# Patient Record
Sex: FEMALE | Race: WHITE | NOT HISPANIC OR LATINO | Employment: UNEMPLOYED | URBAN - METROPOLITAN AREA
[De-identification: names, ages, dates, MRNs, and addresses within clinical notes are randomized per-mention and may not be internally consistent; named-entity substitution may affect disease eponyms.]

---

## 2017-01-02 ENCOUNTER — OFFICE VISIT (OUTPATIENT)
Dept: URGENT CARE | Facility: CLINIC | Age: 52
End: 2017-01-02
Payer: OTHER GOVERNMENT

## 2017-01-02 VITALS
WEIGHT: 236.56 LBS | HEIGHT: 64 IN | HEART RATE: 108 BPM | SYSTOLIC BLOOD PRESSURE: 142 MMHG | OXYGEN SATURATION: 98 % | TEMPERATURE: 98 F | BODY MASS INDEX: 40.39 KG/M2 | DIASTOLIC BLOOD PRESSURE: 82 MMHG

## 2017-01-02 DIAGNOSIS — H65.90 NON-SUPPURATIVE OTITIS MEDIA, UNSPECIFIED LATERALITY: Primary | ICD-10-CM

## 2017-01-02 DIAGNOSIS — J02.9 SORE THROAT: ICD-10-CM

## 2017-01-02 DIAGNOSIS — J06.9 VIRAL URI WITH COUGH: ICD-10-CM

## 2017-01-02 LAB
CTP QC/QA: YES
S PYO RRNA THROAT QL PROBE: NEGATIVE

## 2017-01-02 PROCEDURE — 87880 STREP A ASSAY W/OPTIC: CPT | Mod: PBBFAC,PO | Performed by: PHYSICIAN ASSISTANT

## 2017-01-02 PROCEDURE — 87081 CULTURE SCREEN ONLY: CPT

## 2017-01-02 PROCEDURE — 99214 OFFICE O/P EST MOD 30 MIN: CPT | Mod: PBBFAC,PO | Performed by: PHYSICIAN ASSISTANT

## 2017-01-02 PROCEDURE — 99999 PR PBB SHADOW E&M-EST. PATIENT-LVL IV: CPT | Mod: PBBFAC,,, | Performed by: PHYSICIAN ASSISTANT

## 2017-01-02 PROCEDURE — 99214 OFFICE O/P EST MOD 30 MIN: CPT | Mod: S$PBB,,, | Performed by: PHYSICIAN ASSISTANT

## 2017-01-02 RX ORDER — PROMETHAZINE HYDROCHLORIDE AND DEXTROMETHORPHAN HYDROBROMIDE 6.25; 15 MG/5ML; MG/5ML
5 SYRUP ORAL EVERY 6 HOURS PRN
Qty: 120 ML | Refills: 0 | Status: SHIPPED | OUTPATIENT
Start: 2017-01-02 | End: 2018-05-10 | Stop reason: ALTCHOICE

## 2017-01-02 RX ORDER — BENZONATATE 100 MG/1
200 CAPSULE ORAL 3 TIMES DAILY PRN
Qty: 60 CAPSULE | Refills: 0 | Status: SHIPPED | OUTPATIENT
Start: 2017-01-02 | End: 2017-01-12

## 2017-01-02 RX ORDER — AMOXICILLIN 875 MG/1
875 TABLET, FILM COATED ORAL EVERY 12 HOURS
Qty: 14 TABLET | Refills: 0 | Status: SHIPPED | OUTPATIENT
Start: 2017-01-02 | End: 2017-01-09

## 2017-01-02 NOTE — PATIENT INSTRUCTIONS
Middle Ear Infection (Adult)  You have an infection of the middle ear, the space behind the eardrum. This is also called acute otitis media (AOM). Sometimes it is caused by the common cold. This is because congestion can block the internal passage (eustachian tube) that drains fluid from the middle ear. When the middle ear fills with fluid, bacteria can grow there and cause an infection. Oral antibiotics are used to treat this illness, not ear drops. Symptoms usually start to improve within 1 to 2 days of treatment.    Home care  The following are general care guidelines:  · Finish all of the antibiotic medicine given, even though you may feel better after the first few days.  · You may use over-the-counter medicine, such as acetaminophen or ibuprofen, to control pain and fever, unless something else was prescribed. If you have chronic liver or kidney disease or have ever had a stomach ulcer or gastrointestinal bleeding, talk with your healthcare provider before using these medicines. Do not give aspirin to anyone under 18 years of age who has a fever. It may cause severe illness or death.  Follow-up care  Follow up with your healthcare provider, or as advised, in 2 weeks if all symptoms have not gotten better, or if hearing doesn't go back to normal within 1 month.  When to seek medical advice  Call your healthcare provider right away if any of these occur:  · Ear pain gets worse or does not improve after 3 days of treatment  · Unusual drowsiness or confusion  · Neck pain, stiff neck, or headache  · Fluid or blood draining from the ear canal  · Fever of 100.4°F (38°C) or as advised   · Seizure  © 2833-5121 [employer]. [address] [place] [address], [place] [address]. All rights reserved. This information is not intended as a substitute for professional medical care. Always follow your healthcare professional's instructions.      PRICE therapy:  Protect the joint with stabilizing brace or taping  Rest the  extremity  Ice as many times a day for 20 minute intervals for first 48 hours or until inflammation has stabilized    Compression to provide support and help prevent swelling  Elevation above heart level to help decrease swelling  For pain, may take Ibuprofen or Naproxen    Follow up with Primary Care Provider/Orthopedics if no improvement.

## 2017-01-02 NOTE — MR AVS SNAPSHOT
[facility]  [address] [place] [address]  Phone: [phone]  Fax: [phone]                  Linette Galo   2017 9:00 AM   Office Visit    Description:  Female : 1965   Provider:  Tiffanie Yepez PA-C   Department:  [place] [facility]           Reason for Visit     Sinus Problem           Diagnoses this Visit        Comments    Left non-suppurative otitis media    -  Primary     Viral URI with cough                To Do List           Goals (5 Years of Data)     None      Follow-Up and Disposition     Return in about 2 days (around 2017), or if symptoms worsen or fail to improve.       These Medications        Disp Refills Start End    amoxicillin (AMOXIL) 875 MG tablet 14 tablet 0 2017    Take 1 tablet (875 mg total) by mouth every 12 (twelve) hours. - Oral    Pharmacy: [facility] [id number] [address] Ph #: [phone]       benzonatate (TESSALON) 100 MG capsule 60 capsule 0 2017    Take 2 capsules (200 mg total) by mouth 3 (three) times daily as needed for Cough. - Oral    Pharmacy: [facility] [address] [address] Ph #: [phone]       promethazine-dextromethorphan (PROMETHAZINE-DM) 6.25-15 mg/5 mL Syrp 120 mL 0 2017     Take 5 mLs by mouth every 6 (six) hours as needed. May cause drowsiness - Oral    Pharmacy: [facility] [address] [address] Ph #: [phone]         [facility]     [facility] On Call Nurse Care Line -  Assistance  Registered nurses in the [facility] provide clinical advisement, health education, appointment booking, and other advisory services.  Call for this free service at [phone].             Medications           Message regarding Medications     Verify the changes and/or additions to your medication regime listed below are the same as discussed with your clinician today.  If any of these  changes or additions are incorrect, please notify your healthcare provider.        START taking these NEW medications        Refills    amoxicillin (AMOXIL) 875 MG tablet 0    Sig: Take 1 tablet (875 mg total) by mouth every 12 (twelve) hours.    Class: Normal    Route: Oral    benzonatate (TESSALON) 100 MG capsule 0    Sig: Take 2 capsules (200 mg total) by mouth 3 (three) times daily as needed for Cough.    Class: Normal    Route: Oral    promethazine-dextromethorphan (PROMETHAZINE-DM) 6.25-15 mg/5 mL Syrp 0    Sig: Take 5 mLs by mouth every 6 (six) hours as needed. May cause drowsiness    Class: Normal    Route: Oral           Verify that the below list of medications is an accurate representation of the medications you are currently taking.  If none reported, the list may be blank. If incorrect, please contact your healthcare provider. Carry this list with you in case of emergency.           Current Medications     buPROPion (WELLBUTRIN) 100 MG tablet Take 100 mg by mouth 2 (two) times daily.    cholecalciferol, vitamin D3, (VITAMIN D3) 1,000 unit capsule Take 1 capsule (1,000 Units total) by mouth once daily.    cyanocobalamin, vitamin B-12, (VITAMIN B-12) 50 mcg tablet Take 50 mcg by mouth once daily.    duloxetine (CYMBALTA) 60 MG capsule Take 60 mg by mouth once daily.    lamotrigine (LAMICTAL) 100 MG tablet Take by mouth once daily. 100mg Oral PRN As directed.    lisdexamfetamine (VYVANSE) 50 MG capsule Take 50 mg by mouth every morning.    multivitamin capsule Take 1 capsule by mouth once daily.    amoxicillin (AMOXIL) 875 MG tablet Take 1 tablet (875 mg total) by mouth every 12 (twelve) hours.    AMPHETAMINE SALT COMBO 10 MG tablet Take 1 tablet by mouth every evening.    benzonatate (TESSALON) 100 MG capsule Take 2 capsules (200 mg total) by mouth 3 (three) times daily as needed for Cough.    cetirizine (ZYRTEC) 10 MG tablet Take 1 tablet (10 mg total) by mouth every evening.    fluticasone (FLONASE) 50  "mcg/actuation nasal spray 2 sprays by Each Nare route once daily.    promethazine-dextromethorphan (PROMETHAZINE-DM) 6.25-15 mg/5 mL Syrp Take 5 mLs by mouth every 6 (six) hours as needed. May cause drowsiness           Clinical Reference Information           Vital Signs - Last Recorded  Most recent update: 1/2/2017  9:08 AM by Leena Easton LPN    BP Pulse Temp Ht Wt SpO2    (!) 142/82 108 98.3 °F (36.8 °C) (Tympanic) 5' 4" (1.626 m) 107.3 kg (236 lb 8.9 oz) 98%    BMI                40.6 kg/m2          Blood Pressure          Most Recent Value    BP  (!)  142/82      Allergies as of 1/2/2017     No Known Allergies      Immunizations Administered on Date of Encounter - 1/2/2017     None      Instructions      Middle Ear Infection (Adult)  You have an infection of the middle ear, the space behind the eardrum. This is also called acute otitis media (AOM). Sometimes it is caused by the common cold. This is because congestion can block the internal passage (eustachian tube) that drains fluid from the middle ear. When the middle ear fills with fluid, bacteria can grow there and cause an infection. Oral antibiotics are used to treat this illness, not ear drops. Symptoms usually start to improve within 1 to 2 days of treatment.    Home care  The following are general care guidelines:  · Finish all of the antibiotic medicine given, even though you may feel better after the first few days.  · You may use over-the-counter medicine, such as acetaminophen or ibuprofen, to control pain and fever, unless something else was prescribed. If you have chronic liver or kidney disease or have ever had a stomach ulcer or gastrointestinal bleeding, talk with your healthcare provider before using these medicines. Do not give aspirin to anyone under 18 years of age who has a fever. It may cause severe illness or death.  Follow-up care  Follow up with your healthcare provider, or as advised, in 2 weeks if all symptoms have not gotten " better, or if hearing doesn't go back to normal within 1 month.  When to seek medical advice  Call your healthcare provider right away if any of these occur:  · Ear pain gets worse or does not improve after 3 days of treatment  · Unusual drowsiness or confusion  · Neck pain, stiff neck, or headache  · Fluid or blood draining from the ear canal  · Fever of 100.4°F (38°C) or as advised   · Seizure  © 2770-0738 [employer]. [address] [place] [address], [place] [address]. All rights reserved. This information is not intended as a substitute for professional medical care. Always follow your healthcare professional's instructions.      PRICE therapy:  Protect the joint with stabilizing brace or taping  Rest the extremity  Ice as many times a day for 20 minute intervals for first 48 hours or until inflammation has stabilized    Compression to provide support and help prevent swelling  Elevation above heart level to help decrease swelling  For pain, may take Ibuprofen or Naproxen    Follow up with Primary Care Provider/Orthopedics if no improvement.

## 2017-01-02 NOTE — PROGRESS NOTES
"Subjective:       Patient ID: Linette Galo is a 51 y.o. female.    Chief Complaint: Sinus Problem    Sinus Problem   This is a new problem. Episode onset: 4 days ago. The problem is unchanged. There has been no fever. Her pain is at a severity of 8/10. The pain is severe. Associated symptoms include congestion (yellow discharge), coughing (non productive), ear pain (left ear pain), sinus pressure ("teeth hurt") and a sore throat. Pertinent negatives include no chills, shortness of breath or sneezing. Past treatments include nothing.     Review of Systems   Constitutional: Negative for chills, fatigue, fever and unexpected weight change.   HENT: Positive for congestion (yellow discharge), ear pain (left ear pain), sinus pressure ("teeth hurt") and sore throat. Negative for ear discharge, postnasal drip, rhinorrhea, sneezing, trouble swallowing and voice change.    Eyes: Negative for pain and discharge.   Respiratory: Positive for cough (non productive). Negative for shortness of breath and wheezing.    Cardiovascular: Negative for chest pain and leg swelling.   Gastrointestinal: Negative for abdominal pain, nausea and vomiting.       Objective:      Visit Vitals    BP (!) 142/82    Pulse 108    Temp 98.3 °F (36.8 °C) (Tympanic)    Ht 5' 4" (1.626 m)    Wt 107.3 kg (236 lb 8.9 oz)    SpO2 98%    BMI 40.6 kg/m2     Physical Exam   Constitutional: She is oriented to person, place, and time. She appears well-developed and well-nourished. No distress.   HENT:   Head: Normocephalic and atraumatic.   Right Ear: External ear normal.   Left Ear: External ear normal. Tympanic membrane is erythematous.   Nose: Nose normal.   Mouth/Throat: Oropharynx is clear and moist. No oropharyngeal exudate.   Eyes: Conjunctivae and EOM are normal. Pupils are equal, round, and reactive to light. Right eye exhibits no discharge. Left eye exhibits no discharge. No scleral icterus.   Neck: Normal range of motion. Neck supple. "   Cardiovascular: Normal rate, regular rhythm, normal heart sounds and intact distal pulses.  Exam reveals no gallop and no friction rub.    No murmur heard.  Pulmonary/Chest: Effort normal and breath sounds normal. No stridor. No respiratory distress. She has no wheezes. She has no rales. She exhibits no tenderness.   Lymphadenopathy:     She has no cervical adenopathy.   Neurological: She is alert and oriented to person, place, and time. Coordination normal.   Skin: Skin is warm and dry. No rash noted. She is not diaphoretic. No erythema. No pallor.   Nursing note and vitals reviewed.      Assessment:       1. Left non-suppurative otitis media    2. Viral URI with cough    3. Sore throat        Plan:       Left non-suppurative otitis media  -     amoxicillin (AMOXIL) 875 MG tablet; Take 1 tablet (875 mg total) by mouth every 12 (twelve) hours.  Dispense: 14 tablet; Refill: 0    Viral URI with cough  -     benzonatate (TESSALON) 100 MG capsule; Take 2 capsules (200 mg total) by mouth 3 (three) times daily as needed for Cough.  Dispense: 60 capsule; Refill: 0  -     promethazine-dextromethorphan (PROMETHAZINE-DM) 6.25-15 mg/5 mL Syrp; Take 5 mLs by mouth every 6 (six) hours as needed. May cause drowsiness  Dispense: 120 mL; Refill: 0    Sore throat  -     POCT Rapid Strep A  -     Strep A culture, throat      Patient also complained of R wrist pain gradual over past few months, has hx of carpal tunnel surgery. Worried about arthritis. Suggested to patient to resume wearing splint especially at night and may do ice and NSAID's along with resting the wrist. Suggested early follow up with PCP to discuss due to more complicated history of carpal tunnel and surgery.  PRICE therapy:  Protect the joint with stabilizing brace or taping  Rest the extremity  Ice as many times a day for 20 minute intervals for first 48 hours or until inflammation has stabilized    Compression to provide support and help prevent  swelling  Elevation above heart level to help decrease swelling  For pain, may take Ibuprofen or Naproxen    Follow up with Primary Care Provider/Orthopedics if no improvement.       Heather Trant PA-C [facility]

## 2017-01-04 LAB — BACTERIA THROAT CULT: NORMAL

## 2017-02-02 ENCOUNTER — HOSPITAL ENCOUNTER (OUTPATIENT)
Dept: RADIOLOGY | Facility: HOSPITAL | Age: 52
Discharge: HOME | End: 2017-02-02
Attending: FAMILY MEDICINE
Payer: OTHER GOVERNMENT

## 2017-02-02 ENCOUNTER — OFFICE VISIT (OUTPATIENT)
Dept: URGENT CARE | Facility: CLINIC | Age: 52
End: 2017-02-02
Payer: OTHER GOVERNMENT

## 2017-02-02 VITALS
WEIGHT: 239.19 LBS | HEART RATE: 95 BPM | TEMPERATURE: 99 F | OXYGEN SATURATION: 98 % | SYSTOLIC BLOOD PRESSURE: 130 MMHG | HEIGHT: 64 IN | BODY MASS INDEX: 40.83 KG/M2 | DIASTOLIC BLOOD PRESSURE: 80 MMHG

## 2017-02-02 DIAGNOSIS — M25.531 WRIST PAIN, ACUTE, RIGHT: Primary | ICD-10-CM

## 2017-02-02 DIAGNOSIS — M79.644 THUMB PAIN, RIGHT: ICD-10-CM

## 2017-02-02 DIAGNOSIS — M25.531 WRIST PAIN, ACUTE, RIGHT: ICD-10-CM

## 2017-02-02 DIAGNOSIS — S63.501A WRIST SPRAIN, RIGHT, INITIAL ENCOUNTER: ICD-10-CM

## 2017-02-02 PROCEDURE — 73140 X-RAY EXAM OF FINGER(S): CPT | Mod: TC,PO

## 2017-02-02 PROCEDURE — 99999 PR PBB SHADOW E&M-EST. PATIENT-LVL IV: CPT | Mod: PBBFAC,,, | Performed by: PHYSICIAN ASSISTANT

## 2017-02-02 PROCEDURE — 73110 X-RAY EXAM OF WRIST: CPT | Mod: 26,RT,, | Performed by: RADIOLOGY

## 2017-02-02 PROCEDURE — 99214 OFFICE O/P EST MOD 30 MIN: CPT | Mod: PBBFAC,PO | Performed by: PHYSICIAN ASSISTANT

## 2017-02-02 PROCEDURE — 99213 OFFICE O/P EST LOW 20 MIN: CPT | Mod: S$PBB,,, | Performed by: PHYSICIAN ASSISTANT

## 2017-02-02 PROCEDURE — 73140 X-RAY EXAM OF FINGER(S): CPT | Mod: 26,,, | Performed by: RADIOLOGY

## 2017-02-02 PROCEDURE — 73110 X-RAY EXAM OF WRIST: CPT | Mod: TC,PO,RT

## 2017-02-02 NOTE — PATIENT INSTRUCTIONS
Rest  Ice pack 3-4 times a day  Compression (brace/splint)  Elevation  Tylenol or Ibuprofen for pain as needed    Follow up with Ortho or Primary Care Physician if any worsening in symptoms or no improvement after 2 weeks.            Wrist Splint: Velcro  A splint is designed to prevent movement of the bones, muscles and tendons. Velcro wrist splints are used because of their comfort and convenience for wrist and hand injuries. In certain conditions, the splint can be removed when bathing or changing clothes. The condition you are being treated for will determine how long you should wear the splint and if it is safe to remove your splint before your next visit. If you are unsure, ask your nurse or doctor.  When to seek medical advice  Call your healthcare provider right away if any of these occur:  · Increased pain or swelling under the splint or in the hand or fingers  · Fingers or hand becomes cold, blue, numb or tingly  © 8885-3992 [employer]. [address] [place] [address]. All rights reserved. This information is not intended as a substitute for professional medical care. Always follow your healthcare professional's instructions.        Wrist Sprain  A sprain is an injury to the ligaments or capsule that holds a joint together. There are no broken bones. Most sprains take about 3 to 6 weeks to heal. If it a severe sprain where the ligament is completely torn, it can take months to recover.    Most wrist sprains are treated with a splint, wrist brace, or elastic wrap for support. Severe sprains may require surgery.  Home care  · Keep your arm elevated to reduce pain and swelling. This is very important during the first 48 hours.  · Apply an ice pack over the injured area for 15 to 20 minutes every 3 to 6 hours. You should do this for the first 24 to 48 hours. You can make an ice pack by filling a plastic bag that seals at the top with ice cubes and then wrapping it with a thin towel.  Continue to use ice packs for relief of pain and swelling as needed. As the ice melts, be careful to avoid getting your wrap, splint, or cast wet. After 48 hours, apply heat (warm shower or warm bath) for 15 to 20 minutes several times a day, or alternate ice and heat.   · You may use over-the-counter pain medicine to control pain, unless another pain medicine was prescribed. If you have chronic liver or kidney disease or ever had a stomach ulcer or GI bleeding, talk with your doctor before using these medicines.  · If you were given a splint or brace, wear it for the time advised by your doctor.  Follow-up care  Follow up with your healthcare provider as advised. Any X-rays you had today dont show any broken bones, breaks, or fractures. Sometimes fractures dont show up on the first X-ray. Bruises and sprains can sometimes hurt as much as a fracture. These injuries can take time to heal completely. If your symptoms dont improve or they get worse, talk with your doctor. You may need a repeat X-ray. If X-rays were taken, you will be told of any new findings that may affect your care.  When to seek medical advice  Call your healthcare provider right away if any of these occur:  · Pain or swelling increases  · Fingers or hand becomes cold, blue, numb, or tingly  © 7376-4135 [employer]. [address] [place] [address], [place] [address]. All rights reserved. This information is not intended as a substitute for professional medical care. Always follow your healthcare professional's instructions.

## 2017-02-02 NOTE — PROGRESS NOTES
"Subjective:    Patient ID: Linette Galo is a 51 y.o. female.    Chief Complaint: Wrist Pain ((R) including (R) thumb)    Wrist Pain    The pain is present in the right wrist and right fingers. This is a new problem. The current episode started in the past 7 days. There has been no history of extremity trauma. The quality of the pain is described as aching. The pain is at a severity of 6/10. Pertinent negatives include no fever, inability to bear weight, stiffness or tingling. She has tried nothing for the symptoms. The treatment provided no relief. Carpal tunnel surgery about 10 years ago     Review of Systems   Constitutional: Negative for chills and fever.   HENT: Negative for ear pain and sore throat.    Respiratory: Negative for cough and wheezing.    Gastrointestinal: Negative for diarrhea and vomiting.   Musculoskeletal: Positive for arthralgias, joint swelling and myalgias. Negative for stiffness.   Neurological: Negative for tingling.     Objective:     Visit Vitals    /80 (BP Location: Right arm, Patient Position: Sitting, BP Method: Manual)    Pulse 95    Temp 98.8 °F (37.1 °C) (Oral)    Ht 5' 3.5" (1.613 m)    Wt 108.5 kg (239 lb 3.2 oz)    SpO2 98%    BMI 41.71 kg/m2       Physical Exam   Constitutional: She is oriented to person, place, and time. She appears well-developed and well-nourished.   HENT:   Head: Normocephalic and atraumatic.   Right Ear: External ear normal.   Left Ear: External ear normal.   Nose: Nose normal.   Eyes: Conjunctivae and EOM are normal.   Neck: Normal range of motion. Neck supple.   Pulmonary/Chest: Effort normal. No respiratory distress.   Musculoskeletal:        Right wrist: She exhibits tenderness. She exhibits normal range of motion and no swelling.        Arms:       Right hand: She exhibits normal capillary refill and no swelling.   Pain with flexion and extension of wrist.  Right wrist tender to palpation.  Equal strength bilateral hands.  Right " thumb: negative tender to palpation.    Neurological: She is alert and oriented to person, place, and time.   Skin: Skin is warm.   Nursing note and vitals reviewed.    Assessment:     1. Wrist pain, acute, right    2. Thumb pain, right    3. Wrist sprain, right, initial encounter      Plan:   Wrist pain, acute, right  -     X-Ray Wrist Complete Right; Future; Expected date: 2/2/17    Thumb pain, right  -     X-Ray Finger 2 or More Views; Future; Expected date: 2/2/17    Wrist sprain, right, initial encounter    Xray Findings:    3 coned-down images of the thumb and multiple views RIGHT wrist reviewed.    There is minimal degenerative change noted at the radiocarpal joint.  Mild juxta-articular osteopenia present.  Early degenerative changes noted at the first CMC, MCP and IP joints without acute fracture or dislocation.  No radiopaque foreign body or soft tissue calcification noted.    Reviewed xray results with patient  Velcro splint applied to right wrist    Rest  Ice pack 3-4 times a day  Compression (brace/splint)  Elevation  Tylenol or Ibuprofen for pain as needed    Follow up with Ortho or Primary Care Physician if any worsening in symptoms or no improvement after 2 weeks.  After visit summary given and discussed.  Patient verbalized understanding and agrees with treatment plan.  Patient remained stable and was discharged in no acute distress.

## 2017-12-28 ENCOUNTER — OFFICE VISIT (OUTPATIENT)
Dept: URGENT CARE | Facility: CLINIC | Age: 52
End: 2017-12-28
Payer: OTHER GOVERNMENT

## 2017-12-28 VITALS
SYSTOLIC BLOOD PRESSURE: 140 MMHG | OXYGEN SATURATION: 99 % | DIASTOLIC BLOOD PRESSURE: 90 MMHG | HEIGHT: 64 IN | BODY MASS INDEX: 41.21 KG/M2 | TEMPERATURE: 99 F | HEART RATE: 105 BPM | WEIGHT: 241.38 LBS

## 2017-12-28 DIAGNOSIS — J01.00 ACUTE NON-RECURRENT MAXILLARY SINUSITIS: Primary | ICD-10-CM

## 2017-12-28 PROCEDURE — 99213 OFFICE O/P EST LOW 20 MIN: CPT | Mod: PBBFAC,PO | Performed by: PHYSICIAN ASSISTANT

## 2017-12-28 PROCEDURE — 3008F BODY MASS INDEX DOCD: CPT | Mod: ,,, | Performed by: PHYSICIAN ASSISTANT

## 2017-12-28 PROCEDURE — 99999 PR PBB SHADOW E&M-EST. PATIENT-LVL III: CPT | Mod: PBBFAC,,, | Performed by: PHYSICIAN ASSISTANT

## 2017-12-28 PROCEDURE — 99214 OFFICE O/P EST MOD 30 MIN: CPT | Mod: S$PBB,,, | Performed by: PHYSICIAN ASSISTANT

## 2017-12-28 RX ORDER — DOXYCYCLINE 100 MG/1
100 CAPSULE ORAL EVERY 12 HOURS
Qty: 14 CAPSULE | Refills: 0 | Status: SHIPPED | OUTPATIENT
Start: 2017-12-28 | End: 2018-01-04

## 2017-12-28 RX ORDER — METHYLPREDNISOLONE 4 MG/1
TABLET ORAL
Qty: 1 PACKAGE | Refills: 0 | Status: SHIPPED | OUTPATIENT
Start: 2017-12-28 | End: 2018-05-10 | Stop reason: ALTCHOICE

## 2017-12-28 NOTE — PROGRESS NOTES
"Subjective:       Patient ID: Linette Galo is a 52 y.o. female.    Chief Complaint: Sinus Problem    Sinus Problem   This is a new problem. The current episode started 1 to 4 weeks ago (seen 2.5 weeks ago for what she thought was flu at outside UC, treated with amoxil and flonase no improvement in symptoms. Continued sinus pressure R>L). The problem is unchanged. There has been no fever. The pain is moderate. Associated symptoms include congestion (thick yellow/green nasal discharge), headaches and sinus pressure (pain into teeth). Pertinent negatives include no chills, coughing, ear pain, shortness of breath, sneezing or sore throat. Treatments tried: amoxil, flonase, mucinex. The treatment provided mild relief.     Review of Systems   Constitutional: Negative for chills, fatigue and fever.   HENT: Positive for congestion (thick yellow/green nasal discharge), sinus pain and sinus pressure (pain into teeth). Negative for ear discharge, ear pain, postnasal drip, rhinorrhea, sneezing and sore throat.    Eyes: Negative for pain and discharge.   Respiratory: Negative for cough, shortness of breath and wheezing.    Cardiovascular: Negative for chest pain and leg swelling.   Gastrointestinal: Negative for abdominal pain, nausea and vomiting.   Musculoskeletal: Negative for myalgias.   Skin: Negative for rash.   Neurological: Positive for headaches.       Objective:      BP (!) 140/90 (BP Location: Left arm, Patient Position: Sitting, BP Method: Large (Manual))   Pulse 105   Temp 99.4 °F (37.4 °C) (Tympanic)   Ht 5' 4" (1.626 m)   Wt 109.5 kg (241 lb 6.5 oz)   SpO2 99%   BMI 41.44 kg/m²   Physical Exam   Constitutional: She is oriented to person, place, and time. She appears well-developed and well-nourished. No distress.   HENT:   Head: Normocephalic and atraumatic.   Right Ear: Tympanic membrane, external ear and ear canal normal.   Left Ear: Tympanic membrane, external ear and ear canal normal.   Nose: " Nose normal. Right sinus exhibits no maxillary sinus tenderness and no frontal sinus tenderness. Left sinus exhibits no maxillary sinus tenderness and no frontal sinus tenderness.   Mouth/Throat: Oropharynx is clear and moist. No oropharyngeal exudate or posterior oropharyngeal erythema. No tonsillar exudate.   Eyes: Conjunctivae and EOM are normal. Pupils are equal, round, and reactive to light. Right eye exhibits no discharge. Left eye exhibits no discharge.   Neck: Normal range of motion. Neck supple.   Cardiovascular: Normal rate, regular rhythm, normal heart sounds and intact distal pulses.  Exam reveals no gallop and no friction rub.    No murmur heard.  Pulmonary/Chest: Effort normal and breath sounds normal. No stridor. No respiratory distress. She has no wheezes. She has no rales. She exhibits no tenderness.   Lymphadenopathy:     She has no cervical adenopathy.   Neurological: She is alert and oriented to person, place, and time. Coordination normal.   Skin: Skin is warm and dry. No rash noted. She is not diaphoretic. No erythema. No pallor.   Nursing note and vitals reviewed.      Assessment:       1. Acute non-recurrent maxillary sinusitis        Plan:       Acute non-recurrent maxillary sinusitis  -     doxycycline (VIBRAMYCIN) 100 MG Cap; Take 1 capsule (100 mg total) by mouth every 12 (twelve) hours. Note to Pharmacy: Can substitute for Monodox if needed  Dispense: 14 capsule; Refill: 0  -     methylPREDNISolone (MEDROL DOSEPACK) 4 mg tablet; use as directed  Dispense: 1 Package; Refill: 0    Likely bacterial sinusitis no improvement after amoxil, will start doxy and steroid pack, RTC if no improvement.    Take all antibiotics even if you feel better before completing the entire regimen.   -Use normal saline nasal spray during the day every 3 hours for sinus irrigation and congestion.    -Avoid exposure to cigarette smoke.    -Practice good handwashing.  -Use warm compresses to sinuses for relief  several times a day  -Increase fluid intake to at least 64 ounces of water per day to keep secretions thin and loose  -Use a humidifier in home to help relieve congestion or steam inhalation three times a day for 20-30 minutes.  -Sleep with head of bed elevated   -Take tylenol or ibuprofen as needed for sinus headache.    -Use warm salt water gargles for throat discomfort.  -Avoid caffeine and alcohol    Follow up with PCP in 1 week if no improvement or sooner if worsening.    Go to ER if you develop fever of 103 or higher, chest pain, shortness of breath, upper back pain, stiff neck or severe headache.        Heather Trant PA-C [facility]

## 2017-12-28 NOTE — PATIENT INSTRUCTIONS
Sinusitis (Antibiotic Treatment)    The sinuses are air-filled spaces within the bones of the face. They connect to the inside of the nose. Sinusitis is an inflammation of the tissue lining the sinus cavity. Sinus inflammation can occur during a cold. It can also be due to allergies to pollens and other particles in the air. Sinusitis can cause symptoms of sinus congestion and fullness. A sinus infection causes fever, headache and facial pain. There is often green or yellow drainage from the nose or into the back of the throat (post-nasal drip). You have been given antibiotics to treat this condition.  Home care:  · Take the full course of antibiotics as instructed. Do not stop taking them, even if you feel better.  · Drink plenty of water, hot tea, and other liquids. This may help thin mucus. It also may promote sinus drainage.  · Heat may help soothe painful areas of the face. Use a towel soaked in hot water. Or,  the shower and direct the hot spray onto your face. Using a vaporizer along with a menthol rub at night may also help.   · An expectorant containing guaifenesin may help thin the mucus and promote drainage from the sinuses.  · Over-the-counter decongestants may be used unless a similar medicine was prescribed. Nasal sprays work the fastest. Use one that contains phenylephrine or oxymetazoline. First blow the nose gently. Then use the spray. Do not use these medicines more often than directed on the label or symptoms may get worse. You may also use tablets containing pseudoephedrine. Avoid products that combine ingredients, because side effects may be increased. Read labels. You can also ask the pharmacist for help. (NOTE: Persons with high blood pressure should not use decongestants. They can raise blood pressure.)  · Over-the-counter antihistamines may help if allergies contributed to your sinusitis.    · Do not use nasal rinses or irrigation during an acute sinus infection, unless told to by  your health care provider. Rinsing may spread the infection to other sinuses.  · Use acetaminophen or ibuprofen to control pain, unless another pain medicine was prescribed. (If you have chronic liver or kidney disease or ever had a stomach ulcer, talk with your doctor before using these medicines. Aspirin should never be used in anyone under 18 years of age who is ill with a fever. It may cause severe liver damage.)  · Don't smoke. This can worsen symptoms.  Follow-up care  Follow up with your healthcare provider or our staff if you are not improving within the next week.  When to seek medical advice  Call your healthcare provider if any of these occur:  · Facial pain or headache becoming more severe  · Stiff neck  · Unusual drowsiness or confusion  · Swelling of the forehead or eyelids  · Vision problems, including blurred or double vision  · Fever of 100.4ºF (38ºC) or higher, or as directed by your healthcare provider  · Seizure  · Breathing problems  · Symptoms not resolving within 10 days  Date Last Reviewed: 4/13/2015  © 5517-7229 [employer]. [address] [place] [address]. All rights reserved. This information is not intended as a substitute for professional medical care. Always follow your healthcare professional's instructions.      Take all antibiotics even if you feel better before completing the entire regimen.   -Use normal saline nasal spray during the day every 3 hours for sinus irrigation and congestion.    -Avoid exposure to cigarette smoke.    -Practice good handwashing.  -Use warm compresses to sinuses for relief several times a day  -Increase fluid intake to at least 64 ounces of water per day to keep secretions thin and loose  -Use a humidifier in home to help relieve congestion or steam inhalation three times a day for 20-30 minutes.  -Sleep with head of bed elevated   -Take tylenol or ibuprofen as needed for sinus headache.    -Use warm salt water gargles for throat  discomfort.  -Avoid caffeine and alcohol    Follow up with PCP in 1 week if no improvement or sooner if worsening.    Go to ER if you develop fever of 103 or higher, chest pain, shortness of breath, upper back pain, stiff neck or severe headache.

## 2018-05-10 ENCOUNTER — LAB VISIT (OUTPATIENT)
Dept: LAB | Facility: HOSPITAL | Age: 53
End: 2018-05-10
Attending: FAMILY MEDICINE
Payer: OTHER GOVERNMENT

## 2018-05-10 ENCOUNTER — OFFICE VISIT (OUTPATIENT)
Dept: INTERNAL MEDICINE | Facility: CLINIC | Age: 53
End: 2018-05-10
Payer: OTHER GOVERNMENT

## 2018-05-10 VITALS
SYSTOLIC BLOOD PRESSURE: 128 MMHG | BODY MASS INDEX: 40.73 KG/M2 | WEIGHT: 244.5 LBS | TEMPERATURE: 99 F | HEIGHT: 65 IN | DIASTOLIC BLOOD PRESSURE: 88 MMHG | HEART RATE: 80 BPM

## 2018-05-10 DIAGNOSIS — M65.341 TRIGGER RING FINGER OF RIGHT HAND: ICD-10-CM

## 2018-05-10 DIAGNOSIS — Z12.39 BREAST CANCER SCREENING: ICD-10-CM

## 2018-05-10 DIAGNOSIS — R53.83 FATIGUE, UNSPECIFIED TYPE: ICD-10-CM

## 2018-05-10 DIAGNOSIS — R53.83 FATIGUE, UNSPECIFIED TYPE: Primary | ICD-10-CM

## 2018-05-10 DIAGNOSIS — Z12.11 COLON CANCER SCREENING: ICD-10-CM

## 2018-05-10 DIAGNOSIS — M65.4 DE QUERVAIN'S DISEASE (TENOSYNOVITIS): ICD-10-CM

## 2018-05-10 LAB
ALBUMIN SERPL BCP-MCNC: 3.9 G/DL
ALP SERPL-CCNC: 71 U/L
ALT SERPL W/O P-5'-P-CCNC: 17 U/L
ANION GAP SERPL CALC-SCNC: 8 MMOL/L
AST SERPL-CCNC: 18 U/L
BASOPHILS # BLD AUTO: 0.05 K/UL
BASOPHILS NFR BLD: 0.7 %
BILIRUB SERPL-MCNC: 0.3 MG/DL
BUN SERPL-MCNC: 16 MG/DL
CALCIUM SERPL-MCNC: 9.3 MG/DL
CHLORIDE SERPL-SCNC: 107 MMOL/L
CO2 SERPL-SCNC: 23 MMOL/L
CREAT SERPL-MCNC: 0.9 MG/DL
DIFFERENTIAL METHOD: NORMAL
EOSINOPHIL # BLD AUTO: 0.2 K/UL
EOSINOPHIL NFR BLD: 2.3 %
ERYTHROCYTE [DISTWIDTH] IN BLOOD BY AUTOMATED COUNT: 13.5 %
EST. GFR  (AFRICAN AMERICAN): >60 ML/MIN/1.73 M^2
EST. GFR  (NON AFRICAN AMERICAN): >60 ML/MIN/1.73 M^2
FERRITIN SERPL-MCNC: 21 NG/ML
GLUCOSE SERPL-MCNC: 86 MG/DL
HCT VFR BLD AUTO: 41.5 %
HGB BLD-MCNC: 13.3 G/DL
IMM GRANULOCYTES # BLD AUTO: 0.03 K/UL
IMM GRANULOCYTES NFR BLD AUTO: 0.4 %
IRON SERPL-MCNC: 80 UG/DL
LYMPHOCYTES # BLD AUTO: 1.7 K/UL
LYMPHOCYTES NFR BLD: 24.7 %
MCH RBC QN AUTO: 30 PG
MCHC RBC AUTO-ENTMCNC: 32 G/DL
MCV RBC AUTO: 94 FL
MONOCYTES # BLD AUTO: 0.4 K/UL
MONOCYTES NFR BLD: 5.6 %
NEUTROPHILS # BLD AUTO: 4.5 K/UL
NEUTROPHILS NFR BLD: 66.3 %
NRBC BLD-RTO: 0 /100 WBC
PLATELET # BLD AUTO: 178 K/UL
PMV BLD AUTO: 12.6 FL
POTASSIUM SERPL-SCNC: 4.7 MMOL/L
PROT SERPL-MCNC: 7.2 G/DL
RBC # BLD AUTO: 4.44 M/UL
SATURATED IRON: 19 %
SODIUM SERPL-SCNC: 138 MMOL/L
TOTAL IRON BINDING CAPACITY: 431 UG/DL
TRANSFERRIN SERPL-MCNC: 291 MG/DL
TSH SERPL DL<=0.005 MIU/L-ACNC: 2.28 UIU/ML
VIT B12 SERPL-MCNC: 383 PG/ML
WBC # BLD AUTO: 6.83 K/UL

## 2018-05-10 PROCEDURE — 90471 IMMUNIZATION ADMIN: CPT | Mod: PBBFAC,PO

## 2018-05-10 PROCEDURE — 82607 VITAMIN B-12: CPT

## 2018-05-10 PROCEDURE — 99214 OFFICE O/P EST MOD 30 MIN: CPT | Mod: S$PBB,,, | Performed by: FAMILY MEDICINE

## 2018-05-10 PROCEDURE — 85025 COMPLETE CBC W/AUTO DIFF WBC: CPT

## 2018-05-10 PROCEDURE — 83540 ASSAY OF IRON: CPT

## 2018-05-10 PROCEDURE — 99213 OFFICE O/P EST LOW 20 MIN: CPT | Mod: PBBFAC,PO,25 | Performed by: FAMILY MEDICINE

## 2018-05-10 PROCEDURE — 84443 ASSAY THYROID STIM HORMONE: CPT

## 2018-05-10 PROCEDURE — 82728 ASSAY OF FERRITIN: CPT

## 2018-05-10 PROCEDURE — 80053 COMPREHEN METABOLIC PANEL: CPT

## 2018-05-10 PROCEDURE — 99999 PR PBB SHADOW E&M-EST. PATIENT-LVL III: CPT | Mod: PBBFAC,,, | Performed by: FAMILY MEDICINE

## 2018-05-10 PROCEDURE — 36415 COLL VENOUS BLD VENIPUNCTURE: CPT | Mod: PO

## 2018-05-10 RX ORDER — DEXTROAMPHETAMINE SULFATE, DEXTROAMPHETAMINE SACCHARATE, AMPHETAMINE ASPARTATE MONOHYDRATE, AND AMPHETAMINE SULFATE 12.5; 12.5; 12.5; 12.5 MG/1; MG/1; MG/1; MG/1
1 CAPSULE, EXTENDED RELEASE ORAL DAILY
COMMUNITY
Start: 2018-04-25 | End: 2019-12-06

## 2018-05-10 RX ORDER — BUPROPION HYDROCHLORIDE 200 MG/1
1 TABLET, EXTENDED RELEASE ORAL DAILY
COMMUNITY
Start: 2018-03-06 | End: 2021-01-27

## 2018-05-11 NOTE — PROGRESS NOTES
"Subjective:      Patient ID: Linette Galo is a 52 y.o. female.    Chief Complaint: Hand Pain (LT thumb, Rt ring finger)    HPI  51 yo female here with a few issues today.  Hx of bariatric surgery and she had been getting iron infusions a few yrs ago.    She has noticed increased fatigue overall and wants levels checked.  She also reports R hand/ring finger is getting stuck/painful.  Knows it is trigger finger, not quite ready for intervention.  Her L thumb is hurting at base.  No trauma/injury.  No swelling.    She is babysitting her infant grandchildren.    Past Medical History:   Diagnosis Date    ADD (attention deficit disorder)     Anemia     Anxiety     Followed by Psychology    Depression     bipolar, anxiety, ADD    Positive ARIK (antinuclear antibody)     Dr. Ross//following     Family History   Problem Relation Age of Onset    Breast cancer Maternal Grandmother     Colon cancer Maternal Grandmother     Hypertension Maternal Grandmother     Diabetes Maternal Grandmother     Thrombophilia Father     Parkinsonism Father     Alzheimer's disease Father     Lumbar disc disease Father     Thrombophilia Sister     Hypertension Mother     Diabetes Mother     Hypertension Paternal Grandmother     Diabetes Paternal Grandmother     Ovarian cancer Neg Hx      Past Surgical History:   Procedure Laterality Date    CARPAL TUNNEL RELEASE      both hands    GASTRIC BYPASS  2008    HYSTERECTOMY      RALH (retains ovaries)    TONSILLECTOMY       Social History   Substance Use Topics    Smoking status: Never Smoker    Smokeless tobacco: Never Used    Alcohol use Yes      Comment: socially       /88   Pulse 80   Temp 99.1 °F (37.3 °C) (Tympanic)   Ht 5' 4.5" (1.638 m)   Wt 110.9 kg (244 lb 7.8 oz)   BMI 41.32 kg/m²     Review of Systems   Constitutional: Positive for activity change and fatigue.   Gastrointestinal: Negative for blood in stool.   Musculoskeletal: Positive for " arthralgias.       Objective:     Physical Exam   Constitutional: She is oriented to person, place, and time. She appears well-developed and well-nourished.   Cardiovascular: Normal rate, regular rhythm and normal heart sounds.    Pulmonary/Chest: Effort normal and breath sounds normal. No respiratory distress.   Musculoskeletal: She exhibits tenderness.        Arms:  Right ring finger/trigger  Tender at base of L thumb   Neurological: She is alert and oriented to person, place, and time.   Nursing note and vitals reviewed.      Lab Results   Component Value Date    WBC 6.83 05/10/2018    HGB 13.3 05/10/2018    HCT 41.5 05/10/2018     05/10/2018    CHOL 199 08/19/2015    TRIG 59 08/19/2015    HDL 58 08/19/2015    ALT 17 05/10/2018    AST 18 05/10/2018     05/10/2018    K 4.7 05/10/2018     05/10/2018    CREATININE 0.9 05/10/2018    BUN 16 05/10/2018    CO2 23 05/10/2018    TSH 2.282 05/10/2018    INR 1.0 04/05/2012    HGBA1C 6.3 (H) 12/12/2007       Assessment:     1. Fatigue, unspecified type    2. De Quervain's disease (tenosynovitis)    3. Trigger ring finger of right hand    4. Colon cancer screening    5. Breast cancer screening         Plan:     Fatigue, unspecified type  -     CBC auto differential; Future; Expected date: 05/10/2018  -     Comprehensive metabolic panel; Future; Expected date: 05/10/2018  -     TSH; Future; Expected date: 05/10/2018  -     Ferritin; Future; Expected date: 05/10/2018  -     Iron and TIBC; Future; Expected date: 05/10/2018  -     Vitamin B12; Future; Expected date: 05/10/2018    De Quervain's disease (tenosynovitis)    Trigger ring finger of right hand    Colon cancer screening  -     Case request GI: COLONOSCOPY    Breast cancer screening  -     Mammo Digital Screening Bilat with CAD; Future; Expected date: 05/10/2018    Other orders  -     (In Office Administered) Tdap Vaccine    Update labs for iron levels/TSH  Update Mammo/Cscope  Recommend hand ortho.   Can use spica splint for L thumb/wrist.    Ice/NSAIDS if needed  F/u pRN

## 2018-05-16 ENCOUNTER — DOCUMENTATION ONLY (OUTPATIENT)
Dept: ENDOSCOPY | Facility: HOSPITAL | Age: 53
End: 2018-05-16

## 2018-05-28 ENCOUNTER — OFFICE VISIT (OUTPATIENT)
Dept: URGENT CARE | Facility: CLINIC | Age: 53
End: 2018-05-28
Payer: OTHER GOVERNMENT

## 2018-05-28 VITALS
RESPIRATION RATE: 16 BRPM | WEIGHT: 242.75 LBS | TEMPERATURE: 99 F | SYSTOLIC BLOOD PRESSURE: 138 MMHG | OXYGEN SATURATION: 99 % | HEART RATE: 110 BPM | BODY MASS INDEX: 41.44 KG/M2 | DIASTOLIC BLOOD PRESSURE: 76 MMHG | HEIGHT: 64 IN

## 2018-05-28 DIAGNOSIS — R29.898 TMJ CLICK: ICD-10-CM

## 2018-05-28 DIAGNOSIS — M26.69 TMJ INFLAMMATION: ICD-10-CM

## 2018-05-28 DIAGNOSIS — R59.0 SUBMANDIBULAR LYMPHADENOPATHY: Primary | ICD-10-CM

## 2018-05-28 PROCEDURE — 99214 OFFICE O/P EST MOD 30 MIN: CPT | Mod: S$PBB,,, | Performed by: PHYSICIAN ASSISTANT

## 2018-05-28 PROCEDURE — 99999 PR PBB SHADOW E&M-EST. PATIENT-LVL III: CPT | Mod: PBBFAC,,, | Performed by: PHYSICIAN ASSISTANT

## 2018-05-28 PROCEDURE — 96372 THER/PROPH/DIAG INJ SC/IM: CPT | Mod: PBBFAC,PO

## 2018-05-28 PROCEDURE — 99213 OFFICE O/P EST LOW 20 MIN: CPT | Mod: PBBFAC,PO | Performed by: PHYSICIAN ASSISTANT

## 2018-05-28 RX ORDER — METHYLPREDNISOLONE 4 MG/1
TABLET ORAL
Refills: 0 | COMMUNITY
Start: 2018-05-26 | End: 2018-11-02

## 2018-05-28 RX ORDER — HYDROCODONE BITARTRATE AND ACETAMINOPHEN 5; 325 MG/1; MG/1
TABLET ORAL
Refills: 0 | COMMUNITY
Start: 2018-05-26 | End: 2019-12-06

## 2018-05-28 RX ORDER — AMOXICILLIN AND CLAVULANATE POTASSIUM 875; 125 MG/1; MG/1
1 TABLET, FILM COATED ORAL 2 TIMES DAILY
Qty: 20 TABLET | Refills: 0 | Status: SHIPPED | OUTPATIENT
Start: 2018-05-28 | End: 2018-06-07

## 2018-05-28 RX ORDER — TIZANIDINE 4 MG/1
4 TABLET ORAL EVERY 6 HOURS PRN
Qty: 30 TABLET | Refills: 0 | Status: SHIPPED | OUTPATIENT
Start: 2018-05-28 | End: 2018-06-07

## 2018-05-28 RX ORDER — KETOROLAC TROMETHAMINE 30 MG/ML
60 INJECTION, SOLUTION INTRAMUSCULAR; INTRAVENOUS
Status: COMPLETED | OUTPATIENT
Start: 2018-05-28 | End: 2018-05-28

## 2018-05-28 RX ADMIN — KETOROLAC TROMETHAMINE 60 MG: 60 INJECTION, SOLUTION INTRAMUSCULAR at 09:05

## 2018-05-28 NOTE — PROGRESS NOTES
"Subjective:      Patient ID: Linette Galo is a 52 y.o. female.    Chief Complaint: Jaw Pain    Ms. Galo is a 51yo female that presents to Urgent Care for R sided facial and submandibular pain.  Patient states Saturday she went to [facility] for sharp, shooting pains on the R side of her face radiating to her forehead and down to her chin.  She was diagnosed with trigeminal neuralgia and given oral steroids, steroid injection and Norco.  Patient states pain in her face has somewhat improved but now the pain is more localized to her R submandibular area.  Patient denies any recent infection.  She does report a history of TMJ dysfunction, grinding, clenching and jaw clicking for which she wears a nightguard every night and occasionally during the day.  Patient reports pain when chewing and swallowing.  Patient reports she is leaving town today to bring her daughter to [place] and will return Wednesday.        Review of Systems   Constitutional: Negative for chills, diaphoresis and fever.   HENT: Positive for sore throat (mild). Negative for congestion, rhinorrhea, trouble swallowing (painful to swallow) and voice change.         (-) trismus  (-) tooth pain  Last dental work was October, had crown placed, have had to adjust crown several times but no problems in the past month or so   Eyes: Negative for visual disturbance.   Respiratory: Negative for cough, shortness of breath and wheezing.    Gastrointestinal: Positive for nausea (d/t pain). Negative for abdominal pain, constipation, diarrhea and vomiting.   Skin: Negative for rash.   Neurological: Positive for headaches. Negative for dizziness, weakness and light-headedness.        (-) unilateral paralysis       Objective:   /76 (BP Location: Left arm, Patient Position: Sitting, BP Method: Medium (Automatic))   Pulse 110   Temp 99.3 °F (37.4 °C) (Tympanic)   Resp 16   Ht 5' 4" (1.626 m)   Wt 110.1 kg (242 lb 11.6 oz)   SpO2 99%   BMI 41.66 kg/m² "   Physical Exam   Constitutional: She appears well-developed and well-nourished. She does not appear ill. No distress.   HENT:   Head: Normocephalic and atraumatic.   Right Ear: Tympanic membrane and ear canal normal. Tympanic membrane is not erythematous. No middle ear effusion.   Left Ear: Tympanic membrane and ear canal normal. Tympanic membrane is not erythematous.  No middle ear effusion.   Nose: Nose normal. No mucosal edema or rhinorrhea. Right sinus exhibits no maxillary sinus tenderness and no frontal sinus tenderness. Left sinus exhibits no maxillary sinus tenderness and no frontal sinus tenderness.   Mouth/Throat: Uvula is midline and oropharynx is clear and moist. No oral lesions. No dental abscesses or dental caries. No posterior oropharyngeal erythema.   Cardiovascular: Normal rate, regular rhythm and normal heart sounds.    No murmur heard.  Pulmonary/Chest: Effort normal and breath sounds normal. No respiratory distress. She has no decreased breath sounds. She has no wheezes. She has no rhonchi. She has no rales.   Lymphadenopathy:        Head (right side): Submandibular adenopathy present.     She has no cervical adenopathy.   Neurological: No cranial nerve deficit.   CN 3, 4, 6, 7, 11 and 12 checked and intact   Skin: Skin is warm and dry. No rash noted. She is not diaphoretic.   Psychiatric: She has a normal mood and affect. Her speech is normal and behavior is normal. Thought content normal.     Assessment:      1. Submandibular lymphadenopathy    2. TMJ click    3. TMJ inflammation       Plan:   Submandibular lymphadenopathy  -     amoxicillin-clavulanate 875-125mg (AUGMENTIN) 875-125 mg per tablet; Take 1 tablet by mouth 2 (two) times daily.  Dispense: 20 tablet; Refill: 0    TMJ click  -     tiZANidine (ZANAFLEX) 4 MG tablet; Take 1 tablet (4 mg total) by mouth every 6 (six) hours as needed (spasm).  Dispense: 30 tablet; Refill: 0    TMJ inflammation  -     ketorolac injection 60 mg; Inject 2  mLs (60 mg total) into the muscle one time.    Trigeminal neuralgia vs TMJ exacerbation vs submandibular infection  No fever or signs of infection, but will begin on antibiotics d/t unilateral lymphadenopathy and severely tender submandibular gland  Advise patient to d/c NSAIDs until this evening or tomorrow d/t injection today  Do not take Tylenol and Norco at the same time   Do not take Norco and muscle relaxer at the same time as they will have additive sedating effects  Discussed if patient becomes febrile, she has difficulty swallowing or opening jaw, pain becomes more intense, she should seek immediate care  Follow up with dentist to ensure symptoms are not originating from crown placed in October  Advise warm compresses for pain relief    Gave handout on lymphadenopathy and TMJ.  Printed AVS and reviewed treatment plan in detail.    Discussed worsening signs/symptoms and when to return to clinic or go to ED.   Patient expresses understanding and agrees with treatment plan.

## 2018-05-28 NOTE — PATIENT INSTRUCTIONS
Lymphadenopathy  Lymphadenopathy is swelling of the lymph nodes. Lymph nodes are small, bean-shaped glands around the body.  What are lymph nodes?  Lymph nodes are part of your immune system. The glands are found in your neck, armpits, groin, chest, and abdomen. They act as filters for lymph fluid as it flows through your body. Lymph fluid contains white blood cells and other things that fight infection.  Why lymph nodes swell  Lymphadenopathy is very common. The glands often enlarge during a viral or bacterial infection. It can happen during a cold, the flu, or strep throat. The nodes may swell in just one area of the body, such as the neck (localized). Or nodes may swell all over the body (generalized). The neck (cervical) lymph nodes are the most common site of lymphadenopathy.  What causes lymphadenopathy?  Dead cells and fluid build up in the lymph nodes as they help fight infection or disease. This causes them to swell in size. Enlarged lymph nodes are often near the source of infection. This can help to find the cause of an infection. For example, swollen lymph nodes around the jaw may be because of an infection in the teeth or mouth. But lymphadenopathy may also be generalized. This is common in some viral illnesses such as mononucleosis or chickenpox (varicella).  Lymphadenopathy can also be caused by:  · Infection of a lymph node or small group of nodes (lymphadenitis)  · Cancer  · Reactions to medicines such as antibiotics and some seizure medicines  · Other health conditions, such as lupus  Symptoms of lymphadenopathy  Lymphadenopathy can cause symptoms such as:  · Lumps under the jaw, on the sides or back of the neck, in the armpits, in the groin, or in the chest or belly (abdomen)  · Pain or tenderness in any of these areas  · Redness or warmth in any of these areas  You may also have symptoms from an infection causing the swollen glands. These symptoms may include fever, sore throat, body aches, or  cough.  Diagnosing lymphadenopathy  Your health care provider will ask about your health history and symptoms. He or she will give you a physical exam and check the areas where lymph nodes are enlarged. Your health care provider will check the size and location of the nodes, and ask how long they have been swollen and if they are painful. Diagnostic tests and referral to specialists may be recommended. They may include:  · Blood tests. These are done to check for signs of infection and other problems.  · Urine test. This is also done to check for infection and other problems.  · Chest X-ray. This test can show enlarged lymph nodes or other problems.  · Lymph node biopsy. If lymph nodes are swollen for 3 to 4 weeks, they may be checked with a biopsy. Small samples of lymph node tissue are taken and checked in a lab for signs of cancer. You may be referred to a specialist in blood disorders and cancer (hematologist and oncologist).  Treatment for lymphadenopathy  The treatment of enlarged lymph nodes depends on the cause. Enlarged lymph nodes are often harmless and go away without any treatment. Treatment is most often done on the cause of the enlarged nodes and may include:  · Antibiotic medicine to treat a bacterial infection  · Incision and drainage (I & D) of a lymph node for lymphadenitis  · Other medicines or procedures to treat the cause of the enlarged nodes  You may need follow-up exam in 3 to 4 weeks to recheck enlarged nodes.     When to call your health care provider  Call your health care provider if you have lymph nodes that are still swollen after 3 to 4 weeks.   Date Last Reviewed: 6/19/2015  © 4535-6651 [employer]. [address] [place] [address], [place] [address]. All rights reserved. This information is not intended as a substitute for professional medical care. Always follow your healthcare professional's instructions.        Pain Relief Methods for Temporomandibular Disorders (TMD)  You  have been diagnosed with temporomandibular disorder (TMD). This term describes a group of problems related to the temporomandibular joint (TMJ)and nearby muscles. The TMJ is located where the upper and lower jaws meet. TMD can cause painful and frustrating symptoms. But your health care provider can recommend various pain relief methods as part of your treatment. These may include medicines and certain types of therapy, like massage or gentle exercise.  Using medicines    Medicines may be prescribed to treat TMD. Others may be available over-the-counter. The medicine type and dosage will depend on the problem you have. For your safety, tell your health care provider if you are currently taking any medicines. Also mention any herbs or supplements you are using. Common medicines used to treat TMD include:  · Anti-inflammatories and analgesics. These treat pain, inflammation, osteoarthritis, and rheumatoid arthritis. Anti-inflammatories reduce swelling, heat, redness, and pain. They also help restore function. Analgesics reduce pain. Nonsteroidal anti-inflammatories (NSAIDs) relieve inflammation as well as pain.  · Muscle relaxants. These treat myofascial pain. This is pain that occurs in the soft tissues or muscles around the TMJ. Muscle relaxants help ease muscle tension. This reduces pressure on the TMJ from tight jaw muscles.  · Antidepressants. These can be used to reduce pain or bruxism (teeth grinding). At higher dosages, these medicines are used to treat depression. Given at low dosages, antidepressants help relieve TMD symptoms. They can reduce muscle pain. They also raise the level of serotonin, a body chemical that improves sleep. This in turn can decrease bruxism during the night.  Treating painful muscles  A trigger point is a painful spot in a tight muscle. It is often painful to the touch and may refer pain to other places. Your health care provider can focus on trigger points using:  · Massage, both  inside and outside the mouth. This relaxes muscles and improves circulation.  · Palpation, which is applying pressure to points of the jaw and face with the fingers.  · Cold spray and stretching of the muscles to relax them.  · An anesthetic for pain relief. This may be given as an injection by your dentist.  Treating the joint  Therapy may focus directly on the TMJ. There are different ways to treat the joint:  · A self-care program helps you treat and manage symptoms on your own. Your program may include exercises. It may also include using ice and heat to relieve pain.  · Gentle manipulation reduces pain and restores range of motion. The health care provider uses his or her hands to relax muscles and ligaments around the joint.  · Exercises strengthen muscles in the jaw and face.  · Ultrasound uses sound waves to reduce pain and swelling. It also improves pain and swelling.  Treating inflammation  When the joint is inflamed, movement becomes difficult -- even impossible at times. Your health care provider can help. Treatment may include:  · Rest and gentle exercise to increase range of motion. One common exercise is to apply pressure to the jaw and resist the movement (isometric exercise).  · A gel pack or ice wrapped in a towel applied for 10 to 20 minutes repeated 3 or 4 times a day. This eases swelling and reduces pain.  · Massage and gentle manipulation as described above.  Date Last Reviewed: 7/13/2015  © 6356-7332 [employer] [address] [place] [address], [place] [address]. All rights reserved. This information is not intended as a substitute for professional medical care. Always follow your healthcare professional's instructions.

## 2018-06-01 ENCOUNTER — PATIENT MESSAGE (OUTPATIENT)
Dept: INTERNAL MEDICINE | Facility: CLINIC | Age: 53
End: 2018-06-01

## 2018-06-18 ENCOUNTER — HOSPITAL ENCOUNTER (OUTPATIENT)
Dept: RADIOLOGY | Facility: HOSPITAL | Age: 53
Discharge: HOME | End: 2018-06-18
Attending: FAMILY MEDICINE
Payer: OTHER GOVERNMENT

## 2018-06-18 VITALS — HEIGHT: 64 IN | BODY MASS INDEX: 41.32 KG/M2 | WEIGHT: 242 LBS

## 2018-06-18 DIAGNOSIS — Z12.39 BREAST CANCER SCREENING: ICD-10-CM

## 2018-06-18 PROCEDURE — 77067 SCR MAMMO BI INCL CAD: CPT | Mod: TC,PO

## 2018-06-18 PROCEDURE — 77067 SCR MAMMO BI INCL CAD: CPT | Mod: 26,,, | Performed by: RADIOLOGY

## 2018-10-29 ENCOUNTER — PATIENT MESSAGE (OUTPATIENT)
Dept: INTERNAL MEDICINE | Facility: CLINIC | Age: 53
End: 2018-10-29

## 2018-10-29 DIAGNOSIS — Z82.49 FAM HX-ISCHEM HEART DISEASE: ICD-10-CM

## 2018-10-29 DIAGNOSIS — Z12.11 COLON CANCER SCREENING: ICD-10-CM

## 2018-10-29 DIAGNOSIS — M65.30 TRIGGER FINGER, UNSPECIFIED FINGER, UNSPECIFIED LATERALITY: Primary | ICD-10-CM

## 2018-10-31 ENCOUNTER — OFFICE VISIT (OUTPATIENT)
Dept: CARDIOLOGY | Facility: CLINIC | Age: 53
End: 2018-10-31
Payer: OTHER GOVERNMENT

## 2018-10-31 ENCOUNTER — CLINICAL SUPPORT (OUTPATIENT)
Dept: CARDIOLOGY | Facility: CLINIC | Age: 53
End: 2018-10-31
Payer: OTHER GOVERNMENT

## 2018-10-31 VITALS
HEIGHT: 64 IN | BODY MASS INDEX: 40.73 KG/M2 | HEART RATE: 84 BPM | DIASTOLIC BLOOD PRESSURE: 80 MMHG | SYSTOLIC BLOOD PRESSURE: 124 MMHG | WEIGHT: 238.56 LBS

## 2018-10-31 DIAGNOSIS — Z82.49 FAMILY HISTORY OF PREMATURE CAD: Primary | ICD-10-CM

## 2018-10-31 DIAGNOSIS — Z82.49 FAMILY HISTORY OF CORONARY ARTERY DISEASE: Primary | ICD-10-CM

## 2018-10-31 DIAGNOSIS — Z82.49 FAMILY HISTORY OF CORONARY ARTERY DISEASE: ICD-10-CM

## 2018-10-31 DIAGNOSIS — R07.89 OTHER CHEST PAIN: ICD-10-CM

## 2018-10-31 PROCEDURE — 99213 OFFICE O/P EST LOW 20 MIN: CPT | Mod: PBBFAC,PO | Performed by: INTERNAL MEDICINE

## 2018-10-31 PROCEDURE — 93005 ELECTROCARDIOGRAM TRACING: CPT | Mod: PBBFAC,PO | Performed by: INTERNAL MEDICINE

## 2018-10-31 PROCEDURE — 99244 OFF/OP CNSLTJ NEW/EST MOD 40: CPT | Mod: S$PBB,,, | Performed by: INTERNAL MEDICINE

## 2018-10-31 PROCEDURE — 99999 PR PBB SHADOW E&M-EST. PATIENT-LVL III: CPT | Mod: PBBFAC,,, | Performed by: INTERNAL MEDICINE

## 2018-10-31 PROCEDURE — 93010 ELECTROCARDIOGRAM REPORT: CPT | Mod: S$PBB,,, | Performed by: INTERNAL MEDICINE

## 2018-10-31 NOTE — LETTER
October 31, 2018      Earnest Reed MD  [address] [address]           [facility]  [address] [facility]  [place] [address]  Phone: [phone]  Fax: [phone]          Patient: Linette Galo   MR Number: [id number]   YOB: 1965   Date of Visit: 10/31/2018       Dear Dr. Earnest Reed:    Thank you for referring Linette Galo to me for evaluation. Attached you will find relevant portions of my assessment and plan of care.    If you have questions, please do not hesitate to call me. I look forward to following Linette Galo along with you.    Sincerely,    Redd Boyle MD    Enclosure  CC:  No Recipients    If you would like to receive this communication electronically, please contact [email or web address] or [phone] to request more information on [email or web address] Link access.    For providers and/or their staff who would like to refer a patient to [facility], please contact us through our one-stop-shop provider referral line, [facility], at [phone].    If you feel you have received this communication in error or would no longer like to receive these types of communications, please e-mail [email or web address]

## 2018-10-31 NOTE — PROGRESS NOTES
Subjective:   Patient ID:  Linette Gaol is a 53 y.o. female who presents for cardiac consult of Family History Ischemic Heart Disease      HPI  The patient came in today for cardiac consult of Family History Ischemic Heart Disease    Referring Physician: Earnest Reed MD   Reason for consult: eval for CAD    10/31/18  This is a 53 year old female pt with current medical conditions ADD,fe def anemia, anxiety, depression, positive ARIK, hx bariatric surgery, presents for initial CV evaluation.     Overall has been doing well. She has strong FH of CAD and wants eval. She has some atypical CP with drinking water/popcorn, unsure if GERD. No MIRANDA/SOB.     Patient feels no sob, no leg swelling, no PND, no palpitation, no dizziness, no syncope, no CNS symptoms.    Patient has fairly good exercise tolerance. Cuts grass without issues.     Patient is compliant with medications.    ECG - NSR    FH - GF  in 50s for MI, father - 53 with bypass.     Past Medical History:   Diagnosis Date    ADD (attention deficit disorder)     Anemia     Anxiety     Followed by Psychology    Depression     bipolar, anxiety, ADD    Positive ARIK (antinuclear antibody)     Dr. Ross//following       Past Surgical History:   Procedure Laterality Date    CARPAL TUNNEL RELEASE      both hands    GASTRIC BYPASS  2008    HYSTERECTOMY      RALH (retains ovaries)    TONSILLECTOMY         Social History     Tobacco Use    Smoking status: Never Smoker    Smokeless tobacco: Never Used   Substance Use Topics    Alcohol use: Yes     Comment: socially    Drug use: No       Family History   Problem Relation Age of Onset    Breast cancer Maternal Grandmother     Colon cancer Maternal Grandmother     Hypertension Maternal Grandmother     Diabetes Maternal Grandmother     Thrombophilia Father     Parkinsonism Father     Lumbar disc disease Father     Stroke Father     Heart disease Father     Thrombophilia Sister      Hypertension Mother     Diabetes Mother     Hypertension Paternal Grandmother     Diabetes Paternal Grandmother           Medication List           Accurate as of 10/31/18  9:28 AM. If you have any questions, ask your nurse or doctor.               CONTINUE taking these medications    buPROPion 200 MG Sr12  Commonly known as:  WELLBUTRIN SR     cetirizine 10 MG tablet  Commonly known as:  ZYRTEC  Take 1 tablet (10 mg total) by mouth every evening.     cholecalciferol (vitamin D3) 1,000 unit capsule  Commonly known as:  VITAMIN D3  Take 1 capsule (1,000 Units total) by mouth once daily.     DULoxetine 60 MG capsule  Commonly known as:  CYMBALTA     HYDROcodone-acetaminophen 5-325 mg per tablet  Commonly known as:  NORCO     lamoTRIgine 100 MG tablet  Commonly known as:  LAMICTAL     methylPREDNISolone 4 mg tablet  Commonly known as:  MEDROL DOSEPACK     multivitamin capsule     MYDAYIS 50 mg Ct24  Generic drug:  dextroamphetamine-amphetamine     VITAMIN B-12 50 mcg tablet  Generic drug:  cyanocobalamin (vitamin B-12)            Review of Systems   Constitutional: Negative.    HENT: Negative.    Eyes: Negative.    Respiratory: Negative.  Negative for shortness of breath.    Cardiovascular: Positive for chest pain. Negative for palpitations and leg swelling.   Gastrointestinal: Negative.    Genitourinary: Negative.    Musculoskeletal: Negative.    Skin: Negative.    Neurological: Negative.    Endo/Heme/Allergies: Negative.    Psychiatric/Behavioral: Negative.    All 12 systems otherwise negative.      Wt Readings from Last 3 Encounters:   10/31/18 108.2 kg (238 lb 8.6 oz)   06/18/18 109.8 kg (242 lb)   05/28/18 110.1 kg (242 lb 11.6 oz)     Temp Readings from Last 3 Encounters:   05/28/18 99.3 °F (37.4 °C) (Tympanic)   05/10/18 99.1 °F (37.3 °C) (Tympanic)   12/28/17 99.4 °F (37.4 °C) (Tympanic)     BP Readings from Last 3 Encounters:   10/31/18 124/80   05/28/18 138/76   05/10/18 128/88     Pulse Readings from Last  "3 Encounters:   10/31/18 84   05/28/18 110   05/10/18 80       /80 (BP Location: Right arm, Patient Position: Sitting, BP Method: Large (Manual))   Pulse 84   Ht 5' 4" (1.626 m)   Wt 108.2 kg (238 lb 8.6 oz)   BMI 40.94 kg/m²     Objective:   Physical Exam   Constitutional: She is oriented to person, place, and time. She appears well-developed and well-nourished. No distress.   Obese   HENT:   Head: Normocephalic and atraumatic.   Nose: Nose normal.   Mouth/Throat: Oropharynx is clear and moist.   Eyes: Conjunctivae and EOM are normal. No scleral icterus.   Neck: Normal range of motion. Neck supple. No JVD present. No thyromegaly present.   Cardiovascular: Normal rate, regular rhythm, S1 normal and S2 normal. Exam reveals no gallop, no S3, no S4 and no friction rub.   No murmur heard.  Pulmonary/Chest: Effort normal and breath sounds normal. No stridor. No respiratory distress. She has no wheezes. She has no rales. She exhibits no tenderness.   Abdominal: Soft. Bowel sounds are normal. She exhibits no distension and no mass. There is no tenderness. There is no rebound.   Genitourinary:   Genitourinary Comments: Deferred   Musculoskeletal: Normal range of motion. She exhibits no edema, tenderness or deformity.   Lymphadenopathy:     She has no cervical adenopathy.   Neurological: She is alert and oriented to person, place, and time. She exhibits normal muscle tone. Coordination normal.   Skin: Skin is warm and dry. No rash noted. She is not diaphoretic. No erythema. No pallor.   Psychiatric: She has a normal mood and affect. Her behavior is normal. Judgment and thought content normal.   Nursing note and vitals reviewed.      Lab Results   Component Value Date     05/10/2018    K 4.7 05/10/2018     05/10/2018    CO2 23 05/10/2018    BUN 16 05/10/2018    CREATININE 0.9 05/10/2018    GLU 86 05/10/2018    HGBA1C 6.3 (H) 12/12/2007    MG 2.3 07/06/2010    AST 18 05/10/2018    ALT 17 05/10/2018    " ALBUMIN 3.9 05/10/2018    PROT 7.2 05/10/2018    BILITOT 0.3 05/10/2018    WBC 6.83 05/10/2018    HGB 13.3 05/10/2018    HCT 41.5 05/10/2018    MCV 94 05/10/2018     05/10/2018    INR 1.0 04/05/2012    TSH 2.282 05/10/2018    CHOL 199 08/19/2015    HDL 58 08/19/2015    LDLCALC 129.2 08/19/2015    TRIG 59 08/19/2015     Assessment:      1. Family history of premature CAD    2. Other chest pain        Plan:     1. Chest pain, atypical with strong FH of CAD  - exercise stress test and ECHO  - discussed non-cardiac causes - GERD  - repeat lipids - discussed Mediterranean       Thank you for allowing me to participate in this patient's care. Please do not hesitate to contact me with any questions or concerns. Consult note has been forwarded to the referral physician.

## 2018-11-01 DIAGNOSIS — M79.643 PAIN OF HAND, UNSPECIFIED LATERALITY: Primary | ICD-10-CM

## 2018-11-02 ENCOUNTER — IMMUNIZATION (OUTPATIENT)
Dept: INTERNAL MEDICINE | Facility: CLINIC | Age: 53
End: 2018-11-02
Payer: OTHER GOVERNMENT

## 2018-11-02 ENCOUNTER — OFFICE VISIT (OUTPATIENT)
Dept: ORTHOPEDICS | Facility: CLINIC | Age: 53
End: 2018-11-02
Payer: OTHER GOVERNMENT

## 2018-11-02 ENCOUNTER — CLINICAL SUPPORT (OUTPATIENT)
Dept: CARDIOLOGY | Facility: CLINIC | Age: 53
End: 2018-11-02
Attending: INTERNAL MEDICINE
Payer: OTHER GOVERNMENT

## 2018-11-02 ENCOUNTER — HOSPITAL ENCOUNTER (OUTPATIENT)
Dept: RADIOLOGY | Facility: HOSPITAL | Age: 53
Discharge: HOME | End: 2018-11-02
Attending: PHYSICIAN ASSISTANT
Payer: OTHER GOVERNMENT

## 2018-11-02 VITALS
DIASTOLIC BLOOD PRESSURE: 81 MMHG | WEIGHT: 238.56 LBS | HEIGHT: 64 IN | HEART RATE: 91 BPM | RESPIRATION RATE: 12 BRPM | SYSTOLIC BLOOD PRESSURE: 117 MMHG | BODY MASS INDEX: 40.73 KG/M2
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
## 2018-11-02 DIAGNOSIS — M79.643 PAIN OF HAND, UNSPECIFIED LATERALITY: ICD-10-CM

## 2018-11-02 DIAGNOSIS — M18.12 ARTHRITIS OF CARPOMETACARPAL (CMC) JOINT OF LEFT THUMB: ICD-10-CM

## 2018-11-02 DIAGNOSIS — M79.642 LEFT HAND PAIN: ICD-10-CM

## 2018-11-02 DIAGNOSIS — K91.89 COMPLICATIONS OF GASTRIC BYPASS SURGERY: ICD-10-CM

## 2018-11-02 DIAGNOSIS — R07.89 OTHER CHEST PAIN: ICD-10-CM

## 2018-11-02 DIAGNOSIS — M65.311 TRIGGER THUMB OF RIGHT HAND: Primary | ICD-10-CM

## 2018-11-02 DIAGNOSIS — Z82.49 FAMILY HISTORY OF PREMATURE CAD: ICD-10-CM

## 2018-11-02 LAB — DIASTOLIC DYSFUNCTION: NO

## 2018-11-02 PROCEDURE — 73130 X-RAY EXAM OF HAND: CPT | Mod: 50,TC,FY,PO

## 2018-11-02 PROCEDURE — 93016 CV STRESS TEST SUPVJ ONLY: CPT | Mod: S$PBB,,, | Performed by: INTERNAL MEDICINE

## 2018-11-02 PROCEDURE — 99203 OFFICE O/P NEW LOW 30 MIN: CPT | Mod: S$PBB,,, | Performed by: PHYSICIAN ASSISTANT

## 2018-11-02 PROCEDURE — 73130 X-RAY EXAM OF HAND: CPT | Mod: 26,RT,, | Performed by: RADIOLOGY

## 2018-11-02 PROCEDURE — 93018 CV STRESS TEST I&R ONLY: CPT | Mod: S$PBB,,, | Performed by: INTERNAL MEDICINE

## 2018-11-02 PROCEDURE — 90471 IMMUNIZATION ADMIN: CPT | Mod: PBBFAC,PO

## 2018-11-02 PROCEDURE — 99999 PR PBB SHADOW E&M-EST. PATIENT-LVL III: CPT | Mod: PBBFAC,,, | Performed by: PHYSICIAN ASSISTANT

## 2018-11-02 PROCEDURE — 73130 X-RAY EXAM OF HAND: CPT | Mod: 26,59,LT, | Performed by: RADIOLOGY

## 2018-11-02 PROCEDURE — 99213 OFFICE O/P EST LOW 20 MIN: CPT | Mod: PBBFAC,25,PO | Performed by: PHYSICIAN ASSISTANT

## 2018-11-02 PROCEDURE — 93017 CV STRESS TEST TRACING ONLY: CPT | Mod: PBBFAC,PO | Performed by: INTERNAL MEDICINE

## 2018-11-02 RX ORDER — AMOXICILLIN 500 MG/1
CAPSULE ORAL
Refills: 0 | COMMUNITY
Start: 2018-10-23 | End: 2019-12-06

## 2018-11-02 RX ORDER — LORAZEPAM 0.5 MG/1
TABLET ORAL
Refills: 0 | COMMUNITY
Start: 2018-09-18 | End: 2020-07-20

## 2018-11-02 NOTE — LETTER
November 2, 2018      Earnest Reed MD  [address] [place] [address]           [facility]  [address]  [place] [address]  Phone: [phone]  Fax: [phone]          Patient: Linette Galo   MR Number: [id number]   YOB: 1965   Date of Visit: 11/2/2018       Dear Dr. Earnest Reed:    Thank you for referring Linette Galo to me for evaluation. Attached you will find relevant portions of my assessment and plan of care.    If you have questions, please do not hesitate to call me. I look forward to following Linette Galo along with you.    Sincerely,    OSIEL Capellan  CC:  No Recipients    If you would like to receive this communication electronically, please contact [email or web address] or [phone] to request more information on [email or web address] Link access.    For providers and/or their staff who would like to refer a patient to [facility], please contact us through our one-stop-shop provider referral line, [facility], at [phone].    If you feel you have received this communication in error or would no longer like to receive these types of communications, please e-mail [email or web address]

## 2018-11-02 NOTE — PROGRESS NOTES
Subjective:      Patient ID: Linette Galo is a 53 y.o. female.    Chief Complaint: Pain of the Left Hand and Pain and Swelling of the Right Hand      HPI: iLnette Galo  is a 53 y.o. female who c/o Pain of the Left Hand and Pain and Swelling of the Right Hand   for duration of 10 months.  The right hand is worse than the left.  Her pain in the right hand is along the A1 pulley of the ring finger.  She complains of the ring finger getting stuck on her when she makes a fist.  She denies associated numbness and tingling.  Pain currently is only 2/10.  However pain increases substantially when the finger locks up on her.  She sometimes has to manually straighten the finger out.  Quality is aching and throbbing.  Alleviating factors include nothing.  Aggravating factors include morning time.  Additionally, she complains of pain along the left thenar eminence.  Again she has no numbness or tingling in the left hand, either.  She has no inciting injury this has been going on for months.    Past Medical History:   Diagnosis Date    ADD (attention deficit disorder)     Anemia     Anxiety     Followed by Psychology    Depression     bipolar, anxiety, ADD    Positive ARIK (antinuclear antibody)     Dr. Ross//following     Past Surgical History:   Procedure Laterality Date    CARPAL TUNNEL RELEASE      both hands    GASTRIC BYPASS  2008    HYSTERECTOMY      RALH (retains ovaries)    TONSILLECTOMY       Family History   Problem Relation Age of Onset    Breast cancer Maternal Grandmother     Colon cancer Maternal Grandmother     Hypertension Maternal Grandmother     Diabetes Maternal Grandmother     Thrombophilia Father     Parkinsonism Father     Lumbar disc disease Father     Stroke Father     Heart disease Father     Thrombophilia Sister     Hypertension Mother     Diabetes Mother     Hypertension Paternal Grandmother     Diabetes Paternal Grandmother      Social History     Socioeconomic  History    Marital status:      Spouse name: Not on file    Number of children: Not on file    Years of education: Not on file    Highest education level: Not on file   Social Needs    Financial resource strain: Not on file    Food insecurity - worry: Not on file    Food insecurity - inability: Not on file    Transportation needs - medical: Not on file    Transportation needs - non-medical: Not on file   Occupational History    Not on file   Tobacco Use    Smoking status: Never Smoker    Smokeless tobacco: Never Used   Substance and Sexual Activity    Alcohol use: Yes     Comment: socially    Drug use: No    Sexual activity: Yes     Partners: Male     Birth control/protection: Surgical     Comment: mut monog   Other Topics Concern    Not on file   Social History Narrative    Not on file        Medication List           Accurate as of 11/2/18  4:01 PM. If you have any questions, ask your nurse or doctor.               CONTINUE taking these medications    amoxicillin 500 MG capsule  Commonly known as:  AMOXIL     buPROPion 200 MG Sr12  Commonly known as:  WELLBUTRIN SR     cetirizine 10 MG tablet  Commonly known as:  ZYRTEC  Take 1 tablet (10 mg total) by mouth every evening.     cholecalciferol (vitamin D3) 1,000 unit capsule  Commonly known as:  VITAMIN D3  Take 1 capsule (1,000 Units total) by mouth once daily.     DULoxetine 60 MG capsule  Commonly known as:  CYMBALTA     HYDROcodone-acetaminophen 5-325 mg per tablet  Commonly known as:  NORCO     lamoTRIgine 100 MG tablet  Commonly known as:  LAMICTAL     LORazepam 0.5 MG tablet  Commonly known as:  ATIVAN     multivitamin capsule     MYDAYIS 50 mg Ct24  Generic drug:  dextroamphetamine-amphetamine     VITAMIN B-12 50 mcg tablet  Generic drug:  cyanocobalamin (vitamin B-12)        STOP taking these medications    methylPREDNISolone 4 mg tablet  Commonly known as:  MEDROL DOSEPACK  Stopped by:  Emma Beck PA-C          Review of  patient's allergies indicates:  No Known Allergies    Review of Systems   Constitution: Negative for fever.   Cardiovascular: Negative for chest pain.   Respiratory: Negative for cough and shortness of breath.    Skin: Negative for rash.   Musculoskeletal: Positive for joint pain, joint swelling and stiffness.   Gastrointestinal: Negative for heartburn.   Neurological: Negative for headaches and numbness.         Objective:        General    Nursing note and vitals reviewed.  Constitutional: She is oriented to person, place, and time. She appears well-developed and well-nourished.   HENT:   Head: Normocephalic and atraumatic.   Eyes: EOM are normal.   Neck: Normal range of motion.   Cardiovascular: Regular rhythm.    Pulmonary/Chest: Effort normal. No respiratory distress.   Abdominal: Soft.   Neurological: She is alert and oriented to person, place, and time.   Psychiatric: She has a normal mood and affect. Her behavior is normal.             Right Hand/Wrist Exam     Inspection   Scars: Hand -  absent  Effusion: Hand -  absent  Bruising: Hand -  absent  Deformity: Hand -  deformity    Tenderness   The patient is tender to palpation of the anderson area.    Range of Motion     Wrist   Extension: normal   Flexion: normal   Pronation: normal   Supination: normal     Tests   Phalens Sign: negative  Tinel's sign (median nerve): negative  Finkelstein's test: negative    Atrophy   Thenar:  negative  Hypothenar:  negative  Intrinsic:  negative  1st Dorsal Interosseous: negative    Other     Neuorologic Exam    Median Distribution: normal  Ulnar Distribution: normal  Radial Distribution: normal    Comments:  2+ radial pulse  TTP A1 pulley ring finger  Active triggering on exam ring finger      Left Hand/Wrist Exam     Tenderness   The patient is tender to palpation of the anderson area.     Range of Motion     Wrist   Extension: normal   Flexion: normal   Pronation: normal   Supination: normal     Finger Flexion   MP Thumb:  normal   DIP Thumb: normal     Finger Extension   MP Thumb: normal  DIP Thumb: normal    Tests   Phalens Sign: negative  Tinel's sign (median nerve): negative  Finkelstein's test: negative      Comments:  Mild swelling in the thenar eminence with TTP      Right Elbow Exam     Tests   Tinel's sign (cubital tunnel): negative      Left Elbow Exam     Tests   Tinel's sign (cubital tunnel): negative        Muscle Strength   Right Upper Extremity   Wrist extension: 5/5/5   Wrist flexion: 5/5/5   : 5/5/5   Pinch Mechanism: 5/5  Left Upper Extremity  Wrist extension: 5/5/5   Wrist flexion: 5/5/5   :  5/5/5   Thumb - APB: 5/5  Thumb - FPL: 5/5  Pinch Mechanism: 5/5    Vascular Exam       Capillary Refill  Right Hand: normal capillary refill            Xray:   Bilateral hand from today images and report were reviewed today.  I agree with the radiologist's interpretation.  Mild bilateral degenerative changes at the 1st CMC joints without fracture or dislocation.  Minimal degenerative change bilaterally involving the wrists and hands.  No significant soft tissue swelling, calcification or foreign body.  No focal erosion or periosteal reaction.    Assessment:       Encounter Diagnoses   Name Primary?    Trigger thumb of right hand Yes    Left hand pain     Arthritis of carpometacarpal (CMC) joint of left thumb     Complications of gastric bypass surgery           Plan:       Linette Lara was seen today for pain, pain and swelling.    Diagnoses and all orders for this visit:    Trigger thumb of right hand    Left hand pain    Arthritis of carpometacarpal (CMC) joint of left thumb    Complications of gastric bypass surgery    Ms. Lara comes in today as a new patient with new problems in the bilateral hands.  She certainly has a trigger finger of the right ring finger.  We have discussed risks and benefits of an injection today.  She goes on to tell me that she is currently on an antibiotic for a tooth abscess which may  require at root canal. I have actually advised against doing an injection today as she is actively being treated for an infection which can potentially increase the risk of infection following injection. Instead, I will put her into a trigger finger splint.  I will plan to see her back in the office in approximately 2 weeks.  As long as the infection has cleared, I will be happy to proceed with a trigger finger injection at that time.  With regard to the left hand, she has full motion of the left thumb.  She has no discomfort with strength testing in flexion or extension or AB duction.  She has no evidence of de Quervain tenosynovitis.  I suspect some of the discomfort she is experiencing comes from the CMC joint arthritis of the left hand.  I have advised her to use a thumb spica splint.  Additionally, she has a history of gastric bypass surgery and as such is not a candidate for oral NSAIDs.  Instead, we will prescribe a topical anti-inflammatory cream from [facility] pharmacy which she can use 3 to 4 times a day on both hands.  She verbalizes understanding and agrees with the above plan.    Follow-up in about 2 weeks (around 11/16/2018).    The patient understands, chooses and consents to this plan and accepts all   the risks which include but are not limited to the risks mentioned above.     Disclaimer: This note was prepared using a voice recognition system and is likely to have sound alike errors within the text.

## 2018-11-07 ENCOUNTER — TELEPHONE (OUTPATIENT)
Dept: ENDOSCOPY | Facility: HOSPITAL | Age: 53
End: 2018-11-07

## 2018-11-07 NOTE — TELEPHONE ENCOUNTER
Endoscopy Scheduling Questionnaire:    Call Type:OUTGOING    1. Have you been admitted overnight to the hospital in the past 3 months? no  2. Do you get CP and SOB while walking up a flight of stairs? no  3. Have you had a stent placed in the past 12 months? no  4. Have you had a stroke or heart attack in the past 6 months? no  5. Have you had any chest pain in the past 3 months? no      If so, have you been evaluated by your PCP or Cardiologist? no  6. Do you take weight loss medications? no  7. Have you been diagnosed with Diverticulitis within the past 3 months? no  8. Are you having any GI symptoms that you feel need to be evaluated prior to your procedure? no  9. Are you on dialysis? no  10. Are you diabetic? no  11. Do you have any other health issues that you feel might limit your ability to safely have the procedure and/or sedation? no  12. Is the patient over 79 yo? no        If so, has the patient been seen by their PCP or GI in the last 3 months? N/A       -I have reviewed the last colonoscopy for recommendations regarding surveillance and bowel prep.   -I have reviewed the patient's medications and allergies. She is not on high risk medications and will not require cardiac clearance.  -I have verified the pharmacy information. The prep being used is Suprep. The patient's prep instructions were sent via [facility] patient portal..    Date Endoscopy Scheduled: (11/27/18)

## 2018-11-08 RX ORDER — SODIUM, POTASSIUM,MAG SULFATES 17.5-3.13G
SOLUTION, RECONSTITUTED, ORAL ORAL
Qty: 1 KIT | Refills: 0 | Status: ON HOLD | OUTPATIENT
Start: 2018-11-08 | End: 2019-01-22 | Stop reason: HOSPADM

## 2018-11-15 ENCOUNTER — PATIENT MESSAGE (OUTPATIENT)
Dept: ORTHOPEDICS | Facility: CLINIC | Age: 53
End: 2018-11-15

## 2018-12-24 ENCOUNTER — TELEPHONE (OUTPATIENT)
Dept: ENDOSCOPY | Facility: HOSPITAL | Age: 53
End: 2018-12-24

## 2019-01-22 ENCOUNTER — HOSPITAL ENCOUNTER (OUTPATIENT)
Facility: HOSPITAL | Age: 54
Discharge: HOME | End: 2019-01-22
Attending: FAMILY MEDICINE | Admitting: FAMILY MEDICINE
Payer: OTHER GOVERNMENT

## 2019-01-22 ENCOUNTER — ANESTHESIA (OUTPATIENT)
Dept: ENDOSCOPY | Facility: HOSPITAL | Age: 54
End: 2019-01-22
Payer: OTHER GOVERNMENT

## 2019-01-22 ENCOUNTER — ANESTHESIA EVENT (OUTPATIENT)
Dept: ENDOSCOPY | Facility: HOSPITAL | Age: 54
End: 2019-01-22
Payer: OTHER GOVERNMENT

## 2019-01-22 VITALS
WEIGHT: 237 LBS | SYSTOLIC BLOOD PRESSURE: 126 MMHG | HEIGHT: 64 IN | DIASTOLIC BLOOD PRESSURE: 87 MMHG | HEART RATE: 87 BPM | OXYGEN SATURATION: 100 % | RESPIRATION RATE: 18 BRPM | TEMPERATURE: 98 F | BODY MASS INDEX: 40.46 KG/M2

## 2019-01-22 DIAGNOSIS — K63.5 POLYP OF COLON, UNSPECIFIED PART OF COLON, UNSPECIFIED TYPE: ICD-10-CM

## 2019-01-22 DIAGNOSIS — Z80.0 FAMILY HISTORY OF COLON CANCER: ICD-10-CM

## 2019-01-22 DIAGNOSIS — Z12.11 COLON CANCER SCREENING: Primary | ICD-10-CM

## 2019-01-22 DIAGNOSIS — Z12.11 SPECIAL SCREENING FOR MALIGNANT NEOPLASMS, COLON: ICD-10-CM

## 2019-01-22 PROCEDURE — 88305 TISSUE EXAM BY PATHOLOGIST: CPT | Mod: 26,,, | Performed by: PATHOLOGY

## 2019-01-22 PROCEDURE — 25000003 PHARM REV CODE 250: Performed by: FAMILY MEDICINE

## 2019-01-22 PROCEDURE — 00811 ANES LWR INTST NDSC NOS: CPT | Performed by: FAMILY MEDICINE

## 2019-01-22 PROCEDURE — 45385 COLONOSCOPY W/LESION REMOVAL: CPT | Performed by: FAMILY MEDICINE

## 2019-01-22 PROCEDURE — 25000003 PHARM REV CODE 250: Performed by: NURSE ANESTHETIST, CERTIFIED REGISTERED

## 2019-01-22 PROCEDURE — 63600175 PHARM REV CODE 636 W HCPCS: Performed by: NURSE ANESTHETIST, CERTIFIED REGISTERED

## 2019-01-22 PROCEDURE — 88305 TISSUE SPECIMEN TO PATHOLOGY - SURGERY: ICD-10-PCS | Mod: 26,,, | Performed by: PATHOLOGY

## 2019-01-22 PROCEDURE — 37000008 HC ANESTHESIA 1ST 15 MINUTES: Performed by: FAMILY MEDICINE

## 2019-01-22 PROCEDURE — 45385 PR COLONOSCOPY,REMV LESN,SNARE: ICD-10-PCS | Mod: 33,,, | Performed by: FAMILY MEDICINE

## 2019-01-22 PROCEDURE — 27201089 HC SNARE, DISP (ANY): Performed by: FAMILY MEDICINE

## 2019-01-22 PROCEDURE — 45385 COLONOSCOPY W/LESION REMOVAL: CPT | Mod: 33,,, | Performed by: FAMILY MEDICINE

## 2019-01-22 PROCEDURE — 88305 TISSUE EXAM BY PATHOLOGIST: CPT | Performed by: PATHOLOGY

## 2019-01-22 PROCEDURE — 37000009 HC ANESTHESIA EA ADD 15 MINS: Performed by: FAMILY MEDICINE

## 2019-01-22 RX ORDER — SODIUM CHLORIDE 0.9 % (FLUSH) 0.9 %
3 SYRINGE (ML) INJECTION
Status: DISCONTINUED | OUTPATIENT
Start: 2019-01-22 | End: 2019-01-22 | Stop reason: HOSPADM

## 2019-01-22 RX ORDER — PROPOFOL 10 MG/ML
VIAL (ML) INTRAVENOUS
Status: DISCONTINUED | OUTPATIENT
Start: 2019-01-22 | End: 2019-01-22

## 2019-01-22 RX ORDER — LIDOCAINE HYDROCHLORIDE 10 MG/ML
INJECTION INFILTRATION; PERINEURAL
Status: DISCONTINUED | OUTPATIENT
Start: 2019-01-22 | End: 2019-01-22

## 2019-01-22 RX ORDER — SODIUM CHLORIDE, SODIUM LACTATE, POTASSIUM CHLORIDE, CALCIUM CHLORIDE 600; 310; 30; 20 MG/100ML; MG/100ML; MG/100ML; MG/100ML
INJECTION, SOLUTION INTRAVENOUS CONTINUOUS
Status: DISCONTINUED | OUTPATIENT
Start: 2019-01-22 | End: 2019-01-22 | Stop reason: HOSPADM

## 2019-01-22 RX ADMIN — PROPOFOL 50 MG: 10 INJECTION, EMULSION INTRAVENOUS at 09:01

## 2019-01-22 RX ADMIN — LIDOCAINE HYDROCHLORIDE 50 MG: 10 INJECTION, SOLUTION INFILTRATION; PERINEURAL at 09:01

## 2019-01-22 RX ADMIN — SODIUM CHLORIDE, SODIUM LACTATE, POTASSIUM CHLORIDE, AND CALCIUM CHLORIDE: .6; .31; .03; .02 INJECTION, SOLUTION INTRAVENOUS at 08:01

## 2019-01-22 RX ADMIN — PROPOFOL 25 MG: 10 INJECTION, EMULSION INTRAVENOUS at 09:01

## 2019-01-22 NOTE — H&P
Short Stay Endoscopy History and Physical    PCP - Earnest Reed MD    Procedure - Colonoscopy  ASA - 2  Mallampati - per anesthesia  History of Anesthesia problems - no  Family history Anesthesia problems -  no     HPI:  This is a 53 y.o. female here for evaluation of :   Active Hospital Problems    Diagnosis  POA    *Colon cancer screening [Z12.11]  Not Applicable    Special screening for malignant neoplasms, colon [Z12.11]  Not Applicable    Family history of colon cancer [Z80.0]  Not Applicable     Her grandmother had colon cancer.        Resolved Hospital Problems   No resolved problems to display.         Health Maintenance       Date Due Completion Date    Colonoscopy 09/20/2017 ---    Mammogram 06/18/2019 6/18/2018    Lipid Panel 11/02/2023 11/2/2018    TETANUS VACCINE 05/10/2028 5/10/2018 (Done)    Override on 5/10/2018: Done          Screening - yes  History of polyps - no  Diarrhea - no  Anemia - no  Blood in stools - no  Abdominal pain - no  Other - her grandmother had colon cancer.    ROS:  CONSTITUTIONAL: Denies weight change,  fatigue, fevers, chills, night sweats.  CARDIOVASCULAR: Denies chest pain, shortness of breath, orthopnea and edema.  RESPIRATORY: Denies cough, hemoptysis, dyspnea, and wheezing.  GI: See HPI.    Medical History:   Past Medical History:   Diagnosis Date    ADD (attention deficit disorder)     Anemia     Anxiety     Followed by Psychology    Depression     bipolar, anxiety, ADD    Family history of colon cancer 1/22/2019    Her grandmother had colon cancer.    Positive ARIK (antinuclear antibody)     Dr. Ross//following       Surgical History:   Past Surgical History:   Procedure Laterality Date    CARPAL TUNNEL RELEASE      both hands    GASTRIC BYPASS  2008    HYSTERECTOMY      RALH (retains ovaries)    TONSILLECTOMY         Family History:   Family History   Problem Relation Age of Onset    Breast cancer Maternal Grandmother     Colon cancer Maternal  Grandmother     Hypertension Maternal Grandmother     Diabetes Maternal Grandmother     Thrombophilia Father     Parkinsonism Father     Lumbar disc disease Father     Stroke Father     Heart disease Father     Thrombophilia Sister     Hypertension Mother     Diabetes Mother     Hypertension Paternal Grandmother     Diabetes Paternal Grandmother        Social History:   Social History     Tobacco Use    Smoking status: Never Smoker    Smokeless tobacco: Never Used   Substance Use Topics    Alcohol use: Yes     Comment: socially    Drug use: No       Allergies:   Review of patient's allergies indicates:  No Known Allergies    Medications:   No current facility-administered medications on file prior to encounter.      Current Outpatient Medications on File Prior to Encounter   Medication Sig Dispense Refill    buPROPion (WELLBUTRIN SR) 200 MG Tb12 Take 1 tablet by mouth once daily.      cholecalciferol, vitamin D3, (VITAMIN D3) 1,000 unit capsule Take 1 capsule (1,000 Units total) by mouth once daily. 100 capsule 12    cyanocobalamin, vitamin B-12, (VITAMIN B-12) 50 mcg tablet Take 50 mcg by mouth once daily.      duloxetine (CYMBALTA) 60 MG capsule Take 60 mg by mouth once daily.      multivitamin capsule Take 1 capsule by mouth once daily.      MYDAYIS 50 mg CT24 Take 1 capsule by mouth once daily.      cetirizine (ZYRTEC) 10 MG tablet Take 1 tablet (10 mg total) by mouth every evening. 30 tablet 0    HYDROcodone-acetaminophen (NORCO) 5-325 mg per tablet TAKE 1 BY MOUTH EVERY 6 HOURS FOR 3 DAYS  0    lamotrigine (LAMICTAL) 100 MG tablet Take by mouth once daily. 100mg Oral PRN As directed.         Physical Exam:  Vital Signs:   Vitals:    01/22/19 0835   BP: (!) 138/100   Pulse: 99   Resp: 16   Temp: 97.9 °F (36.6 °C)     General Appearance: Well appearing in no acute distress  ENT: OP clear  Chest: CTA B  CV: RRR, no m/r/g  Abd: s/nt/nd/nabs  Ext: no edema    Labs:Reviewed    IMP:  Active  Hospital Problems    Diagnosis  POA    *Colon cancer screening [Z12.11]  Not Applicable    Special screening for malignant neoplasms, colon [Z12.11]  Not Applicable    Family history of colon cancer [Z80.0]  Not Applicable     Her grandmother had colon cancer.        Resolved Hospital Problems   No resolved problems to display.         Plan:   I have explained the risks and benefits of colonoscopy to the patient including but not limited to bleeding, perforation, infection, and death. The patient wishes to proceed.

## 2019-01-22 NOTE — DISCHARGE SUMMARY
Endoscopy Discharge Summary      Admit Date: 1/22/2019    Discharge Date and Time:  1/22/2019 9:57 AM    Attending Physician: Moe Jimenez MD     Discharge Physician: Moe Jimenez MD     Principal Admitting Diagnoses: Colon cancer screening         Discharge Diagnosis: The primary encounter diagnosis was Colon cancer screening. Diagnoses of Special screening for malignant neoplasms, colon, Family history of colon cancer, and Polyp of colon, unspecified part of colon, unspecified type were also pertinent to this visit.     Discharged Condition: Good    Indication for Admission: Colon cancer screening     Hospital Course: Patient was admitted for an inpatient procedure and tolerated the procedure well with no complications.    Significant Diagnostic Studies: Colonoscopy with cold snare polypectomy    Pathology (if any):  Specimen (12h ago, onward)    Start     Ordered    01/22/19 0952  Specimen to Pathology - Surgery  Once     Comments:  1. Sigmoid polyp     Start Status   01/22/19 0952 Collected (01/22/19 0957)       01/22/19 0953          Estimated Blood Loss: 1 ml.    Discussed with: patient and family.    Disposition: Home.    Follow Up/Patient Instructions:   Current Discharge Medication List      CONTINUE these medications which have NOT CHANGED    Details   buPROPion (WELLBUTRIN SR) 200 MG Tb12 Take 1 tablet by mouth once daily.      cholecalciferol, vitamin D3, (VITAMIN D3) 1,000 unit capsule Take 1 capsule (1,000 Units total) by mouth once daily.  Qty: 100 capsule, Refills: 12    Associated Diagnoses: Complications of gastric bypass surgery      cyanocobalamin, vitamin B-12, (VITAMIN B-12) 50 mcg tablet Take 50 mcg by mouth once daily.      duloxetine (CYMBALTA) 60 MG capsule Take 60 mg by mouth once daily.      multivitamin capsule Take 1 capsule by mouth once daily.      MYDAYIS 50 mg CT24 Take 1 capsule by mouth once daily.      amoxicillin (AMOXIL) 500 MG capsule TAKE ONE BY MOUTH 3 TIMES DAILY  UNTIL FINISHED  Refills: 0      cetirizine (ZYRTEC) 10 MG tablet Take 1 tablet (10 mg total) by mouth every evening.  Qty: 30 tablet, Refills: 0    Associated Diagnoses: Acute maxillary sinusitis, recurrence not specified      HYDROcodone-acetaminophen (NORCO) 5-325 mg per tablet TAKE 1 BY MOUTH EVERY 6 HOURS FOR 3 DAYS  Refills: 0      lamotrigine (LAMICTAL) 100 MG tablet Take by mouth once daily. 100mg Oral PRN As directed.      LORazepam (ATIVAN) 0.5 MG tablet TAKE ONE OR TWO TABLETS BY MOUTH AS NEEDED FOR ACUTE ANXIETY  Refills: 0         STOP taking these medications       sodium,potassium,mag sulfates (SUPREP BOWEL PREP KIT) 17.5-3.13-1.6 gram SolR Comments:   Reason for Stopping:               Discharge Procedure Orders   Diet general     Call MD for:  temperature >100.4     Call MD for:  persistent nausea and vomiting     Call MD for:  severe uncontrolled pain     Call MD for:  difficulty breathing, headache or visual disturbances     Call MD for:  redness, tenderness, or signs of infection (pain, swelling, redness, odor or green/yellow discharge around incision site)     Call MD for:  hives     Call MD for:  persistent dizziness or light-headedness     No dressing needed       Follow-up Information     Moe Jimenez MD. Call in 1 week.    Specialty:  Family Medicine  Why:  To receive pathology results.  Contact information:  [address] [facility] [address]  [phone]

## 2019-01-22 NOTE — ANESTHESIA POSTPROCEDURE EVALUATION
"Anesthesia Post Evaluation    Patient: Linette Galo    Procedure(s) Performed: Procedure(s) (LRB):  COLONOSCOPY (N/A)    Final Anesthesia Type: MAC  Patient location during evaluation: GI PACU  Patient participation: Yes- Able to Participate  Level of consciousness: awake and alert and oriented  Post-procedure vital signs: reviewed and stable  Pain management: adequate  Airway patency: patent  PONV status at discharge: No PONV  Anesthetic complications: no      Cardiovascular status: blood pressure returned to baseline  Respiratory status: unassisted, room air and spontaneous ventilation  Hydration status: euvolemic  Follow-up not needed.        Visit Vitals  /87 (BP Location: Left arm, Patient Position: Lying)   Pulse 87   Temp 36.6 °C (97.9 °F) (Oral)   Resp 18   Ht 5' 4" (1.626 m)   Wt 107.5 kg (237 lb)   SpO2 100%   Breastfeeding? No   BMI 40.68 kg/m²       Pain/Christine Score: Christine Score: 10 (1/22/2019 10:19 AM)        "

## 2019-01-22 NOTE — TRANSFER OF CARE
"Anesthesia Transfer of Care Note    Patient: Linette Galo    Procedure(s) Performed: Procedure(s) (LRB):  COLONOSCOPY (N/A)    Patient location: GI    Anesthesia Type: MAC    Transport from OR: Transported from OR on room air with adequate spontaneous ventilation    Post pain: adequate analgesia    Post assessment: no apparent anesthetic complications    Post vital signs: stable    Level of consciousness: awake, alert and oriented    Nausea/Vomiting: no nausea/vomiting    Complications: none    Transfer of care protocol was followed      Last vitals:   Visit Vitals  BP (!) 138/100   Pulse 99   Temp 36.6 °C (97.9 °F) (Skin)   Resp 16   Ht 5' 4" (1.626 m)   Wt 107.5 kg (237 lb)   SpO2 100%   Breastfeeding? No   BMI 40.68 kg/m²     "

## 2019-01-22 NOTE — ANESTHESIA RELEASE NOTE
"Anesthesia Release from PACU Note    Patient: Linette Galo    Procedure(s) Performed: Procedure(s) (LRB):  COLONOSCOPY (N/A)    Anesthesia type: MAC    Post pain: Adequate analgesia    Post assessment: no apparent anesthetic complications, tolerated procedure well and no evidence of recall    Last Vitals:   Visit Vitals  /87 (BP Location: Left arm, Patient Position: Lying)   Pulse 87   Temp 36.6 °C (97.9 °F) (Oral)   Resp 18   Ht 5' 4" (1.626 m)   Wt 107.5 kg (237 lb)   SpO2 100%   Breastfeeding? No   BMI 40.68 kg/m²       Post vital signs: stable    Level of consciousness: awake, alert  and oriented    Nausea/Vomiting: no nausea/no vomiting    Complications: none    Airway Patency: patent    Respiratory: unassisted, spontaneous ventilation, room air    Cardiovascular: stable and blood pressure at baseline    Hydration: euvolemic  "

## 2019-01-22 NOTE — ANESTHESIA PREPROCEDURE EVALUATION
01/22/2019  Linette Galo is a 53 y.o., female.    Anesthesia Evaluation    I have reviewed the Patient Summary Reports.    I have reviewed the Nursing Notes.   I have reviewed the Medications.     Review of Systems  Anesthesia Hx:  No problems with previous Anesthesia    Social:  Non-Smoker    Hematology/Oncology:         -- Anemia:   Cardiovascular:   ECG has been reviewed.    Hepatic/GI:   Bowel Prep.    Psych:   Psychiatric History anxiety depression          Physical Exam  General:  Obesity    Airway/Jaw/Neck:  Airway Findings: Mouth Opening: Normal Tongue: Normal  General Airway Assessment: Adult       Chest/Lungs:  Chest/Lungs Findings: Clear to auscultation     Heart/Vascular:  Heart Findings: Rate: Normal             Anesthesia Plan  Type of Anesthesia, risks & benefits discussed:  Anesthesia Type:  MAC  Patient's Preference:   Intra-op Monitoring Plan: standard ASA monitors  Intra-op Monitoring Plan Comments:   Post Op Pain Control Plan:   Post Op Pain Control Plan Comments:   Induction:   IV  Beta Blocker:  Patient is not currently on a Beta-Blocker (No further documentation required).       Informed Consent: Patient understands risks and agrees with Anesthesia plan.  Questions answered. Anesthesia consent signed with patient.  ASA Score: 2     Day of Surgery Review of History & Physical: I have interviewed and examined the patient. I have reviewed the patient's H&P dated:  There are no significant changes.          Ready For Surgery From Anesthesia Perspective.

## 2019-01-22 NOTE — [employer]OVATION PATIENT INSTRUCTIONS
Discharge Summary/Instructions after an Endoscopic Procedure  Patient Name: Linette Galo  Patient MRN: [id number]  Patient YOB: 1965 Tuesday, January 22, 2019 Moe Jimenez MD  RESTRICTIONS:  During your procedure today, you received medications for sedation.  These   medications may affect your judgment, balance and coordination.  Therefore,   for 24 hours, you have the following restrictions:   - DO NOT drive a car, operate machinery, make legal/financial decisions,   sign important papers or drink alcohol.    ACTIVITY:  Today: no heavy lifting, straining or running due to procedural   sedation/anesthesia.  The following day: return to full activity including work.  DIET:  Eat and drink normally unless instructed otherwise.     TREATMENT FOR COMMON SIDE EFFECTS:  - Mild abdominal pain, nausea, belching, bloating or excessive gas:  rest,   eat lightly and use a heating pad.  - Sore Throat: treat with throat lozenges and/or gargle with warm salt   water.  - Because air was used during the procedure, expelling large amounts of air   from your rectum or belching is normal.  - If a bowel prep was taken, you may not have a bowel movement for 1-3 days.    This is normal.  SYMPTOMS TO WATCH FOR AND REPORT TO YOUR PHYSICIAN:  1. Abdominal pain or bloating, other than gas cramps.  2. Chest pain.  3. Back pain.  4. Signs of infection such as: chills or fever occurring within 24 hours   after the procedure.  5. Rectal bleeding, which would show as bright red, maroon, or black stools.   (A tablespoon of blood from the rectum is not serious, especially if   hemorrhoids are present.)  6. Vomiting.  7. Weakness or dizziness.  GO DIRECTLY TO THE NEAREST EMERGENCY ROOM IF YOU HAVE ANY OF THE FOLLOWING:      Difficulty breathing              Chills and/or fever over 101 F   Persistent vomiting and/or vomiting blood   Severe abdominal pain   Severe chest pain   Black, tarry stools   Bleeding- more than one  tablespoon   Any other symptom or condition that you feel may need urgent attention  Your doctor recommends these additional instructions:  If any biopsies were taken, your doctors clinic will contact you in 1 to 2   weeks with any results.  - Patient has a contact number available for emergencies.  The signs and   symptoms of potential delayed complications were discussed with the   patient.  Return to normal activities tomorrow.  Written discharge   instructions were provided to the patient.   - Resume previous diet.   - Continue present medications.   - Await pathology results.   - Repeat colonoscopy in 5 years for surveillance.   - Telephone my office for pathology results in 1 week.   - Discharge patient to home (via wheelchair).  For questions, problems or results please call your physician Moe Jimenez MD at Work:  [phone]  If you have any questions about the above instructions, call the GI   department at [phone] or call the endoscopy unit at [phone]   from 7am until 3 pm.  [facility] [place], EMERGENCY ROOM PHONE NUMBER:   [phone]  IF A COMPLICATION OR EMERGENCY SITUATION ARISES AND YOU ARE UNABLE TO REACH   YOUR PHYSICIAN - GO DIRECTLY TO THE EMERGENCY ROOM.  I have read or have had read to me these discharge instructions for my   procedure and have received a written copy.  I understand these   instructions and will follow-up with my physician if I have any questions.     __________________________________       _____________________________________  Nurse Signature                                          Patient/Designated   Responsible Party Signature  Moe Jimenez MD  1/22/2019 9:56:13 AM  This report has been verified and signed electronically.  [employer]ATION

## 2019-01-28 ENCOUNTER — TELEPHONE (OUTPATIENT)
Dept: ENDOSCOPY | Facility: HOSPITAL | Age: 54
End: 2019-01-28

## 2019-01-28 NOTE — PROGRESS NOTES
Dear Earnest Reed MD,    I recently cared for Linette Galo and performed an endoscopy.  Tissue was sent for pathology evaluation and I will have a letter written to ask the patient to repeat the colonoscopy in 10 years.  The pathology showed that there was only hyperplastic tissue.  Thank you for allowing me to participate in the care of your patient.  Please call me for any questions that you might have.      Dr. Moe Jimenez  [phone] [facility]  [phone] office      NURSING STAFF:Please  inform the patient that I reviewed the recent pathology obtained at the time of colonoscopy.    The results showed that there was hyperplastic tissue present which is benign and based on that, I recommend that the patient have a repeat colonoscopy performed in 10 years.     If the patient has MyChart, this message has been sent to them.  Confirm that they read the note.  If not, copy the information and print a letter to send to the patient at this time.  Confirm that a notation to the PCP was done.      Dear Linette Galo,    This is to inform you that I have reviewed your recent colonoscopy pathology.  The results showed that you had hyperplastic tissue present which is benign and based on that, I recommend that you have a repeat colonoscopy performed in 10 years.      Dr. Moe Jimenez  [phone]

## 2019-04-29 DIAGNOSIS — I25.10 CORONARY ARTERY DISEASE, ANGINA PRESENCE UNSPECIFIED, UNSPECIFIED VESSEL OR LESION TYPE, UNSPECIFIED WHETHER NATIVE OR TRANSPLANTED HEART: Primary | ICD-10-CM

## 2019-10-24 DIAGNOSIS — M79.642 BILATERAL HAND PAIN: Primary | ICD-10-CM

## 2019-10-24 DIAGNOSIS — M79.641 BILATERAL HAND PAIN: Primary | ICD-10-CM

## 2019-10-25 ENCOUNTER — PATIENT MESSAGE (OUTPATIENT)
Dept: ORTHOPEDICS | Facility: CLINIC | Age: 54
End: 2019-10-25

## 2019-10-28 ENCOUNTER — OFFICE VISIT (OUTPATIENT)
Dept: SPORTS MEDICINE | Facility: CLINIC | Age: 54
End: 2019-10-28
Payer: OTHER GOVERNMENT

## 2019-10-28 ENCOUNTER — HOSPITAL ENCOUNTER (OUTPATIENT)
Dept: RADIOLOGY | Facility: HOSPITAL | Age: 54
Discharge: HOME | End: 2019-10-28
Attending: PHYSICIAN ASSISTANT
Payer: OTHER GOVERNMENT

## 2019-10-28 VITALS
RESPIRATION RATE: 18 BRPM | DIASTOLIC BLOOD PRESSURE: 76 MMHG | BODY MASS INDEX: 40.46 KG/M2 | SYSTOLIC BLOOD PRESSURE: 125 MMHG | HEART RATE: 95 BPM | HEIGHT: 64 IN | WEIGHT: 237 LBS

## 2019-10-28 DIAGNOSIS — M18.0 ARTHRITIS OF CARPOMETACARPAL (CMC) JOINT OF BOTH THUMBS: Primary | ICD-10-CM

## 2019-10-28 DIAGNOSIS — M79.641 BILATERAL HAND PAIN: ICD-10-CM

## 2019-10-28 DIAGNOSIS — M79.642 BILATERAL HAND PAIN: ICD-10-CM

## 2019-10-28 PROCEDURE — 99214 OFFICE O/P EST MOD 30 MIN: CPT | Mod: PBBFAC,25 | Performed by: PHYSICIAN ASSISTANT

## 2019-10-28 PROCEDURE — 73130 X-RAY EXAM OF HAND: CPT | Mod: 26,50,, | Performed by: RADIOLOGY

## 2019-10-28 PROCEDURE — 99213 PR OFFICE/OUTPT VISIT, EST, LEVL III, 20-29 MIN: ICD-10-PCS | Mod: S$PBB,,, | Performed by: PHYSICIAN ASSISTANT

## 2019-10-28 PROCEDURE — 73130 X-RAY EXAM OF HAND: CPT | Mod: 50,TC

## 2019-10-28 PROCEDURE — 99999 PR PBB SHADOW E&M-EST. PATIENT-LVL IV: CPT | Mod: PBBFAC,,, | Performed by: PHYSICIAN ASSISTANT

## 2019-10-28 PROCEDURE — 99213 OFFICE O/P EST LOW 20 MIN: CPT | Mod: S$PBB,,, | Performed by: PHYSICIAN ASSISTANT

## 2019-10-28 PROCEDURE — 99999 PR PBB SHADOW E&M-EST. PATIENT-LVL IV: ICD-10-PCS | Mod: PBBFAC,,, | Performed by: PHYSICIAN ASSISTANT

## 2019-10-28 PROCEDURE — 73130 XR HAND COMPLETE 3 VIEWS BILATERAL: ICD-10-PCS | Mod: 26,50,, | Performed by: RADIOLOGY

## 2019-10-28 RX ORDER — DEXTROAMPHETAMINE SACCHARATE, AMPHETAMINE ASPARTATE MONOHYDRATE, DEXTROAMPHETAMINE SULFATE AND AMPHETAMINE SULFATE 7.5; 7.5; 7.5; 7.5 MG/1; MG/1; MG/1; MG/1
CAPSULE, EXTENDED RELEASE ORAL
Refills: 0 | COMMUNITY
Start: 2019-07-29 | End: 2019-12-06

## 2019-10-28 RX ORDER — LISDEXAMFETAMINE DIMESYLATE 70 MG/1
70 CAPSULE ORAL EVERY MORNING
Refills: 0 | COMMUNITY
Start: 2019-10-22 | End: 2022-11-04 | Stop reason: ALTCHOICE

## 2019-10-28 RX ORDER — ERGOCALCIFEROL (VITAMIN D2) 10 MCG
TABLET ORAL
COMMUNITY
End: 2020-01-13

## 2019-10-28 NOTE — PROGRESS NOTES
Subjective:      Patient ID: Linette Galo is a 54 y.o. female.    Chief Complaint: Pain of the Left Hand and Pain of the Right Hand      HPI: Linette Galo  is a 54 y.o. female who c/o Pain of the Left Hand and Pain of the Right Hand   for duration of about a year.  I have seen her previously and diagnosed her with left CMC arthritis. At the time last year, she also had some triggering in the right thumb per my notes.  However, she is reporting triggering of the right middle finger.  She says this is manageable, particularly when she wears a trigger finger splint.  She comes in today, because she is having increasing discomfort in the bilateral thumbs.  4/10 in severity.  Quality is aching, throbbing, sharp, burning.  Symptoms are intermittent.  She denies associated numbness and tingling.  However, she is complaining of associated weakness.  Alleviating factors include nothing.  She has failed 3 different sorts of thumb spica splints.  Aggravating factors include use of the hands.    Past Medical History:   Diagnosis Date    ADD (attention deficit disorder)     Anemia     Anxiety     Followed by Psychology    Depression     bipolar, anxiety, ADD    Family history of colon cancer 1/22/2019    Her grandmother had colon cancer.    Positive ARIK (antinuclear antibody)     Dr. Ross//following     Past Surgical History:   Procedure Laterality Date    CARPAL TUNNEL RELEASE      both hands    COLONOSCOPY N/A 1/22/2019    Procedure: COLONOSCOPY;  Surgeon: Moe Jimenez MD;  Location[facility];  Service: Endoscopy;  Laterality: N/A;    GASTRIC BYPASS  2008    HYSTERECTOMY      RALH (retains ovaries)    TONSILLECTOMY       Family History   Problem Relation Age of Onset    Breast cancer Maternal Grandmother     Colon cancer Maternal Grandmother     Hypertension Maternal Grandmother     Diabetes Maternal Grandmother     Thrombophilia Father     Parkinsonism Father     Lumbar disc disease  Father     Stroke Father     Heart disease Father     Thrombophilia Sister     Hypertension Mother     Diabetes Mother     Hypertension Paternal Grandmother     Diabetes Paternal Grandmother      Social History     Socioeconomic History    Marital status:      Spouse name: Not on file    Number of children: Not on file    Years of education: Not on file    Highest education level: Not on file   Occupational History    Not on file   Social Needs    Financial resource strain: Not on file    Food insecurity:     Worry: Not on file     Inability: Not on file    Transportation needs:     Medical: Not on file     Non-medical: Not on file   Tobacco Use    Smoking status: Never Smoker    Smokeless tobacco: Never Used   Substance and Sexual Activity    Alcohol use: Yes     Comment: socially    Drug use: No    Sexual activity: Yes     Partners: Male     Birth control/protection: Surgical     Comment: mut monog   Lifestyle    Physical activity:     Days per week: Not on file     Minutes per session: Not on file    Stress: Not on file   Relationships    Social connections:     Talks on phone: Not on file     Gets together: Not on file     Attends Restorationism service: Not on file     Active member of club or organization: Not on file     Attends meetings of clubs or organizations: Not on file     Relationship status: Not on file   Other Topics Concern    Not on file   Social History Narrative    Not on file     Medication List with Changes/Refills   Current Medications    AMOXICILLIN (AMOXIL) 500 MG CAPSULE    TAKE ONE BY MOUTH 3 TIMES DAILY UNTIL FINISHED    BUPROPION (WELLBUTRIN SR) 200 MG TB12    Take 1 tablet by mouth once daily.    CETIRIZINE (ZYRTEC) 10 MG TABLET    Take 1 tablet (10 mg total) by mouth every evening.    CHOLECALCIFEROL, VITAMIN D3, (VITAMIN D3) 1,000 UNIT CAPSULE    Take 1 capsule (1,000 Units total) by mouth once daily.    CYANOCOBALAMIN, VITAMIN B-12, (VITAMIN B-12) 50 MCG  TABLET    Take 50 mcg by mouth once daily.    DEXTROAMPHETAMINE-AMPHETAMINE (ADDERALL XR) 30 MG 24 HR CAPSULE    TAKE ONE BY MOUTH EVERY MORNING AS NEEDED FOR ATTENTION / CONCENTRATION.    DULOXETINE (CYMBALTA) 60 MG CAPSULE    Take 60 mg by mouth once daily.    ERGOCALCIFEROL, VITAMIN D2, 400 UNIT TAB    Take by mouth.    HYDROCODONE-ACETAMINOPHEN (NORCO) 5-325 MG PER TABLET    TAKE 1 BY MOUTH EVERY 6 HOURS FOR 3 DAYS    LAMOTRIGINE (LAMICTAL) 100 MG TABLET    Take by mouth once daily. 100mg Oral PRN As directed.    LORAZEPAM (ATIVAN) 0.5 MG TABLET    TAKE ONE OR TWO TABLETS BY MOUTH AS NEEDED FOR ACUTE ANXIETY    MULTIVITAMIN CAPSULE    Take 1 capsule by mouth once daily.    MYDAYIS 50 MG CT24    Take 1 capsule by mouth once daily.    VYVANSE 70 MG CAPSULE         Review of patient's allergies indicates:  No Known Allergies    Review of Systems   Constitution: Negative for fever.   Cardiovascular: Negative for chest pain.   Respiratory: Negative for cough and shortness of breath.    Skin: Negative for rash.   Musculoskeletal: Positive for joint pain. Negative for joint swelling and stiffness.   Gastrointestinal: Negative for heartburn.   Neurological: Negative for headaches and numbness.         Objective:        General    Nursing note and vitals reviewed.  Constitutional: She is oriented to person, place, and time. She appears well-developed and well-nourished.   HENT:   Head: Normocephalic and atraumatic.   Eyes: EOM are normal.   Neck: Normal range of motion.   Cardiovascular: Regular rhythm.    Pulmonary/Chest: Effort normal. No respiratory distress.   Abdominal: Soft.   Neurological: She is alert and oriented to person, place, and time.   Psychiatric: She has a normal mood and affect. Her behavior is normal.             Right Hand/Wrist Exam     Inspection   Scars: Hand -  absent  Effusion: Hand -  absent  Bruising: Hand -  absent  Deformity: Hand -  deformity    Tenderness   The patient is tender to  palpation of the anderson area.    Range of Motion     Wrist   Extension: normal   Flexion: normal   Pronation: normal   Supination: normal     Finger Flexion   MP Thumb: normal   DIP Thumb: normal     Finger Extension   MP Thumb: normal   DIP Thumb: normal    Tests   Phalens Sign: negative  Tinel's sign (median nerve): negative  Finkelstein's test: negative    Atrophy   Thenar:  negative  Hypothenar:  negative  Intrinsic:  negative  1st Dorsal Interosseous: negative    Other     Neuorologic Exam    Median Distribution: normal  Ulnar Distribution: normal  Radial Distribution: normal    Comments:  2+ radial pulse  TTP anderson surf 1st cmc  Pain with rotatory compression 1st cmc jt      Left Hand/Wrist Exam     Inspection   Scars: Hand -  absent  Effusion: Hand -  absent  Bruising: Hand -  absent  Deformity: Hand -  absent    Tenderness   The patient is tender to palpation of the anderson area.     Range of Motion     Wrist   Extension: normal   Flexion: normal   Pronation: normal   Supination: normal     Finger Flexion   MP Thumb: normal   DIP Thumb: normal     Finger Extension   MP Thumb: normal  DIP Thumb: normal    Tests   Phalens Sign: negative  Tinel's sign (median nerve): negative  Finkelstein's test: negative    Atrophy  Thenar:  Negative  Hypothenar:  negative  Intrinsic: negative  1st Dorsal Interosseous:  negative    Comments:  Mild swelling in the thenar eminence with TTP 1st cmc   Pain with rotatory compression 1st cmc jt        Right Elbow Exam     Tests   Tinel's sign (cubital tunnel): negative      Left Elbow Exam     Tests   Tinel's sign (cubital tunnel): negative        Muscle Strength   Right Upper Extremity   Wrist extension: 5/5/5   Wrist flexion: 5/5/5   : 4/5/5   Thumb - APB: 5/5  Thumb - FPL: 5/5  Pinch Mechanism: 5/5  Left Upper Extremity  Wrist extension: 5/5/5   Wrist flexion: 5/5/5   :  5/5/5   Index Finger: 4/5  Thumb - APB: 5/5  Thumb - FPL: 5/5  Pinch Mechanism: 5/5    Vascular Exam        Capillary Refill  Right Hand: normal capillary refill              Xray images and report were reviewed today.  I agree with the radiologist's interpretation.    X-Ray Hand 3 View Bilateral  Narrative: EXAMINATION:  XR HAND COMPLETE 3 VIEWS BILATERAL    CLINICAL HISTORY:  . Pain in right hand    TECHNIQUE:  PA, lateral, and oblique views of both hands were performed.    COMPARISON:  11/02/2018    FINDINGS:  No acute osseous abnormality.  Left greater than right 1st CMC joint degenerative findings.  No concerning erosions.  No focal soft tissue abnormality.  Impression: As above    Electronically signed by: Antonio Sánchez MD  Date:    10/28/2019  Time:    14:53        Assessment:       Encounter Diagnoses   Name Primary?    Arthritis of carpometacarpal (CMC) joint of both thumbs Yes    Bilateral hand pain           Plan:       Linette Lara was seen today for pain and pain.    Diagnoses and all orders for this visit:    Arthritis of carpometacarpal (CMC) joint of both thumbs    Bilateral hand pain        Linette LIU Iraj is an established pt who comes in today for the above problems.  She has worsening symptoms of the bilateral thumbs.  She does have degenerative changes noted in this 1st CMC joints bilaterally.  We have discussed risks and benefits of a corticosteroid injection in the bilateral 1st CMC joints. She is leery of doing injections. We have also discussed occupational therapy in warm water soaks.  Ultimately, she is not convinced that that will help.  Lastly, I have offered to have her see Dr. Jade, our hand specialist to discuss treatment options.  She wishes to proceed with a consultation from him.  I will see her back in the office on an as-needed basis.  She verbalizes understanding and agrees.  She may continue using thumb spica splint as needed.    Follow up for consult with Dr. Jade per patient request.          The patient understands, chooses and consents to this plan and accepts  all   the risks which include but are not limited to the risks mentioned above.     Disclaimer: This note was prepared using a voice recognition system and is likely to have sound alike errors within the text.

## 2019-10-29 ENCOUNTER — OFFICE VISIT (OUTPATIENT)
Dept: ORTHOPEDICS | Facility: CLINIC | Age: 54
End: 2019-10-29
Payer: OTHER GOVERNMENT

## 2019-10-29 VITALS
HEIGHT: 64 IN | HEART RATE: 85 BPM | SYSTOLIC BLOOD PRESSURE: 149 MMHG | DIASTOLIC BLOOD PRESSURE: 93 MMHG | BODY MASS INDEX: 40.46 KG/M2 | WEIGHT: 237 LBS

## 2019-10-29 DIAGNOSIS — M18.0 ARTHRITIS OF CARPOMETACARPAL (CMC) JOINT OF BOTH THUMBS: Primary | ICD-10-CM

## 2019-10-29 PROCEDURE — 20600 SMALL JOINT ASPIRATION/INJECTION: R THUMB MCP, L THUMB MCP: ICD-10-PCS | Mod: 50,S$PBB,, | Performed by: ORTHOPAEDIC SURGERY

## 2019-10-29 PROCEDURE — 99999 PR PBB SHADOW E&M-EST. PATIENT-LVL III: ICD-10-PCS | Mod: PBBFAC,,, | Performed by: ORTHOPAEDIC SURGERY

## 2019-10-29 PROCEDURE — 99214 PR OFFICE/OUTPT VISIT, EST, LEVL IV, 30-39 MIN: ICD-10-PCS | Mod: S$PBB,25,, | Performed by: ORTHOPAEDIC SURGERY

## 2019-10-29 PROCEDURE — 20600 DRAIN/INJ JOINT/BURSA W/O US: CPT | Mod: 50,PBBFAC | Performed by: ORTHOPAEDIC SURGERY

## 2019-10-29 PROCEDURE — 99999 PR PBB SHADOW E&M-EST. PATIENT-LVL III: CPT | Mod: PBBFAC,,, | Performed by: ORTHOPAEDIC SURGERY

## 2019-10-29 PROCEDURE — 99214 OFFICE O/P EST MOD 30 MIN: CPT | Mod: S$PBB,25,, | Performed by: ORTHOPAEDIC SURGERY

## 2019-10-29 PROCEDURE — 99213 OFFICE O/P EST LOW 20 MIN: CPT | Mod: PBBFAC,25 | Performed by: ORTHOPAEDIC SURGERY

## 2019-10-29 RX ORDER — TRIAMCINOLONE ACETONIDE 40 MG/ML
40 INJECTION, SUSPENSION INTRA-ARTICULAR; INTRAMUSCULAR
Status: DISCONTINUED | OUTPATIENT
Start: 2019-10-29 | End: 2019-10-29 | Stop reason: HOSPADM

## 2019-10-29 RX ADMIN — TRIAMCINOLONE ACETONIDE 40 MG: 200 INJECTION, SUSPENSION INTRA-ARTICULAR; INTRAMUSCULAR at 04:10

## 2019-10-29 NOTE — PROGRESS NOTES
Subjective:     Patient ID: Linette Galo is a 54 y.o. female.    Chief Complaint: Pain of the Right Hand and Pain of the Left Hand    The patient is a 54-year-old female who has osteoarthritis of the basal joint of both thumbs quite symptomatic.  She has tried thumb spica splints without improvement.  She is status post carpal tunnel release 15 years ago.      Past Medical History:   Diagnosis Date    ADD (attention deficit disorder)     Anemia     Anxiety     Followed by Psychology    Depression     bipolar, anxiety, ADD    Family history of colon cancer 1/22/2019    Her grandmother had colon cancer.    Positive ARIK (antinuclear antibody)     Dr. Ross//following     Past Surgical History:   Procedure Laterality Date    CARPAL TUNNEL RELEASE      both hands    COLONOSCOPY N/A 1/22/2019    Procedure: COLONOSCOPY;  Surgeon: Moe Jimenez MD;  [facility];  Service: Endoscopy;  Laterality: N/A;    GASTRIC BYPASS  2008    HYSTERECTOMY      RALH (retains ovaries)    TONSILLECTOMY       Family History   Problem Relation Age of Onset    Breast cancer Maternal Grandmother     Colon cancer Maternal Grandmother     Hypertension Maternal Grandmother     Diabetes Maternal Grandmother     Thrombophilia Father     Parkinsonism Father     Lumbar disc disease Father     Stroke Father     Heart disease Father     Thrombophilia Sister     Hypertension Mother     Diabetes Mother     Hypertension Paternal Grandmother     Diabetes Paternal Grandmother      Social History     Socioeconomic History    Marital status:      Spouse name: Not on file    Number of children: Not on file    Years of education: Not on file    Highest education level: Not on file   Occupational History    Not on file   Social Needs    Financial resource strain: Not on file    Food insecurity:     Worry: Not on file     Inability: Not on file    Transportation needs:     Medical: Not on file      Non-medical: Not on file   Tobacco Use    Smoking status: Never Smoker    Smokeless tobacco: Never Used   Substance and Sexual Activity    Alcohol use: Yes     Comment: socially    Drug use: No    Sexual activity: Yes     Partners: Male     Birth control/protection: Surgical     Comment: mut monog   Lifestyle    Physical activity:     Days per week: Not on file     Minutes per session: Not on file    Stress: Not on file   Relationships    Social connections:     Talks on phone: Not on file     Gets together: Not on file     Attends Taoism service: Not on file     Active member of club or organization: Not on file     Attends meetings of clubs or organizations: Not on file     Relationship status: Not on file   Other Topics Concern    Not on file   Social History Narrative    Not on file     Medication List with Changes/Refills   Current Medications    AMOXICILLIN (AMOXIL) 500 MG CAPSULE    TAKE ONE BY MOUTH 3 TIMES DAILY UNTIL FINISHED    BUPROPION (WELLBUTRIN SR) 200 MG TB12    Take 1 tablet by mouth once daily.    CETIRIZINE (ZYRTEC) 10 MG TABLET    Take 1 tablet (10 mg total) by mouth every evening.    CHOLECALCIFEROL, VITAMIN D3, (VITAMIN D3) 1,000 UNIT CAPSULE    Take 1 capsule (1,000 Units total) by mouth once daily.    CYANOCOBALAMIN, VITAMIN B-12, (VITAMIN B-12) 50 MCG TABLET    Take 50 mcg by mouth once daily.    DEXTROAMPHETAMINE-AMPHETAMINE (ADDERALL XR) 30 MG 24 HR CAPSULE    TAKE ONE BY MOUTH EVERY MORNING AS NEEDED FOR ATTENTION / CONCENTRATION.    DULOXETINE (CYMBALTA) 60 MG CAPSULE    Take 60 mg by mouth once daily.    ERGOCALCIFEROL, VITAMIN D2, 400 UNIT TAB    Take by mouth.    HYDROCODONE-ACETAMINOPHEN (NORCO) 5-325 MG PER TABLET    TAKE 1 BY MOUTH EVERY 6 HOURS FOR 3 DAYS    LAMOTRIGINE (LAMICTAL) 100 MG TABLET    Take by mouth once daily. 100mg Oral PRN As directed.    LORAZEPAM (ATIVAN) 0.5 MG TABLET    TAKE ONE OR TWO TABLETS BY MOUTH AS NEEDED FOR ACUTE ANXIETY    MULTIVITAMIN  CAPSULE    Take 1 capsule by mouth once daily.    MYDAYIS 50 MG CT24    Take 1 capsule by mouth once daily.    VYVANSE 70 MG CAPSULE         Review of patient's allergies indicates:  No Known Allergies  Review of Systems   Constitution: Negative for malaise/fatigue.   HENT: Negative for hearing loss.    Eyes: Negative for double vision and visual disturbance.   Cardiovascular: Negative for chest pain.   Respiratory: Negative for shortness of breath.    Endocrine: Negative for cold intolerance.   Hematologic/Lymphatic: Does not bruise/bleed easily.   Skin: Negative for poor wound healing and suspicious lesions.   Musculoskeletal: Negative for gout, joint pain and joint swelling.   Gastrointestinal: Positive for dysphagia. Negative for nausea and vomiting.   Genitourinary: Negative for dysuria.   Neurological: Negative for numbness, paresthesias and sensory change.   Psychiatric/Behavioral: Negative for depression, memory loss and substance abuse. The patient is not nervous/anxious.    Allergic/Immunologic: Negative for persistent infections.       Objective:   Body mass index is 40.68 kg/m².  Vitals:    10/29/19 1545   BP: (!) 149/93   Pulse: 85                General    Constitutional: She is oriented to person, place, and time. She appears well-developed and well-nourished. No distress.   HENT:   Head: Normocephalic.   Eyes: EOM are normal.   Neck: Normal range of motion.   Pulmonary/Chest: Effort normal.   Neurological: She is oriented to person, place, and time.   Psychiatric: She has a normal mood and affect.             Right Hand/Wrist Exam     Inspection   Scars: Wrist - absent Hand -  absent  Effusion: Wrist - absent Hand -  absent    Pain   Wrist - The patient exhibits pain of the CMC.    Other     Neuorologic Exam    Median Distribution: normal  Ulnar Distribution: normal  Radial Distribution: normal      Left Hand/Wrist Exam     Inspection   Scars: Wrist - absent Hand -  absent  Effusion: Wrist - absent  Hand -  absent    Pain   Wrist - The patient exhibits pain of the CMC.    Other     Sensory Exam  Median Distribution: normal  Ulnar Distribution: normal  Radial Distribution: normal          Vascular Exam       Capillary Refill  Right Hand: normal capillary refill  Left Hand: normal capillary refill      Relevant imaging results reviewed and interpreted by me, discussed with the patient and / or family today radiographs showed bilateral thumb basal joint degenerative joint disease in both wrist  Assessment:     Encounter Diagnosis   Name Primary?    Arthritis of carpometacarpal (CMC) joint of both thumbs Yes        Plan:         The patient was injected in both thumb basal joints each with 0.5 cc of Kenalog and 0.5 cc of 2% plain lidocaine under sterile technique. She will wait at least 3 months between injections.            Disclaimer: This note was prepared using a voice recognition system and is likely to have sound alike errors within the text.

## 2019-10-29 NOTE — PROCEDURES
Small Joint Aspiration/Injection: R thumb MCP, L thumb MCP  Date/Time: 10/29/2019 4:00 PM  Performed by: Sterling Jade MD  Authorized by: Sterling Jade MD     Consent Done?:  Yes (Verbal)  Indications:  Joint swelling and pain  Timeout: Prior to procedure the correct patient, procedure, and site was verified      Location:  Thumb  Site:  R thumb MCP and L thumb MCP  Prep: Patient was prepped and draped in usual sterile fashion    Medications:  40 mg triamcinolone acetonide 40 mg/mL; 40 mg triamcinolone acetonide 40 mg/mL

## 2019-11-16 ENCOUNTER — PATIENT MESSAGE (OUTPATIENT)
Dept: INTERNAL MEDICINE | Facility: CLINIC | Age: 54
End: 2019-11-16

## 2019-12-06 ENCOUNTER — OFFICE VISIT (OUTPATIENT)
Dept: INTERNAL MEDICINE | Facility: CLINIC | Age: 54
End: 2019-12-06
Payer: OTHER GOVERNMENT

## 2019-12-06 VITALS
TEMPERATURE: 99 F | DIASTOLIC BLOOD PRESSURE: 84 MMHG | OXYGEN SATURATION: 99 % | SYSTOLIC BLOOD PRESSURE: 126 MMHG | WEIGHT: 237.88 LBS | HEART RATE: 83 BPM | HEIGHT: 64 IN | BODY MASS INDEX: 40.61 KG/M2

## 2019-12-06 DIAGNOSIS — Z29.9 PREVENTIVE MEASURE: ICD-10-CM

## 2019-12-06 DIAGNOSIS — R09.81 HEAD CONGESTION: ICD-10-CM

## 2019-12-06 DIAGNOSIS — J32.9 SINUSITIS, UNSPECIFIED CHRONICITY, UNSPECIFIED LOCATION: ICD-10-CM

## 2019-12-06 DIAGNOSIS — R05.9 COUGH: Primary | ICD-10-CM

## 2019-12-06 PROCEDURE — 99213 OFFICE O/P EST LOW 20 MIN: CPT | Mod: S$PBB,,, | Performed by: FAMILY MEDICINE

## 2019-12-06 PROCEDURE — 99999 PR PBB SHADOW E&M-EST. PATIENT-LVL III: ICD-10-PCS | Mod: PBBFAC,,, | Performed by: FAMILY MEDICINE

## 2019-12-06 PROCEDURE — 99213 PR OFFICE/OUTPT VISIT, EST, LEVL III, 20-29 MIN: ICD-10-PCS | Mod: S$PBB,,, | Performed by: FAMILY MEDICINE

## 2019-12-06 PROCEDURE — 99999 PR PBB SHADOW E&M-EST. PATIENT-LVL III: CPT | Mod: PBBFAC,,, | Performed by: FAMILY MEDICINE

## 2019-12-06 PROCEDURE — 99213 OFFICE O/P EST LOW 20 MIN: CPT | Mod: PBBFAC,PO | Performed by: FAMILY MEDICINE

## 2019-12-06 RX ORDER — METHYLPREDNISOLONE 4 MG/1
TABLET ORAL
Qty: 1 PACKAGE | Refills: 0 | Status: SHIPPED | OUTPATIENT
Start: 2019-12-06 | End: 2019-12-27

## 2019-12-06 RX ORDER — FLUTICASONE PROPIONATE 50 MCG
2 SPRAY, SUSPENSION (ML) NASAL DAILY
Qty: 16 G | Refills: 0 | Status: SHIPPED | OUTPATIENT
Start: 2019-12-06 | End: 2020-07-20

## 2019-12-06 NOTE — PROGRESS NOTES
"Subjective:      Patient ID: Linette Galo is a 54 y.o. female.    Chief Complaint: Cough; Nasal Congestion; and Headache    HPI  53 yo here with c/o nasal congestion, sinus pressure, facial pain/pressure, ear pressure, HA and cough.  Started 2 days ago.  No fever/chills.  Using tylenol and tylenol cold/sinus.  No wheezing  No able to cough anything up but feels like mucous is stuck in her throat.    Past Medical History:   Diagnosis Date    ADD (attention deficit disorder)     Anemia     Anxiety     Followed by Psychology    Depression     bipolar, anxiety, ADD    Family history of colon cancer 1/22/2019    Her grandmother had colon cancer.    Positive ARIK (antinuclear antibody)     Dr. Ross//following     Family History   Problem Relation Age of Onset    Breast cancer Maternal Grandmother     Colon cancer Maternal Grandmother     Hypertension Maternal Grandmother     Diabetes Maternal Grandmother     Thrombophilia Father     Parkinsonism Father     Lumbar disc disease Father     Stroke Father     Heart disease Father     Thrombophilia Sister     Hypertension Mother     Diabetes Mother     Hypertension Paternal Grandmother     Diabetes Paternal Grandmother      Past Surgical History:   Procedure Laterality Date    CARPAL TUNNEL RELEASE      both hands    COLONOSCOPY N/A 1/22/2019    Procedure: COLONOSCOPY;  Surgeon: Moe Jimenez MD;  Location: [facility];  Service: Endoscopy;  Laterality: N/A;    GASTRIC BYPASS  2008    HYSTERECTOMY      RALH (retains ovaries)    TONSILLECTOMY       Social History     Tobacco Use    Smoking status: Never Smoker    Smokeless tobacco: Never Used   Substance Use Topics    Alcohol use: Yes     Frequency: Never     Drinks per session: Patient refused     Binge frequency: Patient refused     Comment: socially    Drug use: No       /84   Pulse 83   Temp 98.5 °F (36.9 °C) (Oral)   Ht 5' 4" (1.626 m)   Wt 107.9 kg (237 lb 14 oz)   SpO2 99% "   BMI 40.83 kg/m²     Review of Systems   Constitutional: Negative for chills and fever.   HENT: Positive for ear pain, postnasal drip and rhinorrhea. Negative for sore throat.    Respiratory: Positive for cough. Negative for shortness of breath and wheezing.    Cardiovascular: Negative for chest pain.   Musculoskeletal: Negative for myalgias.   Skin: Negative for rash.   Allergic/Immunologic: Negative for environmental allergies.   Neurological: Positive for headaches.       Objective:     Physical Exam   Constitutional: She appears well-developed and well-nourished.   HENT:   Right Ear: External ear normal.   Left Ear: External ear normal.   Mouth/Throat: Oropharynx is clear and moist.   BL TMs are dull, no erythema or effusion noted  Nasal turbinates are mildly swollen/boggy   Neck: Neck supple.   Cardiovascular: Normal rate, regular rhythm and normal heart sounds.   Pulmonary/Chest: Effort normal and breath sounds normal. No stridor. No respiratory distress. She has no wheezes.   Lymphadenopathy:     She has no cervical adenopathy.   Nursing note and vitals reviewed.      Lab Results   Component Value Date    WBC 6.83 05/10/2018    HGB 13.3 05/10/2018    HCT 41.5 05/10/2018     05/10/2018    CHOL 165 11/02/2018    TRIG 75 11/02/2018    HDL 55 11/02/2018    ALT 17 05/10/2018    AST 18 05/10/2018     05/10/2018    K 4.7 05/10/2018     05/10/2018    CREATININE 0.9 05/10/2018    BUN 16 05/10/2018    CO2 23 05/10/2018    TSH 2.282 05/10/2018    INR 1.0 04/05/2012    HGBA1C 6.3 (H) 12/12/2007       Assessment:     1. Cough    2. Head congestion    3. Sinusitis, unspecified chronicity, unspecified location    4. Preventive measure         Plan:     Cough    Head congestion    Sinusitis, unspecified chronicity, unspecified location    Preventive measure  -     CBC auto differential; Future; Expected date: 12/06/2019  -     Comprehensive metabolic panel; Future; Expected date: 12/06/2019  -      Hemoglobin A1c; Future; Expected date: 12/06/2019  -     Lipid panel; Future; Expected date: 12/06/2019  -     TSH; Future; Expected date: 12/06/2019  -     Vitamin D; Future; Expected date: 12/06/2019  -     Ferritin; Future; Expected date: 12/06/2019  -     Iron and TIBC; Future; Expected date: 12/06/2019  -     Vitamin B12; Future; Expected date: 12/06/2019    Other orders  -     methylPREDNISolone (MEDROL DOSEPACK) 4 mg tablet; use as directed  Dispense: 1 Package; Refill: 0  -     fluticasone propionate (FLONASE) 50 mcg/actuation nasal spray; 2 sprays (100 mcg total) by Each Nostril route once daily.  Dispense: 16 g; Refill: 0    Start medrol dose uzma and flonase today  Try adding some mucinex DM with plenty of water  No sign of infection at this time, but if facial pressure/pain doesn't improve with steroids will add Abs.  F/u with labs for annual

## 2019-12-10 ENCOUNTER — PATIENT MESSAGE (OUTPATIENT)
Dept: INTERNAL MEDICINE | Facility: CLINIC | Age: 54
End: 2019-12-10

## 2019-12-11 RX ORDER — AMOXICILLIN AND CLAVULANATE POTASSIUM 875; 125 MG/1; MG/1
1 TABLET, FILM COATED ORAL EVERY 12 HOURS
Qty: 20 TABLET | Refills: 0 | Status: SHIPPED | OUTPATIENT
Start: 2019-12-11 | End: 2019-12-21

## 2019-12-18 ENCOUNTER — PATIENT MESSAGE (OUTPATIENT)
Dept: INTERNAL MEDICINE | Facility: CLINIC | Age: 54
End: 2019-12-18

## 2019-12-18 DIAGNOSIS — H91.90 PERCEIVED HEARING CHANGES: Primary | ICD-10-CM

## 2019-12-19 ENCOUNTER — PATIENT MESSAGE (OUTPATIENT)
Dept: INTERNAL MEDICINE | Facility: CLINIC | Age: 54
End: 2019-12-19

## 2019-12-27 ENCOUNTER — PATIENT OUTREACH (OUTPATIENT)
Dept: ADMINISTRATIVE | Facility: HOSPITAL | Age: 54
End: 2019-12-27

## 2019-12-27 NOTE — PROGRESS NOTES
HM reviewed.   Immunizations abstracted.  Care Everywhere abstracted. No new results found.  Health Maintenance Due   Topic    Mammogram      Previsit chart audit completed.  *KDL*

## 2020-01-02 ENCOUNTER — CLINICAL SUPPORT (OUTPATIENT)
Dept: AUDIOLOGY | Facility: CLINIC | Age: 55
End: 2020-01-02
Payer: OTHER GOVERNMENT

## 2020-01-02 DIAGNOSIS — H90.5 HEARING LOSS, SENSORINEURAL, COMBINED TYPES: Primary | ICD-10-CM

## 2020-01-02 PROCEDURE — 92567 TYMPANOMETRY: CPT | Mod: PBBFAC | Performed by: AUDIOLOGIST

## 2020-01-02 PROCEDURE — 92557 COMPREHENSIVE HEARING TEST: CPT | Mod: PBBFAC | Performed by: AUDIOLOGIST

## 2020-01-02 NOTE — PROGRESS NOTES
Linette Galo was seen 01/02/2020 for an audiological evaluation.  Patient complains of possible gradual decrease in hearing with her right ear being the better hearing ear. She reports having difficulty understanding conversational speech especially in the presence of background noise. She denies tinnitus and noise exposure. She reports that her family has a history of hearing loss with her mother's siblings all wearing hearing aids.     Results reveal normal hearing sensitivity 250-6000 Hz bilaterally and a mild hearing loss at 8k Hz, bilaterally.  Speech Reception Thresholds were  10 dBHL for the right ear and 10 dBHL for the left ear.   Word recognition scores were excellent bilaterally.  Tympanograms were Type A, normal for the right ear and Type A, normal for the left ear.    Patient was counseled on the above findings.      Recommendations include:    1.  ENT followup as needed  2.  Wear hearing protective devices around loud noise  3.  Repeat audiogram in 1-2 years

## 2020-01-06 ENCOUNTER — LAB VISIT (OUTPATIENT)
Dept: LAB | Facility: HOSPITAL | Age: 55
End: 2020-01-06
Attending: FAMILY MEDICINE
Payer: OTHER GOVERNMENT

## 2020-01-06 DIAGNOSIS — Z29.9 PREVENTIVE MEASURE: ICD-10-CM

## 2020-01-06 LAB
BASOPHILS # BLD AUTO: 0.04 K/UL (ref 0–0.2)
BASOPHILS NFR BLD: 0.8 % (ref 0–1.9)
DIFFERENTIAL METHOD: ABNORMAL
EOSINOPHIL # BLD AUTO: 0.1 K/UL (ref 0–0.5)
EOSINOPHIL NFR BLD: 2.7 % (ref 0–8)
ERYTHROCYTE [DISTWIDTH] IN BLOOD BY AUTOMATED COUNT: 14 % (ref 11.5–14.5)
HCT VFR BLD AUTO: 42.8 % (ref 37–48.5)
HGB BLD-MCNC: 13.4 G/DL (ref 12–16)
IMM GRANULOCYTES # BLD AUTO: 0.01 K/UL (ref 0–0.04)
IMM GRANULOCYTES NFR BLD AUTO: 0.2 % (ref 0–0.5)
LYMPHOCYTES # BLD AUTO: 1.5 K/UL (ref 1–4.8)
LYMPHOCYTES NFR BLD: 31.7 % (ref 18–48)
MCH RBC QN AUTO: 28.8 PG (ref 27–31)
MCHC RBC AUTO-ENTMCNC: 31.3 G/DL (ref 32–36)
MCV RBC AUTO: 92 FL (ref 82–98)
MONOCYTES # BLD AUTO: 0.4 K/UL (ref 0.3–1)
MONOCYTES NFR BLD: 7.7 % (ref 4–15)
NEUTROPHILS # BLD AUTO: 2.7 K/UL (ref 1.8–7.7)
NEUTROPHILS NFR BLD: 56.9 % (ref 38–73)
NRBC BLD-RTO: 0 /100 WBC
PLATELET # BLD AUTO: 179 K/UL (ref 150–350)
PMV BLD AUTO: 12.8 FL (ref 9.2–12.9)
RBC # BLD AUTO: 4.65 M/UL (ref 4–5.4)
WBC # BLD AUTO: 4.8 K/UL (ref 3.9–12.7)

## 2020-01-06 PROCEDURE — 83540 ASSAY OF IRON: CPT

## 2020-01-06 PROCEDURE — 80061 LIPID PANEL: CPT

## 2020-01-06 PROCEDURE — 82306 VITAMIN D 25 HYDROXY: CPT

## 2020-01-06 PROCEDURE — 85025 COMPLETE CBC W/AUTO DIFF WBC: CPT

## 2020-01-06 PROCEDURE — 84443 ASSAY THYROID STIM HORMONE: CPT

## 2020-01-06 PROCEDURE — 82607 VITAMIN B-12: CPT

## 2020-01-06 PROCEDURE — 83036 HEMOGLOBIN GLYCOSYLATED A1C: CPT

## 2020-01-06 PROCEDURE — 36415 COLL VENOUS BLD VENIPUNCTURE: CPT | Mod: PO

## 2020-01-06 PROCEDURE — 80053 COMPREHEN METABOLIC PANEL: CPT

## 2020-01-06 PROCEDURE — 82728 ASSAY OF FERRITIN: CPT

## 2020-01-07 LAB
25(OH)D3+25(OH)D2 SERPL-MCNC: 57 NG/ML (ref 30–96)
ALBUMIN SERPL BCP-MCNC: 3.9 G/DL (ref 3.5–5.2)
ALP SERPL-CCNC: 78 U/L (ref 55–135)
ALT SERPL W/O P-5'-P-CCNC: 20 U/L (ref 10–44)
ANION GAP SERPL CALC-SCNC: 8 MMOL/L (ref 8–16)
AST SERPL-CCNC: 18 U/L (ref 10–40)
BILIRUB SERPL-MCNC: 0.3 MG/DL (ref 0.1–1)
BUN SERPL-MCNC: 16 MG/DL (ref 6–20)
CALCIUM SERPL-MCNC: 9.1 MG/DL (ref 8.7–10.5)
CHLORIDE SERPL-SCNC: 106 MMOL/L (ref 95–110)
CHOLEST SERPL-MCNC: 191 MG/DL (ref 120–199)
CHOLEST/HDLC SERPL: 3.2 {RATIO} (ref 2–5)
CO2 SERPL-SCNC: 26 MMOL/L (ref 23–29)
CREAT SERPL-MCNC: 0.9 MG/DL (ref 0.5–1.4)
EST. GFR  (AFRICAN AMERICAN): >60 ML/MIN/1.73 M^2
EST. GFR  (NON AFRICAN AMERICAN): >60 ML/MIN/1.73 M^2
ESTIMATED AVG GLUCOSE: 126 MG/DL (ref 68–131)
FERRITIN SERPL-MCNC: 9 NG/ML (ref 20–300)
GLUCOSE SERPL-MCNC: 81 MG/DL (ref 70–110)
HBA1C MFR BLD HPLC: 6 % (ref 4–5.6)
HDLC SERPL-MCNC: 59 MG/DL (ref 40–75)
HDLC SERPL: 30.9 % (ref 20–50)
IRON SERPL-MCNC: 52 UG/DL (ref 30–160)
LDLC SERPL CALC-MCNC: 120.4 MG/DL (ref 63–159)
NONHDLC SERPL-MCNC: 132 MG/DL
POTASSIUM SERPL-SCNC: 4.3 MMOL/L (ref 3.5–5.1)
PROT SERPL-MCNC: 7.3 G/DL (ref 6–8.4)
SATURATED IRON: 11 % (ref 20–50)
SODIUM SERPL-SCNC: 140 MMOL/L (ref 136–145)
TOTAL IRON BINDING CAPACITY: 481 UG/DL (ref 250–450)
TRANSFERRIN SERPL-MCNC: 325 MG/DL (ref 200–375)
TRIGL SERPL-MCNC: 58 MG/DL (ref 30–150)
TSH SERPL DL<=0.005 MIU/L-ACNC: 2.15 UIU/ML (ref 0.4–4)
VIT B12 SERPL-MCNC: 444 PG/ML (ref 210–950)

## 2020-01-09 NOTE — PROGRESS NOTES
Called pt to schedule mammogram. Offered to schedule for her. Pt declined stating she would schedule it herself online once she has her calendar and can see her schedule.

## 2020-01-13 ENCOUNTER — OFFICE VISIT (OUTPATIENT)
Dept: INTERNAL MEDICINE | Facility: CLINIC | Age: 55
End: 2020-01-13
Payer: OTHER GOVERNMENT

## 2020-01-13 VITALS
BODY MASS INDEX: 41.33 KG/M2 | HEART RATE: 84 BPM | WEIGHT: 242.06 LBS | SYSTOLIC BLOOD PRESSURE: 128 MMHG | DIASTOLIC BLOOD PRESSURE: 84 MMHG | HEIGHT: 64 IN | TEMPERATURE: 98 F

## 2020-01-13 DIAGNOSIS — D50.9 IRON DEFICIENCY ANEMIA, UNSPECIFIED IRON DEFICIENCY ANEMIA TYPE: ICD-10-CM

## 2020-01-13 DIAGNOSIS — Z12.39 BREAST CANCER SCREENING: ICD-10-CM

## 2020-01-13 DIAGNOSIS — R73.09 ABNORMAL GLUCOSE: ICD-10-CM

## 2020-01-13 DIAGNOSIS — Z00.00 ANNUAL PHYSICAL EXAM: Primary | ICD-10-CM

## 2020-01-13 PROCEDURE — 99999 PR PBB SHADOW E&M-EST. PATIENT-LVL IV: CPT | Mod: PBBFAC,,, | Performed by: FAMILY MEDICINE

## 2020-01-13 PROCEDURE — 99999 PR PBB SHADOW E&M-EST. PATIENT-LVL IV: ICD-10-PCS | Mod: PBBFAC,,, | Performed by: FAMILY MEDICINE

## 2020-01-13 PROCEDURE — 99214 OFFICE O/P EST MOD 30 MIN: CPT | Mod: PBBFAC,PO,25 | Performed by: FAMILY MEDICINE

## 2020-01-13 PROCEDURE — 99396 PREV VISIT EST AGE 40-64: CPT | Mod: S$PBB,,, | Performed by: FAMILY MEDICINE

## 2020-01-13 PROCEDURE — 99396 PR PREVENTIVE VISIT,EST,40-64: ICD-10-PCS | Mod: S$PBB,,, | Performed by: FAMILY MEDICINE

## 2020-01-13 PROCEDURE — 90686 IIV4 VACC NO PRSV 0.5 ML IM: CPT | Mod: PBBFAC,PO

## 2020-01-13 NOTE — PROGRESS NOTES
"Subjective:      Patient ID: Linette Galo is a 54 y.o. female.    Chief Complaint: Annual Exam    HPI   55 yo here for annual.  Followed by Psych for her meds.  Has outside Rheum as well.  Hx of gastric surgery, iron def anemia.  Has needed iron infusions in the past.  Feels tired/sluggish.  Doesn't respond to oral iron.  Weight is up, not eating well/exercising.  Been under a lot of stress.    Past Medical History:   Diagnosis Date    ADD (attention deficit disorder)     Anemia     Anxiety     Followed by Psychology    Depression     bipolar, anxiety, ADD    Family history of colon cancer 1/22/2019    Her grandmother had colon cancer.    Positive ARIK (antinuclear antibody)     Dr. Ross//following     Family History   Problem Relation Age of Onset    Breast cancer Maternal Grandmother     Colon cancer Maternal Grandmother     Hypertension Maternal Grandmother     Diabetes Maternal Grandmother     Thrombophilia Father     Parkinsonism Father     Lumbar disc disease Father     Stroke Father     Heart disease Father     Thrombophilia Sister     Hypertension Mother     Diabetes Mother     Hypertension Paternal Grandmother     Diabetes Paternal Grandmother      Past Surgical History:   Procedure Laterality Date    CARPAL TUNNEL RELEASE      both hands    COLONOSCOPY N/A 1/22/2019    Procedure: COLONOSCOPY;  Surgeon: Moe Jimenez MD;  Location: [facility];  Service: Endoscopy;  Laterality: N/A;    GASTRIC BYPASS  2008    HYSTERECTOMY      RALH (retains ovaries)    TONSILLECTOMY       Social History     Tobacco Use    Smoking status: Never Smoker    Smokeless tobacco: Never Used   Substance Use Topics    Alcohol use: Yes     Frequency: Never     Drinks per session: Patient refused     Binge frequency: Patient refused     Comment: socially    Drug use: No       /84   Pulse 84   Temp 98.3 °F (36.8 °C) (Oral)   Ht 5' 4" (1.626 m)   Wt 109.8 kg (242 lb 1 oz)   BMI 41.55 " kg/m²     Review of Systems   Constitutional: Positive for fatigue. Negative for activity change and unexpected weight change.   HENT: Negative for hearing loss, rhinorrhea and trouble swallowing.    Eyes: Negative for discharge and visual disturbance.   Respiratory: Negative for chest tightness and wheezing.    Cardiovascular: Negative for chest pain and palpitations.   Gastrointestinal: Negative for blood in stool, constipation, diarrhea and vomiting.   Endocrine: Negative for polydipsia and polyuria.   Genitourinary: Negative for difficulty urinating, dysuria, hematuria and menstrual problem.   Musculoskeletal: Negative for arthralgias, joint swelling and neck pain.   Neurological: Positive for headaches. Negative for weakness.   Psychiatric/Behavioral: Negative for confusion and dysphoric mood.       Objective:     Physical Exam   Constitutional: She is oriented to person, place, and time. She appears well-developed and well-nourished. No distress.   HENT:   Right Ear: External ear normal.   Left Ear: External ear normal.   Nose: Nose normal.   Mouth/Throat: Oropharynx is clear and moist.   Eyes: Pupils are equal, round, and reactive to light. Conjunctivae are normal.   Neck: Normal range of motion. Neck supple. Carotid bruit is not present.   Cardiovascular: Normal rate, regular rhythm and normal heart sounds.   Pulmonary/Chest: Effort normal and breath sounds normal. No respiratory distress. She has no wheezes. She has no rales.   Abdominal: Soft. Bowel sounds are normal. She exhibits no distension. There is no tenderness. There is no guarding.   Musculoskeletal: She exhibits no edema.   Neurological: She is alert and oriented to person, place, and time. No cranial nerve deficit.   Skin: Skin is warm and dry. No rash noted.   Psychiatric: She has a normal mood and affect. Her behavior is normal. Judgment and thought content normal.   Nursing note and vitals reviewed.      Lab Results   Component Value Date     WBC 4.80 01/06/2020    HGB 13.4 01/06/2020    HCT 42.8 01/06/2020     01/06/2020    CHOL 191 01/06/2020    TRIG 58 01/06/2020    HDL 59 01/06/2020    ALT 20 01/06/2020    AST 18 01/06/2020     01/06/2020    K 4.3 01/06/2020     01/06/2020    CREATININE 0.9 01/06/2020    BUN 16 01/06/2020    CO2 26 01/06/2020    TSH 2.145 01/06/2020    INR 1.0 04/05/2012    HGBA1C 6.0 (H) 01/06/2020       Assessment:     1. Annual physical exam    2. Iron deficiency anemia, unspecified iron deficiency anemia type    3. Abnormal glucose    4. Breast cancer screening         Plan:     Annual physical exam    Iron deficiency anemia, unspecified iron deficiency anemia type  -     Ambulatory referral to Hematology / Oncology    Abnormal glucose  -     Hemoglobin A1c; Future; Expected date: 07/13/2020    Breast cancer screening  -     Mammo Digital Screening Bilat; Future; Expected date: 01/13/2020    Reviewed labs with pt  A1C is up some  Iron levels are low  See Heme for iron infusion//Dr. Etienne in past  Update mammo  Focus on better eating/exercise/weight loss  F/u with me in 6 mos wit A1C

## 2020-01-23 ENCOUNTER — INITIAL CONSULT (OUTPATIENT)
Dept: HEMATOLOGY/ONCOLOGY | Facility: CLINIC | Age: 55
End: 2020-01-23
Payer: OTHER GOVERNMENT

## 2020-01-23 VITALS
DIASTOLIC BLOOD PRESSURE: 88 MMHG | RESPIRATION RATE: 20 BRPM | OXYGEN SATURATION: 99 % | HEIGHT: 64 IN | WEIGHT: 242.75 LBS | SYSTOLIC BLOOD PRESSURE: 135 MMHG | HEART RATE: 101 BPM | TEMPERATURE: 99 F | BODY MASS INDEX: 41.44 KG/M2

## 2020-01-23 DIAGNOSIS — K91.89 COMPLICATIONS OF GASTRIC BYPASS SURGERY: ICD-10-CM
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
## 2020-01-23 DIAGNOSIS — D50.0 IRON DEFICIENCY ANEMIA DUE TO CHRONIC BLOOD LOSS: Primary | ICD-10-CM

## 2020-01-23 PROCEDURE — 99244 OFF/OP CNSLTJ NEW/EST MOD 40: CPT | Mod: S$PBB,,, | Performed by: INTERNAL MEDICINE

## 2020-01-23 PROCEDURE — 99999 PR PBB SHADOW E&M-EST. PATIENT-LVL III: CPT | Mod: PBBFAC,,, | Performed by: INTERNAL MEDICINE

## 2020-01-23 PROCEDURE — 99244 PR OFFICE CONSULTATION,LEVEL IV: ICD-10-PCS | Mod: S$PBB,,, | Performed by: INTERNAL MEDICINE

## 2020-01-23 PROCEDURE — 99999 PR PBB SHADOW E&M-EST. PATIENT-LVL III: ICD-10-PCS | Mod: PBBFAC,,, | Performed by: INTERNAL MEDICINE

## 2020-01-23 PROCEDURE — 99213 OFFICE O/P EST LOW 20 MIN: CPT | Mod: PBBFAC | Performed by: INTERNAL MEDICINE

## 2020-01-23 RX ORDER — SODIUM CHLORIDE 0.9 % (FLUSH) 0.9 %
10 SYRINGE (ML) INJECTION
Status: CANCELLED | OUTPATIENT
Start: 2020-01-23

## 2020-01-23 RX ORDER — HEPARIN 100 UNIT/ML
500 SYRINGE INTRAVENOUS
Status: CANCELLED | OUTPATIENT
Start: 2020-01-23

## 2020-01-23 RX ORDER — SODIUM CHLORIDE 0.9 % (FLUSH) 0.9 %
10 SYRINGE (ML) INJECTION
Status: CANCELLED | OUTPATIENT
Start: 2020-01-31

## 2020-01-23 RX ORDER — HEPARIN 100 UNIT/ML
500 SYRINGE INTRAVENOUS
Status: CANCELLED | OUTPATIENT
Start: 2020-01-31

## 2020-01-23 NOTE — PROGRESS NOTES
Subjective:       Patient ID: Linette Galo is a 54 y.o. female.    Chief Complaint: Results and Anemia    HPI 54-year-old female history of gastric bypass reported increasing fatigue and weakness found to have iron deficiency anemia    Past Medical History:   Diagnosis Date    ADD (attention deficit disorder)     Anemia     Anxiety     Followed by Psychology    Depression     bipolar, anxiety, ADD    Family history of colon cancer 1/22/2019    Her grandmother had colon cancer.    Positive ARIK (antinuclear antibody)     Dr. Ross//following     Family History   Problem Relation Age of Onset    Breast cancer Maternal Grandmother     Colon cancer Maternal Grandmother     Hypertension Maternal Grandmother     Diabetes Maternal Grandmother     Thrombophilia Father     Parkinsonism Father     Lumbar disc disease Father     Stroke Father     Heart disease Father     Thrombophilia Sister     Hypertension Mother     Diabetes Mother     Hypertension Paternal Grandmother     Diabetes Paternal Grandmother      Social History     Socioeconomic History    Marital status:      Spouse name: Not on file    Number of children: Not on file    Years of education: Not on file    Highest education level: Not on file   Occupational History    Not on file   Social Needs    Financial resource strain: Not hard at all    Food insecurity:     Worry: Never true     Inability: Never true    Transportation needs:     Medical: No     Non-medical: No   Tobacco Use    Smoking status: Never Smoker    Smokeless tobacco: Never Used   Substance and Sexual Activity    Alcohol use: Yes     Frequency: Never     Drinks per session: Patient refused     Binge frequency: Patient refused     Comment: socially    Drug use: No    Sexual activity: Yes     Partners: Male     Birth control/protection: Surgical     Comment: mut monog   Lifestyle    Physical activity:     Days per week: Not on file     Minutes per  session: Not on file    Stress: Rather much   Relationships    Social connections:     Talks on phone: More than three times a week     Gets together: Twice a week     Attends Baptism service: Not on file     Active member of club or organization: No     Attends meetings of clubs or organizations: Patient refused     Relationship status:    Other Topics Concern    Not on file   Social History Narrative    Not on file     Past Surgical History:   Procedure Laterality Date    CARPAL TUNNEL RELEASE      both hands    COLONOSCOPY N/A 1/22/2019    Procedure: COLONOSCOPY;  Surgeon: Moe Jimenez MD;  Location[facility];  Service: Endoscopy;  Laterality: N/A;    GASTRIC BYPASS  2008    HYSTERECTOMY      RALH (retains ovaries)    TONSILLECTOMY         Labs:  Lab Results   Component Value Date    WBC 4.80 01/06/2020    HGB 13.4 01/06/2020    HCT 42.8 01/06/2020    MCV 92 01/06/2020     01/06/2020     BMP  Lab Results   Component Value Date     01/06/2020    K 4.3 01/06/2020     01/06/2020    CO2 26 01/06/2020    BUN 16 01/06/2020    CREATININE 0.9 01/06/2020    CALCIUM 9.1 01/06/2020    ANIONGAP 8 01/06/2020    ESTGFRAFRICA >60.0 01/06/2020    EGFRNONAA >60.0 01/06/2020     Lab Results   Component Value Date    ALT 20 01/06/2020    AST 18 01/06/2020    ALKPHOS 78 01/06/2020    BILITOT 0.3 01/06/2020       Lab Results   Component Value Date    IRON 52 01/06/2020    TIBC 481 (H) 01/06/2020    FERRITIN 9 (L) 01/06/2020     Lab Results   Component Value Date    BSZLFKNR16 444 01/06/2020     Lab Results   Component Value Date    FOLATE 5.8 12/12/2007     Lab Results   Component Value Date    TSH 2.145 01/06/2020         Review of Systems   Constitutional: Positive for fatigue. Negative for activity change, appetite change, chills, diaphoresis, fever and unexpected weight change.   HENT: Negative for congestion, dental problem, drooling, ear discharge, ear pain, facial swelling, hearing  loss, mouth sores, nosebleeds, postnasal drip, rhinorrhea, sinus pressure, sneezing, sore throat, tinnitus, trouble swallowing and voice change.    Eyes: Negative for photophobia, pain, discharge, redness, itching and visual disturbance.   Respiratory: Negative for cough, choking, chest tightness, shortness of breath, wheezing and stridor.    Cardiovascular: Negative for chest pain, palpitations and leg swelling.   Gastrointestinal: Negative for abdominal distention, abdominal pain, anal bleeding, blood in stool, constipation, diarrhea, nausea, rectal pain and vomiting.   Endocrine: Negative for cold intolerance, heat intolerance, polydipsia, polyphagia and polyuria.   Genitourinary: Negative for decreased urine volume, difficulty urinating, dyspareunia, dysuria, enuresis, flank pain, frequency, genital sores, hematuria, menstrual problem, pelvic pain, urgency, vaginal bleeding, vaginal discharge and vaginal pain.   Musculoskeletal: Negative for arthralgias, back pain, gait problem, joint swelling, myalgias, neck pain and neck stiffness.   Skin: Negative for color change, pallor and rash.   Allergic/Immunologic: Negative for environmental allergies, food allergies and immunocompromised state.   Neurological: Positive for weakness. Negative for dizziness, tremors, seizures, syncope, facial asymmetry, speech difficulty, light-headedness, numbness and headaches.   Hematological: Negative for adenopathy. Does not bruise/bleed easily.   Psychiatric/Behavioral: Positive for dysphoric mood. Negative for agitation, behavioral problems, confusion, decreased concentration, hallucinations, self-injury, sleep disturbance and suicidal ideas. The patient is nervous/anxious. The patient is not hyperactive.        Objective:      Physical Exam   Constitutional: She is oriented to person, place, and time. She appears well-developed and well-nourished. She appears distressed.   HENT:   Head: Normocephalic and atraumatic.   Right Ear:  External ear normal.   Left Ear: External ear normal.   Nose: Nose normal. Right sinus exhibits no maxillary sinus tenderness and no frontal sinus tenderness. Left sinus exhibits no maxillary sinus tenderness and no frontal sinus tenderness.   Mouth/Throat: Oropharynx is clear and moist. No oropharyngeal exudate.   Eyes: Pupils are equal, round, and reactive to light. Conjunctivae, EOM and lids are normal. Right eye exhibits no discharge. Left eye exhibits no discharge. Right conjunctiva is not injected. Right conjunctiva has no hemorrhage. Left conjunctiva is not injected. Left conjunctiva has no hemorrhage. No scleral icterus.   Neck: Normal range of motion. Neck supple. No JVD present. No tracheal deviation present. No thyromegaly present.   Cardiovascular: Normal rate and regular rhythm.   Pulmonary/Chest: Effort normal. No stridor. No respiratory distress. She exhibits no tenderness.   Abdominal: Soft. She exhibits no distension and no mass. There is no splenomegaly or hepatomegaly. There is no tenderness. There is no rebound.   Musculoskeletal: Normal range of motion. She exhibits no edema or tenderness.   Lymphadenopathy:     She has no cervical adenopathy.     She has no axillary adenopathy.        Right: No supraclavicular adenopathy present.        Left: No supraclavicular adenopathy present.   Neurological: She is alert and oriented to person, place, and time. No cranial nerve deficit. Coordination normal.   Skin: Skin is dry. No rash noted. She is not diaphoretic. No erythema.   Psychiatric: Her behavior is normal. Judgment and thought content normal. Her mood appears anxious. She exhibits a depressed mood.   Vitals reviewed.          Assessment:      1. Iron deficiency anemia due to chronic blood loss    2. Complications of gastric bypass surgery           Plan:     Documented iron deficiency last treated 3 years ago will proceed with intravenous iron.  Orders written reviewed return in 3-4 months with  CBC iron status prior to be seen by myself for nurse practitioner would recommend CBC iron status probably twice per year to see if patient needs intravenous iron supplementation        Tenzin Etienne Jr, MD FACP

## 2020-01-23 NOTE — LETTER
January 23, 2020      Earnest Reed MD  [address] [place] [address]           [facility]ology [facility]  [address] [facility]  [place] [address]  Phone: [phone]  Fax: [phone]          Patient: Linette Galo   MR Number: [id number]   YOB: 1965   Date of Visit: 1/23/2020       Dear Dr. Earnest Reed:    Thank you for referring Linette Galo to me for evaluation. Attached you will find relevant portions of my assessment and plan of care.    If you have questions, please do not hesitate to call me. I look forward to following Linette Galo along with you.    Sincerely,    Tenzin Etienne MD    Enclosure  CC:  No Recipients    If you would like to receive this communication electronically, please contact [email or web address] or [phone] to request more information on [employer] access.    For providers and/or their staff who would like to refer a patient to [facility], please contact us through our one-stop-shop provider referral line, [facility], at [phone].    If you feel you have received this communication in error or would no longer like to receive these types of communications, please e-mail [email or web address]

## 2020-01-29 ENCOUNTER — OFFICE VISIT (OUTPATIENT)
Dept: ORTHOPEDICS | Facility: CLINIC | Age: 55
End: 2020-01-29
Payer: OTHER GOVERNMENT

## 2020-01-29 VITALS
WEIGHT: 242 LBS | SYSTOLIC BLOOD PRESSURE: 149 MMHG | HEIGHT: 64 IN | HEART RATE: 92 BPM | BODY MASS INDEX: 41.32 KG/M2 | DIASTOLIC BLOOD PRESSURE: 90 MMHG

## 2020-01-29 DIAGNOSIS — M18.0 ARTHRITIS OF CARPOMETACARPAL (CMC) JOINT OF BOTH THUMBS: Primary | ICD-10-CM

## 2020-01-29 PROCEDURE — 99213 PR OFFICE/OUTPT VISIT, EST, LEVL III, 20-29 MIN: ICD-10-PCS | Mod: 25,S$PBB,, | Performed by: ORTHOPAEDIC SURGERY

## 2020-01-29 PROCEDURE — 20600 DRAIN/INJ JOINT/BURSA W/O US: CPT | Mod: 50,PBBFAC | Performed by: ORTHOPAEDIC SURGERY

## 2020-01-29 PROCEDURE — 99999 PR PBB SHADOW E&M-EST. PATIENT-LVL III: CPT | Mod: PBBFAC,,, | Performed by: ORTHOPAEDIC SURGERY

## 2020-01-29 PROCEDURE — 99213 OFFICE O/P EST LOW 20 MIN: CPT | Mod: 25,S$PBB,, | Performed by: ORTHOPAEDIC SURGERY

## 2020-01-29 PROCEDURE — 20600 SMALL JOINT ASPIRATION/INJECTION: R THUMB MCP, L THUMB MCP: ICD-10-PCS | Mod: 50,S$PBB,, | Performed by: ORTHOPAEDIC SURGERY

## 2020-01-29 PROCEDURE — 99213 OFFICE O/P EST LOW 20 MIN: CPT | Mod: PBBFAC,25 | Performed by: ORTHOPAEDIC SURGERY

## 2020-01-29 PROCEDURE — 99999 PR PBB SHADOW E&M-EST. PATIENT-LVL III: ICD-10-PCS | Mod: PBBFAC,,, | Performed by: ORTHOPAEDIC SURGERY

## 2020-01-29 RX ORDER — TRIAMCINOLONE ACETONIDE 40 MG/ML
40 INJECTION, SUSPENSION INTRA-ARTICULAR; INTRAMUSCULAR
Status: DISCONTINUED | OUTPATIENT
Start: 2020-01-29 | End: 2020-01-29 | Stop reason: HOSPADM

## 2020-01-29 RX ADMIN — TRIAMCINOLONE ACETONIDE 40 MG: 200 INJECTION, SUSPENSION INTRA-ARTICULAR; INTRAMUSCULAR at 11:01

## 2020-01-29 NOTE — PROGRESS NOTES
Subjective:     Patient ID: Linette Galo is a 54 y.o. female.    Chief Complaint: Pain of the Right Hand and Pain of the Left Hand    The patient is a 54-year-old female with bilateral thumb basal joint degenerative joint disease. She was last injected 3 months ago with good results and request reinjection.      Past Medical History:   Diagnosis Date    ADD (attention deficit disorder)     Anemia     Anxiety     Followed by Psychology    Depression     bipolar, anxiety, ADD    Family history of colon cancer 1/22/2019    Her grandmother had colon cancer.    Positive ARIK (antinuclear antibody)     Dr. Ross//following     Past Surgical History:   Procedure Laterality Date    CARPAL TUNNEL RELEASE      both hands    COLONOSCOPY N/A 1/22/2019    Procedure: COLONOSCOPY;  Surgeon: Moe Jimenez MD;  Location: [facility];  Service: Endoscopy;  Laterality: N/A;    GASTRIC BYPASS  2008    HYSTERECTOMY      RALH (retains ovaries)    TONSILLECTOMY       Family History   Problem Relation Age of Onset    Breast cancer Maternal Grandmother     Colon cancer Maternal Grandmother     Hypertension Maternal Grandmother     Diabetes Maternal Grandmother     Thrombophilia Father     Parkinsonism Father     Lumbar disc disease Father     Stroke Father     Heart disease Father     Thrombophilia Sister     Hypertension Mother     Diabetes Mother     Hypertension Paternal Grandmother     Diabetes Paternal Grandmother      Social History     Socioeconomic History    Marital status:      Spouse name: Not on file    Number of children: Not on file    Years of education: Not on file    Highest education level: Not on file   Occupational History    Not on file   Social Needs    Financial resource strain: Not hard at all    Food insecurity:     Worry: Never true     Inability: Never true    Transportation needs:     Medical: No     Non-medical: No   Tobacco Use    Smoking status: Never Smoker     Smokeless tobacco: Never Used   Substance and Sexual Activity    Alcohol use: Yes     Frequency: Never     Drinks per session: Patient refused     Binge frequency: Patient refused     Comment: socially    Drug use: No    Sexual activity: Yes     Partners: Male     Birth control/protection: Surgical     Comment: mut monog   Lifestyle    Physical activity:     Days per week: Not on file     Minutes per session: Not on file    Stress: Rather much   Relationships    Social connections:     Talks on phone: More than three times a week     Gets together: Twice a week     Attends [employer] service: Not on file     Active member of club or organization: No     Attends meetings of clubs or organizations: Patient refused     Relationship status:    Other Topics Concern    Not on file   Social History Narrative    Not on file     Medication List with Changes/Refills   Current Medications    BUPROPION (WELLBUTRIN SR) 200 MG TB12    Take 1 tablet by mouth once daily.    CHOLECALCIFEROL, VITAMIN D3, (VITAMIN D3) 1,000 UNIT CAPSULE    Take 1 capsule (1,000 Units total) by mouth once daily.    CYANOCOBALAMIN, VITAMIN B-12, (VITAMIN B-12) 50 MCG TABLET    Take 50 mcg by mouth once daily.    DULOXETINE (CYMBALTA) 60 MG CAPSULE    Take 60 mg by mouth once daily.    FLUTICASONE PROPIONATE (FLONASE) 50 MCG/ACTUATION NASAL SPRAY    2 sprays (100 mcg total) by Each Nostril route once daily.    LAMOTRIGINE (LAMICTAL) 100 MG TABLET    Take by mouth once daily. 100mg Oral PRN As directed.    LORAZEPAM (ATIVAN) 0.5 MG TABLET    TAKE ONE OR TWO TABLETS BY MOUTH AS NEEDED FOR ACUTE ANXIETY    MULTIVITAMIN CAPSULE    Take 1 capsule by mouth once daily.    VYVANSE 70 MG CAPSULE         Review of patient's allergies indicates:  No Known Allergies  Review of Systems   Constitution: Negative for malaise/fatigue.   HENT: Negative for hearing loss.    Eyes: Negative for double vision and visual disturbance.   Cardiovascular:  Negative for chest pain.   Respiratory: Negative for shortness of breath.    Endocrine: Negative for cold intolerance.   Hematologic/Lymphatic: Does not bruise/bleed easily.   Skin: Negative for poor wound healing and suspicious lesions.   Musculoskeletal: Negative for gout, joint pain and joint swelling.   Gastrointestinal: Negative for nausea and vomiting.   Genitourinary: Negative for dysuria.   Neurological: Negative for numbness, paresthesias and sensory change.   Psychiatric/Behavioral: Negative for depression, memory loss and substance abuse. The patient is not nervous/anxious.    Allergic/Immunologic: Negative for persistent infections.       Objective:   Body mass index is 41.54 kg/m².  Vitals:    01/29/20 1035   BP: (!) 149/90   Pulse: 92                General    Constitutional: She is oriented to person, place, and time. She appears well-developed and well-nourished. No distress.   HENT:   Head: Normocephalic.   Eyes: EOM are normal.   Neck: Normal range of motion.   Pulmonary/Chest: Effort normal.   Neurological: She is oriented to person, place, and time.   Psychiatric: She has a normal mood and affect.             Right Hand/Wrist Exam     Inspection   Scars: Hand -  absent    Pain   Wrist - The patient exhibits pain of the CMC.    Other     Neuorologic Exam    Median Distribution: normal  Ulnar Distribution: normal  Radial Distribution: normal    Comments:  The patient is tender over the basal joint of the thumb with a positive axial circumduction grind test      Left Hand/Wrist Exam     Inspection   Scars: Hand -  absent    Pain   Wrist - The patient exhibits pain of the CMC.    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 No radiographs were ordered today  Assessment:     Encounter Diagnosis   Name Primary?    Arthritis of carpometacarpal (CMC) joint of both thumbs Yes        Plan:         The patient injected in both thumb basal joint each with 0.5 cc of Kenalog and 0.5 cc of 2% plain lidocaine under sterile technique. She will return in 3 months for reinjection if needed. She is aware she will then she need basal joint arthroplasty.            Disclaimer: This note was prepared using a voice recognition system and is likely to have sound alike errors within the text.

## 2020-01-29 NOTE — PROCEDURES
Small Joint Aspiration/Injection: R thumb MCP, L thumb MCP  Date/Time: 1/29/2020 11:00 AM  Performed by: Sterling Jade MD  Authorized by: Sterling Jade MD     Consent Done?:  Yes (Verbal)  Timeout: Prior to procedure the correct patient, procedure, and site was verified      Location:  Thumb  Site:  R thumb MCP and L thumb MCP  Prep: Patient was prepped and draped in usual sterile fashion    Medications:  40 mg triamcinolone acetonide 40 mg/mL

## 2020-01-30 ENCOUNTER — INFUSION (OUTPATIENT)
Dept: INFUSION THERAPY | Facility: HOSPITAL | Age: 55
End: 2020-01-30
Attending: FAMILY MEDICINE
Payer: OTHER GOVERNMENT

## 2020-01-30 VITALS
HEART RATE: 80 BPM | RESPIRATION RATE: 16 BRPM | DIASTOLIC BLOOD PRESSURE: 81 MMHG | OXYGEN SATURATION: 99 % | TEMPERATURE: 99 F | SYSTOLIC BLOOD PRESSURE: 131 MMHG

## 2020-01-30 DIAGNOSIS — D50.0 IRON DEFICIENCY ANEMIA DUE TO CHRONIC BLOOD LOSS: Primary | ICD-10-CM

## 2020-01-30 PROCEDURE — 96365 THER/PROPH/DIAG IV INF INIT: CPT

## 2020-01-30 PROCEDURE — 63600175 PHARM REV CODE 636 W HCPCS: Performed by: INTERNAL MEDICINE

## 2020-01-30 RX ADMIN — FERRIC CARBOXYMALTOSE INJECTION 750 MG: 50 INJECTION, SOLUTION INTRAVENOUS at 01:01

## 2020-02-06 ENCOUNTER — INFUSION (OUTPATIENT)
Dept: INFUSION THERAPY | Facility: HOSPITAL | Age: 55
End: 2020-02-06
Attending: INTERNAL MEDICINE
Payer: OTHER GOVERNMENT

## 2020-02-06 VITALS
DIASTOLIC BLOOD PRESSURE: 64 MMHG | TEMPERATURE: 98 F | RESPIRATION RATE: 16 BRPM | OXYGEN SATURATION: 99 % | HEART RATE: 87 BPM | SYSTOLIC BLOOD PRESSURE: 112 MMHG

## 2020-02-06 DIAGNOSIS — D50.0 IRON DEFICIENCY ANEMIA DUE TO CHRONIC BLOOD LOSS: Primary | ICD-10-CM

## 2020-02-06 PROCEDURE — 63600175 PHARM REV CODE 636 W HCPCS: Performed by: INTERNAL MEDICINE

## 2020-02-06 PROCEDURE — 96365 THER/PROPH/DIAG IV INF INIT: CPT

## 2020-02-06 RX ADMIN — FERRIC CARBOXYMALTOSE INJECTION 750 MG: 50 INJECTION, SOLUTION INTRAVENOUS at 01:02

## 2020-02-06 NOTE — DISCHARGE INSTRUCTIONS
[facility]  [address] [facility]  [address]  [phone] phone     [phone] fax  Hours of Operation: Monday- Friday 8:00am- 5:00pm  After hours phone  [phone]  Hematology / Oncology Physicians on call      [facility] [address] Dr[address] Dr., Dr., NP Sydney Prescott, NP Tyesha Taylor, NP    Please call with any concerns regarding your appointment today.

## 2020-02-14 ENCOUNTER — HOSPITAL ENCOUNTER (OUTPATIENT)
Dept: RADIOLOGY | Facility: HOSPITAL | Age: 55
Discharge: HOME | End: 2020-02-14
Attending: FAMILY MEDICINE
Payer: OTHER GOVERNMENT

## 2020-02-14 VITALS — WEIGHT: 242.06 LBS | BODY MASS INDEX: 41.33 KG/M2 | HEIGHT: 64 IN

## 2020-02-14 DIAGNOSIS — Z12.39 BREAST CANCER SCREENING: ICD-10-CM

## 2020-02-14 PROCEDURE — 77067 MAMMO DIGITAL SCREENING BILAT WITH TOMOSYNTHESIS_CAD: ICD-10-PCS | Mod: 26,,, | Performed by: RADIOLOGY

## 2020-02-14 PROCEDURE — 77063 MAMMO DIGITAL SCREENING BILAT WITH TOMOSYNTHESIS_CAD: ICD-10-PCS | Mod: 26,,, | Performed by: RADIOLOGY

## 2020-02-14 PROCEDURE — 77067 SCR MAMMO BI INCL CAD: CPT | Mod: TC

## 2020-02-14 PROCEDURE — 77063 BREAST TOMOSYNTHESIS BI: CPT | Mod: 26,,, | Performed by: RADIOLOGY

## 2020-02-14 PROCEDURE — 77067 SCR MAMMO BI INCL CAD: CPT | Mod: 26,,, | Performed by: RADIOLOGY
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
## 2020-02-20 DIAGNOSIS — K91.89 COMPLICATIONS OF GASTRIC BYPASS SURGERY: ICD-10-CM

## 2020-02-20 DIAGNOSIS — D50.0 IRON DEFICIENCY ANEMIA DUE TO CHRONIC BLOOD LOSS: Primary | ICD-10-CM

## 2020-03-20 ENCOUNTER — PATIENT MESSAGE (OUTPATIENT)
Dept: INTERNAL MEDICINE | Facility: CLINIC | Age: 55
End: 2020-03-20

## 2020-03-20 ENCOUNTER — OFFICE VISIT (OUTPATIENT)
Dept: INTERNAL MEDICINE | Facility: CLINIC | Age: 55
End: 2020-03-20
Payer: OTHER GOVERNMENT

## 2020-03-20 DIAGNOSIS — J32.9 SINUSITIS, UNSPECIFIED CHRONICITY, UNSPECIFIED LOCATION: Primary | ICD-10-CM

## 2020-03-20 PROCEDURE — 99214 PR OFFICE/OUTPT VISIT, EST, LEVL IV, 30-39 MIN: ICD-10-PCS | Mod: 95,,, | Performed by: FAMILY MEDICINE

## 2020-03-20 PROCEDURE — 99214 OFFICE O/P EST MOD 30 MIN: CPT | Mod: 95,,, | Performed by: FAMILY MEDICINE

## 2020-03-20 RX ORDER — AMOXICILLIN AND CLAVULANATE POTASSIUM 875; 125 MG/1; MG/1
1 TABLET, FILM COATED ORAL EVERY 12 HOURS
Qty: 20 TABLET | Refills: 0 | Status: SHIPPED | OUTPATIENT
Start: 2020-03-20 | End: 2020-03-30

## 2020-03-20 NOTE — PROGRESS NOTES
Subjective:      Patient ID: Linette Galo is a 54 y.o. female.    Chief Complaint:   Sinus    HPI   55 yo presents via telemed with c/o sinus infection.  Has sinus pressure, HA, congestion and is blowing out yellow/green/brown/red mucous.  No fever/chills  No wheezing/SOB  No N/V/D    The patient location is: home   The chief complaint leading to consultation is: sinus  Visit type: Virtual visit with synchronous audio and video  Total time spent with patient: 8-10 mins  Each patient to whom he or she provides medical services by telemedicine is:  (1) informed of the relationship between the physician and patient and the respective role of any other health care provider with respect to management of the patient; and (2) notified that he or she may decline to receive medical services by telemedicine and may withdraw from such care at any time.    Past Medical History:   Diagnosis Date    ADD (attention deficit disorder)     Anemia     Anxiety     Followed by Psychology    Depression     bipolar, anxiety, ADD    Family history of colon cancer 1/22/2019    Her grandmother had colon cancer.    Positive ARIK (antinuclear antibody)     Dr. Ross//following     Family History   Problem Relation Age of Onset    Breast cancer Maternal Grandmother     Colon cancer Maternal Grandmother     Hypertension Maternal Grandmother     Diabetes Maternal Grandmother     Thrombophilia Father     Parkinsonism Father     Lumbar disc disease Father     Stroke Father     Heart disease Father     Thrombophilia Sister     Hypertension Mother     Diabetes Mother     Hypertension Paternal Grandmother     Diabetes Paternal Grandmother      Past Surgical History:   Procedure Laterality Date    CARPAL TUNNEL RELEASE      both hands    COLONOSCOPY N/A 1/22/2019    Procedure: COLONOSCOPY;  Surgeon: Moe Jimenez MD;  Location: [facility];  Service: Endoscopy;  Laterality: N/A;    GASTRIC BYPASS  2008    HYSTERECTOMY       RALH (retains ovaries)    TONSILLECTOMY       Social History     Tobacco Use    Smoking status: Never Smoker    Smokeless tobacco: Never Used   Substance Use Topics    Alcohol use: Yes     Frequency: Never     Drinks per session: Patient refused     Binge frequency: Patient refused     Comment: socially    Drug use: No       There were no vitals taken for this visit.    Review of Systems   Constitutional: Negative for activity change, chills, fever and unexpected weight change.   HENT: Positive for rhinorrhea. Negative for hearing loss and trouble swallowing.    Eyes: Negative for discharge and visual disturbance.   Respiratory: Negative for cough, chest tightness and wheezing.    Cardiovascular: Negative for chest pain and palpitations.   Gastrointestinal: Negative for blood in stool, constipation, diarrhea and vomiting.   Endocrine: Negative for polydipsia and polyuria.   Genitourinary: Negative for difficulty urinating, dysuria, hematuria and menstrual problem.   Musculoskeletal: Negative for arthralgias, joint swelling and neck pain.   Neurological: Positive for headaches. Negative for weakness.   Psychiatric/Behavioral: Negative for confusion and dysphoric mood.       Objective:     Physical Exam   Constitutional: She is oriented to person, place, and time. She appears well-developed and well-nourished. No distress.   Pulmonary/Chest: Effort normal. No respiratory distress.   Neurological: She is alert and oriented to person, place, and time.   Skin: She is not diaphoretic.   Psychiatric: She has a normal mood and affect. Her behavior is normal. Judgment and thought content normal.       Lab Results   Component Value Date    WBC 4.80 01/06/2020    HGB 13.4 01/06/2020    HCT 42.8 01/06/2020     01/06/2020    CHOL 191 01/06/2020    TRIG 58 01/06/2020    HDL 59 01/06/2020    ALT 20 01/06/2020    AST 18 01/06/2020     01/06/2020    K 4.3 01/06/2020     01/06/2020    CREATININE 0.9  01/06/2020    BUN 16 01/06/2020    CO2 26 01/06/2020    TSH 2.145 01/06/2020    INR 1.0 04/05/2012    HGBA1C 6.0 (H) 01/06/2020       Assessment:     1. Sinusitis, unspecified chronicity, unspecified location         Plan:     Sinusitis, unspecified chronicity, unspecified location    Other orders  -     amoxicillin-clavulanate 875-125mg (AUGMENTIN) 875-125 mg per tablet; Take 1 tablet by mouth every 12 (twelve) hours. for 10 days  Dispense: 20 tablet; Refill: 0    Start augmentin bid x 10 days  Flonase/zyrtec or claritin  Plenty of fluids/rest  F/u if worsening

## 2020-05-05 ENCOUNTER — LAB VISIT (OUTPATIENT)
Dept: LAB | Facility: HOSPITAL | Age: 55
End: 2020-05-05
Attending: NURSE PRACTITIONER
Payer: OTHER GOVERNMENT

## 2020-05-05 ENCOUNTER — PATIENT MESSAGE (OUTPATIENT)
Dept: HEMATOLOGY/ONCOLOGY | Facility: CLINIC | Age: 55
End: 2020-05-05

## 2020-05-05 DIAGNOSIS — D50.0 IRON DEFICIENCY ANEMIA DUE TO CHRONIC BLOOD LOSS: ICD-10-CM

## 2020-05-05 DIAGNOSIS — K91.89 COMPLICATIONS OF GASTRIC BYPASS SURGERY: ICD-10-CM
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
## 2020-05-05 LAB
BASOPHILS # BLD AUTO: 0.06 K/UL (ref 0–0.2)
BASOPHILS NFR BLD: 0.9 % (ref 0–1.9)
DIFFERENTIAL METHOD: ABNORMAL
EOSINOPHIL # BLD AUTO: 0.1 K/UL (ref 0–0.5)
EOSINOPHIL NFR BLD: 1.8 % (ref 0–8)
ERYTHROCYTE [DISTWIDTH] IN BLOOD BY AUTOMATED COUNT: 13.6 % (ref 11.5–14.5)
FERRITIN SERPL-MCNC: 323 NG/ML (ref 20–300)
HCT VFR BLD AUTO: 44.6 % (ref 37–48.5)
HGB BLD-MCNC: 14.2 G/DL (ref 12–16)
IMM GRANULOCYTES # BLD AUTO: 0.02 K/UL (ref 0–0.04)
IMM GRANULOCYTES NFR BLD AUTO: 0.3 % (ref 0–0.5)
IRON SERPL-MCNC: 92 UG/DL (ref 30–160)
LYMPHOCYTES # BLD AUTO: 1.9 K/UL (ref 1–4.8)
LYMPHOCYTES NFR BLD: 28.6 % (ref 18–48)
MCH RBC QN AUTO: 30.4 PG (ref 27–31)
MCHC RBC AUTO-ENTMCNC: 31.8 G/DL (ref 32–36)
MCV RBC AUTO: 96 FL (ref 82–98)
MONOCYTES # BLD AUTO: 0.4 K/UL (ref 0.3–1)
MONOCYTES NFR BLD: 5.8 % (ref 4–15)
NEUTROPHILS # BLD AUTO: 4.2 K/UL (ref 1.8–7.7)
NEUTROPHILS NFR BLD: 62.6 % (ref 38–73)
NRBC BLD-RTO: 0 /100 WBC
PLATELET # BLD AUTO: 174 K/UL (ref 150–350)
PMV BLD AUTO: 12.6 FL (ref 9.2–12.9)
RBC # BLD AUTO: 4.67 M/UL (ref 4–5.4)
SATURATED IRON: 27 % (ref 20–50)
TOTAL IRON BINDING CAPACITY: 340 UG/DL (ref 250–450)
TRANSFERRIN SERPL-MCNC: 230 MG/DL (ref 200–375)
WBC # BLD AUTO: 6.74 K/UL (ref 3.9–12.7)

## 2020-05-05 PROCEDURE — 82728 ASSAY OF FERRITIN: CPT

## 2020-05-05 PROCEDURE — 36415 COLL VENOUS BLD VENIPUNCTURE: CPT | Mod: PO

## 2020-05-05 PROCEDURE — 83540 ASSAY OF IRON: CPT

## 2020-05-05 PROCEDURE — 85025 COMPLETE CBC W/AUTO DIFF WBC: CPT

## 2020-05-07 ENCOUNTER — OFFICE VISIT (OUTPATIENT)
Dept: HEMATOLOGY/ONCOLOGY | Facility: CLINIC | Age: 55
End: 2020-05-07
Payer: OTHER GOVERNMENT

## 2020-05-07 DIAGNOSIS — K63.5 POLYP OF COLON, UNSPECIFIED PART OF COLON, UNSPECIFIED TYPE: ICD-10-CM

## 2020-05-07 DIAGNOSIS — N64.4 MASTODYNIA: ICD-10-CM

## 2020-05-07 DIAGNOSIS — Z12.11 SPECIAL SCREENING FOR MALIGNANT NEOPLASMS, COLON: ICD-10-CM

## 2020-05-07 DIAGNOSIS — K91.89 COMPLICATIONS OF GASTRIC BYPASS SURGERY: ICD-10-CM

## 2020-05-07 DIAGNOSIS — D50.0 IRON DEFICIENCY ANEMIA DUE TO CHRONIC BLOOD LOSS: Primary | ICD-10-CM
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
## 2020-05-07 PROCEDURE — 99214 OFFICE O/P EST MOD 30 MIN: CPT | Mod: S$PBB,,, | Performed by: NURSE PRACTITIONER

## 2020-05-07 PROCEDURE — 99214 PR OFFICE/OUTPT VISIT, EST, LEVL IV, 30-39 MIN: ICD-10-PCS | Mod: S$PBB,,, | Performed by: NURSE PRACTITIONER

## 2020-05-07 PROCEDURE — 99999 PR PBB SHADOW E&M-EST. PATIENT-LVL II: ICD-10-PCS | Mod: PBBFAC,,, | Performed by: NURSE PRACTITIONER

## 2020-05-07 PROCEDURE — 99999 PR PBB SHADOW E&M-EST. PATIENT-LVL II: CPT | Mod: PBBFAC,,, | Performed by: NURSE PRACTITIONER

## 2020-05-07 PROCEDURE — 99212 OFFICE O/P EST SF 10 MIN: CPT | Mod: PBBFAC | Performed by: NURSE PRACTITIONER

## 2020-05-07 NOTE — PATIENT INSTRUCTIONS
Iron-Deficiency Anemia (Adult)  Red blood cells carry oxygen to the tissues of your body. Anemia is a condition in which you have too few red blood cells. You need iron to make red cells. Anemia makes you feel tired and run down. When anemia becomes severe, your skin becomes pale. You may feel short of breath after physical activity. Other symptoms include:  · Headaches  · Dizziness  · Leg cramps with physical activity  · Drowsiness  · Restless legs  Your anemia is caused by not having enough iron in your body. This may be because of:  · Loss of blood. This can be caused by heavy menstrual periods. It can also be caused by bleeding from the stomach or intestines.  · Poor diet. You may not be eating enough foods that contain iron.  · Inability to absorb iron from the foods you eat  · Pregnancy  If your blood count is low enough, your healthcare provider may prescribe an iron supplement. It usually takes about 2 to 3 months of treatment with iron supplements to correct anemia. Severe cases of anemia need a blood transfusion to quickly ease symptoms and deliver more oxygen to the cells.  Home care  Follow these guidelines when caring for yourself at home:  · Eat foods high in iron. This will boost the amount of iron stored in your body. It is a natural way to build up the number of blood cells. Good sources of iron include beef, liver, spinach and other dark green leafy vegetables, whole grains, beans, and nuts.  · Do not overexert yourself.  · Talk with your healthcare provider before traveling by air or traveling to high altitudes.  Follow-up care  Follow up with your healthcare provider in 2 months, or as advised. This is to have another red blood cell count to be sure your anemia has been fixed.  When to seek medical advice  Call your healthcare provider right away if any of these occur:  · Shortness of breath or chest pain  · Dizziness or fainting  · Vomiting blood or passing red or black-colored stool   Date  Last Reviewed: 2/25/2016  © 9463-2989 [employer]. [address] [place] [address], [place] [address]. All rights reserved. This information is not intended as a substitute for professional medical care. Always follow your healthcare professional's instructions.

## 2020-05-07 NOTE — PROGRESS NOTES
Subjective:       Patient ID: Linette Galo is a 54 y.o. female.    Chief Complaint: Anemia        The patient location is: [place]  The chief complaint leading to consultation is: Iron Deficiency Follow-Up  Visit type: audiovisual  Total time spent with patient: 20 minutes  Each patient to whom he or she provides medical services by telemedicine is:  (1) informed of the relationship between the physician and patient and the respective role of any other health care provider with respect to management of the patient; and (2) notified that he or she may decline to receive medical services by telemedicine and may withdraw from such care at any time.      HPI:  54 year old female, presents for evaluation following infusion with Injectafer x2 doses in February. Patient has history of iron deficiency and gastric bypass surgery.    Today's visit:  Patient presents to discuss lab results. Reports continued fatigue    Review of Systems   Constitutional: Positive for fatigue. Negative for activity change, appetite change, chills, diaphoresis, fever and unexpected weight change.   HENT: Negative for congestion, hearing loss, nosebleeds, postnasal drip and trouble swallowing.    Eyes: Negative for discharge and visual disturbance.   Respiratory: Negative for cough, chest tightness and shortness of breath.    Cardiovascular: Negative for chest pain and palpitations.   Gastrointestinal: Negative for abdominal distention, abdominal pain, blood in stool, constipation, diarrhea, nausea and vomiting.   Endocrine: Negative for cold intolerance and heat intolerance.   Genitourinary: Negative for difficulty urinating, dyspareunia, flank pain and hematuria.   Musculoskeletal: Negative for arthralgias, back pain and myalgias.   Skin: Negative.    Neurological: Negative for dizziness, weakness, light-headedness and headaches.   Hematological: Negative for adenopathy. Does not bruise/bleed easily.   Psychiatric/Behavioral: Negative  for agitation, behavioral problems and confusion. The patient is nervous/anxious.        Medication List with Changes/Refills   Current Medications    BUPROPION (WELLBUTRIN SR) 200 MG TB12    Take 1 tablet by mouth once daily.    CHOLECALCIFEROL, VITAMIN D3, (VITAMIN D3) 1,000 UNIT CAPSULE    Take 1 capsule (1,000 Units total) by mouth once daily.    CYANOCOBALAMIN, VITAMIN B-12, (VITAMIN B-12) 50 MCG TABLET    Take 50 mcg by mouth once daily.    DULOXETINE (CYMBALTA) 60 MG CAPSULE    Take 60 mg by mouth once daily.    FLUTICASONE PROPIONATE (FLONASE) 50 MCG/ACTUATION NASAL SPRAY    2 sprays (100 mcg total) by Each Nostril route once daily.    LAMOTRIGINE (LAMICTAL) 100 MG TABLET    Take by mouth once daily. 100mg Oral PRN As directed.    LORAZEPAM (ATIVAN) 0.5 MG TABLET    TAKE ONE OR TWO TABLETS BY MOUTH AS NEEDED FOR ACUTE ANXIETY    MULTIVITAMIN CAPSULE    Take 1 capsule by mouth once daily.    VYVANSE 70 MG CAPSULE         Objective:   There were no vitals filed for this visit.  Physical Exam   HENT:   Right Ear: Hearing normal.   Left Ear: Hearing normal.   Pulmonary/Chest: No respiratory distress.   Neurological: She is alert.       Assessment:       Problem List Items Addressed This Visit        Renal/    Mastodynia       Oncology    Iron deficiency anemia - Primary    Relevant Orders    CBC auto differential    Comprehensive metabolic panel    Ferritin    Iron and TIBC       GI    Complications of gastric bypass surgery    Special screening for malignant neoplasms, colon    Colon polyp          Plan:         SHARON:  --Reviewed labs with patient  --Currently iron repleted  --S/P Injectafer x2 2/20202    Follow-Up:  Return to clinic in 6 months with labs prior

## 2020-07-08 ENCOUNTER — OFFICE VISIT (OUTPATIENT)
Dept: ORTHOPEDICS | Facility: CLINIC | Age: 55
End: 2020-07-08
Payer: OTHER GOVERNMENT

## 2020-07-08 VITALS — BODY MASS INDEX: 41.32 KG/M2 | WEIGHT: 242 LBS | HEIGHT: 64 IN

## 2020-07-08 DIAGNOSIS — M18.0 ARTHRITIS OF CARPOMETACARPAL (CMC) JOINT OF BOTH THUMBS: Primary | ICD-10-CM

## 2020-07-08 PROCEDURE — 99213 OFFICE O/P EST LOW 20 MIN: CPT | Mod: S$PBB,25,, | Performed by: ORTHOPAEDIC SURGERY

## 2020-07-08 PROCEDURE — 99213 PR OFFICE/OUTPT VISIT, EST, LEVL III, 20-29 MIN: ICD-10-PCS | Mod: S$PBB,25,, | Performed by: ORTHOPAEDIC SURGERY

## 2020-07-08 PROCEDURE — 20600 SMALL JOINT ASPIRATION/INJECTION: R THUMB MCP, L THUMB MCP: ICD-10-PCS | Mod: 50,S$PBB,, | Performed by: ORTHOPAEDIC SURGERY

## 2020-07-08 PROCEDURE — 99213 OFFICE O/P EST LOW 20 MIN: CPT | Mod: PBBFAC,25 | Performed by: ORTHOPAEDIC SURGERY

## 2020-07-08 PROCEDURE — 99999 PR PBB SHADOW E&M-EST. PATIENT-LVL III: ICD-10-PCS | Mod: PBBFAC,,, | Performed by: ORTHOPAEDIC SURGERY

## 2020-07-08 PROCEDURE — 99999 PR PBB SHADOW E&M-EST. PATIENT-LVL III: CPT | Mod: PBBFAC,,, | Performed by: ORTHOPAEDIC SURGERY

## 2020-07-08 PROCEDURE — 20600 DRAIN/INJ JOINT/BURSA W/O US: CPT | Mod: 50,PBBFAC | Performed by: ORTHOPAEDIC SURGERY

## 2020-07-08 RX ORDER — TRIAMCINOLONE ACETONIDE 40 MG/ML
40 INJECTION, SUSPENSION INTRA-ARTICULAR; INTRAMUSCULAR
Status: DISCONTINUED | OUTPATIENT
Start: 2020-07-08 | End: 2020-07-08 | Stop reason: HOSPADM

## 2020-07-08 RX ADMIN — TRIAMCINOLONE ACETONIDE 40 MG: 200 INJECTION, SUSPENSION INTRA-ARTICULAR; INTRAMUSCULAR at 03:07

## 2020-07-08 NOTE — PROCEDURES
Small Joint Aspiration/Injection: R thumb MCP, L thumb MCP    Date/Time: 7/8/2020 3:40 PM  Performed by: Sterling Jade MD  Authorized by: Sterling Jade MD     Consent Done?:  Yes (Verbal)  Timeout: prior to procedure the correct patient, procedure, and site was verified    Prep: patient was prepped and draped in usual sterile fashion      Local anesthesia used?: Yes    Local anesthetic:  Lidocaine 2% with epinephrine  Anesthetic total (ml):  1    Location:  Thumb  Site:  R thumb MCP and L thumb MCP  Ultrasonic guidance for needle placement?: No    Needle size:  25 G  Medications:  40 mg triamcinolone acetonide 40 mg/mL; 40 mg triamcinolone acetonide 40 mg/mL

## 2020-07-08 NOTE — PROGRESS NOTES
Subjective:     Patient ID: Linette Galo is a 54 y.o. female.    Chief Complaint: Pain of the Left Hand and Pain of the Right Hand    HPI    Past Medical History:   Diagnosis Date    ADD (attention deficit disorder)     Anemia     Anxiety     Followed by Psychology    Depression     bipolar, anxiety, ADD    Family history of colon cancer 1/22/2019    Her grandmother had colon cancer.    Positive ARIK (antinuclear antibody)     Dr. Ross//following     Past Surgical History:   Procedure Laterality Date    CARPAL TUNNEL RELEASE      both hands    COLONOSCOPY N/A 1/22/2019    Procedure: COLONOSCOPY;  Surgeon: Moe Jimenez MD;  Location: [facility] ENDO;  Service: Endoscopy;  Laterality: N/A;    GASTRIC BYPASS  2008    HYSTERECTOMY      RALH (retains ovaries)    TONSILLECTOMY       Family History   Problem Relation Age of Onset    Breast cancer Maternal Grandmother     Colon cancer Maternal Grandmother     Hypertension Maternal Grandmother     Diabetes Maternal Grandmother     Thrombophilia Father     Parkinsonism Father     Lumbar disc disease Father     Stroke Father     Heart disease Father     Thrombophilia Sister     Hypertension Mother     Diabetes Mother     Hypertension Paternal Grandmother     Diabetes Paternal Grandmother      Social History     Socioeconomic History    Marital status:      Spouse name: Not on file    Number of children: Not on file    Years of education: Not on file    Highest education level: Not on file   Occupational History    Not on file   Social Needs    Financial resource strain: Not hard at all    Food insecurity     Worry: Never true     Inability: Never true    Transportation needs     Medical: No     Non-medical: No   Tobacco Use    Smoking status: Never Smoker    Smokeless tobacco: Never Used   Substance and Sexual Activity    Alcohol use: Yes     Frequency: Never     Drinks per session: Patient refused     Binge frequency: Patient  refused     Comment: socially    Drug use: No    Sexual activity: Yes     Partners: Male     Birth control/protection: Surgical     Comment: mut monog   Lifestyle    Physical activity     Days per week: Not on file     Minutes per session: Not on file    Stress: Rather much   Relationships    Social connections     Talks on phone: More than three times a week     Gets together: Twice a week     Attends Mandaen service: Not on file     Active member of club or organization: No     Attends meetings of clubs or organizations: Patient refused     Relationship status:    Other Topics Concern    Not on file   Social History Narrative    Not on file     Medication List with Changes/Refills   Current Medications    BUPROPION (WELLBUTRIN SR) 200 MG TB12    Take 1 tablet by mouth once daily.    CHOLECALCIFEROL, VITAMIN D3, (VITAMIN D3) 1,000 UNIT CAPSULE    Take 1 capsule (1,000 Units total) by mouth once daily.    CYANOCOBALAMIN, VITAMIN B-12, (VITAMIN B-12) 50 MCG TABLET    Take 50 mcg by mouth once daily.    DULOXETINE (CYMBALTA) 60 MG CAPSULE    Take 60 mg by mouth once daily.    FLUTICASONE PROPIONATE (FLONASE) 50 MCG/ACTUATION NASAL SPRAY    2 sprays (100 mcg total) by Each Nostril route once daily.    LAMOTRIGINE (LAMICTAL) 100 MG TABLET    Take by mouth once daily. 100mg Oral PRN As directed.    LORAZEPAM (ATIVAN) 0.5 MG TABLET    TAKE ONE OR TWO TABLETS BY MOUTH AS NEEDED FOR ACUTE ANXIETY    MULTIVITAMIN CAPSULE    Take 1 capsule by mouth once daily.    VYVANSE 70 MG CAPSULE         Review of patient's allergies indicates:  No Known Allergies  ROS    Objective:   Body mass index is 41.54 kg/m².  There were no vitals filed for this visit.             General    Constitutional: She is oriented to person, place, and time. She appears well-developed and well-nourished. No distress.   HENT:   Head: Normocephalic.   Eyes: EOM are normal.   Neck: Normal range of motion.   Pulmonary/Chest: Effort normal.    Neurological: She is oriented to person, place, and time.   Psychiatric: She has a normal mood and affect.             Right Hand/Wrist Exam     Inspection   Scars: Wrist - absent Hand -  absent  Effusion: Wrist - absent Hand -  absent    Pain   Wrist - The patient exhibits pain of the CMC.    Other     Neuorologic Exam    Median Distribution: normal  Ulnar Distribution: normal  Radial Distribution: normal    Comments:  The patient has tenderness over the basal joint right thumb.  There is positive axial circumduction grind test.  There are no motor or sensory deficits.      Left Hand/Wrist Exam     Inspection   Scars: Wrist - absent Hand -  absent  Effusion: Wrist - absent Hand -  absent    Pain   Wrist - The patient exhibits pain of the CMC.    Other     Sensory Exam  Median Distribution: normal  Ulnar Distribution: normal  Radial Distribution: normal    Comments:  The patient has tenderness of the basal joint left thumb.  There is a positive axial circumduction grind test.  There are no motor or sensory deficits.          Vascular Exam       Capillary Refill  Right Hand: normal capillary refill  Left Hand: normal capillary refill      Relevant imaging results reviewed and interpreted by me, discussed with the patient and / or family today radiographs were not obtained today  Assessment:     Encounter Diagnosis   Name Primary?    Arthritis of carpometacarpal (CMC) joint of both thumbs Yes        Plan:     The patient injected in both thumb basal joints each with point cc of Kenalog and point cc of 2% plain lidocaine.  She wait at least 3 months between injections.                Disclaimer: This note was prepared using a voice recognition system and is likely to have sound alike errors within the text.

## 2020-07-10 ENCOUNTER — LAB VISIT (OUTPATIENT)
Dept: LAB | Facility: HOSPITAL | Age: 55
End: 2020-07-10
Attending: FAMILY MEDICINE
Payer: OTHER GOVERNMENT

## 2020-07-10 DIAGNOSIS — R73.09 ABNORMAL GLUCOSE: ICD-10-CM

## 2020-07-10 LAB
ESTIMATED AVG GLUCOSE: 111 MG/DL (ref 68–131)
HBA1C MFR BLD HPLC: 5.5 % (ref 4–5.6)

## 2020-07-10 PROCEDURE — 36415 COLL VENOUS BLD VENIPUNCTURE: CPT | Mod: PO

## 2020-07-10 PROCEDURE — 83036 HEMOGLOBIN GLYCOSYLATED A1C: CPT

## 2020-07-20 ENCOUNTER — OFFICE VISIT (OUTPATIENT)
Dept: INTERNAL MEDICINE | Facility: CLINIC | Age: 55
End: 2020-07-20
Payer: OTHER GOVERNMENT

## 2020-07-20 VITALS
HEART RATE: 78 BPM | BODY MASS INDEX: 39.94 KG/M2 | DIASTOLIC BLOOD PRESSURE: 70 MMHG | SYSTOLIC BLOOD PRESSURE: 128 MMHG | HEIGHT: 64 IN | WEIGHT: 233.94 LBS | TEMPERATURE: 98 F

## 2020-07-20 DIAGNOSIS — R73.09 ABNORMAL GLUCOSE: Primary | ICD-10-CM

## 2020-07-20 DIAGNOSIS — D50.9 IRON DEFICIENCY ANEMIA, UNSPECIFIED IRON DEFICIENCY ANEMIA TYPE: ICD-10-CM

## 2020-07-20 DIAGNOSIS — Z29.9 PREVENTIVE MEASURE: ICD-10-CM

## 2020-07-20 DIAGNOSIS — E66.01 MORBID OBESITY: ICD-10-CM

## 2020-07-20 PROCEDURE — 99999 PR PBB SHADOW E&M-EST. PATIENT-LVL III: CPT | Mod: PBBFAC,,, | Performed by: FAMILY MEDICINE

## 2020-07-20 PROCEDURE — 90750 HZV VACC RECOMBINANT IM: CPT | Mod: PBBFAC,PO

## 2020-07-20 PROCEDURE — 99213 OFFICE O/P EST LOW 20 MIN: CPT | Mod: PBBFAC,PO | Performed by: FAMILY MEDICINE

## 2020-07-20 PROCEDURE — 99999 PR PBB SHADOW E&M-EST. PATIENT-LVL III: ICD-10-PCS | Mod: PBBFAC,,, | Performed by: FAMILY MEDICINE

## 2020-07-20 PROCEDURE — 99213 PR OFFICE/OUTPT VISIT, EST, LEVL III, 20-29 MIN: ICD-10-PCS | Mod: S$PBB,,, | Performed by: FAMILY MEDICINE

## 2020-07-20 PROCEDURE — 99213 OFFICE O/P EST LOW 20 MIN: CPT | Mod: S$PBB,,, | Performed by: FAMILY MEDICINE

## 2020-07-20 RX ORDER — DULOXETIN HYDROCHLORIDE 20 MG/1
1 CAPSULE, DELAYED RELEASE ORAL DAILY
COMMUNITY
Start: 2020-07-01 | End: 2020-07-20

## 2020-07-20 NOTE — PROGRESS NOTES
Subjective:      Patient ID: Linette Galo is a 54 y.o. female.    Chief Complaint:  F/u chronic conditions    HPI  53 yo female here for f/u on chronic conditions.  Had BL injections in thumbs with Dr. Jade last week.  Notices on occasion a tingling sensation in the L forearm.  Not constant or consistent.  Unsure if it's occurred since the injection.   Hx of BL carpal tunnel repair  Had GI bug yesterday, still feeling sore/tired today.    Weight is down 9 lbs from last visit.  Psych meds the same  Trying to eat better to control A1C without needing meds    Past Medical History:   Diagnosis Date    ADD (attention deficit disorder)     Anemia     Anxiety     Followed by Psychology    Depression     bipolar, anxiety, ADD    Family history of colon cancer 1/22/2019    Her grandmother had colon cancer.    Positive ARIK (antinuclear antibody)     Dr. Ross//following     Family History   Problem Relation Age of Onset    Breast cancer Maternal Grandmother     Colon cancer Maternal Grandmother     Hypertension Maternal Grandmother     Diabetes Maternal Grandmother     Thrombophilia Father     Parkinsonism Father     Lumbar disc disease Father     Stroke Father     Heart disease Father     Thrombophilia Sister     Hypertension Mother     Diabetes Mother     Hypertension Paternal Grandmother     Diabetes Paternal Grandmother      Past Surgical History:   Procedure Laterality Date    CARPAL TUNNEL RELEASE      both hands    COLONOSCOPY N/A 1/22/2019    Procedure: COLONOSCOPY;  Surgeon: Moe Jimenez MD;  Location: [facility];  Service: Endoscopy;  Laterality: N/A;    GASTRIC BYPASS  2008    HYSTERECTOMY      RALH (retains ovaries)    TONSILLECTOMY       Social History     Tobacco Use    Smoking status: Never Smoker    Smokeless tobacco: Never Used   Substance Use Topics    Alcohol use: Yes     Frequency: Never     Drinks per session: Patient refused     Binge frequency: Patient refused  "    Comment: socially    Drug use: No       /70 (BP Location: Right arm, Patient Position: Sitting, BP Method: X-Large (Manual))   Pulse 78   Temp 97.5 °F (36.4 °C) (Temporal)   Ht 5' 4" (1.626 m)   Wt 106.1 kg (233 lb 14.5 oz)   BMI 40.15 kg/m²     Review of Systems   Constitutional: Negative for activity change and unexpected weight change.   HENT: Negative for hearing loss, rhinorrhea and trouble swallowing.    Eyes: Negative for discharge and visual disturbance.   Respiratory: Negative for chest tightness and wheezing.    Cardiovascular: Negative for chest pain and palpitations.   Gastrointestinal: Negative for blood in stool, constipation, diarrhea and vomiting.   Endocrine: Negative for polydipsia and polyuria.   Genitourinary: Negative for difficulty urinating, dysuria, hematuria and menstrual problem.   Musculoskeletal: Negative for arthralgias, joint swelling and neck pain.   Neurological: Negative for weakness and headaches.   Psychiatric/Behavioral: Negative for confusion and dysphoric mood.       Objective:     Physical Exam  Vitals signs and nursing note reviewed.   Constitutional:       General: She is not in acute distress.     Appearance: She is well-developed.   Eyes:      Conjunctiva/sclera: Conjunctivae normal.      Pupils: Pupils are equal, round, and reactive to light.   Neck:      Musculoskeletal: Normal range of motion and neck supple.      Thyroid: No thyromegaly.   Cardiovascular:      Rate and Rhythm: Normal rate and regular rhythm.      Heart sounds: Normal heart sounds. No murmur. No gallop.    Pulmonary:      Effort: Pulmonary effort is normal. No respiratory distress.      Breath sounds: Normal breath sounds. No wheezing or rales.   Abdominal:      General: Bowel sounds are normal. There is no distension.      Palpations: Abdomen is soft.      Tenderness: There is no abdominal tenderness. There is no guarding.   Musculoskeletal:      Right lower leg: No edema.      Left " lower leg: No edema.   Skin:     General: Skin is warm and dry.      Findings: No rash.   Neurological:      Mental Status: She is alert and oriented to person, place, and time.      Cranial Nerves: No cranial nerve deficit.   Psychiatric:         Mood and Affect: Mood normal.         Behavior: Behavior normal.         Thought Content: Thought content normal.         Judgment: Judgment normal.         Lab Results   Component Value Date    WBC 6.74 05/05/2020    HGB 14.2 05/05/2020    HCT 44.6 05/05/2020     05/05/2020    CHOL 191 01/06/2020    TRIG 58 01/06/2020    HDL 59 01/06/2020    ALT 20 01/06/2020    AST 18 01/06/2020     01/06/2020    K 4.3 01/06/2020     01/06/2020    CREATININE 0.9 01/06/2020    BUN 16 01/06/2020    CO2 26 01/06/2020    TSH 2.145 01/06/2020    INR 1.0 04/05/2012    HGBA1C 5.5 07/10/2020       Assessment:     1. Abnormal glucose    2. Iron deficiency anemia, unspecified iron deficiency anemia type    3. Morbid obesity    4. Preventive measure         Plan:     Abnormal glucose    Iron deficiency anemia, unspecified iron deficiency anemia type    Morbid obesity    Preventive measure  -     Comprehensive metabolic panel; Future; Expected date: 01/20/2021  -     Hemoglobin A1C; Future; Expected date: 01/20/2021  -     Lipid Panel; Future; Expected date: 01/20/2021  -     TSH; Future; Expected date: 01/20/2021  -     Vitamin D; Future; Expected date: 01/20/2021  -     Vitamin B12; Future; Expected date: 01/20/2021    Other orders  -     (In Office Administered) Zoster Recombinant Vaccine  -     (In Office Administered) Zoster Recombinant Vaccine; Future; Expected date: 09/20/2020    A1C improved//cont with weight loss  Iron def//managed by heme  Monitor nerve pain in L arm/may be related to the thumb/hand  Cont current meds  Shingrix #1 today//#2 in 2 mos  F/u 6 mos//all labs

## 2020-09-22 ENCOUNTER — CLINICAL SUPPORT (OUTPATIENT)
Dept: INTERNAL MEDICINE | Facility: CLINIC | Age: 55
End: 2020-09-22
Payer: OTHER GOVERNMENT

## 2020-09-22 DIAGNOSIS — Z23 NEED FOR SHINGLES VACCINE: Primary | ICD-10-CM

## 2020-09-22 PROCEDURE — 90750 HZV VACC RECOMBINANT IM: CPT | Mod: PBBFAC,PO

## 2020-09-22 PROCEDURE — 99999 PR PBB SHADOW E&M-EST. PATIENT-LVL II: ICD-10-PCS | Mod: PBBFAC,,,

## 2020-09-22 PROCEDURE — 99999 PR PBB SHADOW E&M-EST. PATIENT-LVL II: CPT | Mod: PBBFAC,,,

## 2020-09-22 PROCEDURE — 99212 OFFICE O/P EST SF 10 MIN: CPT | Mod: PBBFAC,PO,25

## 2020-09-22 PROCEDURE — 90472 IMMUNIZATION ADMIN EACH ADD: CPT | Mod: PBBFAC,PO

## 2020-09-22 PROCEDURE — 90686 IIV4 VACC NO PRSV 0.5 ML IM: CPT | Mod: PBBFAC,PO

## 2020-09-22 NOTE — PROGRESS NOTES
Shingrix #2 administered. Pt tolerated well. Advised pt to remain in lobby 15 minutes after injection. If no adverse/allergic reaction, may leave

## 2020-10-13 ENCOUNTER — OFFICE VISIT (OUTPATIENT)
Dept: ORTHOPEDICS | Facility: CLINIC | Age: 55
End: 2020-10-13
Payer: OTHER GOVERNMENT

## 2020-10-13 VITALS
HEIGHT: 64 IN | SYSTOLIC BLOOD PRESSURE: 149 MMHG | HEART RATE: 103 BPM | DIASTOLIC BLOOD PRESSURE: 85 MMHG | WEIGHT: 233 LBS | BODY MASS INDEX: 39.78 KG/M2

## 2020-10-13 DIAGNOSIS — M18.0 ARTHRITIS OF CARPOMETACARPAL (CMC) JOINT OF BOTH THUMBS: Primary | ICD-10-CM

## 2020-10-13 PROCEDURE — 20600 DRAIN/INJ JOINT/BURSA W/O US: CPT | Mod: 50,PBBFAC | Performed by: ORTHOPAEDIC SURGERY

## 2020-10-13 PROCEDURE — 99999 PR PBB SHADOW E&M-EST. PATIENT-LVL III: CPT | Mod: PBBFAC,,, | Performed by: ORTHOPAEDIC SURGERY

## 2020-10-13 PROCEDURE — 99213 PR OFFICE/OUTPT VISIT, EST, LEVL III, 20-29 MIN: ICD-10-PCS | Mod: S$PBB,25,, | Performed by: ORTHOPAEDIC SURGERY

## 2020-10-13 PROCEDURE — 99213 OFFICE O/P EST LOW 20 MIN: CPT | Mod: S$PBB,25,, | Performed by: ORTHOPAEDIC SURGERY

## 2020-10-13 PROCEDURE — 99999 PR PBB SHADOW E&M-EST. PATIENT-LVL III: ICD-10-PCS | Mod: PBBFAC,,, | Performed by: ORTHOPAEDIC SURGERY

## 2020-10-13 PROCEDURE — 99213 OFFICE O/P EST LOW 20 MIN: CPT | Mod: PBBFAC | Performed by: ORTHOPAEDIC SURGERY

## 2020-10-13 PROCEDURE — 20600 SMALL JOINT ASPIRATION/INJECTION: R THUMB MCP, L THUMB MCP: ICD-10-PCS | Mod: 50,S$PBB,, | Performed by: ORTHOPAEDIC SURGERY

## 2020-10-13 RX ORDER — TRIAMCINOLONE ACETONIDE 40 MG/ML
40 INJECTION, SUSPENSION INTRA-ARTICULAR; INTRAMUSCULAR
Status: DISCONTINUED | OUTPATIENT
Start: 2020-10-13 | End: 2020-10-13 | Stop reason: HOSPADM

## 2020-10-13 RX ADMIN — TRIAMCINOLONE ACETONIDE 40 MG: 200 INJECTION, SUSPENSION INTRA-ARTICULAR; INTRAMUSCULAR at 03:10

## 2020-10-13 NOTE — PROGRESS NOTES
Subjective:     Patient ID: Linette Galo is a 55 y.o. female.    Chief Complaint: Pain of the Left Hand and Pain of the Right Hand    The patient is a 55-year-old female with bilateral thumb basal joint degenerative joint disease.  She was last injected 3 months ago with incomplete relief.  She requests reinjection.      Past Medical History:   Diagnosis Date    ADD (attention deficit disorder)     Anemia     Anxiety     Followed by Psychology    Depression     bipolar, anxiety, ADD    Family history of colon cancer 1/22/2019    Her grandmother had colon cancer.    Positive ARIK (antinuclear antibody)     Dr. Ross//following     Past Surgical History:   Procedure Laterality Date    CARPAL TUNNEL RELEASE      both hands    COLONOSCOPY N/A 1/22/2019    Procedure: COLONOSCOPY;  Surgeon: Moe Jimenez MD;  Location: [facility];  Service: Endoscopy;  Laterality: N/A;    GASTRIC BYPASS  2008    HYSTERECTOMY      RALH (retains ovaries)    TONSILLECTOMY       Family History   Problem Relation Age of Onset    Breast cancer Maternal Grandmother     Colon cancer Maternal Grandmother     Hypertension Maternal Grandmother     Diabetes Maternal Grandmother     Thrombophilia Father     Parkinsonism Father     Lumbar disc disease Father     Stroke Father     Heart disease Father     Thrombophilia Sister     Hypertension Mother     Diabetes Mother     Hypertension Paternal Grandmother     Diabetes Paternal Grandmother      Social History     Socioeconomic History    Marital status:      Spouse name: Not on file    Number of children: Not on file    Years of education: Not on file    Highest education level: Not on file   Occupational History    Not on file   Social Needs    Financial resource strain: Not hard at all    Food insecurity     Worry: Never true     Inability: Never true    Transportation needs     Medical: No     Non-medical: No   Tobacco Use    Smoking status: Never  Smoker    Smokeless tobacco: Never Used   Substance and Sexual Activity    Alcohol use: Yes     Frequency: Never     Drinks per session: Patient refused     Binge frequency: Patient refused     Comment: socially    Drug use: No    Sexual activity: Yes     Partners: Male     Birth control/protection: Surgical     Comment: mut monog   Lifestyle    Physical activity     Days per week: Not on file     Minutes per session: Not on file    Stress: Rather much   Relationships    Social connections     Talks on phone: More than three times a week     Gets together: Twice a week     Attends Islam service: Not on file     Active member of club or organization: No     Attends meetings of clubs or organizations: Patient refused     Relationship status:    Other Topics Concern    Not on file   Social History Narrative    Not on file     Medication List with Changes/Refills   Current Medications    BUPROPION (WELLBUTRIN SR) 200 MG TB12    Take 1 tablet by mouth once daily.    CHOLECALCIFEROL, VITAMIN D3, (VITAMIN D3) 1,000 UNIT CAPSULE    Take 1 capsule (1,000 Units total) by mouth once daily.    CYANOCOBALAMIN, VITAMIN B-12, (VITAMIN B-12) 50 MCG TABLET    Take 50 mcg by mouth once daily.    DULOXETINE (CYMBALTA) 60 MG CAPSULE    Take 60 mg by mouth once daily.    LAMOTRIGINE (LAMICTAL) 100 MG TABLET    Take by mouth once daily. 100mg Oral PRN As directed.    MULTIVITAMIN CAPSULE    Take 1 capsule by mouth once daily.    VYVANSE 70 MG CAPSULE    Take 70 mg by mouth every morning.      Review of patient's allergies indicates:  No Known Allergies  Review of Systems   Constitution: Negative for malaise/fatigue.   HENT: Negative for hearing loss.    Eyes: Negative for double vision and visual disturbance.   Cardiovascular: Negative for chest pain.   Respiratory: Negative for shortness of breath.    Endocrine: Negative for cold intolerance.   Hematologic/Lymphatic: Does not bruise/bleed easily.   Skin: Negative for  poor wound healing and suspicious lesions.   Musculoskeletal: Negative for gout, joint pain and joint swelling.   Gastrointestinal: Negative for nausea and vomiting.   Genitourinary: Negative for dysuria.   Neurological: Negative for numbness, paresthesias and sensory change.   Psychiatric/Behavioral: Negative for depression, memory loss and substance abuse. The patient is not nervous/anxious.    Allergic/Immunologic: Negative for persistent infections.       Objective:   Body mass index is 39.99 kg/m².  Vitals:    10/13/20 1530   BP: (!) 149/85   Pulse: 103                General    Constitutional: She is oriented to person, place, and time. She appears well-developed and well-nourished. No distress.   HENT:   Head: Normocephalic.   Eyes: EOM are normal.   Neck: Normal range of motion.   Pulmonary/Chest: Effort normal.   Neurological: She is oriented to person, place, and time.   Psychiatric: She has a normal mood and affect.             Right Hand/Wrist Exam     Inspection   Scars: Wrist - absent Hand -  absent  Effusion: Wrist - absent Hand -  absent    Pain   Wrist - The patient exhibits pain of the CMC.    Other     Neuorologic Exam    Median Distribution: normal  Ulnar Distribution: normal  Radial Distribution: normal    Comments:  The patient is tender basal joint right thumb with a positive axial circumduction grind test.  There are no motor or sensory deficits.      Left Hand/Wrist Exam     Inspection   Scars: Wrist - absent Hand -  absent  Effusion: Wrist - absent Hand -  absent    Pain   Wrist - The patient exhibits pain of the CMC.    Other     Sensory Exam  Median Distribution: normal  Ulnar Distribution: normal  Radial Distribution: normal    Comments:  The patient is tender basal joint left thumb with a positive axial circumduction grind test.  There are no motor or sensory deficits.          Vascular Exam       Capillary Refill  Right Hand: normal capillary refill  Left Hand: normal capillary  refill         radiographs were not obtained today  Assessment:     Bilateral thumb basal joint degenerative joint disease     Plan:       The patient was injected both thumb basal joints each with 0.5 cc of Kenalog and 0.5 cc of 2% plain lidocaine.  She will return in 3 weeks if not improved.              Disclaimer: This note was prepared using a voice recognition system and is likely to have sound alike errors within the text.

## 2020-10-13 NOTE — PROCEDURES
Small Joint Aspiration/Injection: R thumb MCP, L thumb MCP    Date/Time: 10/13/2020 3:40 PM  Performed by: Sterling Jade MD  Authorized by: Sterling Jade MD     Consent Done?:  Yes (Verbal)  Indications:  Arthritis  Timeout: prior to procedure the correct patient, procedure, and site was verified    Prep: patient was prepped and draped in usual sterile fashion      Local anesthesia used?: Yes    Local anesthetic:  Lidocaine 2% without epinephrine  Anesthetic total (ml):  1    Location:  Thumb  Site:  R thumb MCP and L thumb MCP  Ultrasonic guidance for needle placement?: No    Needle size:  25 G  Approach:  Volar  Medications:  40 mg triamcinolone acetonide 40 mg/mL

## 2020-10-20 ENCOUNTER — PATIENT MESSAGE (OUTPATIENT)
Dept: INTERNAL MEDICINE | Facility: CLINIC | Age: 55
End: 2020-10-20

## 2020-12-04 ENCOUNTER — PATIENT MESSAGE (OUTPATIENT)
Dept: INTERNAL MEDICINE | Facility: CLINIC | Age: 55
End: 2020-12-04

## 2020-12-17 ENCOUNTER — PATIENT MESSAGE (OUTPATIENT)
Dept: ORTHOPEDICS | Facility: CLINIC | Age: 55
End: 2020-12-17

## 2021-01-13 ENCOUNTER — OFFICE VISIT (OUTPATIENT)
Dept: ORTHOPEDICS | Facility: CLINIC | Age: 56
End: 2021-01-13
Payer: OTHER GOVERNMENT

## 2021-01-13 VITALS
WEIGHT: 233 LBS | DIASTOLIC BLOOD PRESSURE: 87 MMHG | SYSTOLIC BLOOD PRESSURE: 158 MMHG | HEIGHT: 64 IN | HEART RATE: 92 BPM | BODY MASS INDEX: 39.78 KG/M2

## 2021-01-13 DIAGNOSIS — M18.0 ARTHRITIS OF CARPOMETACARPAL (CMC) JOINT OF BOTH THUMBS: Primary | ICD-10-CM

## 2021-01-13 DIAGNOSIS — M65.30 TRIGGER FINGER, ACQUIRED: ICD-10-CM

## 2021-01-13 PROCEDURE — 99213 OFFICE O/P EST LOW 20 MIN: CPT | Mod: PBBFAC,25 | Performed by: ORTHOPAEDIC SURGERY

## 2021-01-13 PROCEDURE — 99213 PR OFFICE/OUTPT VISIT, EST, LEVL III, 20-29 MIN: ICD-10-PCS | Mod: S$PBB,25,, | Performed by: ORTHOPAEDIC SURGERY

## 2021-01-13 PROCEDURE — 99999 PR PBB SHADOW E&M-EST. PATIENT-LVL III: ICD-10-PCS | Mod: PBBFAC,,, | Performed by: ORTHOPAEDIC SURGERY

## 2021-01-13 PROCEDURE — 20600 SMALL JOINT ASPIRATION/INJECTION: R THUMB MCP, L THUMB MCP: ICD-10-PCS | Mod: 50,S$PBB,, | Performed by: ORTHOPAEDIC SURGERY

## 2021-01-13 PROCEDURE — 20550 NJX 1 TENDON SHEATH/LIGAMENT: CPT | Mod: PBBFAC,RT | Performed by: ORTHOPAEDIC SURGERY

## 2021-01-13 PROCEDURE — 99999 PR PBB SHADOW E&M-EST. PATIENT-LVL III: CPT | Mod: PBBFAC,,, | Performed by: ORTHOPAEDIC SURGERY

## 2021-01-13 PROCEDURE — 20550 TENDON SHEATH: ICD-10-PCS | Mod: S$PBB,51,RT, | Performed by: ORTHOPAEDIC SURGERY

## 2021-01-13 PROCEDURE — 20600 DRAIN/INJ JOINT/BURSA W/O US: CPT | Mod: 50,PBBFAC | Performed by: ORTHOPAEDIC SURGERY

## 2021-01-13 PROCEDURE — 99213 OFFICE O/P EST LOW 20 MIN: CPT | Mod: S$PBB,25,, | Performed by: ORTHOPAEDIC SURGERY

## 2021-01-13 RX ORDER — DULOXETIN HYDROCHLORIDE 20 MG/1
60 CAPSULE, DELAYED RELEASE ORAL DAILY
COMMUNITY
Start: 2020-11-09

## 2021-01-13 RX ORDER — VORTIOXETINE 20 MG/1
1 TABLET, FILM COATED ORAL DAILY
COMMUNITY
Start: 2021-01-12 | End: 2021-08-18

## 2021-01-13 RX ORDER — TRIAMCINOLONE ACETONIDE 40 MG/ML
40 INJECTION, SUSPENSION INTRA-ARTICULAR; INTRAMUSCULAR
Status: DISCONTINUED | OUTPATIENT
Start: 2021-01-13 | End: 2021-01-13 | Stop reason: HOSPADM

## 2021-01-13 RX ADMIN — TRIAMCINOLONE ACETONIDE 40 MG: 200 INJECTION, SUSPENSION INTRA-ARTICULAR; INTRAMUSCULAR at 08:01

## 2021-01-20 ENCOUNTER — LAB VISIT (OUTPATIENT)
Dept: LAB | Facility: HOSPITAL | Age: 56
End: 2021-01-20
Attending: FAMILY MEDICINE
Payer: OTHER GOVERNMENT

## 2021-01-20 DIAGNOSIS — Z29.9 PREVENTIVE MEASURE: ICD-10-CM

## 2021-01-20 LAB
25(OH)D3+25(OH)D2 SERPL-MCNC: 45 NG/ML (ref 30–96)
ALBUMIN SERPL BCP-MCNC: 4.2 G/DL (ref 3.5–5.2)
ALP SERPL-CCNC: 93 U/L (ref 55–135)
ALT SERPL W/O P-5'-P-CCNC: 21 U/L (ref 10–44)
ANION GAP SERPL CALC-SCNC: 10 MMOL/L (ref 8–16)
AST SERPL-CCNC: 16 U/L (ref 10–40)
BILIRUB SERPL-MCNC: 0.5 MG/DL (ref 0.1–1)
BUN SERPL-MCNC: 21 MG/DL (ref 6–20)
CALCIUM SERPL-MCNC: 9.2 MG/DL (ref 8.7–10.5)
CHLORIDE SERPL-SCNC: 106 MMOL/L (ref 95–110)
CHOLEST SERPL-MCNC: 179 MG/DL (ref 120–199)
CHOLEST/HDLC SERPL: 3.1 {RATIO} (ref 2–5)
CO2 SERPL-SCNC: 24 MMOL/L (ref 23–29)
CREAT SERPL-MCNC: 0.8 MG/DL (ref 0.5–1.4)
EST. GFR  (AFRICAN AMERICAN): >60 ML/MIN/1.73 M^2
EST. GFR  (NON AFRICAN AMERICAN): >60 ML/MIN/1.73 M^2
GLUCOSE SERPL-MCNC: 82 MG/DL (ref 70–110)
HDLC SERPL-MCNC: 57 MG/DL (ref 40–75)
HDLC SERPL: 31.8 % (ref 20–50)
LDLC SERPL CALC-MCNC: 112.4 MG/DL (ref 63–159)
NONHDLC SERPL-MCNC: 122 MG/DL
POTASSIUM SERPL-SCNC: 4.1 MMOL/L (ref 3.5–5.1)
PROT SERPL-MCNC: 7.4 G/DL (ref 6–8.4)
SODIUM SERPL-SCNC: 140 MMOL/L (ref 136–145)
TRIGL SERPL-MCNC: 48 MG/DL (ref 30–150)

## 2021-01-20 PROCEDURE — 80053 COMPREHEN METABOLIC PANEL: CPT

## 2021-01-20 PROCEDURE — 82607 VITAMIN B-12: CPT

## 2021-01-20 PROCEDURE — 82306 VITAMIN D 25 HYDROXY: CPT

## 2021-01-20 PROCEDURE — 80061 LIPID PANEL: CPT

## 2021-01-20 PROCEDURE — 83036 HEMOGLOBIN GLYCOSYLATED A1C: CPT

## 2021-01-20 PROCEDURE — 36415 COLL VENOUS BLD VENIPUNCTURE: CPT | Mod: PO

## 2021-01-20 PROCEDURE — 84443 ASSAY THYROID STIM HORMONE: CPT

## 2021-01-21 LAB
ESTIMATED AVG GLUCOSE: 108 MG/DL (ref 68–131)
HBA1C MFR BLD HPLC: 5.4 % (ref 4–5.6)
TSH SERPL DL<=0.005 MIU/L-ACNC: 1.63 UIU/ML (ref 0.4–4)
VIT B12 SERPL-MCNC: 577 PG/ML (ref 210–950)

## 2021-01-22 ENCOUNTER — PATIENT MESSAGE (OUTPATIENT)
Dept: INTERNAL MEDICINE | Facility: CLINIC | Age: 56
End: 2021-01-22

## 2021-01-22 ENCOUNTER — OFFICE VISIT (OUTPATIENT)
Dept: URGENT CARE | Facility: CLINIC | Age: 56
End: 2021-01-22
Payer: OTHER GOVERNMENT

## 2021-01-22 VITALS
SYSTOLIC BLOOD PRESSURE: 124 MMHG | HEART RATE: 83 BPM | WEIGHT: 220 LBS | OXYGEN SATURATION: 99 % | BODY MASS INDEX: 37.56 KG/M2 | DIASTOLIC BLOOD PRESSURE: 65 MMHG | TEMPERATURE: 98 F | HEIGHT: 64 IN

## 2021-01-22 DIAGNOSIS — B34.9 VIRAL SYNDROME: Primary | ICD-10-CM

## 2021-01-22 PROCEDURE — 99214 OFFICE O/P EST MOD 30 MIN: CPT | Mod: S$GLB,,, | Performed by: NURSE PRACTITIONER

## 2021-01-22 PROCEDURE — 86769 SARS-COV-2 COVID-19 ANTIBODY: CPT

## 2021-01-22 PROCEDURE — 99214 PR OFFICE/OUTPT VISIT, EST, LEVL IV, 30-39 MIN: ICD-10-PCS | Mod: S$GLB,,, | Performed by: NURSE PRACTITIONER

## 2021-01-22 RX ORDER — GUAIFENESIN AND DEXTROMETHORPHAN HYDROBROMIDE 1200; 60 MG/1; MG/1
1 TABLET, EXTENDED RELEASE ORAL EVERY 12 HOURS
COMMUNITY
Start: 2021-01-22 | End: 2021-02-01

## 2021-01-22 RX ORDER — FLUTICASONE PROPIONATE 50 MCG
2 SPRAY, SUSPENSION (ML) NASAL DAILY
Qty: 16 G | Refills: 0 | Status: SHIPPED | OUTPATIENT
Start: 2021-01-22 | End: 2021-02-05

## 2021-01-22 RX ORDER — CETIRIZINE HYDROCHLORIDE 10 MG/1
10 TABLET ORAL DAILY
Qty: 30 TABLET | Refills: 0 | Status: SHIPPED | OUTPATIENT
Start: 2021-01-22 | End: 2021-02-17

## 2021-01-23 LAB — SARS-COV-2 IGG SERPLBLD QL IA.RAPID: NEGATIVE

## 2021-01-25 ENCOUNTER — TELEPHONE (OUTPATIENT)
Dept: URGENT CARE | Facility: CLINIC | Age: 56
End: 2021-01-25

## 2021-01-27 ENCOUNTER — OFFICE VISIT (OUTPATIENT)
Dept: INTERNAL MEDICINE | Facility: CLINIC | Age: 56
End: 2021-01-27
Payer: OTHER GOVERNMENT

## 2021-01-27 ENCOUNTER — LAB VISIT (OUTPATIENT)
Dept: LAB | Facility: HOSPITAL | Age: 56
End: 2021-01-27
Attending: NURSE PRACTITIONER
Payer: OTHER GOVERNMENT

## 2021-01-27 VITALS
WEIGHT: 237.19 LBS | HEIGHT: 64 IN | HEART RATE: 76 BPM | TEMPERATURE: 98 F | SYSTOLIC BLOOD PRESSURE: 134 MMHG | DIASTOLIC BLOOD PRESSURE: 70 MMHG | BODY MASS INDEX: 40.49 KG/M2

## 2021-01-27 DIAGNOSIS — Z12.31 ENCOUNTER FOR SCREENING MAMMOGRAM FOR MALIGNANT NEOPLASM OF BREAST: ICD-10-CM

## 2021-01-27 DIAGNOSIS — R73.09 ABNORMAL GLUCOSE: ICD-10-CM

## 2021-01-27 DIAGNOSIS — Z00.00 ANNUAL PHYSICAL EXAM: Primary | ICD-10-CM

## 2021-01-27 DIAGNOSIS — D50.0 IRON DEFICIENCY ANEMIA DUE TO CHRONIC BLOOD LOSS: ICD-10-CM

## 2021-01-27 LAB
ALBUMIN SERPL BCP-MCNC: 4.1 G/DL (ref 3.5–5.2)
ALP SERPL-CCNC: 88 U/L (ref 55–135)
ALT SERPL W/O P-5'-P-CCNC: 24 U/L (ref 10–44)
ANION GAP SERPL CALC-SCNC: 12 MMOL/L (ref 8–16)
AST SERPL-CCNC: 17 U/L (ref 10–40)
BASOPHILS # BLD AUTO: 0.04 K/UL (ref 0–0.2)
BASOPHILS NFR BLD: 0.6 % (ref 0–1.9)
BILIRUB SERPL-MCNC: 0.3 MG/DL (ref 0.1–1)
BUN SERPL-MCNC: 19 MG/DL (ref 6–20)
CALCIUM SERPL-MCNC: 8.8 MG/DL (ref 8.7–10.5)
CHLORIDE SERPL-SCNC: 105 MMOL/L (ref 95–110)
CO2 SERPL-SCNC: 23 MMOL/L (ref 23–29)
CREAT SERPL-MCNC: 0.9 MG/DL (ref 0.5–1.4)
DIFFERENTIAL METHOD: NORMAL
EOSINOPHIL # BLD AUTO: 0.1 K/UL (ref 0–0.5)
EOSINOPHIL NFR BLD: 1 % (ref 0–8)
ERYTHROCYTE [DISTWIDTH] IN BLOOD BY AUTOMATED COUNT: 13.2 % (ref 11.5–14.5)
EST. GFR  (AFRICAN AMERICAN): >60 ML/MIN/1.73 M^2
EST. GFR  (NON AFRICAN AMERICAN): >60 ML/MIN/1.73 M^2
GLUCOSE SERPL-MCNC: 102 MG/DL (ref 70–110)
HCT VFR BLD AUTO: 43.5 % (ref 37–48.5)
HGB BLD-MCNC: 13.9 G/DL (ref 12–16)
IMM GRANULOCYTES # BLD AUTO: 0.03 K/UL (ref 0–0.04)
IMM GRANULOCYTES NFR BLD AUTO: 0.4 % (ref 0–0.5)
LYMPHOCYTES # BLD AUTO: 1.5 K/UL (ref 1–4.8)
LYMPHOCYTES NFR BLD: 21.3 % (ref 18–48)
MCH RBC QN AUTO: 29.9 PG (ref 27–31)
MCHC RBC AUTO-ENTMCNC: 32 G/DL (ref 32–36)
MCV RBC AUTO: 94 FL (ref 82–98)
MONOCYTES # BLD AUTO: 0.3 K/UL (ref 0.3–1)
MONOCYTES NFR BLD: 4.6 % (ref 4–15)
NEUTROPHILS # BLD AUTO: 5 K/UL (ref 1.8–7.7)
NEUTROPHILS NFR BLD: 72.1 % (ref 38–73)
NRBC BLD-RTO: 0 /100 WBC
PLATELET # BLD AUTO: 202 K/UL (ref 150–350)
PMV BLD AUTO: 12 FL (ref 9.2–12.9)
POTASSIUM SERPL-SCNC: 4.1 MMOL/L (ref 3.5–5.1)
PROT SERPL-MCNC: 7.4 G/DL (ref 6–8.4)
RBC # BLD AUTO: 4.65 M/UL (ref 4–5.4)
SODIUM SERPL-SCNC: 140 MMOL/L (ref 136–145)
WBC # BLD AUTO: 6.94 K/UL (ref 3.9–12.7)

## 2021-01-27 PROCEDURE — 85025 COMPLETE CBC W/AUTO DIFF WBC: CPT

## 2021-01-27 PROCEDURE — 99999 PR PBB SHADOW E&M-EST. PATIENT-LVL V: CPT | Mod: PBBFAC,,, | Performed by: FAMILY MEDICINE

## 2021-01-27 PROCEDURE — 99215 OFFICE O/P EST HI 40 MIN: CPT | Mod: PBBFAC,PO | Performed by: FAMILY MEDICINE

## 2021-01-27 PROCEDURE — 82728 ASSAY OF FERRITIN: CPT

## 2021-01-27 PROCEDURE — 83540 ASSAY OF IRON: CPT

## 2021-01-27 PROCEDURE — 99396 PR PREVENTIVE VISIT,EST,40-64: ICD-10-PCS | Mod: S$PBB,,, | Performed by: FAMILY MEDICINE

## 2021-01-27 PROCEDURE — 80053 COMPREHEN METABOLIC PANEL: CPT

## 2021-01-27 PROCEDURE — 99396 PREV VISIT EST AGE 40-64: CPT | Mod: S$PBB,,, | Performed by: FAMILY MEDICINE

## 2021-01-27 PROCEDURE — 99999 PR PBB SHADOW E&M-EST. PATIENT-LVL V: ICD-10-PCS | Mod: PBBFAC,,, | Performed by: FAMILY MEDICINE

## 2021-01-27 PROCEDURE — 36415 COLL VENOUS BLD VENIPUNCTURE: CPT | Mod: PO

## 2021-01-28 LAB
FERRITIN SERPL-MCNC: 234 NG/ML (ref 20–300)
IRON SERPL-MCNC: 102 UG/DL (ref 30–160)
SATURATED IRON: 29 % (ref 20–50)
TOTAL IRON BINDING CAPACITY: 352 UG/DL (ref 250–450)
TRANSFERRIN SERPL-MCNC: 238 MG/DL (ref 200–375)

## 2021-02-17 ENCOUNTER — HOSPITAL ENCOUNTER (OUTPATIENT)
Dept: RADIOLOGY | Facility: HOSPITAL | Age: 56
Discharge: HOME | End: 2021-02-17
Attending: FAMILY MEDICINE
Payer: OTHER GOVERNMENT

## 2021-02-17 ENCOUNTER — OFFICE VISIT (OUTPATIENT)
Dept: OBSTETRICS AND GYNECOLOGY | Facility: CLINIC | Age: 56
End: 2021-02-17
Payer: OTHER GOVERNMENT

## 2021-02-17 VITALS
BODY MASS INDEX: 42.04 KG/M2 | SYSTOLIC BLOOD PRESSURE: 166 MMHG | WEIGHT: 246.25 LBS | HEIGHT: 64 IN | DIASTOLIC BLOOD PRESSURE: 86 MMHG

## 2021-02-17 DIAGNOSIS — Z12.31 ENCOUNTER FOR SCREENING MAMMOGRAM FOR MALIGNANT NEOPLASM OF BREAST: ICD-10-CM

## 2021-02-17 DIAGNOSIS — Z01.419 ENCOUNTER FOR GYNECOLOGICAL EXAMINATION WITHOUT ABNORMAL FINDING: Primary | ICD-10-CM

## 2021-02-17 DIAGNOSIS — Z00.00 ANNUAL PHYSICAL EXAM: ICD-10-CM

## 2021-02-17 PROBLEM — F32.A DEPRESSION: Status: ACTIVE | Noted: 2021-02-17

## 2021-02-17 PROBLEM — U07.1 COVID-19: Status: ACTIVE | Noted: 2020-11-13

## 2021-02-17 PROBLEM — G50.0 TRIGEMINAL NEURALGIA: Status: ACTIVE | Noted: 2021-02-17

## 2021-02-17 PROBLEM — F41.9 ANXIETY: Status: ACTIVE | Noted: 2021-02-17

## 2021-02-17 PROCEDURE — 77067 MAMMO DIGITAL SCREENING BILAT WITH TOMO: ICD-10-PCS | Mod: 26,,, | Performed by: RADIOLOGY

## 2021-02-17 PROCEDURE — 99999 PR PBB SHADOW E&M-EST. PATIENT-LVL IV: CPT | Mod: PBBFAC,,, | Performed by: NURSE PRACTITIONER

## 2021-02-17 PROCEDURE — 77067 SCR MAMMO BI INCL CAD: CPT | Mod: 26,,, | Performed by: RADIOLOGY

## 2021-02-17 PROCEDURE — 77063 MAMMO DIGITAL SCREENING BILAT WITH TOMO: ICD-10-PCS | Mod: 26,,, | Performed by: RADIOLOGY

## 2021-02-17 PROCEDURE — 99999 PR PBB SHADOW E&M-EST. PATIENT-LVL IV: ICD-10-PCS | Mod: PBBFAC,,, | Performed by: NURSE PRACTITIONER

## 2021-02-17 PROCEDURE — 77063 BREAST TOMOSYNTHESIS BI: CPT | Mod: 26,,, | Performed by: RADIOLOGY

## 2021-02-17 PROCEDURE — 99214 OFFICE O/P EST MOD 30 MIN: CPT | Mod: PBBFAC,PO | Performed by: NURSE PRACTITIONER

## 2021-02-17 PROCEDURE — 77067 SCR MAMMO BI INCL CAD: CPT | Mod: TC

## 2021-02-17 PROCEDURE — 99386 PR PREVENTIVE VISIT,NEW,40-64: ICD-10-PCS | Mod: S$PBB,,, | Performed by: NURSE PRACTITIONER

## 2021-02-17 PROCEDURE — 99386 PREV VISIT NEW AGE 40-64: CPT | Mod: S$PBB,,, | Performed by: NURSE PRACTITIONER

## 2021-03-25 ENCOUNTER — PATIENT MESSAGE (OUTPATIENT)
Dept: INTERNAL MEDICINE | Facility: CLINIC | Age: 56
End: 2021-03-25

## 2021-04-12 ENCOUNTER — PATIENT MESSAGE (OUTPATIENT)
Dept: ORTHOPEDICS | Facility: CLINIC | Age: 56
End: 2021-04-12

## 2021-04-28 ENCOUNTER — PATIENT MESSAGE (OUTPATIENT)
Dept: RESEARCH | Facility: HOSPITAL | Age: 56
End: 2021-04-28

## 2021-07-19 ENCOUNTER — PATIENT MESSAGE (OUTPATIENT)
Dept: INTERNAL MEDICINE | Facility: CLINIC | Age: 56
End: 2021-07-19

## 2021-07-20 ENCOUNTER — LAB VISIT (OUTPATIENT)
Dept: LAB | Facility: HOSPITAL | Age: 56
End: 2021-07-20
Payer: OTHER GOVERNMENT

## 2021-07-20 DIAGNOSIS — R73.09 ABNORMAL GLUCOSE: ICD-10-CM

## 2021-07-20 LAB
ESTIMATED AVG GLUCOSE: 105 MG/DL (ref 68–131)
HBA1C MFR BLD: 5.3 % (ref 4–5.6)

## 2021-07-20 PROCEDURE — 36415 COLL VENOUS BLD VENIPUNCTURE: CPT | Mod: PO | Performed by: FAMILY MEDICINE

## 2021-07-20 PROCEDURE — 83036 HEMOGLOBIN GLYCOSYLATED A1C: CPT | Performed by: FAMILY MEDICINE

## 2021-07-27 ENCOUNTER — PATIENT MESSAGE (OUTPATIENT)
Dept: INTERNAL MEDICINE | Facility: CLINIC | Age: 56
End: 2021-07-27

## 2021-08-18 ENCOUNTER — OFFICE VISIT (OUTPATIENT)
Dept: ORTHOPEDICS | Facility: CLINIC | Age: 56
End: 2021-08-18
Payer: OTHER GOVERNMENT

## 2021-08-18 VITALS
SYSTOLIC BLOOD PRESSURE: 120 MMHG | DIASTOLIC BLOOD PRESSURE: 79 MMHG | RESPIRATION RATE: 20 BRPM | HEART RATE: 98 BPM | BODY MASS INDEX: 42.04 KG/M2 | HEIGHT: 64 IN | WEIGHT: 246.25 LBS

## 2021-08-18 DIAGNOSIS — M65.30 TRIGGER FINGER, ACQUIRED: Primary | ICD-10-CM

## 2021-08-18 DIAGNOSIS — M18.0 ARTHRITIS OF CARPOMETACARPAL (CMC) JOINT OF BOTH THUMBS: ICD-10-CM

## 2021-08-18 PROCEDURE — 20600 DRAIN/INJ JOINT/BURSA W/O US: CPT | Mod: 50,PBBFAC | Performed by: ORTHOPAEDIC SURGERY

## 2021-08-18 PROCEDURE — 99213 OFFICE O/P EST LOW 20 MIN: CPT | Mod: 25,S$PBB,, | Performed by: ORTHOPAEDIC SURGERY

## 2021-08-18 PROCEDURE — 20550 NJX 1 TENDON SHEATH/LIGAMENT: CPT | Mod: PBBFAC,RT | Performed by: ORTHOPAEDIC SURGERY

## 2021-08-18 PROCEDURE — 99213 PR OFFICE/OUTPT VISIT, EST, LEVL III, 20-29 MIN: ICD-10-PCS | Mod: 25,S$PBB,, | Performed by: ORTHOPAEDIC SURGERY

## 2021-08-18 PROCEDURE — 99999 PR PBB SHADOW E&M-EST. PATIENT-LVL III: CPT | Mod: PBBFAC,,, | Performed by: ORTHOPAEDIC SURGERY

## 2021-08-18 PROCEDURE — 20600 SMALL JOINT ASPIRATION/INJECTION: R THUMB CMC, L THUMB CMC: ICD-10-PCS | Mod: 51,50,S$PBB, | Performed by: ORTHOPAEDIC SURGERY

## 2021-08-18 PROCEDURE — 99999 PR PBB SHADOW E&M-EST. PATIENT-LVL III: ICD-10-PCS | Mod: PBBFAC,,, | Performed by: ORTHOPAEDIC SURGERY

## 2021-08-18 PROCEDURE — 99213 OFFICE O/P EST LOW 20 MIN: CPT | Mod: PBBFAC | Performed by: ORTHOPAEDIC SURGERY

## 2021-08-18 PROCEDURE — 20550 TENDON SHEATH: ICD-10-PCS | Mod: 59,S$PBB,RT, | Performed by: ORTHOPAEDIC SURGERY

## 2021-08-18 RX ORDER — TRIAMCINOLONE ACETONIDE 40 MG/ML
40 INJECTION, SUSPENSION INTRA-ARTICULAR; INTRAMUSCULAR
Status: DISCONTINUED | OUTPATIENT
Start: 2021-08-18 | End: 2021-08-18 | Stop reason: HOSPADM

## 2021-08-18 RX ADMIN — TRIAMCINOLONE ACETONIDE 40 MG: 200 INJECTION, SUSPENSION INTRA-ARTICULAR; INTRAMUSCULAR at 11:08

## 2021-08-24 ENCOUNTER — PATIENT MESSAGE (OUTPATIENT)
Dept: INTERNAL MEDICINE | Facility: CLINIC | Age: 56
End: 2021-08-24

## 2021-08-24 ENCOUNTER — PATIENT MESSAGE (OUTPATIENT)
Dept: HEMATOLOGY/ONCOLOGY | Facility: CLINIC | Age: 56
End: 2021-08-24

## 2021-08-25 DIAGNOSIS — D50.0 IRON DEFICIENCY ANEMIA DUE TO CHRONIC BLOOD LOSS: Primary | ICD-10-CM

## 2021-09-01 ENCOUNTER — OFFICE VISIT (OUTPATIENT)
Dept: INTERNAL MEDICINE | Facility: CLINIC | Age: 56
End: 2021-09-01
Payer: OTHER GOVERNMENT

## 2021-09-01 ENCOUNTER — PATIENT MESSAGE (OUTPATIENT)
Dept: INTERNAL MEDICINE | Facility: CLINIC | Age: 56
End: 2021-09-01

## 2021-09-01 DIAGNOSIS — D50.0 IRON DEFICIENCY ANEMIA DUE TO CHRONIC BLOOD LOSS: Primary | ICD-10-CM

## 2021-09-01 DIAGNOSIS — R53.83 FATIGUE, UNSPECIFIED TYPE: ICD-10-CM

## 2021-09-01 PROCEDURE — 99213 OFFICE O/P EST LOW 20 MIN: CPT | Mod: 95,,, | Performed by: INTERNAL MEDICINE

## 2021-09-01 PROCEDURE — 99213 PR OFFICE/OUTPT VISIT, EST, LEVL III, 20-29 MIN: ICD-10-PCS | Mod: 95,,, | Performed by: INTERNAL MEDICINE

## 2021-09-08 ENCOUNTER — LAB VISIT (OUTPATIENT)
Dept: LAB | Facility: HOSPITAL | Age: 56
End: 2021-09-08
Attending: INTERNAL MEDICINE
Payer: OTHER GOVERNMENT

## 2021-09-08 DIAGNOSIS — D50.0 IRON DEFICIENCY ANEMIA DUE TO CHRONIC BLOOD LOSS: ICD-10-CM

## 2021-09-08 DIAGNOSIS — R53.83 FATIGUE, UNSPECIFIED TYPE: ICD-10-CM

## 2021-09-08 PROCEDURE — 85025 COMPLETE CBC W/AUTO DIFF WBC: CPT | Performed by: INTERNAL MEDICINE

## 2021-09-08 PROCEDURE — 82728 ASSAY OF FERRITIN: CPT | Performed by: INTERNAL MEDICINE

## 2021-09-08 PROCEDURE — 83540 ASSAY OF IRON: CPT | Performed by: INTERNAL MEDICINE

## 2021-09-08 PROCEDURE — 84466 ASSAY OF TRANSFERRIN: CPT | Performed by: INTERNAL MEDICINE

## 2021-09-08 PROCEDURE — 36415 COLL VENOUS BLD VENIPUNCTURE: CPT | Mod: PO | Performed by: INTERNAL MEDICINE

## 2021-09-08 PROCEDURE — 80053 COMPREHEN METABOLIC PANEL: CPT | Performed by: INTERNAL MEDICINE

## 2021-09-09 LAB
ALBUMIN SERPL BCP-MCNC: 4.1 G/DL (ref 3.5–5.2)
ALP SERPL-CCNC: 74 U/L (ref 55–135)
ALT SERPL W/O P-5'-P-CCNC: 20 U/L (ref 10–44)
ANION GAP SERPL CALC-SCNC: 12 MMOL/L (ref 8–16)
AST SERPL-CCNC: 17 U/L (ref 10–40)
BASOPHILS # BLD AUTO: 0.06 K/UL (ref 0–0.2)
BASOPHILS NFR BLD: 1.1 % (ref 0–1.9)
BILIRUB SERPL-MCNC: 0.4 MG/DL (ref 0.1–1)
BUN SERPL-MCNC: 16 MG/DL (ref 6–20)
CALCIUM SERPL-MCNC: 9.4 MG/DL (ref 8.7–10.5)
CHLORIDE SERPL-SCNC: 106 MMOL/L (ref 95–110)
CO2 SERPL-SCNC: 21 MMOL/L (ref 23–29)
CREAT SERPL-MCNC: 1 MG/DL (ref 0.5–1.4)
DIFFERENTIAL METHOD: NORMAL
EOSINOPHIL # BLD AUTO: 0.1 K/UL (ref 0–0.5)
EOSINOPHIL NFR BLD: 2.4 % (ref 0–8)
ERYTHROCYTE [DISTWIDTH] IN BLOOD BY AUTOMATED COUNT: 13.7 % (ref 11.5–14.5)
EST. GFR  (AFRICAN AMERICAN): >60 ML/MIN/1.73 M^2
EST. GFR  (NON AFRICAN AMERICAN): >60 ML/MIN/1.73 M^2
FERRITIN SERPL-MCNC: 150 NG/ML (ref 20–300)
GLUCOSE SERPL-MCNC: 89 MG/DL (ref 70–110)
HCT VFR BLD AUTO: 41.9 % (ref 37–48.5)
HGB BLD-MCNC: 13.5 G/DL (ref 12–16)
IMM GRANULOCYTES # BLD AUTO: 0.02 K/UL (ref 0–0.04)
IMM GRANULOCYTES NFR BLD AUTO: 0.4 % (ref 0–0.5)
IRON SERPL-MCNC: 99 UG/DL (ref 30–160)
LYMPHOCYTES # BLD AUTO: 1.4 K/UL (ref 1–4.8)
LYMPHOCYTES NFR BLD: 26.5 % (ref 18–48)
MCH RBC QN AUTO: 30.3 PG (ref 27–31)
MCHC RBC AUTO-ENTMCNC: 32.2 G/DL (ref 32–36)
MCV RBC AUTO: 94 FL (ref 82–98)
MONOCYTES # BLD AUTO: 0.4 K/UL (ref 0.3–1)
MONOCYTES NFR BLD: 7.5 % (ref 4–15)
NEUTROPHILS # BLD AUTO: 3.3 K/UL (ref 1.8–7.7)
NEUTROPHILS NFR BLD: 62.1 % (ref 38–73)
NRBC BLD-RTO: 0 /100 WBC
PLATELET # BLD AUTO: 185 K/UL (ref 150–450)
PMV BLD AUTO: 12 FL (ref 9.2–12.9)
POTASSIUM SERPL-SCNC: 4.2 MMOL/L (ref 3.5–5.1)
PROT SERPL-MCNC: 7.1 G/DL (ref 6–8.4)
RBC # BLD AUTO: 4.46 M/UL (ref 4–5.4)
SATURATED IRON: 29 % (ref 20–50)
SODIUM SERPL-SCNC: 139 MMOL/L (ref 136–145)
TOTAL IRON BINDING CAPACITY: 346 UG/DL (ref 250–450)
TRANSFERRIN SERPL-MCNC: 234 MG/DL (ref 200–375)
WBC # BLD AUTO: 5.32 K/UL (ref 3.9–12.7)

## 2021-12-08 ENCOUNTER — OFFICE VISIT (OUTPATIENT)
Dept: ORTHOPEDICS | Facility: CLINIC | Age: 56
End: 2021-12-08
Payer: OTHER GOVERNMENT

## 2021-12-08 ENCOUNTER — HOSPITAL ENCOUNTER (OUTPATIENT)
Dept: RADIOLOGY | Facility: HOSPITAL | Age: 56
Discharge: HOME | End: 2021-12-08
Attending: ORTHOPAEDIC SURGERY
Payer: OTHER GOVERNMENT

## 2021-12-08 VITALS — SYSTOLIC BLOOD PRESSURE: 122 MMHG | HEART RATE: 83 BPM | DIASTOLIC BLOOD PRESSURE: 82 MMHG

## 2021-12-08 DIAGNOSIS — M79.641 BILATERAL HAND PAIN: ICD-10-CM

## 2021-12-08 DIAGNOSIS — M79.641 BILATERAL HAND PAIN: Primary | ICD-10-CM

## 2021-12-08 DIAGNOSIS — M65.30 TRIGGER FINGER, ACQUIRED: ICD-10-CM

## 2021-12-08 DIAGNOSIS — M79.642 BILATERAL HAND PAIN: ICD-10-CM

## 2021-12-08 DIAGNOSIS — M18.0 ARTHRITIS OF CARPOMETACARPAL (CMC) JOINT OF BOTH THUMBS: Primary | ICD-10-CM

## 2021-12-08 DIAGNOSIS — M79.642 BILATERAL HAND PAIN: Primary | ICD-10-CM

## 2021-12-08 PROCEDURE — 20600 DRAIN/INJ JOINT/BURSA W/O US: CPT | Mod: 51,50,PBBFAC | Performed by: ORTHOPAEDIC SURGERY

## 2021-12-08 PROCEDURE — 73130 X-RAY EXAM OF HAND: CPT | Mod: TC,50

## 2021-12-08 PROCEDURE — 73130 X-RAY EXAM OF HAND: CPT | Mod: 26,50,, | Performed by: RADIOLOGY

## 2021-12-08 PROCEDURE — 99213 OFFICE O/P EST LOW 20 MIN: CPT | Mod: PBBFAC,25 | Performed by: ORTHOPAEDIC SURGERY

## 2021-12-08 PROCEDURE — 20600 SMALL JOINT ASPIRATION/INJECTION: R THUMB CMC, L THUMB CMC: ICD-10-PCS | Mod: 51,50,S$PBB, | Performed by: ORTHOPAEDIC SURGERY

## 2021-12-08 PROCEDURE — 20550 TENDON SHEATH: ICD-10-PCS | Mod: 59,S$PBB,F8, | Performed by: ORTHOPAEDIC SURGERY

## 2021-12-08 PROCEDURE — 20550 NJX 1 TENDON SHEATH/LIGAMENT: CPT | Mod: PBBFAC,59,F8 | Performed by: ORTHOPAEDIC SURGERY

## 2021-12-08 PROCEDURE — 99213 PR OFFICE/OUTPT VISIT, EST, LEVL III, 20-29 MIN: ICD-10-PCS | Mod: S$PBB,25,, | Performed by: ORTHOPAEDIC SURGERY

## 2021-12-08 PROCEDURE — 99999 PR PBB SHADOW E&M-EST. PATIENT-LVL III: CPT | Mod: PBBFAC,,, | Performed by: ORTHOPAEDIC SURGERY

## 2021-12-08 PROCEDURE — 99213 OFFICE O/P EST LOW 20 MIN: CPT | Mod: S$PBB,25,, | Performed by: ORTHOPAEDIC SURGERY

## 2021-12-08 PROCEDURE — 73130 XR HAND COMPLETE 3 VIEWS BILATERAL: ICD-10-PCS | Mod: 26,50,, | Performed by: RADIOLOGY

## 2021-12-08 PROCEDURE — 99999 PR PBB SHADOW E&M-EST. PATIENT-LVL III: ICD-10-PCS | Mod: PBBFAC,,, | Performed by: ORTHOPAEDIC SURGERY

## 2021-12-08 RX ORDER — TRIAMCINOLONE ACETONIDE 40 MG/ML
40 INJECTION, SUSPENSION INTRA-ARTICULAR; INTRAMUSCULAR
Status: DISCONTINUED | OUTPATIENT
Start: 2021-12-08 | End: 2021-12-08 | Stop reason: HOSPADM

## 2021-12-08 RX ADMIN — TRIAMCINOLONE ACETONIDE 40 MG: 200 INJECTION, SUSPENSION INTRA-ARTICULAR; INTRAMUSCULAR at 11:12

## 2021-12-09 DIAGNOSIS — Z01.818 PREOP TESTING: Primary | ICD-10-CM

## 2022-01-13 ENCOUNTER — TELEPHONE (OUTPATIENT)
Dept: ORTHOPEDICS | Facility: CLINIC | Age: 57
End: 2022-01-13
Payer: OTHER GOVERNMENT

## 2022-01-18 ENCOUNTER — TELEPHONE (OUTPATIENT)
Dept: ORTHOPEDICS | Facility: CLINIC | Age: 57
End: 2022-01-18
Payer: OTHER GOVERNMENT

## 2022-01-18 DIAGNOSIS — M65.30 TRIGGER FINGER, ACQUIRED: ICD-10-CM

## 2022-01-18 DIAGNOSIS — M18.0 ARTHRITIS OF CARPOMETACARPAL (CMC) JOINT OF BOTH THUMBS: Primary | ICD-10-CM

## 2022-01-18 DIAGNOSIS — Z01.818 PREOP TESTING: Primary | ICD-10-CM

## 2022-01-18 NOTE — TELEPHONE ENCOUNTER
Patient's surgery scheduled for 2/7/22 requires labs, ekg, covid screening and cxr. Pt notified. Pt verbalized understanding.

## 2022-01-21 ENCOUNTER — TELEPHONE (OUTPATIENT)
Dept: ORTHOPEDICS | Facility: CLINIC | Age: 57
End: 2022-01-21
Payer: OTHER GOVERNMENT

## 2022-01-21 ENCOUNTER — PATIENT MESSAGE (OUTPATIENT)
Dept: SURGERY | Facility: HOSPITAL | Age: 57
End: 2022-01-21
Payer: OTHER GOVERNMENT

## 2022-01-21 NOTE — TELEPHONE ENCOUNTER
Spoke to patient regarding type of surgery. Pt verbalized understanding. Patient also wanted to know how long the healing process was. I explained that healing varies per person. Usually no heavy lifting and straining with operative hand. Pt will be given more detailed information at post operative appointments. Pt verbalized understanding.

## 2022-01-24 ENCOUNTER — DOCUMENTATION ONLY (OUTPATIENT)
Dept: PREADMISSION TESTING | Facility: HOSPITAL | Age: 57
End: 2022-01-24
Payer: OTHER GOVERNMENT

## 2022-01-24 NOTE — PROGRESS NOTES
Pre op instructions reviewed with patient per phone.    Spoke about pre op process and surgery instructions, verbalized understanding.    To confirm, Your surgeon has in structed you:  Surgery is scheduled on February 7, 2022 at [facility] [place].      Please report to [facility], 1st floor.       The Pre Admissions will call you the day prior to surgery with your arrival time.        INSTRUCTIONS IMPORTANT!!!  Do Not Eat, Drink, or Smoke after 12 midnight! NO WATER after midnight! OK to brush teeth, no gum, candy or mints!        *Take only these medicines with a small swallow of water-morning of surgery.    Lamictal  Cymbalta     ____  Do Not wear makeup, mascara nail polish or artificial nails  ____  NO powder, lotions, deodorants or creams to surgical area.  ____  Please remove all jewelry, including piercings and leave at home.  ____  Dentures, Hearing Aids and Contact Lens will need to be removed prior to the start of surgery.  ____  Please bring photo ID and insurance information to hospital (Leave Valuables at Home)  ____  If going home the same day, arrange for a ride home. You will not be able to            drive if Anesthesia was used.    ____  Wear clean, loose fitting clothing. Allow for dressings, bandages.  ____  Stop Aspirin, Ibuprofen, Motrin and Aleve at least 5-7 days before surgery, unless otherwise instructed by your doctor, or the nurse. You MAY use Tylenol/acetaminophen until day of               surgery.  ____  If you take diabetic medication, do NOT take morning of surgery unless instructed by            Doctor. Metformin to be stopped 24 hrs prior to surgery time.   ____ Stop taking any Fish Oil supplements or Vitamins at least 5 days prior to surgery, unless instructed otherwise by your Doctor.         Bathing Instructions: The night before surgery and the morning prior to coming to the hospital:              -Do not shave your face.  -Do not shave pubic hair  7 days prior to surgery (gyn pt's).  -Do not shave legs/underarms 3 days prior to surgery.              -Shower & Rinse your body as usual with anti-bacterial Soap (Dial, Lever 2000, or Hibiclens)              -Do not use hibiclens on your head, face, or genitals.              -Do not wash with anti-bacterial soap after you use the hibiclens.              -Rinse your body thoroughly.                 [facility] Visitor/Ride Policy:  Only 1 adult allowed (over the age of 18) to accompany you into Pre-op/Recovery Surgery Dept.  Must have a ride home from a responsible adult that you know and trust.     Medical Transport, Uber or Lyft can only be used if patient has a responsible adult to accompany them during ride home.     Post-Op Instructions: You will receive surgery post-op instructions by your Discharge Nurse prior to going home.     Surgical Site Infection:     Prevention of surgical site infections:                 -Keep incisions clean and dry.              -Do not soak/submerge incisions in water until completely healed.              -Do not apply lotions, powders, creams, or deodorants to site.              -Always make sure hands are cleaned with antibacterial soap/ alcohol-based   prior to touching the surgical site.       Signs and symptoms:              -Redness and pain around the area where you had surgery              -Drainage of cloudy fluid from your surgical wound              -Fever over 100.4 or chills  >>>Call Surgeon office/on-call Surgeon if you experience any of these signs & symptoms post-surgery.        *Please Call [facility] Pre-Admissions Department with surgery instruction questions at [phone].     *Insurance Questions, please call [phone].    Pre admit office to call afternoon prior to surgery with final arrival time.    I

## 2022-02-04 ENCOUNTER — HOSPITAL ENCOUNTER (OUTPATIENT)
Dept: RADIOLOGY | Facility: HOSPITAL | Age: 57
Discharge: HOME | End: 2022-02-04
Attending: ORTHOPAEDIC SURGERY
Payer: OTHER GOVERNMENT

## 2022-02-04 ENCOUNTER — LAB VISIT (OUTPATIENT)
Dept: PRIMARY CARE CLINIC | Facility: CLINIC | Age: 57
End: 2022-02-04
Payer: OTHER GOVERNMENT

## 2022-02-04 ENCOUNTER — TELEPHONE (OUTPATIENT)
Dept: PREADMISSION TESTING | Facility: HOSPITAL | Age: 57
End: 2022-02-04
Payer: OTHER GOVERNMENT

## 2022-02-04 ENCOUNTER — HOSPITAL ENCOUNTER (OUTPATIENT)
Dept: CARDIOLOGY | Facility: HOSPITAL | Age: 57
Discharge: HOME | End: 2022-02-04
Attending: ORTHOPAEDIC SURGERY
Payer: OTHER GOVERNMENT

## 2022-02-04 DIAGNOSIS — Z01.818 PREOP TESTING: ICD-10-CM

## 2022-02-04 PROCEDURE — 71046 X-RAY EXAM CHEST 2 VIEWS: CPT | Mod: TC

## 2022-02-04 PROCEDURE — 93010 EKG 12-LEAD: ICD-10-PCS | Mod: ,,, | Performed by: INTERNAL MEDICINE

## 2022-02-04 PROCEDURE — 93005 ELECTROCARDIOGRAM TRACING: CPT

## 2022-02-04 PROCEDURE — 71046 XR CHEST PA AND LATERAL PRE-OP: ICD-10-PCS | Mod: 26,,, | Performed by: RADIOLOGY

## 2022-02-04 PROCEDURE — U0005 INFEC AGEN DETEC AMPLI PROBE: HCPCS | Performed by: ORTHOPAEDIC SURGERY

## 2022-02-04 PROCEDURE — U0003 INFECTIOUS AGENT DETECTION BY NUCLEIC ACID (DNA OR RNA); SEVERE ACUTE RESPIRATORY SYNDROME CORONAVIRUS 2 (SARS-COV-2) (CORONAVIRUS DISEASE [COVID-19]), AMPLIFIED PROBE TECHNIQUE, MAKING USE OF HIGH THROUGHPUT TECHNOLOGIES AS DESCRIBED BY CMS-2020-01-R: HCPCS | Performed by: ORTHOPAEDIC SURGERY

## 2022-02-04 PROCEDURE — 71046 X-RAY EXAM CHEST 2 VIEWS: CPT | Mod: 26,,, | Performed by: RADIOLOGY

## 2022-02-04 PROCEDURE — 93010 ELECTROCARDIOGRAM REPORT: CPT | Mod: ,,, | Performed by: INTERNAL MEDICINE

## 2022-02-04 NOTE — TELEPHONE ENCOUNTER
Called and spoke with the patient about the following:    Your Surgery arrival time is at 6:15am on 2/7/2022 at [facility] location.    The address is [address] [place]  [address].     Only one adult (over 18) is to accompany you to surgery, unless it is a Pediatric patient, then 2 adults are encouraged to accompany them to the surgery center.    Your ride MUST STAY the entire time until you are discharged.      Please come in the main lobby (located on the 1st floor).     Be prepared to show your photo ID and insurance card.     Masks should be worn by ALL persons entering the building.     Nothing to eat or drink after after midnight, unless you were instructed to take specific medications discussed with the Pre-admit Nurse.     Please call [phone] with any questions or concerns.     Thanks.

## 2022-02-05 LAB
SARS-COV-2 RNA RESP QL NAA+PROBE: NOT DETECTED
SARS-COV-2- CYCLE NUMBER: NORMAL

## 2022-02-07 ENCOUNTER — ANESTHESIA EVENT (OUTPATIENT)
Dept: SURGERY | Facility: HOSPITAL | Age: 57
End: 2022-02-07
Payer: OTHER GOVERNMENT

## 2022-02-07 ENCOUNTER — HOSPITAL ENCOUNTER (OUTPATIENT)
Dept: RADIOLOGY | Facility: HOSPITAL | Age: 57
Discharge: HOME | End: 2022-02-07
Attending: ORTHOPAEDIC SURGERY | Admitting: ORTHOPAEDIC SURGERY
Payer: OTHER GOVERNMENT

## 2022-02-07 ENCOUNTER — HOSPITAL ENCOUNTER (OUTPATIENT)
Facility: HOSPITAL | Age: 57
Discharge: HOME | End: 2022-02-07
Attending: ORTHOPAEDIC SURGERY | Admitting: ORTHOPAEDIC SURGERY
Payer: OTHER GOVERNMENT

## 2022-02-07 ENCOUNTER — ANESTHESIA (OUTPATIENT)
Dept: SURGERY | Facility: HOSPITAL | Age: 57
End: 2022-02-07
Payer: OTHER GOVERNMENT

## 2022-02-07 DIAGNOSIS — M18.11 ARTHRITIS OF CARPOMETACARPAL (CMC) JOINT OF RIGHT THUMB: Primary | ICD-10-CM

## 2022-02-07 PROCEDURE — 36000709 HC OR TIME LEV III EA ADD 15 MIN: Performed by: ORTHOPAEDIC SURGERY

## 2022-02-07 PROCEDURE — 25447 PR REPAIR INTERCARP/CARP-METACARP JT: ICD-10-PCS | Mod: RT,,, | Performed by: ORTHOPAEDIC SURGERY

## 2022-02-07 PROCEDURE — 71000033 HC RECOVERY, INTIAL HOUR: Performed by: ORTHOPAEDIC SURGERY

## 2022-02-07 PROCEDURE — 25000003 PHARM REV CODE 250: Performed by: NURSE ANESTHETIST, CERTIFIED REGISTERED

## 2022-02-07 PROCEDURE — 76942 ECHO GUIDE FOR BIOPSY: CPT | Mod: 26,,, | Performed by: STUDENT IN AN ORGANIZED HEALTH CARE EDUCATION/TRAINING PROGRAM

## 2022-02-07 PROCEDURE — D9220A PRA ANESTHESIA: ICD-10-PCS | Mod: ANES,,, | Performed by: STUDENT IN AN ORGANIZED HEALTH CARE EDUCATION/TRAINING PROGRAM

## 2022-02-07 PROCEDURE — D9220A PRA ANESTHESIA: ICD-10-PCS | Mod: CRNA,,, | Performed by: NURSE ANESTHETIST, CERTIFIED REGISTERED

## 2022-02-07 PROCEDURE — D9220A PRA ANESTHESIA: Mod: CRNA,,, | Performed by: NURSE ANESTHETIST, CERTIFIED REGISTERED

## 2022-02-07 PROCEDURE — 37000009 HC ANESTHESIA EA ADD 15 MINS: Performed by: ORTHOPAEDIC SURGERY

## 2022-02-07 PROCEDURE — 64450 NJX AA&/STRD OTHER PN/BRANCH: CPT | Performed by: ORTHOPAEDIC SURGERY

## 2022-02-07 PROCEDURE — 71000015 HC POSTOP RECOV 1ST HR: Performed by: ORTHOPAEDIC SURGERY

## 2022-02-07 PROCEDURE — 63600175 PHARM REV CODE 636 W HCPCS: Performed by: STUDENT IN AN ORGANIZED HEALTH CARE EDUCATION/TRAINING PROGRAM

## 2022-02-07 PROCEDURE — 63600175 PHARM REV CODE 636 W HCPCS: Performed by: NURSE ANESTHETIST, CERTIFIED REGISTERED

## 2022-02-07 PROCEDURE — 63600175 PHARM REV CODE 636 W HCPCS: Performed by: PHYSICIAN ASSISTANT

## 2022-02-07 PROCEDURE — 37000008 HC ANESTHESIA 1ST 15 MINUTES: Performed by: ORTHOPAEDIC SURGERY

## 2022-02-07 PROCEDURE — 64417 NJX AA&/STRD AX NERVE IMG: CPT | Mod: 59,RT,, | Performed by: STUDENT IN AN ORGANIZED HEALTH CARE EDUCATION/TRAINING PROGRAM

## 2022-02-07 PROCEDURE — 01830 ANES ARTHR/NDSC WRST/HND NOS: CPT | Performed by: ORTHOPAEDIC SURGERY

## 2022-02-07 PROCEDURE — 26055 PR INCISE FINGER TENDON SHEATH: ICD-10-PCS | Mod: 51,F8,, | Performed by: ORTHOPAEDIC SURGERY

## 2022-02-07 PROCEDURE — 25000003 PHARM REV CODE 250: Performed by: ORTHOPAEDIC SURGERY

## 2022-02-07 PROCEDURE — 73120 X-RAY EXAM OF HAND: CPT | Mod: TC,RT

## 2022-02-07 PROCEDURE — C1713 ANCHOR/SCREW BN/BN,TIS/BN: HCPCS | Performed by: ORTHOPAEDIC SURGERY

## 2022-02-07 PROCEDURE — 36000708 HC OR TIME LEV III 1ST 15 MIN: Performed by: ORTHOPAEDIC SURGERY

## 2022-02-07 PROCEDURE — D9220A PRA ANESTHESIA: Mod: ANES,,, | Performed by: STUDENT IN AN ORGANIZED HEALTH CARE EDUCATION/TRAINING PROGRAM

## 2022-02-07 PROCEDURE — 26055 INCISE FINGER TENDON SHEATH: CPT | Mod: 51,F8,, | Performed by: ORTHOPAEDIC SURGERY

## 2022-02-07 PROCEDURE — 64415 NJX AA&/STRD BRCH PLXS IMG: CPT | Performed by: STUDENT IN AN ORGANIZED HEALTH CARE EDUCATION/TRAINING PROGRAM

## 2022-02-07 PROCEDURE — 64417 PR NERVE BLOCK INJ, ANES/STEROID, AXILLARY, INCL IMAG GUIDANCE: ICD-10-PCS | Mod: 59,RT,, | Performed by: STUDENT IN AN ORGANIZED HEALTH CARE EDUCATION/TRAINING PROGRAM

## 2022-02-07 PROCEDURE — 76942 PR U/S GUIDANCE FOR NEEDLE GUIDANCE: ICD-10-PCS | Mod: 26,,, | Performed by: STUDENT IN AN ORGANIZED HEALTH CARE EDUCATION/TRAINING PROGRAM

## 2022-02-07 PROCEDURE — 25447 ARTHRP NTRCRP/CRP/MTCR NTRPS: CPT | Mod: RT,,, | Performed by: ORTHOPAEDIC SURGERY

## 2022-02-07 DEVICE — KIT INTERNALBRACE HAND/WRIST: Type: IMPLANTABLE DEVICE | Site: HAND | Status: FUNCTIONAL

## 2022-02-07 RX ORDER — SODIUM CHLORIDE, SODIUM LACTATE, POTASSIUM CHLORIDE, CALCIUM CHLORIDE 600; 310; 30; 20 MG/100ML; MG/100ML; MG/100ML; MG/100ML
INJECTION, SOLUTION INTRAVENOUS CONTINUOUS
Status: DISCONTINUED | OUTPATIENT
Start: 2022-02-07 | End: 2022-02-07 | Stop reason: HOSPADM

## 2022-02-07 RX ORDER — PROPOFOL 10 MG/ML
VIAL (ML) INTRAVENOUS CONTINUOUS PRN
Status: DISCONTINUED | OUTPATIENT
Start: 2022-02-07 | End: 2022-02-07

## 2022-02-07 RX ORDER — OXYCODONE AND ACETAMINOPHEN 10; 325 MG/1; MG/1
1 TABLET ORAL 4 TIMES DAILY PRN
Qty: 28 TABLET | Refills: 0 | Status: SHIPPED | OUTPATIENT
Start: 2022-02-07 | End: 2022-03-23

## 2022-02-07 RX ORDER — DEXMEDETOMIDINE HYDROCHLORIDE 100 UG/ML
INJECTION, SOLUTION INTRAVENOUS
Status: DISCONTINUED | OUTPATIENT
Start: 2022-02-07 | End: 2022-02-07

## 2022-02-07 RX ORDER — HYDROCODONE BITARTRATE AND ACETAMINOPHEN 5; 325 MG/1; MG/1
1 TABLET ORAL EVERY 4 HOURS PRN
Status: DISCONTINUED | OUTPATIENT
Start: 2022-02-07 | End: 2022-02-07 | Stop reason: HOSPADM

## 2022-02-07 RX ORDER — ROPIVACAINE HYDROCHLORIDE 5 MG/ML
INJECTION, SOLUTION EPIDURAL; INFILTRATION; PERINEURAL
Status: COMPLETED | OUTPATIENT
Start: 2022-02-07 | End: 2022-02-07

## 2022-02-07 RX ORDER — SODIUM CHLORIDE 0.9 % (FLUSH) 0.9 %
10 SYRINGE (ML) INJECTION
Status: DISCONTINUED | OUTPATIENT
Start: 2022-02-07 | End: 2022-02-07 | Stop reason: HOSPADM

## 2022-02-07 RX ORDER — CEFAZOLIN SODIUM 2 G/50ML
2 SOLUTION INTRAVENOUS
Status: COMPLETED | OUTPATIENT
Start: 2022-02-07 | End: 2022-02-07

## 2022-02-07 RX ORDER — ONDANSETRON 2 MG/ML
4 INJECTION INTRAMUSCULAR; INTRAVENOUS DAILY PRN
Status: COMPLETED | OUTPATIENT
Start: 2022-02-07 | End: 2022-02-07

## 2022-02-07 RX ORDER — OXYCODONE AND ACETAMINOPHEN 10; 325 MG/1; MG/1
1 TABLET ORAL EVERY 6 HOURS PRN
Qty: 28 TABLET | Refills: 0 | Status: SHIPPED | OUTPATIENT
Start: 2022-02-07 | End: 2022-03-23

## 2022-02-07 RX ORDER — DEXAMETHASONE SODIUM PHOSPHATE 4 MG/ML
INJECTION, SOLUTION INTRA-ARTICULAR; INTRALESIONAL; INTRAMUSCULAR; INTRAVENOUS; SOFT TISSUE
Status: DISCONTINUED | OUTPATIENT
Start: 2022-02-07 | End: 2022-02-07

## 2022-02-07 RX ORDER — LIDOCAINE HYDROCHLORIDE 10 MG/ML
1 INJECTION, SOLUTION EPIDURAL; INFILTRATION; INTRACAUDAL; PERINEURAL ONCE
Status: DISCONTINUED | OUTPATIENT
Start: 2022-02-07 | End: 2022-02-07 | Stop reason: HOSPADM

## 2022-02-07 RX ORDER — PROPOFOL 10 MG/ML
VIAL (ML) INTRAVENOUS
Status: DISCONTINUED | OUTPATIENT
Start: 2022-02-07 | End: 2022-02-07

## 2022-02-07 RX ORDER — FENTANYL CITRATE 50 UG/ML
25 INJECTION, SOLUTION INTRAMUSCULAR; INTRAVENOUS EVERY 5 MIN PRN
Status: DISCONTINUED | OUTPATIENT
Start: 2022-02-07 | End: 2022-02-07 | Stop reason: HOSPADM

## 2022-02-07 RX ORDER — FENTANYL CITRATE 50 UG/ML
INJECTION, SOLUTION INTRAMUSCULAR; INTRAVENOUS
Status: DISCONTINUED | OUTPATIENT
Start: 2022-02-07 | End: 2022-02-07

## 2022-02-07 RX ORDER — MIDAZOLAM HYDROCHLORIDE 1 MG/ML
INJECTION INTRAMUSCULAR; INTRAVENOUS
Status: DISCONTINUED | OUTPATIENT
Start: 2022-02-07 | End: 2022-02-07

## 2022-02-07 RX ORDER — LIDOCAINE HYDROCHLORIDE 20 MG/ML
INJECTION, SOLUTION EPIDURAL; INFILTRATION; INTRACAUDAL; PERINEURAL
Status: DISCONTINUED | OUTPATIENT
Start: 2022-02-07 | End: 2022-02-07

## 2022-02-07 RX ORDER — BUPIVACAINE HYDROCHLORIDE 2.5 MG/ML
INJECTION, SOLUTION EPIDURAL; INFILTRATION; INTRACAUDAL
Status: DISCONTINUED
Start: 2022-02-07 | End: 2022-02-07 | Stop reason: WASHOUT

## 2022-02-07 RX ORDER — LIDOCAINE HYDROCHLORIDE 20 MG/ML
INJECTION, SOLUTION INFILTRATION; PERINEURAL
Status: DISCONTINUED
Start: 2022-02-07 | End: 2022-02-07 | Stop reason: WASHOUT

## 2022-02-07 RX ORDER — SODIUM CHLORIDE, SODIUM LACTATE, POTASSIUM CHLORIDE, CALCIUM CHLORIDE 600; 310; 30; 20 MG/100ML; MG/100ML; MG/100ML; MG/100ML
INJECTION, SOLUTION INTRAVENOUS
Status: DISCONTINUED | OUTPATIENT
Start: 2022-02-07 | End: 2022-02-07 | Stop reason: HOSPADM

## 2022-02-07 RX ORDER — CHLORHEXIDINE GLUCONATE ORAL RINSE 1.2 MG/ML
10 SOLUTION DENTAL 2 TIMES DAILY
Status: DISCONTINUED | OUTPATIENT
Start: 2022-02-07 | End: 2022-02-07 | Stop reason: HOSPADM

## 2022-02-07 RX ORDER — ONDANSETRON 2 MG/ML
INJECTION INTRAMUSCULAR; INTRAVENOUS
Status: DISCONTINUED | OUTPATIENT
Start: 2022-02-07 | End: 2022-02-07

## 2022-02-07 RX ADMIN — DEXMEDETOMIDINE HYDROCHLORIDE 8 MCG: 100 INJECTION, SOLUTION INTRAVENOUS at 10:02

## 2022-02-07 RX ADMIN — FENTANYL CITRATE 25 MCG: 0.05 INJECTION, SOLUTION INTRAMUSCULAR; INTRAVENOUS at 10:02

## 2022-02-07 RX ADMIN — ONDANSETRON 4 MG: 2 INJECTION INTRAMUSCULAR; INTRAVENOUS at 10:02

## 2022-02-07 RX ADMIN — PROPOFOL 40 MG: 10 INJECTION, EMULSION INTRAVENOUS at 09:02

## 2022-02-07 RX ADMIN — PROPOFOL 20 MG: 10 INJECTION, EMULSION INTRAVENOUS at 09:02

## 2022-02-07 RX ADMIN — ROPIVACAINE HYDROCHLORIDE 20 ML: 5 INJECTION, SOLUTION EPIDURAL; INFILTRATION; PERINEURAL at 09:02

## 2022-02-07 RX ADMIN — PROPOFOL 50 MG: 10 INJECTION, EMULSION INTRAVENOUS at 09:02

## 2022-02-07 RX ADMIN — FENTANYL CITRATE 100 MCG: 50 INJECTION, SOLUTION INTRAMUSCULAR; INTRAVENOUS at 08:02

## 2022-02-07 RX ADMIN — FENTANYL CITRATE 25 MCG: 50 INJECTION, SOLUTION INTRAMUSCULAR; INTRAVENOUS at 09:02

## 2022-02-07 RX ADMIN — PROPOFOL 30 MG: 10 INJECTION, EMULSION INTRAVENOUS at 09:02

## 2022-02-07 RX ADMIN — MIDAZOLAM HYDROCHLORIDE 2 MG: 1 INJECTION INTRAMUSCULAR; INTRAVENOUS at 08:02

## 2022-02-07 RX ADMIN — SODIUM CHLORIDE, SODIUM LACTATE, POTASSIUM CHLORIDE, AND CALCIUM CHLORIDE: 600; 310; 30; 20 INJECTION, SOLUTION INTRAVENOUS at 07:02

## 2022-02-07 RX ADMIN — DEXMEDETOMIDINE HYDROCHLORIDE 4 MCG: 100 INJECTION, SOLUTION INTRAVENOUS at 10:02

## 2022-02-07 RX ADMIN — PROPOFOL 150 MG: 10 INJECTION, EMULSION INTRAVENOUS at 09:02

## 2022-02-07 RX ADMIN — SODIUM CHLORIDE, SODIUM LACTATE, POTASSIUM CHLORIDE, AND CALCIUM CHLORIDE: 600; 310; 30; 20 INJECTION, SOLUTION INTRAVENOUS at 10:02

## 2022-02-07 RX ADMIN — DEXAMETHASONE SODIUM PHOSPHATE 8 MG: 4 INJECTION, SOLUTION INTRA-ARTICULAR; INTRALESIONAL; INTRAMUSCULAR; INTRAVENOUS; SOFT TISSUE at 09:02

## 2022-02-07 RX ADMIN — FENTANYL CITRATE 50 MCG: 50 INJECTION, SOLUTION INTRAMUSCULAR; INTRAVENOUS at 09:02

## 2022-02-07 RX ADMIN — LIDOCAINE HYDROCHLORIDE 80 MG: 20 INJECTION, SOLUTION EPIDURAL; INFILTRATION; INTRACAUDAL; PERINEURAL at 09:02

## 2022-02-07 RX ADMIN — PROPOFOL 70 MCG/KG/MIN: 10 INJECTION, EMULSION INTRAVENOUS at 09:02

## 2022-02-07 RX ADMIN — CEFAZOLIN SODIUM 2 G: 2 SOLUTION INTRAVENOUS at 09:02

## 2022-02-07 RX ADMIN — HYDROCODONE BITARTRATE AND ACETAMINOPHEN 1 TABLET: 5; 325 TABLET ORAL at 11:02

## 2022-02-07 RX ADMIN — ONDANSETRON 4 MG: 2 INJECTION, SOLUTION INTRAMUSCULAR; INTRAVENOUS at 09:02

## 2022-02-07 NOTE — ANESTHESIA PROCEDURE NOTES
Axillary Single Injection    Patient location during procedure: pre-op   Block not for primary anesthetic.  Reason for block: at surgeon's request and post-op pain management   Post-op Pain Location: right arm  Start time: 2/7/2022 8:57 AM  Timeout: 2/7/2022 8:52 AM   End time: 2/7/2022 9:03 AM    Staffing  Authorizing Provider: Devorah Johansen MD  Performing Provider: Devorah Johansen MD    Preanesthetic Checklist  Completed: patient identified, IV checked, site marked, risks and benefits discussed, surgical consent, monitors and equipment checked, pre-op evaluation and timeout performed  Peripheral Block  Patient position: supine  Prep: ChloraPrep  Patient monitoring: heart rate, cardiac monitor, continuous pulse ox, continuous capnometry and frequent blood pressure checks  Block type: axillary  Laterality: right  Injection technique: single shot  Needle  Needle type: Stimuplex   Needle gauge: 21 G  Needle length: 4 in  Needle localization: anatomical landmarks and ultrasound guidance   -ultrasound image captured on disc.  Assessment  Injection assessment: negative aspiration and negative parasthesia  Paresthesia pain: none  Heart rate change: no  Slow fractionated injection: yes  Pain Tolerance: no complaints and comfortable throughout block  Medications:  Medication Administration Time: 2/7/2022 9:03 AM  Medications: ropivacaine (NAROPIN) injection 0.5%, 20 mL    Medications:  Bolus administered: 30 mL of 0.5 ropivacaine  Epinephrine added: 3.75 mcg/mL (1/300,000)  Additional Notes  VSS.  [facility] RN monitoring vitals throughout procedure.  Patient tolerated procedure well.

## 2022-02-07 NOTE — H&P
Subjective:     Patient ID: Linette Galo is a 56 y.o. female.    Chief Complaint: No chief complaint on file.      HPI:  The patient is a 56-year-old female with bilateral thumb basal joint degenerative joint disease right greater than left and right ring trigger finger last injected 08/18/2021 with good relief until recently.  She requests reinjection.    Past Medical History:   Diagnosis Date    ADD (attention deficit disorder)     Anemia     Anxiety     Followed by Psychology    Depression     bipolar, anxiety, ADD    Family history of colon cancer 1/22/2019    Her grandmother had colon cancer.    Positive ARIK (antinuclear antibody)     Dr. Ross//following     Past Surgical History:   Procedure Laterality Date    CARPAL TUNNEL RELEASE      both hands    COLONOSCOPY N/A 1/22/2019    Procedure: COLONOSCOPY;  Surgeon: Moe Jimenez MD;  Location: [facility];  Service: Endoscopy;  Laterality: N/A;    GASTRIC BYPASS  2008    HYSTERECTOMY  2011    RALH (retains ovaries)    TONSILLECTOMY       Family History   Problem Relation Age of Onset    Breast cancer Maternal Grandmother     Colon cancer Maternal Grandmother     Hypertension Maternal Grandmother     Diabetes Maternal Grandmother     Thrombophilia Father     Parkinsonism Father     Lumbar disc disease Father     Stroke Father     Heart disease Father     Thrombophilia Sister     Hypertension Mother     Diabetes Mother     Hypertension Paternal Grandmother     Diabetes Paternal Grandmother      Social History     Socioeconomic History    Marital status:    Tobacco Use    Smoking status: Never Smoker    Smokeless tobacco: Never Used   Substance and Sexual Activity    Alcohol use: Not Currently     Comment: socially    Drug use: No    Sexual activity: Yes     Partners: Male     Birth control/protection: Surgical     Comment: mut monog     Social Determinants of Health     Financial Resource Strain: Low Risk      Difficulty of Paying Living Expenses: Not hard at all   Food Insecurity: No Food Insecurity    Worried About Running Out of Food in the Last Year: Never true    Ran Out of Food in the Last Year: Never true   Transportation Needs: No Transportation Needs    Lack of Transportation (Medical): No    Lack of Transportation (Non-Medical): No   Physical Activity: Unknown    Days of Exercise per Week: 2 days   Stress: Stress Concern Present    Feeling of Stress : Rather much   Social Connections: Unknown    Frequency of Communication with Friends and Family: More than three times a week    Frequency of Social Gatherings with Friends and Family: Once a week    Active Member of Clubs or Organizations: No    Attends Club or Organization Meetings: Never    Marital Status:      Current Discharge Medication List      CONTINUE these medications which have NOT CHANGED    Details   cholecalciferol, vitamin D3, (VITAMIN D3) 1,000 unit capsule Take 1 capsule (1,000 Units total) by mouth once daily.  Qty: 100 capsule, Refills: 12    Associated Diagnoses: Complications of gastric bypass surgery      cyanocobalamin, vitamin B-12, 50 mcg tablet Take 50 mcg by mouth once daily.      DULoxetine (CYMBALTA) 20 MG capsule Take 60 mg by mouth once daily.      lamotrigine (LAMICTAL) 100 MG tablet Take by mouth once daily.      multivitamin capsule Take 1 capsule by mouth once daily.      VYVANSE 70 mg capsule Take 70 mg by mouth every morning.   Refills: 0           Review of patient's allergies indicates:  No Known Allergies  Review of Systems   Constitutional: Negative for malaise/fatigue.   HENT: Negative for hearing loss.    Eyes: Negative for double vision and visual disturbance.   Cardiovascular: Negative for chest pain.   Respiratory: Negative for shortness of breath.    Endocrine: Negative for cold intolerance.   Hematologic/Lymphatic: Does not bruise/bleed easily.   Skin: Negative for poor wound healing and suspicious  lesions.   Musculoskeletal: Positive for arthritis, joint pain and joint swelling. Negative for gout.   Gastrointestinal: Negative for nausea and vomiting.   Genitourinary: Negative for dysuria.   Neurological: Negative for numbness, paresthesias and sensory change.   Psychiatric/Behavioral: Positive for depression. Negative for memory loss and substance abuse. The patient is nervous/anxious.    Allergic/Immunologic: Negative for persistent infections.       Objective:   Body mass index is 41.46 kg/m².  Vitals:    02/07/22 0651   BP: 137/83   Pulse: 89   Resp: 18   Temp: 97.8 °F (36.6 °C)                General    Constitutional: She is oriented to person, place, and time. She appears well-developed and well-nourished. No distress.   HENT:   Head: Normocephalic.   Mouth/Throat: Oropharynx is clear and moist.   Eyes: EOM are normal.   Cardiovascular: Normal rate.    Pulmonary/Chest: Effort normal.   Abdominal: Soft.   Neurological: She is alert and oriented to person, place, and time. No cranial nerve deficit.   Psychiatric: She has a normal mood and affect.             Right Hand/Wrist Exam     Inspection   Scars: Wrist - absent Hand -  absent  Effusion: Wrist - absent Hand -  absent    Pain   Hand - The patient exhibits pain of the ring MCP.  Wrist - The patient exhibits pain of the CMC.    Other     Neuorologic Exam    Median Distribution: normal  Ulnar Distribution: normal  Radial Distribution: normal    Comments:  The patient has tenderness right thumb basal joint with a positive axial circumduction grind test.  There are no motor or sensory deficits.  She also has active triggering right ring finger with a palpable nodule that moves with tendon excursion.      Left Hand/Wrist Exam     Inspection   Scars: Wrist - absent Hand -  absent  Effusion: Wrist - absent Hand -  absent    Pain   Wrist - The patient exhibits pain of the CMC.    Other     Sensory Exam  Median Distribution: normal  Ulnar Distribution:  normal  Radial Distribution: normal    Comments:  The patient has tenderness left thumb basal joint with a positive axial circumduction grind test.  There are no motor or sensory deficits.          Vascular Exam       Capillary Refill  Right Hand: normal capillary refill  Left Hand: normal capillary refill      Relevant imaging results reviewed and interpreted by me, discussed with the patient and / or family today radiographs right hand showed osteoarthritic change basal joint with complete subluxation of the 1st metacarpal trapezial joint.  This is progressed over the last 2 years.  Radiographs of the left hand showed basal joint arthritis.  Assessment:     Encounter Diagnoses   Name Primary?    Arthritis of carpometacarpal (CMC) joint of both thumbs Yes    Trigger finger, acquired         Plan:       The patient injected in both thumb basal joints each with 0.5 cc of Kenalog and 0.5 cc of 2% plain lidocaine.  She was injected in the right ring trigger finger with 0.5 cc of Kenalog and 0.5 cc of 2% plain lidocaine.  She wishes to proceed with a right thumb basal joint arthroplasty and right ring trigger finger release after the 1st of the year.  She was counseled regarding the surgery.  Risk complications and alternatives were discussed including the risk of infection, anesthetic risk, injury to nerves and vessels, loss of motion, and possible need for additional surgeries were discussed.  She seems to understand and agree that surgery.  All questions were answered.              Disclaimer: This note was prepared using a voice recognition system and is likely to have sound alike errors within the text.

## 2022-02-07 NOTE — ANESTHESIA PROCEDURE NOTES
Intubation    Date/Time: 2/7/2022 9:40 AM  Performed by: Mercy Suresh CRNA  Authorized by: Devorah Johansen MD     Intubation:     Induction:  Intravenous    Intubated:  Postinduction    Mask Ventilation:  N/a    Attempts:  1    Attempted By:  CRNA    Difficult Airway Encountered?: No      Complications:  None    Airway Device:  Supraglottic airway/LMA    Airway Device Size:  3.0    Style/Cuff Inflation:  Cuffed    Inflation Amount (mL):  0    Secured at:  The lips    Placement Verified By:  Capnometry    Complicating Factors:  None    Findings Post-Intubation:  BS equal bilateral and atraumatic/condition of teeth unchanged

## 2022-02-07 NOTE — TRANSFER OF CARE
"Anesthesia Transfer of Care Note    Patient: Linette Galo    Procedure(s) Performed: Procedure(s) (LRB):  RELEASE, TRIGGER FINGER (Right)  INTERPOSITION ARTHROPLASTY, CMC JOINT (Right)    Patient location: PACU    Anesthesia Type: general and regional    Transport from OR: Transported from OR on room air with adequate spontaneous ventilation    Post pain: adequate analgesia    Post assessment: no apparent anesthetic complications    Post vital signs: stable    Level of consciousness: awake    Nausea/Vomiting: no nausea/vomiting    Complications: none    Transfer of care protocol was followed      Last vitals:   Visit Vitals  BP (!) 143/70 (BP Location: Left arm, Patient Position: Lying)   Pulse 97   Temp 36.6 °C (97.8 °F) (Temporal)   Resp 14   Ht 5' 4" (1.626 m)   Wt 109.5 kg (241 lb 8.2 oz)   SpO2 100%   Breastfeeding No   BMI 41.46 kg/m²     "

## 2022-02-07 NOTE — ANESTHESIA POSTPROCEDURE EVALUATION
Anesthesia Post Evaluation    Patient: Linette Galo    Procedure(s) Performed: Procedure(s) (LRB):  RELEASE, TRIGGER FINGER (Right)  INTERPOSITION ARTHROPLASTY, CMC JOINT (Right)    Final Anesthesia Type: general      Patient location during evaluation: PACU  Patient participation: Yes- Able to Participate  Level of consciousness: awake and alert and oriented  Post-procedure vital signs: reviewed and stable  Pain management: adequate  Airway patency: patent    PONV status at discharge: No PONV  Anesthetic complications: no      Cardiovascular status: blood pressure returned to baseline and stable  Respiratory status: unassisted, spontaneous ventilation and room air  Hydration status: euvolemic  Follow-up not needed.          Vitals Value Taken Time   /82 02/07/22 1115   Temp 36.7 02/07/22 1140   Pulse 84 02/07/22 1115   Resp 20 02/07/22 1115   SpO2 97 % 02/07/22 1115         Event Time   Out of Recovery 10:45:00         Pain/Christine Score: Pain Rating Prior to Med Admin: 6 (2/7/2022 11:08 AM)  Pain Rating Post Med Admin: 6 (2/7/2022 11:15 AM)  Christine Score: 10 (2/7/2022 11:15 AM)

## 2022-02-07 NOTE — DISCHARGE INSTRUCTIONS
Nozin Instructions  Goal: the goal of Nozin is to reduce the risk of post-procedural infections by bacteria in the nasal cavity. Think of it as hand  for your nose.    How to use:    1. Shake Nozin bottle well    2. Take a cotton swab and apply 4 drops to one tip    3. Insert cotton tip into one nostril, being sure not to go deeper into nose than tip of the swab.    4. Swab nostril 6 times counterclockwise and 6 times clockwise. Make sure to swab the inside front pocket of the nostril.    5. Take swab out and apply 2 drops to the same cotton tip. Repeat steps 3 and 4 in the other nostril.        Do steps 1-5 twice a day for 7 days.        After Hand Surgery  After surgery, the better you take care of yourself--especially your hand--the sooner it will heal. Follow your surgeons instructions. Try not to bump your hand, and dont move or lift anything while youre still wearing bandages, a splint, or a cast.  Care for your hand    · Keep your hand elevated above heart level as much as possible for the first several days after surgery. This helps reduce swelling and pain.  · To help prevent infection and speed healing, take care not to get your cast or bandages wet.  Relieve pain as directed  Your surgeon may prescribe pain medicine or suggest you take an anti-inflammatory medicine. You might also be instructed to apply ice (or another cold source) to your hand. If you use ice cubes, put them in a plastic bag and rest it on top of your bandages. Leave the cold source on your hand for as long as its comfortable. Do this several times a day for the first few days after surgery. It may take several minutes before you can feel the cold through the cast or bandages.  Follow up with your surgeon  During a follow-up visit after surgery, your surgeon will check your progress. The stitches, bandages, splint, or cast may be removed. A new cast or splint may be placed. If your hand has healed enough, your surgeon may  prescribe exercises.  Do prescribed hand exercises  Your surgeon may recommend that you do exercises. These may be done under the guidance of a physical or occupational therapist. The exercises strengthen your hand, help you regain flexibility, and restore proper function. Do the exercises as advised.  Call your surgeon if you have...  · A fever higher than 100.4°F (38.0°C) taken by mouth  · Side effects from your medicine, such as prolonged nausea  · A wet or loose dressing, or a dressing that is too tight  · Excessive bleeding  · Increased, ongoing pain or numbness  · Signs of infection (such as drainage, warmth, or redness) at the incision site   Date Last Reviewed: 11/11/2015  © 5118-2292 [employer]. [address] [place] [address], [place] [address]. All rights reserved. This information is not intended as a substitute for professional medical care. Always follow your healthcare professional's instructions.

## 2022-02-07 NOTE — DISCHARGE SUMMARY
[facility]  Discharge Note  Short Stay    Procedure(s) (LRB):  RELEASE, TRIGGER FINGER (Right) ring trigger finger  ARTHROPLASTY, CMC JOINT (Right) thumb using Arthrex internal brace system    OUTCOME: Patient tolerated treatment/procedure well without complication and is now ready for discharge.    DISPOSITION: Home or Self Care    FINAL DIAGNOSIS:  Right thumb basal joint arthritis  Right ring trigger finger    FOLLOWUP: In clinic    DISCHARGE INSTRUCTIONS:    Discharge Procedure Orders   Diet general     Call MD for:  temperature >100.4     Call MD for:  persistent nausea and vomiting     Call MD for:  severe uncontrolled pain     Call MD for:  difficulty breathing, headache or visual disturbances     Call MD for:  redness, tenderness, or signs of infection (pain, swelling, redness, odor or green/yellow discharge around incision site)     Call MD for:  hives     Call MD for:  persistent dizziness or light-headedness     Call MD for:  extreme fatigue        TIME SPENT ON DISCHARGE:  20 minutes

## 2022-02-07 NOTE — OP NOTE
[facility]  Orthopedic Surgery  Operative Note    SUMMARY     Date of Procedure: 2/7/2022   Assistant: None    Procedure: Procedure(s) (LRB):  RELEASE, TRIGGER FINGER (Right) ring trigger finger   ARTHROPLASTY, CMC JOINT (Right)   thumb using Arthrex internal brace    Surgeon(s) and Role:     * Sterling Jade MD - Primary    Assisting Surgeon: None    Pre-Operative Diagnosis: Arthritis of carpometacarpal (CMC) joint right thumb  Trigger finger, acquired [M65.30] right ring finger    Post-Operative Diagnosis: Post-Op Diagnosis Codes:     * Arthritis of carpometacarpal (CMC) joint right thumb     * Trigger finger, acquired [M65.30] right ring trigger finger    Anesthesia:  General and regional    Technical Procedures Used:  right thumb basal joint arthroplasty  Right ring trigger finger release    Description of the Findings of the Procedure:  The patient was taken to the operating room where general anesthesia was administered.  Satisfactory anesthesia had been achieved the right  arm was pre prepped and draped in usual sterile fashion.  Regional anesthesia had been administered in the holding area by the anesthesia service.  After exsanguination of the extremity a proximal tourniquet was inflated to 250 mm of mercury after the patient received 2 g of Ancef intravenously preoperatively.  At this time a longitudinal oblique incision was made over the trapezium.  Cutaneous neurovascular structures were identified and carefully retracted the capsule was opened.  The trapezium was removed with the McGlamry elevator.  Intraoperative images showed satisfactory resection of the trapezium.  At this time using the Arthrex internal brace system a guidewire was placed in the base of the 2nd metacarpal.  This is judged to be in satisfactory position on the image intensifier.  This was reamed with the stop guide.  A guidewire was placed in the base of the thumb metacarpal and also reamed with the same  guide.  A using the Arthrex internal brace system a FiberTape was placed in the index metacarpal head and the screw was deployed.  A 2nd SwiveLock suture anchor was used and the fork was placed with the FiberTape and deployed into the hole drilled in the thumb metacarpal.  The SwiveLock suture anchor was deployed.  Good fixation was achieved the FiberTape was cut off.  Closure of the capsule was performed using 3 0 Vicryl suture..  Subcutaneous tissue was closed using 3 O Vicryl suture.  Skin was closed using horizontal mattress 4 0 nylon suture.  At this time attention was directed to the ring finger trigger finger.  An oblique incision was made over the A1 pulley right ring finger.  The radial and ulnar neurovascular bundles were identified and carefully retracted.  The A1 pulley was sharply incised under direct vision.  Satisfactory release had been achieved skin was closed using horizontal mattress 4-0 nylon suture.  Xeroform gauze 4 by 4s and 2 ABD pads were overwrapped with a 3 in gauze dressing.  A sling was applied.  The patient tolerated the procedure well and was transferred to the recovery room in satisfactory condition.      Complications: No    Estimated Blood Loss (EBL): 5cc             Condition: Good    Disposition: PACU - hemodynamically stable.    Attestation: I was present and scrubbed for the entire procedure.

## 2022-02-07 NOTE — ANESTHESIA PREPROCEDURE EVALUATION
02/07/2022    Pre-operative evaluation for RELEASE, TRIGGER FINGER (Right), INTERPOSITION ARTHROPLASTY, CMC JOINT (Right)    Linette Galo is a 56 y.o. female     Past Medical History:   Diagnosis Date    ADD (attention deficit disorder)     Anemia     Anxiety     Followed by Psychology    Depression     bipolar, anxiety, ADD    Family history of colon cancer 1/22/2019    Her grandmother had colon cancer.    Positive ARIK (antinuclear antibody)     Dr. Ross//following       Past Surgical History:   Procedure Laterality Date    CARPAL TUNNEL RELEASE      both hands    COLONOSCOPY N/A 1/22/2019    Procedure: COLONOSCOPY;  Surgeon: Moe Jimenez MD;  Location: [facility];  Service: Endoscopy;  Laterality: N/A;    GASTRIC BYPASS  2008    HYSTERECTOMY  2011    RALH (retains ovaries)    TONSILLECTOMY         Vital Signs Range (Last 24H):  Temp:  [36.6 °C (97.8 °F)]   Pulse:  [88-99]   Resp:  [12-21]   BP: (130-158)/(59-83)   SpO2:  [99 %-100 %]       CBC:     Recent Labs   Lab 02/04/22  0847   WBC 5.51   RBC 4.61   HGB 13.7   HCT 43.5      MCV 94   MCH 29.7   MCHC 31.5*       CMP:   Recent Labs   Lab 02/04/22  0847      K 4.2      CO2 24   BUN 14   CREATININE 0.8      CALCIUM 9.5   ALBUMIN 4.1   PROT 7.1   ALKPHOS 84   ALT 22   AST 17   BILITOT 0.4       INR:  No results for input(s): PT, INR, PROTIME, APTT in the last 720 hours.        Anesthesia Evaluation    I have reviewed the Patient Summary Reports.    I have reviewed the Nursing Notes. I have reviewed the NPO Status.   I have reviewed the Medications.     Review of Systems  Anesthesia Hx:  No problems with previous Anesthesia  History of prior surgery of interest to airway management or planning: Denies Family Hx of Anesthesia complications.    Social:  Non-Smoker    Hematology/Oncology:         -- Anemia:    Cardiovascular:   ECG has been reviewed.    Psych:   Psychiatric History anxiety depression          Physical Exam  General:  Obesity    Airway/Jaw/Neck:  Airway Findings: Mouth Opening: Normal Tongue: Normal  General Airway Assessment: Adult       Chest/Lungs:  Chest/Lungs Findings: Clear to auscultation     Heart/Vascular:  Heart Findings: Rate: Normal        Mental Status:  Mental Status Findings:  Alert and Oriented, Cooperative         Anesthesia Plan  Type of Anesthesia, risks & benefits discussed:  Anesthesia Type:  general, regional    Patient's Preference:   Plan Factors:          Intra-op Monitoring Plan: standard ASA monitors  Intra-op Monitoring Plan Comments:   Post Op Pain Control Plan: per primary service following discharge from PACU, multimodal analgesia and peripheral nerve block  Post Op Pain Control Plan Comments:     Induction:   IV  Beta Blocker:  Patient is not currently on a Beta-Blocker (No further documentation required).       Informed Consent: Patient understands risks and agrees with Anesthesia plan.  Questions answered. Anesthesia consent signed with patient.  ASA Score: 2     Day of Surgery Review of History & Physical:    H&P update referred to the surgeon.         Ready For Surgery From Anesthesia Perspective.

## 2022-02-07 NOTE — PLAN OF CARE
Right axillary block completed per Dr. Johansen at bedside. Patient tolerated well. VSS. Continuous cardiac and SPO2 monitoring in place.

## 2022-02-07 NOTE — PLAN OF CARE
Pt and pt family request d/c prescription be sent to [facility].  Pharmacy updated in [facility].  Secure chat sent to Dr. Jade for notification.

## 2022-02-11 ENCOUNTER — OFFICE VISIT (OUTPATIENT)
Dept: ORTHOPEDICS | Facility: CLINIC | Age: 57
End: 2022-02-11
Payer: OTHER GOVERNMENT

## 2022-02-11 VITALS
SYSTOLIC BLOOD PRESSURE: 142 MMHG | HEIGHT: 64 IN | HEART RATE: 91 BPM | WEIGHT: 241 LBS | BODY MASS INDEX: 41.15 KG/M2 | DIASTOLIC BLOOD PRESSURE: 90 MMHG

## 2022-02-11 DIAGNOSIS — M18.0 ARTHRITIS OF CARPOMETACARPAL (CMC) JOINT OF BOTH THUMBS: Primary | ICD-10-CM

## 2022-02-11 DIAGNOSIS — M65.30 TRIGGER FINGER, ACQUIRED: ICD-10-CM

## 2022-02-11 PROCEDURE — 99024 POSTOP FOLLOW-UP VISIT: CPT | Mod: ,,, | Performed by: PHYSICIAN ASSISTANT

## 2022-02-11 PROCEDURE — 99999 PR PBB SHADOW E&M-EST. PATIENT-LVL III: ICD-10-PCS | Mod: PBBFAC,,, | Performed by: PHYSICIAN ASSISTANT

## 2022-02-11 PROCEDURE — 99999 PR PBB SHADOW E&M-EST. PATIENT-LVL III: CPT | Mod: PBBFAC,,, | Performed by: PHYSICIAN ASSISTANT

## 2022-02-11 PROCEDURE — 99024 PR POST-OP FOLLOW-UP VISIT: ICD-10-PCS | Mod: ,,, | Performed by: PHYSICIAN ASSISTANT

## 2022-02-11 PROCEDURE — 99213 OFFICE O/P EST LOW 20 MIN: CPT | Mod: PBBFAC | Performed by: PHYSICIAN ASSISTANT

## 2022-02-11 RX ORDER — BUPROPION HYDROCHLORIDE 200 MG/1
TABLET, EXTENDED RELEASE ORAL
COMMUNITY
Start: 2021-11-15 | End: 2023-08-04

## 2022-02-11 NOTE — PROGRESS NOTES
Patient ID: Linette Galo is a 56 y.o. female.    Chief Complaint: Post-op Evaluation and Pain of the Right Hand      HPI: Linette Galo  is a 56 y.o. female who c/o Post-op Evaluation and Pain of the Right Hand     Post op visit 1  Pain is at worse a 2/10  Patient is doing well for the most part although is very hesitant to move in range her fingers status post surgery  She is not taking any of the pain medication but is taking over-the-counter Tylenol and ibuprofen as needed  Pain is increased with use and activity although patient is very hesitant to use the hand which we discussed in detail gentle ranges of motion and working of the digits  Pain is relieved with rest we discussed elevation/ice, as well as medication  She has been fully compliant with postop instructions and keeping the extremity dry    Patient is presently denying any shortness of breath, chest pain, fever/chills, nausea/vomiting, loss of taste or smell, numbness/tingling or sensation changes, loss of bladder or bowel function.      Procedure: Right basal joint arthroplasty and trigger finger release  DOS:  02/07/2022  Past Medical History:   Diagnosis Date    ADD (attention deficit disorder)     Anemia     Anxiety     Followed by Psychology    Depression     bipolar, anxiety, ADD    Family history of colon cancer 1/22/2019    Her grandmother had colon cancer.    Positive ARIK (antinuclear antibody)     Dr. Ross//following     Past Surgical History:   Procedure Laterality Date    CARPAL TUNNEL RELEASE      both hands    COLONOSCOPY N/A 1/22/2019    Procedure: COLONOSCOPY;  Surgeon: Moe Jimenez MD;  Location: [facility];  Service: Endoscopy;  Laterality: N/A;    GASTRIC BYPASS  2008    HYSTERECTOMY  2011    RAL (retains ovaries)    INTERPOSITION ARTHROPLASTY OF CARPOMETACARPAL JOINTS Right 2/7/2022    Procedure: INTERPOSITION ARTHROPLASTY, CMC JOINT;  Surgeon: Sterling Jade MD;  Location: [facility] OR;   Service: Orthopedics;  Laterality: Right;  right thumb basal joint arthroplasty     ARTHREX - LATRICE AWARE    TONSILLECTOMY      TRIGGER FINGER RELEASE Right 2/7/2022    Procedure: RELEASE, TRIGGER FINGER;  Surgeon: Sterling Jade MD;  Location: [facility];  Service: Orthopedics;  Laterality: Right;   right ring trigger finger release      Family History   Problem Relation Age of Onset    Breast cancer Maternal Grandmother     Colon cancer Maternal Grandmother     Hypertension Maternal Grandmother     Diabetes Maternal Grandmother     Thrombophilia Father     Parkinsonism Father     Lumbar disc disease Father     Stroke Father     Heart disease Father     Thrombophilia Sister     Hypertension Mother     Diabetes Mother     Hypertension Paternal Grandmother     Diabetes Paternal Grandmother      Social History     Socioeconomic History    Marital status:    Tobacco Use    Smoking status: Never Smoker    Smokeless tobacco: Never Used   Substance and Sexual Activity    Alcohol use: Not Currently     Comment: socially    Drug use: No    Sexual activity: Yes     Partners: Male     Birth control/protection: Surgical     Comment: mut monog     Social Determinants of Health     Financial Resource Strain: Low Risk     Difficulty of Paying Living Expenses: Not hard at all   Food Insecurity: No Food Insecurity    Worried About Running Out of Food in the Last Year: Never true    Ran Out of Food in the Last Year: Never true   Transportation Needs: No Transportation Needs    Lack of Transportation (Medical): No    Lack of Transportation (Non-Medical): No   Physical Activity: Unknown    Days of Exercise per Week: 2 days   Stress: Stress Concern Present    Feeling of Stress : Rather much   Social Connections: Unknown    Frequency of Communication with Friends and Family: More than three times a week    Frequency of Social Gatherings with Friends and Family: Once a week    Active Member of  Clubs or Organizations: No    Attends Club or Organization Meetings: Never    Marital Status:      Medication List with Changes/Refills   Current Medications    BUPROPION (WELLBUTRIN SR) 200 MG SR12        CHOLECALCIFEROL, VITAMIN D3, (VITAMIN D3) 1,000 UNIT CAPSULE    Take 1 capsule (1,000 Units total) by mouth once daily.    CYANOCOBALAMIN, VITAMIN B-12, 50 MCG TABLET    Take 50 mcg by mouth once daily.    DULOXETINE (CYMBALTA) 20 MG CAPSULE    Take 60 mg by mouth once daily.    LAMOTRIGINE (LAMICTAL) 100 MG TABLET    Take by mouth once daily.    MULTIVITAMIN CAPSULE    Take 1 capsule by mouth once daily.    OXYCODONE-ACETAMINOPHEN (PERCOCET)  MG PER TABLET    Take 1 tablet by mouth every 6 (six) hours as needed for Pain.    OXYCODONE-ACETAMINOPHEN (PERCOCET)  MG PER TABLET    Take 1 tablet by mouth 4 (four) times daily as needed for Pain.    VYVANSE 70 MG CAPSULE    Take 70 mg by mouth every morning.      Review of patient's allergies indicates:  No Known Allergies    Objective:     Right Hand  2+ rad pulse  Comp soft  Sensation intact  Inc C/D/I x2  No SOI x2  Cap refill < 2 sec  Motor intact to hand and wrist  Mild bruising noted throughout the hand as well as up into the forearm, color change noted of green pale purple yellow in some areas discussed with patient to continue to monitor although healing as of expected    Assessment:       Encounter Diagnoses   Name Primary?    Arthritis of carpometacarpal (CMC) joint of both thumbs Yes    Trigger finger, acquired           Plan:       Linette was seen today for post-op evaluation and pain.    Diagnoses and all orders for this visit:    Arthritis of carpometacarpal (CMC) joint of both thumbs    Trigger finger, acquired        CarmenJane Galo is an established pt here for postop follow-up .  She still has pain medication to take if needed. The incision was cleaned with hydrogen peroxide and normal saline. A sterile Band-Aid was applied.   Patient may clean the incision daily as above.  She was instructed to keep the incision clean and dry until follow-up.  Patient may use thumb spica splint as needed for symptomatic relief.  Patient should notify the office of any signs or symptoms of infection including fevers, erythema, purulent drainage, increasing pain.  Patient will follow up as scheduled.  She verbalizes understanding and agrees.    No follow-ups on file.    The patient understands, chooses and consents to this plan and accepts all   the risks which include but are not limited to the risks mentioned above.     Disclaimer: This note was prepared using a voice recognition system and is likely to have sound alike errors within the text.

## 2022-02-14 VITALS
RESPIRATION RATE: 20 BRPM | HEART RATE: 84 BPM | SYSTOLIC BLOOD PRESSURE: 144 MMHG | WEIGHT: 241.5 LBS | BODY MASS INDEX: 41.23 KG/M2 | HEIGHT: 64 IN | TEMPERATURE: 98 F | DIASTOLIC BLOOD PRESSURE: 82 MMHG | OXYGEN SATURATION: 97 %

## 2022-02-17 ENCOUNTER — OFFICE VISIT (OUTPATIENT)
Dept: ORTHOPEDICS | Facility: CLINIC | Age: 57
End: 2022-02-17
Payer: OTHER GOVERNMENT

## 2022-02-17 VITALS
SYSTOLIC BLOOD PRESSURE: 139 MMHG | WEIGHT: 240.94 LBS | HEIGHT: 64 IN | DIASTOLIC BLOOD PRESSURE: 89 MMHG | TEMPERATURE: 99 F | HEART RATE: 103 BPM | BODY MASS INDEX: 41.13 KG/M2

## 2022-02-17 DIAGNOSIS — M18.0 ARTHRITIS OF CARPOMETACARPAL (CMC) JOINT OF BOTH THUMBS: Primary | ICD-10-CM

## 2022-02-17 PROCEDURE — 99999 PR PBB SHADOW E&M-EST. PATIENT-LVL IV: ICD-10-PCS | Mod: PBBFAC,,, | Performed by: PHYSICIAN ASSISTANT

## 2022-02-17 PROCEDURE — 99024 PR POST-OP FOLLOW-UP VISIT: ICD-10-PCS | Mod: S$PBB,,, | Performed by: PHYSICIAN ASSISTANT

## 2022-02-17 PROCEDURE — 99024 POSTOP FOLLOW-UP VISIT: CPT | Mod: S$PBB,,, | Performed by: PHYSICIAN ASSISTANT

## 2022-02-17 PROCEDURE — 99214 OFFICE O/P EST MOD 30 MIN: CPT | Mod: PBBFAC | Performed by: PHYSICIAN ASSISTANT

## 2022-02-17 PROCEDURE — 99999 PR PBB SHADOW E&M-EST. PATIENT-LVL IV: CPT | Mod: PBBFAC,,, | Performed by: PHYSICIAN ASSISTANT

## 2022-02-17 NOTE — PROGRESS NOTES
Subjective:      Patient ID: Linette Galo is a 56 y.o. female.    Chief Complaint: Post-op Evaluation and Pain of the Right Hand      HPI: Linette Galo is a 56-year-old female in clinic today for postoperative follow-up.  Patient is 10 days status post CMC arthroplasty of the right thumb as well as right ring finger trigger release.  Patient is doing well at this time.  She has been compliant with thumb spica wrist brace.  No new complaints    Past Medical History:   Diagnosis Date    ADD (attention deficit disorder)     Anemia     Anxiety     Followed by Psychology    Depression     bipolar, anxiety, ADD    Family history of colon cancer 1/22/2019    Her grandmother had colon cancer.    Positive ARIK (antinuclear antibody)     Dr. Ross//following       Current Outpatient Medications:     buPROPion (WELLBUTRIN SR) 200 MG SR12, , Disp: , Rfl:     cholecalciferol, vitamin D3, (VITAMIN D3) 1,000 unit capsule, Take 1 capsule (1,000 Units total) by mouth once daily., Disp: 100 capsule, Rfl: 12    cyanocobalamin, vitamin B-12, 50 mcg tablet, Take 50 mcg by mouth once daily., Disp: , Rfl:     DULoxetine (CYMBALTA) 20 MG capsule, Take 60 mg by mouth once daily., Disp: , Rfl:     lamotrigine (LAMICTAL) 100 MG tablet, Take by mouth once daily., Disp: , Rfl:     multivitamin capsule, Take 1 capsule by mouth once daily., Disp: , Rfl:     VYVANSE 70 mg capsule, Take 70 mg by mouth every morning. , Disp: , Rfl: 0    oxyCODONE-acetaminophen (PERCOCET)  mg per tablet, Take 1 tablet by mouth every 6 (six) hours as needed for Pain. (Patient not taking: No sig reported), Disp: 28 tablet, Rfl: 0    oxyCODONE-acetaminophen (PERCOCET)  mg per tablet, Take 1 tablet by mouth 4 (four) times daily as needed for Pain. (Patient not taking: No sig reported), Disp: 28 tablet, Rfl: 0  Review of patient's allergies indicates:  No Known Allergies    /89 (BP Location: Left arm, Patient Position: Sitting, BP  "Method: Large (Automatic))   Pulse 103   Temp 99.1 °F (37.3 °C)   Ht 5' 4" (1.626 m)   Wt 109.3 kg (240 lb 15.4 oz)   BMI 41.36 kg/m²     ROS      Objective:    Ortho Exam   Right hand:  Sutures intact, wound margins healing well, no signs of infection  No edema  Mild TTP  ROM decreased secondary to stiffness  Motor exam normal  Sensation and pulses intact    GEN: Well developed, well nourished female. AAOX3. No acute distress.   Normocephalic, atraumatic.   FLAKITO  Breathing unlabored.  Mood and affect appropriate.        Assessment:     Imaging:  No new imaging ordered today        1. Arthritis of carpometacarpal (CMC) joint of both thumbs          Plan:       Patient progressing as expected postoperatively.  Sutures removed, patient instructed on normal wound care with soap water.  Continue to wear the thumb spica wrist brace for all activities, but she may begin have it off for ROM. Referral for therapy placed.  Patient should follow up in clinic in 1 month for further evaluation    Orders Placed This Encounter    Ambulatory referral/consult to Physical/Occupational Therapy     Follow up in about 1 month (around 3/17/2022).          "

## 2022-03-02 ENCOUNTER — CLINICAL SUPPORT (OUTPATIENT)
Dept: REHABILITATION | Facility: HOSPITAL | Age: 57
End: 2022-03-02
Payer: OTHER GOVERNMENT

## 2022-03-02 DIAGNOSIS — R52 PAIN: ICD-10-CM

## 2022-03-02 DIAGNOSIS — L90.5 SCAR ADHERENT: ICD-10-CM

## 2022-03-02 DIAGNOSIS — M25.60 DECREASED RANGE OF MOTION: ICD-10-CM

## 2022-03-02 DIAGNOSIS — M18.0 ARTHRITIS OF CARPOMETACARPAL (CMC) JOINT OF BOTH THUMBS: ICD-10-CM

## 2022-03-02 DIAGNOSIS — R53.1 DECREASED STRENGTH: ICD-10-CM

## 2022-03-02 PROCEDURE — 97165 OT EVAL LOW COMPLEX 30 MIN: CPT | Mod: PN

## 2022-03-02 NOTE — PLAN OF CARE
[facility] Occupational Therapy  Initial Evaluation     Date: 3/2/2022  Name: Linette LIU Iraj  Clinic Number: [id number]    Therapy Diagnosis:   Encounter Diagnoses   Name Primary?    Arthritis of carpometacarpal (CMC) joint of both thumbs     Pain     Decreased range of motion     Decreased strength     Scar adherent      Physician: Azael Curran PA-C    Physician Orders: Evaluate and treat   Medical Diagnosis: M18.0 (ICD-10-CM) - Arthritis of carpometacarpal (CMC) joint of both thumbs     Surgical Procedure and Date:    Procedure(s) (LRB):  RELEASE, TRIGGER FINGER (Right) ring trigger finger   ARTHROPLASTY, CMC JOINT (Right)   thumb using Arthrex internal brace  Date of Procedure: 2/7/2022   Evaluation Date: 3/2/2022   Insurance Authorization Period Expiration: 3/2/2022 - 12/31/2022  Plan of Care Certification Period: 3/2/2022 - 5/25/2022  Date of Return to MD: 3/9/2022    Visit # / Visits authorized: 1 / 1  Time In: 8:25  Time Out: 9:25  Total Billable Time: 25 minutes    Precautions:  Standard and Weightbearing, No resistive gripping or pinching     Subjective     Involved Side: right   Dominant Side: Right  Date of Onset: Patient reports she has been experiencing pain at least a year prior to 2019.     Mechanism of Injury:  gradual onset with worsening pain over the years  History of Current Condition: Patient reports that she visited doctor for thumb pain in 2019, but she had been experiencing pain for at least one year duration prior to that. She reports that initially her pain was worse in left verses right, but later pain became worse with right hand use.  She also reports that she has been receiving cortisone shots every 3 months in both thumbs.     Surgical Procedure: RELEASE, TRIGGER FINGER (Right) ring trigger finger    ARTHROPLASTY, CMC JOINT (Right)   thumb using Arthrex internal brace  Imaging: xray 12/8/2021   FINDINGS:  Right hand: There is pronounced lateral  "subluxation of the thumb at the 1st CMC joint which is new from prior.  There also appears to be some lucent changes and surface irregularity at the base of the 1st metacarpal as well as the trapezium which are new from prior and raise concern for possible erosive disease.  Other etiology including infection not excluded.  Recommend clinical correlation.  No acute fractures visualized.     Left hand: There is no radiographic evidence of acute osseous, articular, or soft tissue abnormality.  There is moderate to severe osteoarthritis at the 1st CMC joint.  No erosive changes demonstrated     Previous Therapy: none     Past Medical History/Physical Systems Review:   Linette Galo  has a past medical history of ADD (attention deficit disorder), Anemia, Anxiety, Depression, Family history of colon cancer, and Positive ARIK (antinuclear antibody).    Linette Galo  has a past surgical history that includes Gastric bypass (2008); Carpal tunnel release; Tonsillectomy; Colonoscopy (N/A, 1/22/2019); Hysterectomy (2011); Trigger finger release (Right, 2/7/2022); and Interposition arthroplasty of carpometacarpal joints (Right, 2/7/2022).    Linette has a current medication list which includes the following prescription(s): bupropion, cholecalciferol (vitamin d3), cyanocobalamin (vitamin b-12), duloxetine, lamotrigine, multivitamin, oxycodone-acetaminophen, oxycodone-acetaminophen, and vyvanse.    Review of patient's allergies indicates:  No Known Allergies     Patient's Goals for Therapy: "To get back to where I was."     Pain:  Functional Pain Scale Rating 0-10:   5/10 on average  0/10 at best  8/10 at worst  Location: dorsal thumb, palmar incision at index finger has pain when she bumps or hits it "aggravating"  Description: Aching, Deep and Shooting on dorsal thumb- reports occasional shooting pain up arm and deep pain in dorsal hand  she reports palmar incision in hand as "shooting/stabbing"   Aggravating " Factors: using it   Easing Factors: pain medication and ice    Occupation:  Not currently working, active around her home   Working presently: unemployed   Duties: N/A     Functional Limitations/Social History:    Previous functional status includes: Independent with all ADLs.     Current FunctionalStatus   Home/Living environment : lives with their spouse      Limitation of Functional Status as follows:   ADLs/IADLs:     - Feeding: independent     - Bathing: minimal difficulty    - Dressing/Grooming: minimal difficulty      - Driving: using left hand currently     Leisure: coloring, cutting grass         Objective     Observation/Appearance:   Mild Edema present dorsal hand near wrist and in thumb, Bruising present at dorsal thumb and mid-palm    ~ 1 cm incision present proximal to ring finger 2 cm into palm from MCP.  Incision healing with thicker superficial layer of skin on ulnar side. No redness, drainage, or increased skin warmth noted. No bandaging or steri-strips present.   ~ 3 cm incision present over dorsal thumb at CMC joint with slightly curved pattern to incision. Incision healing with superficial layer of thicker skin on radial side; No redness, drainage, or increased skin warmth noted. No bandaging or steri-strips present.      Edema. Measured in centimeters.   3/2/2022 3/2/2022    Left Right   MCPs 20.5 20.5     Edema. Measured in centimeters.   3/2/2022 3/2/2022    Left Right   Thumb:     Prox. Phalanx 6.1 6.9   IP 6.5 6.9   Distal Phalanx 5.1 5.4     Hand ROM. Measured in degrees.   3/2/2022 3/2/2022    Left Right       Wrist      Extension 69 60   Flexion 70 62   Radial deviation  16 25   Ulnar deviation  31 15   Supination  WNL  WFL    Pronation WNL  WFL              Thumb: MP 0/62 0/40                IP 0/50 0/54       Rad ABD 31 34       Pal ABD 41 40          Opposition WNL  WNL    *Patient able to oppose to 5th DIP and pad of 5th digit.       Sensation  Intact light touch.  Patient complains  "of  numbness/tingling dorsal thumb and over radial half of thenar eminence.         Strength (Dyanmometer) and Pinch Strength (Pinch Gauge)  Measured in pounds and psi. Average of three trials.   3/2/2022 3/2/2022    Left Right   Rung II deferred deferred   Key Pinch " "   3pt Pinch " "   2pt Pinch " "   Deferred at this time due to post surgical protocol. Will assess at later session as appropriate.           Manual Muscle Testing  Deferred at this time due to post surgical protocol. Will assess at later session as appropriate.       CMS Impairment/Limitation/Restriction for FOTO hand Survey    Therapist reviewed FOTO scores for Linette Galo on 3/2/2022.   FOTO documents entered into [employer] - see Media section.    Limitation Score: 59%           Treatment     Treatment Time In: 9:00  Treatment Time Out: 9:25  Total Treatment time separate from Evaluation time: 25 minutes    Jane received the following manual therapy techniques for 15 minutes:   -Retrograde massage to right hand to decrease edema and pain  -Desensitization around dorsal thumb incision with gloved fingertips; Desensitization to palmar incision with gloved fingertips to decrease hypersensitivity   -Soft tissue mobilization with gloved fingertips to palmar incision and around dorsal thumb incision to decrease tissue adhesions and pain; performed within patient's tolerance     Jane received therapeutic exercises for 10 minutes including:  -review of issued HEP exercises     Home Exercise Program/Education:  Issued HEP (see patient instructions in EMR) and educated on modality use for pain management . Exercises were reviewed and Jane was able to demonstrate them prior to the end of the session.   Patient received a written copy of exercises to perform at home. Jane demonstrated good  understanding of the education provided.  Patient was advised to perform these exercises free of pain, and to stop performing them if pain " occurs.    Patient/Family Education: role of OT, goals for OT, scheduling/cancellations - patient verbalized understanding. Discussed insurance limitations with patient.    Additional Education provided: no weightbearing through right hand, no extremes of radial abduction/pulling on thumb, on resistive gripping or pinching; scar massage, desensitization techniques, edema management, decrease guarding of right upper extremity prevent proximal joint pain     Assessment     Linette Galo is a 56 y.o. female referred to outpatient occupational therapy and presents with a medical diagnosis of M18.0 (ICD-10-CM) - Arthritis of carpometacarpal (CMC) joint of both thumbs , resulting in Decreased ROM, Decreased  strength, Decreased pinch strength, Decreased functional hand use, Increased pain, Edema, Joint Stiffness and Scar Adhesions and demonstrates limitations as described in the chart below. Following medical record review it is determined that patient will benefit from occupational therapy services in order to maximize pain free and/or functional use of right hand. The following goals were discussed with the patient and patient is in agreement with them as to be addressed in the treatment plan. The patient's rehab potential is Good.     Anticipated barriers to occupational therapy: compliance with surgical precautions, compliance with HEP, therapy attendance  Patient has no cultural, educational or language barriers to learning provided.    Profile and History Assessment of Occupational Performance Level of Clinical Decision Making Complexity Score   Occupational Profile:   Linette Galo is a 56 y.o. female who lives with their spouse and is currently unemployed as N/A . Linette Galo has difficulty with  feeding, bathing and grooming  driving/transportation management, shopping, phone/computer use and housework/household chores  affecting his/her daily functional abilities. His/her main goal for  "therapy is "To get back to where I was." .     Comorbidities:   none    Medical and Therapy History Review:   Brief               Performance Deficits    Physical:  Joint Mobility  Muscle Power/Strength  Muscle Endurance  Skin Integrity/Scar Formation  Edema   Strength  Pinch Strength  Fine Motor Coordination  Pain    Cognitive:  No Deficits    Psychosocial:    No Deficits     Clinical Decision Making:  low    Assessment Process:  Problem-Focused Assessments    Modification/Need for Assistance:  Not Necessary    Intervention Selection:  Several Treatment Options       low  Based on PMHX, co morbidities , data from assessments and functional level of assistance required with task and clinical presentation directly impacting function.       The following goals were discussed with the patient and patient is in agreement with them as to be addressed in the treatment plan.     Goals:   Short Term Goals: (in 6 weeks)  1) Patient will be independent in HEP  2) Patient will be independent with scar management techniques   3) Decrease pain in right  hand to no more than 4/10 worst in ADL/IADL's  4) Increase AROM in right thumb for improved functioning in ADL/IADL's  5) Decrease edema in right thumb by .3cm       Long Term Goals: (in 12 weeks)  1) Decrease pain in right hand to no more than 2/10 worst in ADL/IADL's  2) Increase AROM of right  hand to WFL for increased functioning in ADL/IADL's  3) Increase strength in right  hand to WFL for improved functioning in ADL/IADL's  4) Assess  strength right hand to increase functional use of left hand for improved functioning in ADL/IADL's  5) Decrease edema in left hand to trace or none  6) Increased functioning in ADL/IADL's, as evidenced by a FOTO impairment rating of no more than 35%         Plan   Certification Period/Plan of care expiration: 3/2/2022 to 5/25/2022.    Outpatient Occupational Therapy 2 times weekly for 12 weeks to include the following interventions: " Paraffin, Fluidotherapy, Modalities for pain management, US 3 mhz, Therapeutic exercises/activities., Strengthening, Orthotic Fabrication/Fit/Training, Edema Control, Scar Management and Joint Protection.      Hermes Miramontes, OT

## 2022-03-07 ENCOUNTER — CLINICAL SUPPORT (OUTPATIENT)
Dept: REHABILITATION | Facility: HOSPITAL | Age: 57
End: 2022-03-07
Payer: OTHER GOVERNMENT

## 2022-03-07 DIAGNOSIS — L90.5 SCAR ADHERENT: ICD-10-CM

## 2022-03-07 DIAGNOSIS — M25.60 DECREASED RANGE OF MOTION: ICD-10-CM

## 2022-03-07 DIAGNOSIS — R53.1 DECREASED STRENGTH: ICD-10-CM

## 2022-03-07 DIAGNOSIS — R52 PAIN: Primary | ICD-10-CM

## 2022-03-07 PROCEDURE — 97110 THERAPEUTIC EXERCISES: CPT | Mod: PN

## 2022-03-07 PROCEDURE — 97010 HOT OR COLD PACKS THERAPY: CPT | Mod: PN

## 2022-03-07 PROCEDURE — 97140 MANUAL THERAPY 1/> REGIONS: CPT | Mod: PN

## 2022-03-07 NOTE — PROGRESS NOTES
[facility]  Occupational Therapy Treatment Note    Date: 3/7/2022  Name: Linette LIU [facility]  Clinic Number: [id number]    Therapy Diagnosis:   Encounter Diagnoses   Name Primary?    Pain Yes    Decreased range of motion     Decreased strength     Scar adherent      Physician: Azael Curran PA-C      Physician Orders: Evaluate and treat   Medical Diagnosis: M18.0 (ICD-10-CM) - Arthritis of carpometacarpal (CMC) joint of both thumbs    Surgical Procedure and Date:    Procedure(s) (LRB):  RELEASE, TRIGGER FINGER (Right) ring trigger finger   ARTHROPLASTY, CMC JOINT (Right)   thumb using Arthrex internal brace  Date of Procedure: 2/7/2022   Evaluation Date: 3/2/2022   Insurance Authorization Period Expiration: 3/2/2022 - 12/31/2022  Plan of Care Expiration: 3/2/2022 - 5/25/2022  Progress Note Due: 04/04/2022   Date of Return to MD: 03/09/2022  Visit # / Visits authorized: 1 / 20  FOTO: 1/3      Precautions:  Standard and Weightbearing, No resistive gripping or pinching       Time In: 9:15  Time Out: 10:00  Total Billable Time: 42 minutes (1 HP, 2 MT, 1 TE)     SUBJECTIVE     Pt reports: she has been doing HEP with mild difficulty with radial abduction. She has been performing scar massage, desensitization, and exercises at least 6 times per day  She was compliant with home exercise program given last session.   Response to previous treatment: first treatment following evaluation   Functional change: first treatment following evaluation     Pain: 1/10  Location: right hand      OBJECTIVE     Objective Measures updated at progress report unless specified.    Treatment     Jane received the treatments listed below:     Supervised modalities after being cleared for contradictions:   Paraffin bath - N/A   Fluidotherapy - N/A   Hot Pack - Patient received MH x 8 min to right hand and wrist to increase blood flow, circulation and tissue elasticity prior to therex  Cold Pack - N/A     Direct  contact modalities after being cleared for contraindications:   Ultrasound - N/A     Manual therapy techniques: Joint mobilizations, Manual Lymphatic Drainage, Soft tissue Mobilization and Scar Massage were applied to the: right hand for 23 minutes, including:  -Scar massage using dycem to palmar incision and dorsal thumb incisions to decrease adhesions and pain  -Desensitization techniques using fingertip techniques around and over dorsal thumb incision    -Soft tissue mobilization using fingertip techniques of soft tissue surrounding palmar and dorsal thumb incisions to decrease adhesions and pain   -Retrograde massage to right hand to decrease edema and pain     Therapeutic exercises to develop strength, endurance, ROM and flexibility for 19 minutes, including:    Thumb circumduction, both directions  20 reps    Thumb flexion to 3rd finger 20 reps   Thumb palmar abduction  20 reps   Thumb radial abduction 20 reps   Opposition to tips of all digits 10 reps    Cupping of palm  10 reps    Tendon glides  10 reps    Wrist flexion/ extension  Not today    Wrist radial deviation/ ulnar deviation  Not today            Therapeutic activities to improve functional performance for 0  minutes, including:                                          Patient Education and Home Exercises      Education provided:   - using dycem use for scar massage; issued dycem 03/07/2022  - use of silicone scar gel sheets on dorsal thumb incision to prevent hypertrophic scarring  - Progress towards goals     Written Home Exercises Provided: Patient instructed to cont prior HEP. And new exercise issued   Exercises were reviewed and Jane was able to demonstrate them prior to the end of the session.  Jane demonstrated good  understanding of the HEP provided. See EMR under Patient Instructions for exercises provided during therapy sessions.       Assessment   Patient complained of mild tenderness with scar massage at mid palmar incision. She  "complains of hypersensitivity/"burning" at wrist near proximal end of dorsal thumb incision.  Patient with mild edema at dorsal thumb near CMC joint at wrist; light bruising still present throughout right hand. Overall, edema is decreased since initial evaluation. Patient tolerated all exercises well without complaint of increased pain. Pt would continue to benefit from skilled OT for Decreased ROM, Decreased  strength, Decreased pinch strength, Decreased functional hand use, Increased pain, Edema, Joint Stiffness and Scar Adhesions.        Jane is progressing well towards her goals and there are no updates to goals at this time. Pt prognosis is Good.     Pt will continue to benefit from skilled outpatient occupational therapy to address the deficits listed in the problem list on initial evaluation provide pt/family education and to maximize pt's level of independence in the home and community environment.     Pt's spiritual, cultural and educational needs considered and pt agreeable to plan of care and goals.    Anticipated barriers to occupational therapy: compliance with HEP, therapy attendance       Goals:   Short Term Goals: (in 6 weeks)  1) Patient will be independent in HEP  2) Patient will be independent with scar management techniques   3) Decrease pain in right hand to no more than 4/10 worst in ADL/IADL's  4) Increase AROM in right thumb for improved functioning in ADL/IADL's  5) Decrease edema in right thumb by .3cm        Long Term Goals: (in 12 weeks)  1) Decrease pain in right hand to no more than 2/10 worst in ADL/IADL's  2) Increase AROM of right  hand to WFL for increased functioning in ADL/IADL's  3) Increase strength in right  hand to WFL for improved functioning in ADL/IADL's  4) Assess  strength right hand to increase functional use of left hand for improved functioning in ADL/IADL's  5) Decrease edema in left hand to trace or none  6) Increased functioning in ADL/IADL's, as evidenced " by a FOTO impairment rating of no more than 35%          PLAN   Outpatient Occupational Therapy 2 times weekly for 12 weeks to include the following interventions: Paraffin, Fluidotherapy, Modalities for pain management, US 3 mhz, Therapeutic exercises/activities., Strengthening, Orthotic Fabrication/Fit/Training, Edema Control, Scar Management and Joint Protection.     Updates/Grading for next session: progress all AROM as tolerated by patient      Hermes Miramontes, OT

## 2022-03-07 NOTE — PATIENT INSTRUCTIONS
Issued Via [email or web address] (see logo above) scan QR code for pt specific program          [email or web address]

## 2022-03-08 ENCOUNTER — PATIENT OUTREACH (OUTPATIENT)
Dept: ADMINISTRATIVE | Facility: OTHER | Age: 57
End: 2022-03-08
Payer: OTHER GOVERNMENT

## 2022-03-08 DIAGNOSIS — Z12.31 ENCOUNTER FOR SCREENING MAMMOGRAM FOR BREAST CANCER: Primary | ICD-10-CM

## 2022-03-09 ENCOUNTER — OFFICE VISIT (OUTPATIENT)
Dept: ORTHOPEDICS | Facility: CLINIC | Age: 57
End: 2022-03-09
Payer: OTHER GOVERNMENT

## 2022-03-09 VITALS — BODY MASS INDEX: 40.97 KG/M2 | WEIGHT: 240 LBS | HEIGHT: 64 IN

## 2022-03-09 DIAGNOSIS — M18.0 ARTHRITIS OF CARPOMETACARPAL (CMC) JOINT OF BOTH THUMBS: Primary | ICD-10-CM

## 2022-03-09 PROCEDURE — 99213 OFFICE O/P EST LOW 20 MIN: CPT | Mod: 24,25,S$PBB, | Performed by: ORTHOPAEDIC SURGERY

## 2022-03-09 PROCEDURE — 20600 SMALL JOINT ASPIRATION/INJECTION: L THUMB CMC: ICD-10-PCS | Mod: S$PBB,79,LT, | Performed by: ORTHOPAEDIC SURGERY

## 2022-03-09 PROCEDURE — 99213 OFFICE O/P EST LOW 20 MIN: CPT | Mod: PBBFAC,25 | Performed by: ORTHOPAEDIC SURGERY

## 2022-03-09 PROCEDURE — 99999 PR PBB SHADOW E&M-EST. PATIENT-LVL III: CPT | Mod: PBBFAC,,, | Performed by: ORTHOPAEDIC SURGERY

## 2022-03-09 PROCEDURE — 20600 DRAIN/INJ JOINT/BURSA W/O US: CPT | Mod: PBBFAC,LT | Performed by: ORTHOPAEDIC SURGERY

## 2022-03-09 PROCEDURE — 99213 PR OFFICE/OUTPT VISIT, EST, LEVL III, 20-29 MIN: ICD-10-PCS | Mod: 24,25,S$PBB, | Performed by: ORTHOPAEDIC SURGERY

## 2022-03-09 PROCEDURE — 99999 PR PBB SHADOW E&M-EST. PATIENT-LVL III: ICD-10-PCS | Mod: PBBFAC,,, | Performed by: ORTHOPAEDIC SURGERY

## 2022-03-09 RX ORDER — TRIAMCINOLONE ACETONIDE 40 MG/ML
40 INJECTION, SUSPENSION INTRA-ARTICULAR; INTRAMUSCULAR
Status: DISCONTINUED | OUTPATIENT
Start: 2022-03-09 | End: 2022-03-09 | Stop reason: HOSPADM

## 2022-03-09 RX ADMIN — TRIAMCINOLONE ACETONIDE 40 MG: 200 INJECTION, SUSPENSION INTRA-ARTICULAR; INTRAMUSCULAR at 08:03

## 2022-03-09 NOTE — PROGRESS NOTES
Subjective:     Patient ID: Linette Galo is a 56 y.o. female.    Chief Complaint: Pain and Post-op Evaluation of the Left Hand      HPI:  The patient is a 56-year-old female seen in follow-up after a right thumb basal joint arthroplasty and right ring trigger finger release date of surgery was 02/07/2022.  She request left thumb basal joint injection today.  Last injection was 12/08/2021.    Past Medical History:   Diagnosis Date    ADD (attention deficit disorder)     Anemia     Anxiety     Followed by Psychology    Depression     bipolar, anxiety, ADD    Family history of colon cancer 1/22/2019    Her grandmother had colon cancer.    Positive ARIK (antinuclear antibody)     Dr. Ross//following     Past Surgical History:   Procedure Laterality Date    CARPAL TUNNEL RELEASE      both hands    COLONOSCOPY N/A 1/22/2019    Procedure: COLONOSCOPY;  Surgeon: Moe Jimenez MD;  Location[facility];  Service: Endoscopy;  Laterality: N/A;    GASTRIC BYPASS  2008    HYSTERECTOMY  2011    RALH (retains ovaries)    INTERPOSITION ARTHROPLASTY OF CARPOMETACARPAL JOINTS Right 2/7/2022    Procedure: INTERPOSITION ARTHROPLASTY, CMC JOINT;  Surgeon: Sterling Jade MD;  Location: [facility];  Service: Orthopedics;  Laterality: Right;  right thumb basal joint arthroplasty     ARTHREX - LATRICE AWARE    TONSILLECTOMY      TRIGGER FINGER RELEASE Right 2/7/2022    Procedure: RELEASE, TRIGGER FINGER;  Surgeon: Sterling Jade MD;  Location: [facility];  Service: Orthopedics;  Laterality: Right;   right ring trigger finger release      Family History   Problem Relation Age of Onset    Breast cancer Maternal Grandmother     Colon cancer Maternal Grandmother     Hypertension Maternal Grandmother     Diabetes Maternal Grandmother     Thrombophilia Father     Parkinsonism Father     Lumbar disc disease Father     Stroke Father     Heart disease Father     Thrombophilia Sister     Hypertension Mother      Diabetes Mother     Hypertension Paternal Grandmother     Diabetes Paternal Grandmother      Social History     Socioeconomic History    Marital status:    Tobacco Use    Smoking status: Never Smoker    Smokeless tobacco: Never Used   Substance and Sexual Activity    Alcohol use: Not Currently     Comment: socially    Drug use: No    Sexual activity: Yes     Partners: Male     Birth control/protection: Surgical     Comment: mut monog     Social Determinants of Health     Financial Resource Strain: Low Risk     Difficulty of Paying Living Expenses: Not hard at all   Food Insecurity: No Food Insecurity    Worried About Running Out of Food in the Last Year: Never true    Ran Out of Food in the Last Year: Never true   Transportation Needs: No Transportation Needs    Lack of Transportation (Medical): No    Lack of Transportation (Non-Medical): No   Physical Activity: Unknown    Days of Exercise per Week: 2 days   Stress: Stress Concern Present    Feeling of Stress : Rather much   Social Connections: Unknown    Frequency of Communication with Friends and Family: More than three times a week    Frequency of Social Gatherings with Friends and Family: Once a week    Active Member of Clubs or Organizations: No    Attends Club or Organization Meetings: Never    Marital Status:      Medication List with Changes/Refills   Current Medications    BUPROPION (WELLBUTRIN SR) 200 MG SR12        CHOLECALCIFEROL, VITAMIN D3, (VITAMIN D3) 1,000 UNIT CAPSULE    Take 1 capsule (1,000 Units total) by mouth once daily.    CYANOCOBALAMIN, VITAMIN B-12, 50 MCG TABLET    Take 50 mcg by mouth once daily.    DULOXETINE (CYMBALTA) 20 MG CAPSULE    Take 60 mg by mouth once daily.    LAMOTRIGINE (LAMICTAL) 100 MG TABLET    Take by mouth once daily.    MULTIVITAMIN CAPSULE    Take 1 capsule by mouth once daily.    OXYCODONE-ACETAMINOPHEN (PERCOCET)  MG PER TABLET    Take 1 tablet by mouth every 6 (six)  hours as needed for Pain.    OXYCODONE-ACETAMINOPHEN (PERCOCET)  MG PER TABLET    Take 1 tablet by mouth 4 (four) times daily as needed for Pain.    VYVANSE 70 MG CAPSULE    Take 70 mg by mouth every morning.      Review of patient's allergies indicates:  No Known Allergies  Review of Systems   Constitutional: Negative for malaise/fatigue.   HENT: Negative for hearing loss.    Eyes: Negative for double vision and visual disturbance.   Cardiovascular: Negative for chest pain.   Respiratory: Negative for shortness of breath.    Endocrine: Negative for cold intolerance.   Hematologic/Lymphatic: Does not bruise/bleed easily.   Skin: Negative for poor wound healing and suspicious lesions.   Musculoskeletal: Positive for arthritis, joint pain and joint swelling. Negative for gout.   Gastrointestinal: Negative for nausea and vomiting.   Genitourinary: Negative for dysuria.   Neurological: Negative for numbness, paresthesias and sensory change.   Psychiatric/Behavioral: Positive for depression. Negative for memory loss and substance abuse. The patient is nervous/anxious.    Allergic/Immunologic: Negative for persistent infections.       Objective:   Body mass index is 41.2 kg/m².  There were no vitals filed for this visit.             General    Constitutional: She is oriented to person, place, and time. She appears well-developed and well-nourished. No distress.   HENT:   Head: Normocephalic.   Eyes: EOM are normal.   Pulmonary/Chest: Effort normal.   Neurological: She is oriented to person, place, and time.   Psychiatric: She has a normal mood and affect.         Left Hand/Wrist Exam     Inspection   Scars: Wrist - absent Hand -  absent  Effusion: Wrist - absent Hand -  absent    Pain   Wrist - The patient exhibits pain of the CMC.    Other     Sensory Exam  Median Distribution: normal  Ulnar Distribution: normal  Radial Distribution: normal    Comments:  The patient has tenderness left thumb basal joint with a  positive axial circumduction grind test.  There are no motor or sensory deficits.          Vascular Exam       Capillary Refill  Left Hand: normal capillary refill      radiographs were not obtained today  Assessment:     Encounter Diagnosis   Name Primary?    Arthritis of carpometacarpal (CMC) joint of both thumbs Yes        Plan:       The patient was injected left thumb basal joint with 0.5 cc of Kenalog and 0.5 cc of 2% plain lidocaine under sterile technique.  She will wait at least 3 months between injections.              Disclaimer: This note was prepared using a voice recognition system and is likely to have sound alike errors within the text.

## 2022-03-09 NOTE — PROGRESS NOTES
Health Maintenance Due   Topic Date Due    Hepatitis C Screening  Never done    HIV Screening  Never done    Influenza Vaccine (1) 09/01/2021    COVID-19 Vaccine (3 - Booster for Pfizer series) 10/25/2021    Mammogram  02/17/2022     Updates were requested from care everywhere.  Chart was reviewed for overdue Proactive [facility] Encounters (ADELIA) topics (CRS, Breast Cancer Screening, Eye exam)  Health Maintenance has been updated.  LINKS immunization registry triggered.  Immunizations were reconciled.

## 2022-03-09 NOTE — PROCEDURES
Small Joint Aspiration/Injection: L thumb CMC    Date/Time: 3/9/2022 8:30 AM  Performed by: Sterling Jade MD  Authorized by: Sterling Jade MD     Consent Done?:  Yes (Verbal)  Indications:  Arthritis  Timeout: prior to procedure the correct patient, procedure, and site was verified    Prep: patient was prepped and draped in usual sterile fashion      Local anesthesia used?: Yes    Local anesthetic:  Lidocaine 2% without epinephrine  Anesthetic total (ml):  0.5    Location:  Thumb  Site:  L thumb CMC  Ultrasonic guidance for needle placement?: No    Needle size:  25 G  Approach:  Dorsal  Medications:  40 mg triamcinolone acetonide 40 mg/mL

## 2022-03-11 ENCOUNTER — CLINICAL SUPPORT (OUTPATIENT)
Dept: REHABILITATION | Facility: HOSPITAL | Age: 57
End: 2022-03-11
Payer: OTHER GOVERNMENT

## 2022-03-11 DIAGNOSIS — R53.1 DECREASED STRENGTH: ICD-10-CM

## 2022-03-11 DIAGNOSIS — R52 PAIN: Primary | ICD-10-CM

## 2022-03-11 DIAGNOSIS — L90.5 SCAR ADHERENT: ICD-10-CM

## 2022-03-11 DIAGNOSIS — M25.60 DECREASED RANGE OF MOTION: ICD-10-CM

## 2022-03-11 PROCEDURE — 97110 THERAPEUTIC EXERCISES: CPT | Mod: PN

## 2022-03-11 PROCEDURE — 97140 MANUAL THERAPY 1/> REGIONS: CPT | Mod: PN

## 2022-03-11 PROCEDURE — 97018 PARAFFIN BATH THERAPY: CPT | Mod: PN,59

## 2022-03-11 NOTE — PATIENT INSTRUCTIONS
Issued Via [email or web address] (see logo above) scan QR code for pt specific program         View at [email or web address] using code: [email or web address]

## 2022-03-11 NOTE — PROGRESS NOTES
[facility] OUTPATIENT THERAPY AND WELLNESS  Occupational Therapy Treatment Note    Date: 3/11/2022  Name: Linette LIU [facility]  Clinic Number: [id number]    Therapy Diagnosis:   Encounter Diagnoses   Name Primary?    Pain Yes    Decreased range of motion     Decreased strength     Scar adherent      Physician: Azael Curran PA-C      Physician Orders: Evaluate and treat   Medical Diagnosis: M18.0 (ICD-10-CM) - Arthritis of carpometacarpal (CMC) joint of both thumbs    Surgical Procedure and Date:    Procedure(s) (LRB):  RELEASE, TRIGGER FINGER (Right) ring trigger finger   ARTHROPLASTY, CMC JOINT (Right)   thumb using Arthrex internal brace  Date of Procedure: 2/7/2022   Evaluation Date: 3/2/2022   Insurance Authorization Period Expiration: 3/2/2022 - 12/31/2022   Plan of Care Expiration: 3/2/2022 - 5/25/2022   Progress Note Due: 04/04/2022   Date of Return to MD: none scheduled   Visit # / Visits authorized: 2 / 20 (plus initial evaluation)   FOTO: 1/3      Precautions:  Standard and Weightbearing, No resistive gripping or pinching       Time In: 10:00  Time Out: 10:50  Total Billable Time: 50 minutes (1 P, 2 MT, 1 TE)     SUBJECTIVE     Pt reports: she has been compliant with scar massage, HEP, and wear of silicone scar gel sheets at night.   She continues to report mild difficulty with radial abduction.  She has complaints of dorsal wrist pain with some exercises.      She was compliant with home exercise program given last session.   Response to previous treatment: thumb movement better, less swelling   Functional change: none reported      Pain: 0/10 pre-treat   Location: right hand      OBJECTIVE     Objective Measures updated at progress report unless specified.    Treatment     Jane received the treatments listed below:     Supervised modalities after being cleared for contradictions:   Paraffin bath - Paraffin w/ MHP to right hand for 10 min, pre-tx to decrease pain & increase tissue  extensibility  Fluidotherapy - N/A     Cold Pack - N/A     Direct contact modalities after being cleared for contraindications:   Ultrasound - N/A     Manual therapy techniques: Joint mobilizations, Manual Lymphatic Drainage, Soft tissue Mobilization and Scar Massage were applied to the: right hand for 23 minutes, including:  -Scar massage using dycem to palmar incision and dorsal thumb incisions to decrease adhesions and pain  -Desensitization techniques using fingertip techniques around and over dorsal thumb incision    -Soft tissue mobilization using fingertip techniques of soft tissue surrounding palmar and dorsal thumb incisions to decrease adhesions and pain   -Retrograde massage to right hand to decrease edema and pain     Therapeutic exercises to develop strength, endurance, ROM and flexibility for 17 minutes, including:    Thumb circumduction, both directions  20 reps    Thumb flexion to 3rd finger 20 reps   Thumb palmar abduction  20 reps   Thumb radial abduction 20 reps    Retropulsion  2 X 10 reps    Opposition to tips of all digits 10 reps    Cupping of palm  10 reps    Tendon glides  20 reps    Wrist flexion/ extension  20 reps    Wrist radial deviation/ ulnar deviation  20 reps            Therapeutic activities to improve functional performance for 0  minutes, including:                                          Patient Education and Home Exercises      Education provided:   - using dycem use for scar massage; issued dycem 03/07/2022  - use of silicone scar gel sheets on dorsal thumb incision to prevent hypertrophic scarring  - Progress towards goals     Written Home Exercises Provided: Patient instructed to cont prior HEP. And new exercises issued   Exercises were reviewed and Jane was able to demonstrate them prior to the end of the session.  Jane demonstrated good  understanding of the HEP provided. See EMR under Patient Instructions for exercises provided during therapy sessions.       Assessment    Patient has complaints of pain and tenderness with palpation over a nodule that is present radial to mid palmar incision. Patient with very mild edema at dorsal thumb near CMC joint at wrist. Educated patient on proper wrist positioning with exercises and she reported increased comfort over dorsal wrist.  Patient tolerated all exercises well without complaints of increased pain. Pt would continue to benefit from skilled OT for Decreased ROM, Decreased  strength, Decreased pinch strength, Decreased functional hand use, Increased pain, Edema, Joint Stiffness and Scar Adhesions.        Jane is progressing well towards her goals and there are no updates to goals at this time. Pt prognosis is Good.     Pt will continue to benefit from skilled outpatient occupational therapy to address the deficits listed in the problem list on initial evaluation provide pt/family education and to maximize pt's level of independence in the home and community environment.     Pt's spiritual, cultural and educational needs considered and pt agreeable to plan of care and goals.    Anticipated barriers to occupational therapy: compliance with HEP, therapy attendance       Goals:   Short Term Goals: (in 6 weeks)  1) Patient will be independent in HEP -Met 3/22/2022  2) Patient will be independent with scar management techniques -Progressing   3) Decrease pain in right hand to no more than 4/10 worst in ADL/IADL's-Progressing  4) Increase AROM in right thumb for improved functioning in ADL/IADL's-Progressing  5) Decrease edema in right thumb by .3cm-Progressing        Long Term Goals: (in 12 weeks)  1) Decrease pain in right hand to no more than 2/10 worst in ADL/IADL's  2) Increase AROM of right  hand to WFL for increased functioning in ADL/IADL's  3) Increase strength in right  hand to WFL for improved functioning in ADL/IADL's  4) Assess  strength right hand to increase functional use of left hand for improved functioning in  ADL/IADL's  5) Decrease edema in left hand to trace or none  6) Increased functioning in ADL/IADL's, as evidenced by a FOTO impairment rating of no more than 35%          PLAN   Outpatient Occupational Therapy 2 times weekly for 12 weeks to include the following interventions: Paraffin, Fluidotherapy, Modalities for pain management, US 3 mhz, Therapeutic exercises/activities., Strengthening, Orthotic Fabrication/Fit/Training, Edema Control, Scar Management and Joint Protection.     Updates/Grading for next session: progress all AROM as tolerated by patient      Hermes Miramontes, OT

## 2022-03-15 ENCOUNTER — CLINICAL SUPPORT (OUTPATIENT)
Dept: REHABILITATION | Facility: HOSPITAL | Age: 57
End: 2022-03-15
Payer: OTHER GOVERNMENT

## 2022-03-15 DIAGNOSIS — L90.5 SCAR ADHERENT: ICD-10-CM

## 2022-03-15 DIAGNOSIS — R52 PAIN: Primary | ICD-10-CM

## 2022-03-15 DIAGNOSIS — R53.1 DECREASED STRENGTH: ICD-10-CM

## 2022-03-15 DIAGNOSIS — M25.60 DECREASED RANGE OF MOTION: ICD-10-CM

## 2022-03-15 PROCEDURE — 97110 THERAPEUTIC EXERCISES: CPT | Mod: PN

## 2022-03-15 PROCEDURE — 97022 WHIRLPOOL THERAPY: CPT | Mod: PN

## 2022-03-15 PROCEDURE — 97140 MANUAL THERAPY 1/> REGIONS: CPT | Mod: PN

## 2022-03-15 NOTE — PROGRESS NOTES
[facility]  Occupational Therapy Treatment Note    Date: 3/15/2022  Name: Linette LIU [facility]  Clinic Number: [id number]    Therapy Diagnosis:   Encounter Diagnoses   Name Primary?    Pain Yes    Decreased range of motion     Decreased strength     Scar adherent      Physician: Azael Curran PA-C      Physician Orders: Evaluate and treat   Medical Diagnosis: M18.0 (ICD-10-CM) - Arthritis of carpometacarpal (CMC) joint of both thumbs    Surgical Procedure and Date:    Procedure(s) (LRB):  RELEASE, TRIGGER FINGER (Right) ring trigger finger   ARTHROPLASTY, CMC JOINT (Right)   thumb using Arthrex internal brace  Date of Procedure: 2/7/2022   Evaluation Date: 3/2/2022   Insurance Authorization Period Expiration: 3/2/2022 - 12/31/2022   Plan of Care Expiration: 3/2/2022 - 5/25/2022   Progress Note Due: 04/04/2022   Date of Return to MD: none scheduled at this time   Visit # / Visits authorized: 3 / 20 (plus initial evaluation)   FOTO: 1/3   Initial= 59% / Goal=34%      Precautions:  Standard and Weightbearing, No resistive gripping or pinching       Time In: 9:15  Time Out:10:00   Total Billable Time: 45 minutes (1 FL, 1MT, 1TE )     SUBJECTIVE     Pt reports: she has been compliant with scar massage, HEP, and wear of silicone scar gel sheets at night.  She continues to report complaints of pain at dorsal wrist pain with some exercises.      She was compliant with home exercise program given last session.   Response to previous treatment: thumb movement better, less swelling    Functional change: able to use right hand as assist with IADLs     Pain: 0/10 pre-treat; wrist as 6/10 with pain with movement  Location: right hand      OBJECTIVE     Objective Measures updated at progress report unless specified.    Treatment     Jane received the treatments listed below:     Supervised modalities after being cleared for contradictions:     Fluidotherapy - to right hand for 15 min, continuous  air, 110 deg, air speed 100 to decrease pain, edema & scar tissue and increased tissue extensibility.     Cold Pack - N/A     Direct contact modalities after being cleared for contraindications:   Ultrasound - N/A     Manual therapy techniques: Joint mobilizations, Manual Lymphatic Drainage, Soft tissue Mobilization and Scar Massage were applied to the: right hand for 20 minutes, including:  -Scar massage using dycem to palmar incision and dorsal thumb incisions to decrease adhesions and pain  -Desensitization techniques using fingertip techniques around and over dorsal thumb incision    -Soft tissue mobilization using fingertip techniques of soft tissue surrounding palmar and dorsal thumb incisions to decrease adhesions and pain   -Retrograde massage to right hand to decrease edema and pain     Therapeutic exercises to develop strength, endurance, ROM and flexibility for 10 minutes, including:    Thumb circumduction, both directions  20 reps    Thumb flexion to 3rd finger 20 reps   Thumb palmar abduction  20 reps   Thumb radial abduction 20 reps    Retropulsion  2 X 10 reps    Opposition to tips of all digits 10 reps    Cupping of palm  10 reps    Tendon glides  20 reps    Wrist flexion/ extension  20 reps    Wrist radial deviation/ ulnar deviation  20 reps            Therapeutic activities to improve functional performance for 0  minutes, including:                                          Patient Education and Home Exercises      Education provided:   - using dycem use for scar massage; issued dycem 03/07/2022  - use of silicone scar gel sheets on dorsal thumb incision to prevent hypertrophic scarring   - Progress towards goals     Written Home Exercises Provided: Patient instructed to cont prior HEP. And new exercises issued   Exercises were reviewed and Jane was able to demonstrate them prior to the end of the session.  Jane demonstrated good  understanding of the HEP provided. See EMR under Patient  Instructions for exercises provided during therapy sessions.       Assessment   Patient has continued complaints of pain and tenderness with palpation over a nodule that is present radial to mid palmar incision. Patient with very mild edema at dorsal thumb near CMC joint at wrist. Minimal adherence to underlying tissue at dorsal thumb incision. Patient has continued complaints to dorsal wrist pain even with correct joint positioning during exercises.  Will continue to monitor at future sessions.  Pt would continue to benefit from skilled OT for Decreased ROM, Decreased  strength, Decreased pinch strength, Decreased functional hand use, Increased pain, Edema, Joint Stiffness and Scar Adhesions.        Jane is progressing well towards her goals and there are no updates to goals at this time. Pt prognosis is Good.     Pt will continue to benefit from skilled outpatient occupational therapy to address the deficits listed in the problem list on initial evaluation provide pt/family education and to maximize pt's level of independence in the home and community environment.     Pt's spiritual, cultural and educational needs considered and pt agreeable to plan of care and goals.    Anticipated barriers to occupational therapy: compliance with HEP, therapy attendance       Goals:   Short Term Goals: (in 6 weeks)  1) Patient will be independent in HEP -Met 3/22/2022  2) Patient will be independent with scar management techniques -Progressing   3) Decrease pain in right hand to no more than 4/10 worst in ADL/IADL's-Progressing  4) Increase AROM in right thumb for improved functioning in ADL/IADL's-Progressing  5) Decrease edema in right thumb by .3cm-Progressing        Long Term Goals: (in 12 weeks)  1) Decrease pain in right hand to no more than 2/10 worst in ADL/IADL's  2) Increase AROM of right  hand to WFL for increased functioning in ADL/IADL's  3) Increase strength in right  hand to WFL for improved functioning in  ADL/IADL's  4) Assess  strength right hand to increase functional use of left hand for improved functioning in ADL/IADL's  5) Decrease edema in left hand to trace or none  6) Increased functioning in ADL/IADL's, as evidenced by a FOTO impairment rating of no more than 35%          PLAN   Outpatient Occupational Therapy 2 times weekly for 12 weeks to include the following interventions: Paraffin, Fluidotherapy, Modalities for pain management, US 3 mhz, Therapeutic exercises/activities., Strengthening, Orthotic Fabrication/Fit/Training, Edema Control, Scar Management and Joint Protection.     Updates/Grading for next session: progress all AROM as tolerated by patient      Hermes Miramontes, OT

## 2022-03-18 ENCOUNTER — CLINICAL SUPPORT (OUTPATIENT)
Dept: REHABILITATION | Facility: HOSPITAL | Age: 57
End: 2022-03-18
Payer: OTHER GOVERNMENT

## 2022-03-18 DIAGNOSIS — M25.60 DECREASED RANGE OF MOTION: ICD-10-CM

## 2022-03-18 DIAGNOSIS — R52 PAIN: Primary | ICD-10-CM

## 2022-03-18 DIAGNOSIS — R53.1 DECREASED STRENGTH: ICD-10-CM

## 2022-03-18 DIAGNOSIS — L90.5 SCAR ADHERENT: ICD-10-CM

## 2022-03-18 PROCEDURE — 97140 MANUAL THERAPY 1/> REGIONS: CPT | Mod: PN

## 2022-03-18 PROCEDURE — 97018 PARAFFIN BATH THERAPY: CPT | Mod: PN,59

## 2022-03-18 PROCEDURE — 97110 THERAPEUTIC EXERCISES: CPT | Mod: PN

## 2022-03-18 NOTE — PROGRESS NOTES
[facility]  Occupational Therapy Treatment Note    Date: 3/18/2022  Name: Linette LIU [facility]  Clinic Number: [id number]    Therapy Diagnosis:   Encounter Diagnoses   Name Primary?    Pain Yes    Decreased range of motion     Decreased strength     Scar adherent      Physician: Azael Curran PA-C      Physician Orders: Evaluate and treat   Medical Diagnosis: M18.0 (ICD-10-CM) - Arthritis of carpometacarpal (CMC) joint of both thumbs    Surgical Procedure and Date:    Procedure(s) (LRB):  RELEASE, TRIGGER FINGER (Right) ring trigger finger   ARTHROPLASTY, CMC JOINT (Right)   thumb using Arthrex internal brace  Date of Procedure: 2/7/2022   Evaluation Date: 3/2/2022   Insurance Authorization Period Expiration: 3/2/2022 - 12/31/2022   Plan of Care Expiration: 3/2/2022 - 5/25/2022   Progress Note Due: 04/04/2022   Date of Return to MD: none scheduled at this time   Visit # / Visits authorized: 4 / 20 (plus initial evaluation)   FOTO: 1/3   Initial= 59% / Goal=34%      Precautions:  Standard and Weightbearing, No resistive gripping or pinching       Time In: 10:00  Time Out:10:45  Total Billable Time: 45 minutes (1 P, 1MT, 1TE )     SUBJECTIVE     Pt reports: She reports that the thumb is feeling much better.  She also reports that she still has wrist pain, but it is decreased   She was compliant with home exercise program given last session.   Response to previous treatment: increased active movement of thumb     Functional change: able to use right hand as assist with IADLs     Pain: 0/10 pre-treat; wrist as 1-2/10 with pain with movement  Location: right hand      OBJECTIVE     Objective Measures updated at progress report unless specified.    Treatment     Jane received the treatments listed below:     Supervised modalities after being cleared for contradictions:   Paraffin bath - Paraffin w/ MHP to right hand for 10 min, pre-tx to decrease pain & increase tissue extensibility  .      Cold Pack - N/A     Direct contact modalities after being cleared for contraindications:   Ultrasound - N/A     Manual therapy techniques: Joint mobilizations, Manual Lymphatic Drainage, Soft tissue Mobilization and Scar Massage were applied to the: right hand for 15 minutes, including:  -Scar massage using dycem and scar massage tools to palmar incision and dorsal thumb incisions to decrease adhesions and pain  -Desensitization techniques using fingertip techniques around and over dorsal thumb incision    -Soft tissue mobilization using fingertip techniques, cupping, and IASTM of soft tissue surrounding palmar and dorsal thumb incisions, and radial half of dorsal/volar wrist  to decrease adhesions and pain   -Retrograde massage to right hand to decrease edema and pain     Therapeutic exercises to develop strength, endurance, ROM and flexibility for 20 minutes, including:    Thumb circumduction, both directions  20 reps    Thumb flexion to digit #4 MCP   20 reps  *progressed   Thumb palmar abduction  20 reps    Thumb radial abduction 20 reps    Retropulsion  20 reps    Opposition to tips of all digits 10 reps    Thumb slides to MCP #5 10 reps *added    Cupping of palm  15 reps    Wrist flexion/ extension  20 reps    Wrist radial deviation/ ulnar deviation  20 reps    Theraputty yellow   -logrolling/finger extension  10 reps    -finger flexion, no thumb Not performed                   Therapeutic activities to improve functional performance for 0 minutes, including:     marbles-opposition  Not performed                                      Patient Education and Home Exercises      Education provided:   - using dycem use for scar massage; issued dycem 03/07/2022  - use of silicone scar gel sheets on dorsal thumb incision to prevent hypertrophic scarring   - Progress towards goals     Written Home Exercises Provided: Patient instructed to cont prior HEP.    Exercises were reviewed and Jane was able to  demonstrate them prior to the end of the session.  Jane demonstrated good  understanding of the HEP provided. See EMR under Patient Instructions for exercises provided during therapy sessions.       Assessment   Patient has continued complaints of pain and tenderness with palpation over a nodule that is present radial to mid palmar incision. Patient with very mild edema at dorsal thumb near CMC joint at wrist and dorsal wrist.  Minimal adherence to underlying tissue at dorsal thumb incision. Patient reports dorsal wrist pain is still present, but decreased.  Will continue to monitor at future sessions.  Pt would continue to benefit from skilled OT for Decreased ROM, Decreased  strength, Decreased pinch strength, Decreased functional hand use, Increased pain, Edema, Joint Stiffness and Scar Adhesions.        Jane is progressing well towards her goals and there are no updates to goals at this time. Pt prognosis is Good.     Pt will continue to benefit from skilled outpatient occupational therapy to address the deficits listed in the problem list on initial evaluation provide pt/family education and to maximize pt's level of independence in the home and community environment.     Pt's spiritual, cultural and educational needs considered and pt agreeable to plan of care and goals.    Anticipated barriers to occupational therapy: compliance with HEP, therapy attendance       Goals:   Short Term Goals: (in 6 weeks)  1) Patient will be independent in HEP -Met 3/11/2022  2) Patient will be independent with scar management techniques  -Met 3/18/2022   3) Decrease pain in right hand to no more than 4/10 worst in ADL/IADL's -Met 3/18/2022  4) Increase AROM in right thumb for improved functioning in ADL/IADL's-Progressing  5) Decrease edema in right thumb by .3cm-Progressing        Long Term Goals: (in 12 weeks)  1) Decrease pain in right hand to no more than 2/10 worst in ADL/IADL's  2) Increase AROM of right  hand to WFL  for increased functioning in ADL/IADL's  3) Increase strength in right  hand to WFL for improved functioning in ADL/IADL's  4) Assess  strength right hand to increase functional use of left hand for improved functioning in ADL/IADL's  5) Decrease edema in left hand to trace or none  6) Increased functioning in ADL/IADL's, as evidenced by a FOTO impairment rating of no more than 35%          PLAN   Outpatient Occupational Therapy 2 times weekly for 12 weeks to include the following interventions: Paraffin, Fluidotherapy, Modalities for pain management, US 3 mhz, Therapeutic exercises/activities., Strengthening, Orthotic Fabrication/Fit/Training, Edema Control, Scar Management and Joint Protection.     Updates/Grading for next session: progress all AROM as tolerated by patient      Hermes Miramontes, OT

## 2022-03-23 ENCOUNTER — OFFICE VISIT (OUTPATIENT)
Dept: ORTHOPEDICS | Facility: CLINIC | Age: 57
End: 2022-03-23
Payer: OTHER GOVERNMENT

## 2022-03-23 ENCOUNTER — CLINICAL SUPPORT (OUTPATIENT)
Dept: REHABILITATION | Facility: HOSPITAL | Age: 57
End: 2022-03-23
Payer: OTHER GOVERNMENT

## 2022-03-23 VITALS — BODY MASS INDEX: 40.97 KG/M2 | WEIGHT: 240 LBS | HEIGHT: 64 IN

## 2022-03-23 DIAGNOSIS — M25.60 DECREASED RANGE OF MOTION: ICD-10-CM

## 2022-03-23 DIAGNOSIS — R53.1 DECREASED STRENGTH: ICD-10-CM

## 2022-03-23 DIAGNOSIS — M18.0 ARTHRITIS OF CARPOMETACARPAL (CMC) JOINT OF BOTH THUMBS: Primary | ICD-10-CM

## 2022-03-23 DIAGNOSIS — L90.5 SCAR ADHERENT: ICD-10-CM

## 2022-03-23 DIAGNOSIS — R52 PAIN: Primary | ICD-10-CM

## 2022-03-23 PROCEDURE — 99024 PR POST-OP FOLLOW-UP VISIT: ICD-10-PCS | Mod: ,,, | Performed by: ORTHOPAEDIC SURGERY

## 2022-03-23 PROCEDURE — 99999 PR PBB SHADOW E&M-EST. PATIENT-LVL III: ICD-10-PCS | Mod: PBBFAC,,, | Performed by: ORTHOPAEDIC SURGERY

## 2022-03-23 PROCEDURE — 99999 PR PBB SHADOW E&M-EST. PATIENT-LVL III: CPT | Mod: PBBFAC,,, | Performed by: ORTHOPAEDIC SURGERY

## 2022-03-23 PROCEDURE — 99213 OFFICE O/P EST LOW 20 MIN: CPT | Mod: PBBFAC | Performed by: ORTHOPAEDIC SURGERY

## 2022-03-23 PROCEDURE — 97018 PARAFFIN BATH THERAPY: CPT | Mod: PN

## 2022-03-23 PROCEDURE — 97140 MANUAL THERAPY 1/> REGIONS: CPT | Mod: PN

## 2022-03-23 PROCEDURE — 97110 THERAPEUTIC EXERCISES: CPT | Mod: PN

## 2022-03-23 PROCEDURE — 99024 POSTOP FOLLOW-UP VISIT: CPT | Mod: ,,, | Performed by: ORTHOPAEDIC SURGERY

## 2022-03-23 NOTE — PROGRESS NOTES
Subjective:     Patient ID: Linette Galo is a 56 y.o. female.    Chief Complaint: Pain and Post-op Evaluation of the Right Hand      HPI:  The patient is a 56-year-old female seen in follow-up after a left thumb basal joint arthroplasty date of surgery was 02/07/2022.  I was called by the physical therapist who said she had a string in the incision.    Past Medical History:   Diagnosis Date    ADD (attention deficit disorder)     Anemia     Anxiety     Followed by Psychology    Depression     bipolar, anxiety, ADD    Family history of colon cancer 1/22/2019    Her grandmother had colon cancer.    Positive ARIK (antinuclear antibody)     Dr. Ross//following     Past Surgical History:   Procedure Laterality Date    CARPAL TUNNEL RELEASE      both hands    COLONOSCOPY N/A 1/22/2019    Procedure: COLONOSCOPY;  Surgeon: Moe Jimenez MD;  Location: [facility];  Service: Endoscopy;  Laterality: N/A;    GASTRIC BYPASS  2008    HYSTERECTOMY  2011    RALH (retains ovaries)    INTERPOSITION ARTHROPLASTY OF CARPOMETACARPAL JOINTS Right 2/7/2022    Procedure: INTERPOSITION ARTHROPLASTY, CMC JOINT;  Surgeon: Sterling Jade MD;  Location: [facility] OR;  Service: Orthopedics;  Laterality: Right;  right thumb basal joint arthroplasty     ARTHREX - LATRICE AWARE    TONSILLECTOMY      TRIGGER FINGER RELEASE Right 2/7/2022    Procedure: RELEASE, TRIGGER FINGER;  Surgeon: Sterling Jade MD;  Location: [facility];  Service: Orthopedics;  Laterality: Right;   right ring trigger finger release      Family History   Problem Relation Age of Onset    Breast cancer Maternal Grandmother     Colon cancer Maternal Grandmother     Hypertension Maternal Grandmother     Diabetes Maternal Grandmother     Thrombophilia Father     Parkinsonism Father     Lumbar disc disease Father     Stroke Father     Heart disease Father     Thrombophilia Sister     Hypertension Mother     Diabetes Mother     Hypertension  Paternal Grandmother     Diabetes Paternal Grandmother      Social History     Socioeconomic History    Marital status:    Tobacco Use    Smoking status: Never Smoker    Smokeless tobacco: Never Used   Substance and Sexual Activity    Alcohol use: Not Currently     Comment: socially    Drug use: No    Sexual activity: Yes     Partners: Male     Birth control/protection: Surgical     Comment: mut monog     Social Determinants of Health     Financial Resource Strain: Low Risk     Difficulty of Paying Living Expenses: Not hard at all   Food Insecurity: No Food Insecurity    Worried About Running Out of Food in the Last Year: Never true    Ran Out of Food in the Last Year: Never true   Transportation Needs: No Transportation Needs    Lack of Transportation (Medical): No    Lack of Transportation (Non-Medical): No   Physical Activity: Unknown    Days of Exercise per Week: 2 days   Stress: Stress Concern Present    Feeling of Stress : Rather much   Social Connections: Unknown    Frequency of Communication with Friends and Family: More than three times a week    Frequency of Social Gatherings with Friends and Family: Once a week    Active Member of Clubs or Organizations: No    Attends Club or Organization Meetings: Never    Marital Status:      Medication List with Changes/Refills   Current Medications    BUPROPION (WELLBUTRIN SR) 200 MG SR12        CHOLECALCIFEROL, VITAMIN D3, (VITAMIN D3) 1,000 UNIT CAPSULE    Take 1 capsule (1,000 Units total) by mouth once daily.    CYANOCOBALAMIN, VITAMIN B-12, 50 MCG TABLET    Take 50 mcg by mouth once daily.    DULOXETINE (CYMBALTA) 20 MG CAPSULE    Take 60 mg by mouth once daily.    LAMOTRIGINE (LAMICTAL) 100 MG TABLET    Take by mouth once daily.    MULTIVITAMIN CAPSULE    Take 1 capsule by mouth once daily.    VYVANSE 70 MG CAPSULE    Take 70 mg by mouth every morning.    Discontinued Medications    OXYCODONE-ACETAMINOPHEN (PERCOCET)  MG  PER TABLET    Take 1 tablet by mouth every 6 (six) hours as needed for Pain.    OXYCODONE-ACETAMINOPHEN (PERCOCET)  MG PER TABLET    Take 1 tablet by mouth 4 (four) times daily as needed for Pain.     Review of patient's allergies indicates:  No Known Allergies  Review of Systems   Constitutional: Negative for malaise/fatigue.   HENT: Negative for hearing loss.    Eyes: Negative for double vision and visual disturbance.   Cardiovascular: Negative for chest pain.   Respiratory: Negative for shortness of breath.    Endocrine: Negative for cold intolerance.   Hematologic/Lymphatic: Does not bruise/bleed easily.   Skin: Negative for poor wound healing and suspicious lesions.   Musculoskeletal: Positive for arthritis, joint pain and joint swelling. Negative for gout.   Gastrointestinal: Negative for nausea and vomiting.   Genitourinary: Negative for dysuria.   Neurological: Negative for numbness, paresthesias and sensory change.   Psychiatric/Behavioral: Negative for depression, memory loss and substance abuse. The patient is not nervous/anxious.    Allergic/Immunologic: Negative for persistent infections.       Objective:   Body mass index is 41.2 kg/m².  There were no vitals filed for this visit.             General    Constitutional: She is oriented to person, place, and time. She appears well-developed and well-nourished. No distress.   HENT:   Head: Normocephalic.   Eyes: EOM are normal.   Pulmonary/Chest: Effort normal.   Neurological: She is oriented to person, place, and time.   Psychiatric: She has a normal mood and affect.         Left Hand/Wrist Exam     Inspection   Scars: Wrist - present Hand -  present  Effusion: Wrist - absent Hand -  absent    Other     Sensory Exam  Median Distribution: normal  Ulnar Distribution: normal  Radial Distribution: normal    Comments:  The patient has a cervical suture in the thumb arthroplasty scar.  This was removed with the sterile hemostat.  It appeared to be a  absorbable Vicryl suture.  There is no sign of infection.  There are no motor or sensory deficits.  She also has a ring finger hypertrophic trigger finger scar.  There is no further triggering noted.  There are no motor or sensory deficits.          Vascular Exam       Capillary Refill  Left Hand: normal capillary refill      Relevant imaging results reviewed and interpreted by me, discussed with the patient and / or family today radiographs were not obtained today  Assessment:     Encounter Diagnosis   Name Primary?    Arthritis of carpometacarpal (CMC) joint of both thumbs Yes    Left ring trigger finger release    Plan:   And  The patient had the absorbable suture removed from the thumb arthroplasty scar without difficulty.  There is no sign of infection.  There are no motor or sensory deficits.  She will keep her regular follow-up appointment visit.  She is concerned about the hypertrophic scar ring finger trigger finger release.  I told her at this point the scar is hypertrophic  this is expected.                Disclaimer: This note was prepared using a voice recognition system and is likely to have sound alike errors within the text.

## 2022-03-23 NOTE — PROGRESS NOTES
"  [facility]  Occupational Therapy Treatment Note    Date: 3/23/2022  Name: Linette LIU [facility]  Clinic Number: [id number]    Therapy Diagnosis:   Encounter Diagnoses   Name Primary?    Pain Yes    Decreased range of motion     Decreased strength     Scar adherent      Physician: Azael Curran PA-C      Physician Orders: Evaluate and treat   Medical Diagnosis: M18.0 (ICD-10-CM) - Arthritis of carpometacarpal (CMC) joint of both thumbs    Surgical Procedure and Date:    Procedure(s) (LRB):  RELEASE, TRIGGER FINGER (Right) ring trigger finger   ARTHROPLASTY, CMC JOINT (Right)   thumb using Arthrex internal brace  Date of Procedure: 2/7/2022   Evaluation Date: 3/2/2022   Insurance Authorization Period Expiration: 3/2/2022 - 12/31/2022   Plan of Care Expiration: 3/2/2022 - 5/25/2022   Progress Note Due: 04/04/2022   Date of Return to MD: none scheduled at this time   Visit # / Visits authorized: 5 / 20 (plus initial evaluation)   FOTO: 1/3   Initial= 59% / Goal=34%      Precautions:  Standard and Weightbearing, No resistive gripping or pinching       Time In: 8:30  Time Out: 9:15  Total Billable Time: 45  minutes (1 P, 1MT, 1TE )     SUBJECTIVE     Pt reports: Linette Lara reports that there was a blue/black "pimple" looking area at proximal end of her incision that she noticed Monday.  She used a needle sterilized with alcohol and popped it; she reports there was a little bloody drainage. She is reporting thumb pain as 0/10 pre-treatment.  She also reports that wrist pain has decreased.      She was compliant with home exercise program given last session.   Response to previous treatment: increased thumb movement with less pain      Functional change: able to use right hand as assist with IADLs; she reports that she does not  anything heavy or use tight gripping     Pain: 0/10 pre-treat; wrist as 1-2/10 with pain with some exercises   Location: right hand      OBJECTIVE "     Objective Measures updated at progress report unless specified.    Treatment     Jane received the treatments listed below:     Supervised modalities after being cleared for contradictions:   Paraffin bath - Paraffin w/ MHP to right hand for 10 min, pre-tx to decrease pain & increase tissue extensibility. Paraffin w/ mitten to left hand for 10 min to decrease arthritic pain in right CMC joint      .     Cold Pack - N/A     Direct contact modalities after being cleared for contraindications:   Ultrasound - N/A     Manual therapy techniques: Joint mobilizations, Manual Lymphatic Drainage, Soft tissue Mobilization and Scar Massage were applied to the: right hand for 15 minutes, including:  -Scar massage using dycem, scar massage  tools, and cupping to palmar incision and dorsal thumb incisions to decrease adhesions and pain  -Desensitization techniques using fingertip techniques around and over dorsal thumb incision    -Soft tissue mobilization using fingertip techniques, cupping, and IASTM of soft tissue surrounding palmar and dorsal thumb incisions, and radial half of dorsal/volar wrist  to decrease adhesions and pain   -Retrograde massage to right hand to decrease edema and pain     Therapeutic exercises to develop strength, endurance, ROM and flexibility for 20 minutes, including:    Thumb circumduction, both directions  20 reps    Thumb flexion to digit #4 MCP   20 reps  *progressed    Thumb palmar abduction  20 reps    Thumb radial abduction 20 reps    Retropulsion  20 reps    Thumb slides to MCP #5 15 reps *added    Cupping of palm  15 reps    Wrist flexion/ extension fisted  20 reps    Wrist radial deviation/ulnar deviation fisted  20 reps   isopheres pronated hand 1 min    9 hole peg for opposition to each digit 5 trials            Therapeutic activities to improve functional performance for 0 minutes, including:     marbles-opposition  Not performed                                      Patient  Education and Home Exercises      Education provided:   - using dycem use for scar massage; issued dycem 03/07/2022  - use of silicone scar gel sheets on dorsal thumb incision to prevent hypertrophic scarring   - Progress towards goals     Written Home Exercises Provided: Patient instructed to cont prior HEP.    Exercises were reviewed and Jane was able to demonstrate them prior to the end of the session.  Jane demonstrated good  understanding of the HEP provided. See EMR under Patient Instructions for exercises provided during therapy sessions.       Assessment   Please see patient's subjective report above regarding pimple at thumb incision.  Incision was inspected under flashlight prior to paraffin dip. It was not noted to have any pustules or open areas; it was noted to have 2 spots of dried skin: one at distal incision (where patient reported pimple had been) and one at distal end of incision.  With soft tissue mobilization, proximal dried area skin peeled away and there was scant clear drainage.  Distal area of dried skin remained intact. Pictures were taken and will be sent to doctor's office to see if there are any concerns.           Patient has continued complaints of pain and tenderness with palpation over a nodule that is present radial to mid palmar incision.  Minimal adherence to underlying tissue at dorsal thumb incision. Patient reports dorsal wrist pain is decreasing.  Patient has minimal complaints for increased pain as 1-2 with therapeutic activities requiring thumb adduction.  All other exercises tolerated well.  Pt would continue to benefit from skilled OT for Decreased ROM, Decreased  strength, Decreased pinch strength, Decreased functional hand use, Increased pain, Edema, Joint Stiffness and Scar Adhesions.        Jane is progressing well towards her goals and there are no updates to goals at this time. Pt prognosis is Good.     Pt will continue to benefit from skilled outpatient  occupational therapy to address the deficits listed in the problem list on initial evaluation provide pt/family education and to maximize pt's level of independence in the home and community environment.     Pt's spiritual, cultural and educational needs considered and pt agreeable to plan of care and goals.    Anticipated barriers to occupational therapy: compliance with HEP, therapy attendance       Goals:   Short Term Goals: (in 6 weeks)  1) Patient will be independent in HEP -Met 3/11/2022  2) Patient will be independent with scar management techniques  -Met 3/18/2022   3) Decrease pain in right hand to no more than 4/10 worst in ADL/IADL's -Met 3/18/2022  4) Increase AROM in right thumb for improved functioning in ADL/IADL's-Progressing  5) Decrease edema in right thumb by .3cm-Progressing        Long Term Goals: (in 12 weeks)  1) Decrease pain in right hand to no more than 2/10 worst in ADL/IADL's  2) Increase AROM of right  hand to WFL for increased functioning in ADL/IADL's  3) Increase strength in right  hand to WFL for improved functioning in ADL/IADL's  4) Assess  strength right hand to increase functional use of left hand for improved functioning in ADL/IADL's  5) Decrease edema in left hand to trace or none  6) Increased functioning in ADL/IADL's, as evidenced by a FOTO impairment rating of no more than 35%          PLAN   Outpatient Occupational Therapy 2 times weekly for 12 weeks to include the following interventions: Paraffin, Fluidotherapy, Modalities for pain management, US 3 mhz, Therapeutic exercises/activities., Strengthening, Orthotic Fabrication/Fit/Training, Edema Control, Scar Management and Joint Protection.     Updates/Grading for next session: progress all AROM as tolerated by patient      Hermes Miramontes, OT

## 2022-03-29 ENCOUNTER — CLINICAL SUPPORT (OUTPATIENT)
Dept: REHABILITATION | Facility: HOSPITAL | Age: 57
End: 2022-03-29
Payer: OTHER GOVERNMENT

## 2022-03-29 DIAGNOSIS — R53.1 DECREASED STRENGTH: ICD-10-CM

## 2022-03-29 DIAGNOSIS — L90.5 SCAR ADHERENT: ICD-10-CM

## 2022-03-29 DIAGNOSIS — R52 PAIN: Primary | ICD-10-CM

## 2022-03-29 DIAGNOSIS — M25.60 DECREASED RANGE OF MOTION: ICD-10-CM

## 2022-03-29 PROCEDURE — 97022 WHIRLPOOL THERAPY: CPT | Mod: PN

## 2022-03-29 PROCEDURE — 97110 THERAPEUTIC EXERCISES: CPT | Mod: PN

## 2022-03-29 PROCEDURE — 97140 MANUAL THERAPY 1/> REGIONS: CPT | Mod: PN

## 2022-03-29 NOTE — PROGRESS NOTES
" [facility]  Occupational Therapy Treatment Note    Date: 3/29/2022  Name: Linette LIU [facility]  Clinic Number: [id number]    Therapy Diagnosis:   Encounter Diagnoses   Name Primary?    Pain Yes    Decreased range of motion     Decreased strength     Scar adherent      Physician: Azael Curran PA-C      Physician Orders: Evaluate and treat   Medical Diagnosis: M18.0 (ICD-10-CM) - Arthritis of carpometacarpal (CMC) joint of both thumbs    Surgical Procedure and Date:    Procedure(s) (LRB):  RELEASE, TRIGGER FINGER (Right) ring trigger finger   ARTHROPLASTY, CMC JOINT (Right)   thumb using Arthrex internal brace  Date of Procedure: 2/7/2022   Evaluation Date: 3/2/2022   Insurance Authorization Period Expiration: 3/2/2022 - 12/31/2022   Plan of Care Expiration: 3/2/2022 - 5/25/2022   Progress Note Due: 04/04/2022   Date of Return to MD: none scheduled at this time   Visit # / Visits authorized: 6 / 20 (plus initial evaluation)   FOTO: 1/3   Initial= 59% / Goal=34%      Precautions:  Standard and Weightbearing, No resistive gripping or pinching       Time In: 9:15  Time Out: 10:00   Total Billable Time: 45 minutes (1 FL, 1MT, 1TE )     SUBJECTIVE     Pt reports: She is reporting thumb pain as 1/10 pre-treatment; describes as "achy."  She also reports that wrist pain is better. Patient is reporting increased tightness in palm over trigger finger incision.    She was compliant with home exercise program given last session.   Response to previous treatment: increased thumb movement with less pain      Functional change: able to use right hand lightly as assist with IADLs      Pain: 1/10 pre-treat "achy"   Location: right hand      OBJECTIVE     Objective Measures updated at progress report unless specified.    Treatment     Linette received the treatments listed below:     Supervised modalities after being cleared for contradictions:   Fluidotherapy - to right hand for 15 min, continuous air, " 110 deg, air speed 100 to decrease pain, edema & scar tissue and increased tissue extensibility.     Cold Pack - N/A     Direct contact modalities after being cleared for contraindications:   Ultrasound - N/A     Manual therapy techniques: Joint mobilizations, Manual Lymphatic Drainage, Soft tissue Mobilization and Scar Massage were applied to the: right hand for 15 minutes, including:  -Scar massage using dycem, scar massage  tools, and cupping to palmar incision and dorsal thumb incisions to decrease adhesions and pain  -Desensitization techniques using fingertip techniques around and over dorsal thumb incision    -Soft tissue mobilization using fingertip techniques, cupping, and IASTM of soft tissue surrounding palmar and dorsal thumb incisions, and radial half of dorsal/volar wrist  to decrease adhesions and pain   -Retrograde massage to right hand to decrease edema and pain     Therapeutic exercises to develop strength, endurance, ROM and flexibility for 15 minutes, including:    Thumb circumduction, both directions  20 reps    Thumb flexion to digit #4 MCP   20 reps     Thumb palmar abduction  20 reps    Thumb radial abduction 20 reps    Retropulsion  20 reps    Thumb slides to MCP #5 20 reps     Cupping of palm  15 reps    Wrist flexion/ extension fisted  20 reps    Wrist radial deviation/ulnar deviation fisted  20 reps   Theraputty yellow   -logrolling/finger extension  10 reps    -pancake/finger [id number] reps MCP stretch on table   3 reps        Therapeutic activities to improve functional performance for 0 minutes, including:     marbles-opposition  Not performed                                      Patient Education and Home Exercises      Education provided:   - using dycem use for scar massage; issued dycem 03/07/2022  - use of silicone scar gel sheets on dorsal thumb incision to prevent hypertrophic scarring   - Progress towards goals     Written Home Exercises Provided: Patient instructed to  cont prior HEP.    Exercises were reviewed and Linette was able to demonstrate them prior to the end of the session.  Linette demonstrated good  understanding of the HEP provided. See EMR under Patient Instructions for exercises provided during therapy sessions.       Assessment   Patient has continued complaints of pain and tenderness with palpation over a nodule that is present radial to mid palmar incision.  Thumb incisional scar is mobile with minimal adherence to underlying tissue. Patient reports dorsal wrist pain is decreasing.  Patient tolerated exercises well with no significant increase in pain or difficulty.  Pt would continue to benefit from skilled OT for Decreased ROM, Decreased  strength, Decreased pinch strength, Decreased functional hand use, Increased pain, Edema, Joint Stiffness and Scar Adhesions.        Linette is progressing well towards her goals and there are no updates to goals at this time. Pt prognosis is Good.     Pt will continue to benefit from skilled outpatient occupational therapy to address the deficits listed in the problem list on initial evaluation provide pt/family education and to maximize pt's level of independence in the home and community environment.     Pt's spiritual, cultural and educational needs considered and pt agreeable to plan of care and goals.    Anticipated barriers to occupational therapy: compliance with HEP, therapy attendance       Goals:   Short Term Goals: (in 6 weeks)  1) Patient will be independent in HEP -Met 3/11/2022  2) Patient will be independent with scar management techniques  -Met 3/18/2022   3) Decrease pain in right hand to no more than 4/10 worst in ADL/IADL's -Met 3/18/2022  4) Increase AROM in right thumb for improved functioning in ADL/IADL's-Progressing  5) Decrease edema in right thumb by .3cm-Progressing        Long Term Goals: (in 12 weeks)  1) Decrease pain in right hand to no more than 2/10 worst in ADL/IADL's  2) Increase AROM of right   hand to WFL for increased functioning in ADL/IADL's  3) Increase strength in right  hand to WFL for improved functioning in ADL/IADL's  4) Assess  strength right hand to increase functional use of left hand for improved functioning in ADL/IADL's  5) Decrease edema in left hand to trace or none  6) Increased functioning in ADL/IADL's, as evidenced by a FOTO impairment rating of no more than 35%          PLAN   Outpatient Occupational Therapy 2 times weekly for 12 weeks to include the following interventions: Paraffin, Fluidotherapy, Modalities for pain management, US 3 mhz, Therapeutic exercises/activities., Strengthening, Orthotic Fabrication/Fit/Training, Edema Control, Scar Management and Joint Protection.     Updates/Grading for next session: progress all AROM as tolerated by patient      Hermes Miramontes OT

## 2022-03-30 ENCOUNTER — CLINICAL SUPPORT (OUTPATIENT)
Dept: REHABILITATION | Facility: HOSPITAL | Age: 57
End: 2022-03-30
Payer: OTHER GOVERNMENT

## 2022-03-30 DIAGNOSIS — R52 PAIN: Primary | ICD-10-CM

## 2022-03-30 DIAGNOSIS — L90.5 SCAR ADHERENT: ICD-10-CM

## 2022-03-30 DIAGNOSIS — R53.1 DECREASED STRENGTH: ICD-10-CM

## 2022-03-30 DIAGNOSIS — M25.60 DECREASED RANGE OF MOTION: ICD-10-CM

## 2022-03-30 PROCEDURE — 97140 MANUAL THERAPY 1/> REGIONS: CPT | Mod: PN

## 2022-03-30 PROCEDURE — 97110 THERAPEUTIC EXERCISES: CPT | Mod: PN

## 2022-03-30 PROCEDURE — 97018 PARAFFIN BATH THERAPY: CPT | Mod: PN

## 2022-03-30 NOTE — PROGRESS NOTES
[facility]  Occupational Therapy Treatment Note    Date: 3/30/2022  Name: Linette LIU [facility]  Clinic Number: [id number]    Therapy Diagnosis:   Encounter Diagnoses   Name Primary?    Pain Yes    Decreased range of motion     Decreased strength     Scar adherent      Physician: Azael Curran PA-C      Physician Orders: Evaluate and treat   Medical Diagnosis: M18.0 (ICD-10-CM) - Arthritis of carpometacarpal (CMC) joint of both thumbs    Surgical Procedure and Date:    Procedure(s) (LRB):  RELEASE, TRIGGER FINGER (Right) ring trigger finger   ARTHROPLASTY, CMC JOINT (Right)   thumb using Arthrex internal brace  Date of Procedure: 2/7/2022   Evaluation Date: 3/2/2022   Insurance Authorization Period Expiration: 3/2/2022 - 12/31/2022   Plan of Care Expiration: 3/2/2022 - 5/25/2022   Progress Note Due: 04/04/2022   Date of Return to MD: none scheduled at this time   Visit # / Visits authorized: 7/ 20 (plus initial evaluation)   FOTO: 1/3   Initial= 59% / Current= 47% / Goal=34%      Precautions:  Standard and Weightbearing, No resistive gripping or pinching       Time In: 8:30  Time Out: 9:15   Total Billable Time: 45 minutes (1 P, 1MT, 1TE )     SUBJECTIVE     Pt reports: She is reporting thumb pain as 0/10 today. She also reports that wrist pain is better. She is reporting increased tightness in palm over trigger finger incision and increased morning stiffness.    She was compliant with home exercise program given last session.   Response to previous treatment: increased thumb movement, no pain      Functional change: able to use right hand lightly as assist with IADLs      Pain: 0/10    Location: right hand      OBJECTIVE     Objective Measures updated at progress report unless specified.    Treatment     Linette received the treatments listed below:     Supervised modalities after being cleared for contradictions:   Paraffin bath - Paraffin w/ MHP to right hand for 10 min, pre-tx to  decrease pain & increase tissue extensibility. Paraffin w/ mitten to left hand for 10 min to decrease arthritic pain in right CMC joint      Cold Pack - N/A     Direct contact modalities after being cleared for contraindications:   Ultrasound - N/A     Manual therapy techniques: Joint mobilizations, Manual Lymphatic Drainage, Soft tissue Mobilization and Scar Massage were applied to the: right hand for 15 minutes, including:  -Scar massage using dycem, scar massage tools, and cupping to palmar incision and dorsal thumb incisions to decrease adhesions and pain  -Desensitization techniques using fingertip techniques around and over dorsal thumb incision    -Soft tissue mobilization using fingertip techniques, cupping, and IASTM of soft tissue surrounding palmar and dorsal thumb incisions, and radial half of dorsal/volar wrist  to decrease adhesions and pain   -Retrograde massage to right hand to decrease edema and pain     Therapeutic exercises to develop strength, endurance, ROM and flexibility for 15 minutes, including:    Thumb circumduction, both directions  20 reps    Thumb flexion to digit #4 MCP   20 reps     Thumb palmar abduction  20 reps    Thumb radial abduction 20 reps    Retropulsion  20 reps    Thumb slides to MCP #5 20 reps     Cupping of palm  20 reps     Wrist flexion/ extension fisted  20 reps    Wrist radial deviation/ulnar deviation fisted  20 reps   Theraputty yellow   -logrolling/finger extension  10 reps    -pancake/finger [id number] reps -finger flexion, no thumb 10 reps    isopheres pronated hand 1 min    9 hole peg for opposition to each digit    MCP stretch on table          Therapeutic activities to improve functional performance for 0 minutes, including:     marbles-opposition  Not performed                                      Patient Education and Home Exercises      Education provided:   - using dycem use for scar massage; issued dycem 03/07/2022  - use of silicone scar gel  sheets on dorsal thumb incision to prevent hypertrophic scarring   - Progress towards goals     Written Home Exercises Provided: Patient instructed to cont prior HEP.    Exercises were reviewed and Linette was able to demonstrate them prior to the end of the session.  Linette demonstrated good  understanding of the HEP provided. See EMR under Patient Instructions for exercises provided during therapy sessions.       Assessment   Patient has continued complaints of pain and tenderness with palpation over a nodule that is present radial to mid palmar incision.  She has increased sensitivity with scar massage in this area. Thumb incisional scar is mobile with minimal adherence to underlying tissue. Patient tolerated exercises well with no significant increase in pain or difficulty. Recommended purchase of microwaveable warming mittens for hands to help with morning stiffness. Pt would continue to benefit from skilled OT for Decreased ROM, Decreased  strength, Decreased pinch strength, Decreased functional hand use, Increased pain, Edema, Joint Stiffness and Scar Adhesions.        Linette is progressing well towards her goals and there are no updates to goals at this time. Pt prognosis is Good.     Pt will continue to benefit from skilled outpatient occupational therapy to address the deficits listed in the problem list on initial evaluation provide pt/family education and to maximize pt's level of independence in the home and community environment.     Pt's spiritual, cultural and educational needs considered and pt agreeable to plan of care and goals.    Anticipated barriers to occupational therapy: compliance with HEP, therapy attendance       Goals:   Short Term Goals: (in 6 weeks)  1) Patient will be independent in HEP -Met 3/11/2022  2) Patient will be independent with scar management techniques  -Met 3/18/2022   3) Decrease pain in right hand to no more than 4/10 worst in ADL/IADL's -Met 3/18/2022  4) Increase AROM in  right thumb for improved functioning in ADL/IADL's-Progressing  5) Decrease edema in right thumb by .3cm-Progressing        Long Term Goals: (in 12 weeks)  1) Decrease pain in right hand to no more than 2/10 worst in ADL/IADL's  2) Increase AROM of right  hand to WFL for increased functioning in ADL/IADL's  3) Increase strength in right  hand to WFL for improved functioning in ADL/IADL's  4) Assess  strength right hand to increase functional use of left hand for improved functioning in ADL/IADL's  5) Decrease edema in left hand to trace or none  6) Increased functioning in ADL/IADL's, as evidenced by a FOTO impairment rating of no more than 35%          PLAN   Outpatient Occupational Therapy 2 times weekly for 12 weeks to include the following interventions: Paraffin, Fluidotherapy, Modalities for pain management, US 3 mhz, Therapeutic exercises/activities., Strengthening, Orthotic Fabrication/Fit/Training, Edema Control, Scar Management and Joint Protection.     Updates/Grading for next session: progress with isometric as tolerated by patient       Hermes Miramontes OT

## 2022-04-06 ENCOUNTER — CLINICAL SUPPORT (OUTPATIENT)
Dept: REHABILITATION | Facility: HOSPITAL | Age: 57
End: 2022-04-06
Payer: OTHER GOVERNMENT

## 2022-04-06 DIAGNOSIS — L90.5 SCAR ADHERENT: ICD-10-CM

## 2022-04-06 DIAGNOSIS — R52 PAIN: Primary | ICD-10-CM

## 2022-04-06 DIAGNOSIS — M25.60 DECREASED RANGE OF MOTION: ICD-10-CM

## 2022-04-06 DIAGNOSIS — R53.1 DECREASED STRENGTH: ICD-10-CM

## 2022-04-06 PROCEDURE — 97018 PARAFFIN BATH THERAPY: CPT | Mod: PN

## 2022-04-06 PROCEDURE — 97110 THERAPEUTIC EXERCISES: CPT | Mod: PN

## 2022-04-06 NOTE — PROGRESS NOTES
[facility]  Occupational Therapy Treatment Note    Date: 4/6/2022  Name: Linette LIU Iraj  Clinic Number: [id number]    Therapy Diagnosis:   Encounter Diagnoses   Name Primary?    Pain Yes    Decreased range of motion     Decreased strength     Scar adherent      Physician: Azael Curran PA-C      Physician Orders: Evaluate and treat   Medical Diagnosis: M18.0 (ICD-10-CM) - Arthritis of carpometacarpal (CMC) joint of both thumbs    Surgical Procedure and Date:    Procedure(s) (LRB):  RELEASE, TRIGGER FINGER (Right) ring trigger finger   ARTHROPLASTY, CMC JOINT (Right)   thumb using Arthrex internal brace  Date of Procedure: 2/7/2022   Evaluation Date: 3/2/2022   Insurance Authorization Period Expiration: 3/2/2022 - 12/31/2022   Plan of Care Expiration: 3/2/2022 - 5/25/2022   Progress Note Due: 5/11/2022  Date of Return to MD: none scheduled at this time   Visit # / Visits authorized: 8/ 20 (plus initial evaluation)   FOTO: 2/3   Initial= 59% / Current= 47% / Goal=34%       Precautions:  Standard and Weightbearing, No resistive gripping or pinching       Time In: 9:15  Time Out: 10:00   Total Billable Time: 45 minutes (1 P, 2 TE )     SUBJECTIVE     Pt reports: She continues to report thumb pain as 0/10 and decrease in wrist pain with exercises.  She has not been doing scar massage over palmar incision and reports that she thinks it is better.  She continues with complaints of increased tightness in this area.  She has received her microwaveable warming mitts and finds them helpful.    She was compliant with home exercise program given last session.   Response to previous treatment: increased thumb movement without pain      Functional change: continues to use right hand lightly as assist with IADLs; used as assist with yard work      Pain: 0/10    Location: right hand        OBJECTIVE     Objective Measures updated today, 4/6/2022 . Please see progress note in treatment tab.        Treatment     Linette received the treatments listed below:     Supervised modalities after being cleared for contradictions:   Paraffin bath - Paraffin w/ MHP to right hand for 10 min, pre-tx to decrease pain & increase tissue extensibility.    Cold Pack - N/A     Direct contact modalities after being cleared for contraindications:   Ultrasound - N/A     Manual therapy techniques: Joint mobilizations, Manual Lymphatic Drainage, Soft tissue Mobilization and Scar Massage were applied to the: right hand for 5 minutes, including:  -Scar massage using dycem, scar massage tools, and cupping to palmar incision and dorsal thumb incisions to decrease adhesions and pain  -Desensitization techniques using fingertip techniques around and over dorsal thumb incision    -Soft tissue mobilization using fingertip techniques, cupping, and IASTM of soft tissue surrounding palmar and dorsal thumb incisions, and radial half of dorsal/volar wrist  to decrease adhesions and pain   -Retrograde massage to right hand to decrease edema and pain     Therapeutic exercises to develop strength, endurance, ROM and flexibility for 30 minutes, including:  Objectives measures taken  4/6/2022.    Thumb circumduction, both directions  20 reps    Thumb flexion to digit #4 MCP   20 reps     Thumb flexion isometric with small green sponge  5 X 5 sec hold    Thumb palmar abduction  20 reps    Thumb palmar abduction isometric with small green sponge 5 X 5 sec hold    Thumb radial abduction 20 reps    Thumb radial abduction isometric with small green sponge  5 X 5 sec hold     Retropulsion  20 reps    Thumb slides to MCP #5 20 reps     Thumb opposition isometric with small green sponge, hand supinated   5 X 5 sec hold    First dorsal interossei  10 X 5 sec hold    Cupping of palm  20 reps     Wrist flexion/ extension fisted  20 reps    Wrist radial deviation/ulnar deviation fisted  20 reps       MCP stretch on table   3 reps        Therapeutic activities  to improve functional performance for 0 minutes, including:     marbles-opposition  Not performed                                      Patient Education and Home Exercises      Education provided:   - using dycem use for scar massage; issued dycem 03/07/2022  - use of silicone scar gel sheets on dorsal thumb incision to prevent hypertrophic scarring   - Progress towards goals     Written Home Exercises Provided: Patient instructed to cont prior HEP.    Exercises were reviewed and Linette was able to demonstrate them prior to the end of the session.  Linette demonstrated good  understanding of the HEP provided. See EMR under Patient Instructions for exercises provided during therapy sessions.       Assessment   Patient has continued complaints of pain and tenderness with palpation over a nodule that is present radial to mid palmar incision; She has increased sensitivity with scar massage in this area.  She reports that she has not been performing scar massage in this area and feels that it is better.  Thumb incisional scar is mobile with minimal adherence to underlying tissue. Patient tolerated exercises well with no significant increase in pain or difficulty. Patient has received recommended microwaveable warming mittens for hands and reports they are helpful. Pt would continue to benefit from skilled OT for Decreased ROM, Decreased  strength, Decreased pinch strength, Decreased functional hand use, Increased pain, Edema, Joint Stiffness and Scar Adhesions.        Linette is progressing well towards her goals and there are no updates to goals at this time. Pt prognosis is Good.     Pt will continue to benefit from skilled outpatient occupational therapy to address the deficits listed in the problem list on initial evaluation provide pt/family education and to maximize pt's level of independence in the home and community environment.     Pt's spiritual, cultural and educational needs considered and pt agreeable to  plan of care and goals.    Anticipated barriers to occupational therapy: compliance with HEP, therapy attendance       Goals:   Short Term Goals: (in 6 weeks)  1) Patient will be independent in HEP -Met 3/11/2022  2) Patient will be independent with scar management techniques  -Met 3/18/2022   3) Decrease pain in right hand to no more than 4/10 worst in ADL/IADL's -Met 3/18/2022  4) Increase AROM in right thumb for improved functioning in ADL/IADL's -Partially met 4/6/2022  5) Decrease edema in right thumb by .3cm -Partially met 4/6/2022 (met for MCPs at dorsal hand)         Long Term Goals: (in 12 weeks)  1) Decrease pain in right hand to no more than 2/10 worst in ADL/IADL's -Met 4/6/2022   2) Increase AROM of right  hand to WFL for increased functioning in ADL/IADL's -Progressing  3) Increase strength in right  hand to WFL for improved functioning in ADL/IADL's -Progressing   4) Assess  strength right hand to increase functional use of left hand for improved functioning in ADL/IADL's-Progressing  5) Decrease edema in left hand to trace or none-Progressing  6) Increased functioning in ADL/IADL's, as evidenced by a FOTO impairment rating of no more than 35%-Progressing          PLAN   Outpatient Occupational Therapy 2 times weekly for 12 weeks to include the following interventions: Paraffin, Fluidotherapy, Modalities for pain management, US 3 mhz, Therapeutic exercises/activities., Strengthening, Orthotic Fabrication/Fit/Training, Edema Control, Scar Management and Joint Protection.     Updates/Grading for next session: progress with isometric as tolerated by patient       Hermes Miramontes, OT

## 2022-04-06 NOTE — PLAN OF CARE
Outpatient Therapy Updated Plan of Care     Visit Date: 4/6/2022  Name: Linette Galo  Clinic Number: [id number]    Therapy Diagnosis:   Encounter Diagnoses   Name Primary?    Pain Yes    Decreased range of motion     Decreased strength     Scar adherent      Physician: Azael Curran PA-C    Physician Orders: Evaluate and treat   Medical Diagnosis: M18.0 (ICD-10-CM) - Arthritis of carpometacarpal (CMC) joint of both thumbs  Evaluation Date: 3/2/2022     Total Visits Received: 9 (including evaluation)  Cancelled Visits: 3  No Show Visits: 0    Current Certification Period:  3/2/2022 - 5/25/2022   Precautions:   Standard and Weightbearing, No resistive gripping or pinching       Visits from Evaluation Date:  8  Functional Level Prior to Evaluation:  Independent     Subjective     Update: Patient is reporting pain as 0/10 in hand and wrist. She has continued complaints of tenderness over a palpable nodule at palmar incision.      Objective       Edema. Measured in centimeters.    3/2/2022 3/2/2022 4/6/2022     Left Right Right   MCPs 20.5 20.5 20.1      Edema. Measured in centimeters.    3/2/2022 3/2/2022 4/6/2022     Left Right Right   Thumb:        Prox. Phalanx 6.1 6.9 6.8   IP 6.5 6.9 6.8   Distal Phalanx 5.1 5.4 5.6      Hand ROM. Measured in degrees.    3/2/2022 3/2/2022 4/6/2022     Left Right Right       Wrist         Extension 69 60 62   Flexion 70 62 69   Radial deviation  16 25 20   Ulnar deviation  31 15 25   Supination  WNL  WFL  WFL   Pronation WNL  WFL  WFL                     Thumb: MP 0/62 0/40 0/48                IP 0/50 0/54 0/58       Rad ABD 31 34 40       Pal ABD 41 40 40          Opposition WNL  WNL  WNL           Manual Muscle Testing  *Taken 4/6/2022 at progress note.     Action Left  Action  Right    Forearm Supination 4+/5 Forearm Supination 4-/5   Forearm Pronation 4+/5 Forearm Pronation 4-/5   Wrist Extension  5/5 Wrist Extension  4/5   Wrist Flexion 5/5 Wrist Flexion* 4-/5    Wrist Radial Deviation 5/5 Wrist Radial Deviation 4-/5   Wrist Ulnar Deviation 5/5 Wrist Ulnar Deviation 4-/5       Assessment     Update: Patient had made good progress towards pain and range of motion goals. She has met 3 of 5 STGs and partially met 2 of 5 STGs.  She has also met 1 of 6 LTGs.  Continue current plan of care to continue addressing range of motion and strengthening of right thumb/hand.      Goals:   Short Term Goals: (in 6 weeks)  1) Patient will be independent in HEP -Met 3/11/2022  2) Patient will be independent with scar management techniques  -Met 3/18/2022   3) Decrease pain in right hand to no more than 4/10 worst in ADL/IADL's -Met 3/18/2022  4) Increase AROM in right thumb for improved functioning in ADL/IADL's -Partially met 4/6/2022  5) Decrease edema in right thumb by .3cm -Partially met 4/6/2022 (met for MCPs at dorsal hand)         Long Term Goals: (in 12 weeks)  1) Decrease pain in right hand to no more than 2/10 worst in ADL/IADL's -Met 4/6/2022   2) Increase AROM of right  hand to WFL for increased functioning in ADL/IADL's -Progressing  3) Increase strength in right  hand to WFL for improved functioning in ADL/IADL's -Progressing   4) Assess  strength right hand to increase functional use of left hand for improved functioning in ADL/IADL's-Progressing  5) Decrease edema in left hand to trace or none-Progressing  6) Increased functioning in ADL/IADL's, as evidenced by a FOTO impairment rating of no more than 35%-Progressing        Previous Short Term Goals Status:   Met 3 of 5 STGs  New Short Term Goals Status:   Continue with current STGs  Long Term Goal Status:   continue per initial plan of care.  Reasons for Recertification of Therapy:   Updated progress note     Plan     Updated Certification Period: continue current plan of care;  4/6/2022 to 5/25/2022,     Recommended Treatment Plan: 2 times per week for 7 weeks: Fluidotherapy, Manual Therapy, Moist Heat/ Ice, Orthotic  Management and Training, Paraffin, Patient Education, Self Care, Therapeutic Activities, Therapeutic Exercise and Ultrasound  Other Recommendations: progress range of motion and strengthening according to surgical protocol     Hermes Miramontes OT  4/6/2022      I CERTIFY THE NEED FOR THESE SERVICES FURNISHED UNDER THIS PLAN OF TREATMENT AND WHILE UNDER MY CARE    Physician's comments:        Physician's Signature: ___________________________________________________

## 2022-04-12 ENCOUNTER — CLINICAL SUPPORT (OUTPATIENT)
Dept: REHABILITATION | Facility: HOSPITAL | Age: 57
End: 2022-04-12
Payer: OTHER GOVERNMENT

## 2022-04-12 DIAGNOSIS — R53.1 DECREASED STRENGTH: ICD-10-CM

## 2022-04-12 DIAGNOSIS — M25.60 DECREASED RANGE OF MOTION: ICD-10-CM

## 2022-04-12 DIAGNOSIS — R52 PAIN: Primary | ICD-10-CM

## 2022-04-12 DIAGNOSIS — L90.5 SCAR ADHERENT: ICD-10-CM

## 2022-04-12 PROCEDURE — 97110 THERAPEUTIC EXERCISES: CPT | Mod: PN

## 2022-04-12 PROCEDURE — 97140 MANUAL THERAPY 1/> REGIONS: CPT | Mod: PN

## 2022-04-12 PROCEDURE — 97018 PARAFFIN BATH THERAPY: CPT | Mod: PN

## 2022-04-12 NOTE — PROGRESS NOTES
[facility]  Occupational Therapy Treatment Note    Date: 4/12/2022  Name: Linette LIU [facility]  Clinic Number: [id number]    Therapy Diagnosis:   Encounter Diagnoses   Name Primary?    Pain Yes    Decreased range of motion     Decreased strength     Scar adherent      Physician: Azael Curran PA-C      Physician Orders: Evaluate and treat   Medical Diagnosis: M18.0 (ICD-10-CM) - Arthritis of carpometacarpal (CMC) joint of both thumbs    Surgical Procedure and Date:    Procedure(s) (LRB):  RELEASE, TRIGGER FINGER (Right) ring trigger finger   ARTHROPLASTY, CMC JOINT (Right)   thumb using Arthrex internal brace  Date of Procedure: 2/7/2022   Evaluation Date: 3/2/2022   Insurance Authorization Period Expiration: 3/2/2022 - 12/31/2022   Plan of Care Expiration: 3/2/2022 - 5/25/2022   Progress Note Due: 5/11/2022  Date of Return to MD: none scheduled at this time     Visit # / Visits authorized: 9/ 20 (plus initial evaluation)   FOTO: 2/3   Initial= 59% / Current= 47% / Goal=34%       Precautions:  Standard and Weightbearing, No resistive gripping or pinching       Time In: 9:15  Time Out: 10:00   Total Billable Time: 45 minutes (1 P, 1 MT, 2 TE )     SUBJECTIVE     Pt reports: She continues to report thumb pain as 0/10.  She has not been doing scar massage over palmar incision and reports that she thinks it is better. She reports her index finger tip on palm side is numb following vacumming.      She was compliant with home exercise program given last session.   Response to previous treatment: increased thumb movement without pain      Functional change: continues to use right hand lightly as assist with IADLs; used as assist with yard work      Pain: 0/10    Location: right hand        OBJECTIVE       Objective Measures updated at progress report unless specified.        Treatment     Linette received the treatments listed below:     Supervised modalities after being cleared for  contradictions:   Paraffin bath - Paraffin w/ MHP to right hand for 10 min, pre-tx to decrease pain & increase tissue extensibility.   Cold Pack - N/A     Direct contact modalities after being cleared for contraindications:   Ultrasound - N/A     Manual therapy techniques: Joint mobilizations, Manual Lymphatic Drainage, Soft tissue Mobilization and Scar Massage were applied to the: right hand for 10 minutes, including:  -Scar massage using dycem, scar massage tools, and cupping to palmar incision and dorsal thumb incisions to decrease adhesions and pain  -Desensitization techniques using fingertip techniques around and over dorsal thumb incision    -Soft tissue mobilization using fingertip techniques, cupping, and IASTM of soft tissue surrounding palmar and dorsal thumb incisions, and radial half of dorsal/volar wrist  to decrease adhesions and pain   -Retrograde massage to right hand to decrease edema and pain     Therapeutic exercises to develop strength, endurance, ROM and flexibility for 25 minutes, including:       Thumb circumduction, both directions  20 reps    Thumb flexion to digit #4 MCP   20 reps     Thumb flexion isometric with small green sponge  10 X 5 sec hold    Thumb palmar abduction  20 reps    Thumb palmar abduction isometric with small green sponge 10 X 5 sec hold    Thumb radial abduction 20 reps    Thumb radial abduction isometric with small green sponge  10 X 5 sec hold     Retropulsion  20 reps    Thumb opposition isometric with small green sponge, hand supinated   10 X 5 sec hold    First dorsal interossei  10 X 5 sec hold    Cupping of palm  20 reps     Wrist flexion/ extension fisted  20 reps    Wrist radial deviation/ulnar deviation fisted  20 reps    isopheres pronated hand 1 min       Digiflex yellow 10 reps        Therapeutic activities to improve functional performance for 0 minutes, including:     marbles-opposition  Not performed                                      Patient  Education and Home Exercises      Education provided:   - using dycem use for scar massage; issued dycem 03/07/2022  - use of silicone scar gel sheets on dorsal thumb incision to prevent hypertrophic scarring   - Progress towards goals     Written Home Exercises Provided: Patient instructed to cont prior HEP.    Exercises were reviewed and Linette was able to demonstrate them prior to the end of the session.  Linette demonstrated good  understanding of the HEP provided. See EMR under Patient Instructions for exercises provided during therapy sessions.       Assessment   Patient is reporting decreased pain/tenderness with palpation over a nodule that is present radial to mid palmar incision. She has decreased scar massage in this area. She is tolerating isometric thumb strengthening exercises well with no significant increase in pain or difficulty. Pt would continue to benefit from skilled OT for Decreased ROM, Decreased  strength, Decreased pinch strength, Decreased functional hand use, Increased pain, Edema, Joint Stiffness and Scar Adhesions.        Linette is progressing well towards her goals and there are no updates to goals at this time. Pt prognosis is Good.     Pt will continue to benefit from skilled outpatient occupational therapy to address the deficits listed in the problem list on initial evaluation provide pt/family education and to maximize pt's level of independence in the home and community environment.     Pt's spiritual, cultural and educational needs considered and pt agreeable to plan of care and goals.    Anticipated barriers to occupational therapy: compliance with HEP, therapy attendance       Goals:   Short Term Goals: (in 6 weeks)  1) Patient will be independent in HEP -Met 3/11/2022  2) Patient will be independent with scar management techniques  -Met 3/18/2022   3) Decrease pain in right hand to no more than 4/10 worst in ADL/IADL's -Met 3/18/2022  4) Increase AROM in right thumb for improved  functioning in ADL/IADL's -Partially met 4/6/2022  5) Decrease edema in right thumb by .3cm -Partially met 4/6/2022 (met for MCPs at dorsal hand)         Long Term Goals: (in 12 weeks)  1) Decrease pain in right hand to no more than 2/10 worst in ADL/IADL's -Met 4/12/2022   2) Increase AROM of right  hand to WFL for increased functioning in ADL/IADL's -Progressing  3) Increase strength in right  hand to WFL for improved functioning in ADL/IADL's -Progressing   4) Assess  strength right hand to increase functional use of left hand for improved functioning in ADL/IADL's-Progressing  5) Decrease edema in left hand to trace or none-Progressing  6) Increased functioning in ADL/IADL's, as evidenced by a FOTO impairment rating of no more than 35%-Progressing          PLAN   Outpatient Occupational Therapy 2 times weekly for 12 weeks to include the following interventions: Paraffin, Fluidotherapy, Modalities for pain management, US 3 mhz, Therapeutic exercises/activities., Strengthening, Orthotic Fabrication/Fit/Training, Edema Control, Scar Management and Joint Protection.     Updates/Grading for next session: progress with isometric as tolerated by patient       Hermes Miramontes, OT

## 2022-04-26 ENCOUNTER — PATIENT MESSAGE (OUTPATIENT)
Dept: ADMINISTRATIVE | Facility: HOSPITAL | Age: 57
End: 2022-04-26
Payer: OTHER GOVERNMENT

## 2022-04-29 ENCOUNTER — CLINICAL SUPPORT (OUTPATIENT)
Dept: REHABILITATION | Facility: HOSPITAL | Age: 57
End: 2022-04-29
Payer: OTHER GOVERNMENT

## 2022-04-29 DIAGNOSIS — L90.5 SCAR ADHERENT: ICD-10-CM

## 2022-04-29 DIAGNOSIS — R52 PAIN: Primary | ICD-10-CM

## 2022-04-29 DIAGNOSIS — M25.60 DECREASED RANGE OF MOTION: ICD-10-CM

## 2022-04-29 DIAGNOSIS — R53.1 DECREASED STRENGTH: ICD-10-CM

## 2022-04-29 PROCEDURE — 97110 THERAPEUTIC EXERCISES: CPT | Mod: PN

## 2022-04-29 PROCEDURE — 97018 PARAFFIN BATH THERAPY: CPT | Mod: PN

## 2022-04-29 NOTE — PROGRESS NOTES
[facility]  Occupational Therapy Treatment Note    Date: 4/29/2022  Name: Linette LIU [facility]  Clinic Number: [id number]    Therapy Diagnosis:   Encounter Diagnoses   Name Primary?    Pain Yes    Decreased range of motion     Decreased strength     Scar adherent      Physician: Azael Curran, OSIEL      Physician Orders: Evaluate and treat   Medical Diagnosis: M18.0 (ICD-10-CM) - Arthritis of carpometacarpal (CMC) joint of both thumbs    Surgical Procedure and Date:    Procedure(s) (LRB):  RELEASE, TRIGGER FINGER (Right) ring trigger finger   ARTHROPLASTY, CMC JOINT (Right)   thumb using Arthrex internal brace  Date of Procedure: 2/7/2022   Evaluation Date: 3/2/2022   Insurance Authorization Period Expiration: 3/2/2022 - 12/31/2022   Plan of Care Expiration: 3/2/2022 - 5/25/2022   Progress Note Due: 5/11/2022   Date of Return to MD: none scheduled at this time    Visit # / Visits authorized: 10/ 20 (plus initial evaluation)   FOTO: 2/3   Initial= 59% / Current= 47% / Goal=34%       Precautions:  Standard and Weightbearing, No resistive gripping or pinching       Time In: 10:05  Time Out: 10:50  Total Billable Time: 45 minutes (1 P, 2 TE )     SUBJECTIVE     Pt reports: She continues to report thumb pain as 0/10.  She continues to report tenderness over palmar incision; just distal to the palpable nodule.    She was compliant with home exercise program given last session.   Response to previous treatment: increased thumb movement without pain      Functional change: has been able to  grandchild; vacumm and clean tub    Pain: 0/10    Location: right hand        OBJECTIVE       Objective Measures updated at progress report unless specified.        Treatment     Linette received the treatments listed below:     Supervised modalities after being cleared for contradictions:   Paraffin bath - Paraffin w/ MHP to right hand for 10 min, pre-tx to decrease pain & increase tissue  extensibility.   Cold Pack - N/A     Direct contact modalities after being cleared for contraindications:   Ultrasound - N/A     Manual therapy techniques: Joint mobilizations, Manual Lymphatic Drainage, Soft tissue Mobilization and Scar Massage were applied to the: right hand for 0 minutes, including:  -    Therapeutic exercises to develop strength, endurance, ROM and flexibility for 35 minutes, including:       Thumb circumduction, both directions  20 reps    Thumb flexion to digit #5 MCP   20 reps     Thumb palmar abduction  20 reps    Thumb radial abduction 20 reps    Retropulsion  20 reps    First dorsal interossei  10 X 5 sec hold    Cupping of palm  20 reps     Wrist flexion/ extension fisted  20 reps    Wrist radial deviation/ulnar deviation fisted  20 reps    Theraputty yellow   -logrolling/finger extension  10 reps    -full grasping, with thumb  10 reps    -lateral pinch  3 logs    -tripod 3 logs        isopheres pronated and supinated hand 2 min each    Digiflex yellow 3/10 reps    Hand master- purple, light gripping 2/10    Tan T bar frowns  10 reps    Intrinsic stretch to digit #4 5 X 10 sec         Therapeutic activities to improve functional performance for 0 minutes, including:     marbles-opposition  Not performed                                      Patient Education and Home Exercises      Education provided:  - intrinsic stretch to RF 4/29/2022   - using dycem use for scar massage; issued dycem 03/07/2022  - use of silicone scar gel sheets on dorsal thumb incision to prevent hypertrophic scarring   - Progress towards goals     Written Home Exercises Provided: Patient instructed to cont prior HEP.    Exercises were reviewed and Linette was able to demonstrate them prior to the end of the session.  Linette demonstrated good  understanding of the HEP provided. See EMR under Patient Instructions for exercises provided during therapy sessions.       Assessment   Patient is continuing to report  pain/tenderness with palpation just distal to the nodule that is present radial to mid palmar incision. Demonstrated and instructed patient on intrinsic stretch for RF; she was able to verbalize and demonstrate understanding. Progressed to light resistive thumb pinching and light resistive gripping exercises.  Patient tolerated advanced therapeutic activities well without significant increase in pain or difficulty. Pt would continue to benefit from skilled OT for Decreased ROM, Decreased  strength, Decreased pinch strength, Decreased functional hand use, Increased pain, Edema, Joint Stiffness and Scar Adhesions.        Linette is progressing well towards her goals and there are no updates to goals at this time. Pt prognosis is Good.     Pt will continue to benefit from skilled outpatient occupational therapy to address the deficits listed in the problem list on initial evaluation provide pt/family education and to maximize pt's level of independence in the home and community environment.     Pt's spiritual, cultural and educational needs considered and pt agreeable to plan of care and goals.    Anticipated barriers to occupational therapy: compliance with HEP, therapy attendance       Goals:   Short Term Goals: (in 6 weeks)  1) Patient will be independent in HEP -Met 3/11/2022  2) Patient will be independent with scar management techniques  -Met 3/18/2022   3) Decrease pain in right hand to no more than 4/10 worst in ADL/IADL's -Met 3/18/2022  4) Increase AROM in right thumb for improved functioning in ADL/IADL's -Partially met 4/6/2022  5) Decrease edema in right thumb by .3cm -Partially met 4/6/2022 (met for MCPs at dorsal hand)         Long Term Goals: (in 12 weeks)  1) Decrease pain in right hand to no more than 2/10 worst in ADL/IADL's -Met 4/29/2022   2) Increase AROM of right  hand to WFL for increased functioning in ADL/IADL's -Progressing  3) Increase strength in right  hand to WFL for improved functioning  in ADL/IADL's -Progressing   4) Assess  strength right hand to increase functional use of left hand for improved functioning in ADL/IADL's-Progressing  5) Decrease edema in left hand to trace or none-Progressing  6) Increased functioning in ADL/IADL's, as evidenced by a FOTO impairment rating of no more than 35%-Progressing          PLAN   Outpatient Occupational Therapy 2 times weekly for 12 weeks to include the following interventions: Paraffin, Fluidotherapy, Modalities for pain management, US 3 mhz, Therapeutic exercises/activities., Strengthening, Orthotic Fabrication/Fit/Training, Edema Control, Scar Management and Joint Protection.     Updates/Grading for next session: progress with isometric as tolerated by patient       Hermes Miramontes, OT

## 2022-05-03 ENCOUNTER — CLINICAL SUPPORT (OUTPATIENT)
Dept: REHABILITATION | Facility: HOSPITAL | Age: 57
End: 2022-05-03
Payer: OTHER GOVERNMENT

## 2022-05-03 DIAGNOSIS — M25.60 DECREASED RANGE OF MOTION: ICD-10-CM

## 2022-05-03 DIAGNOSIS — R53.1 DECREASED STRENGTH: ICD-10-CM

## 2022-05-03 DIAGNOSIS — R52 PAIN: Primary | ICD-10-CM

## 2022-05-03 DIAGNOSIS — L90.5 SCAR ADHERENT: ICD-10-CM

## 2022-05-03 PROCEDURE — 97018 PARAFFIN BATH THERAPY: CPT | Mod: PN

## 2022-05-03 PROCEDURE — 97110 THERAPEUTIC EXERCISES: CPT | Mod: PN

## 2022-05-03 NOTE — PROGRESS NOTES
[facility]  Occupational Therapy Treatment Note    Date: 5/3/2022  Name: Linette LIU [facility]  Clinic Number: [id number]    Therapy Diagnosis:   Encounter Diagnoses   Name Primary?    Pain Yes    Decreased range of motion     Decreased strength     Scar adherent      Physician: Azael Curran PA-C      Physician Orders: Evaluate and treat   Medical Diagnosis: M18.0 (ICD-10-CM) - Arthritis of carpometacarpal (CMC) joint of both thumbs    Surgical Procedure and Date:    Procedure(s) (LRB):  RELEASE, TRIGGER FINGER (Right) ring trigger finger   ARTHROPLASTY, CMC JOINT (Right) thumb using Arthrex internal brace  Date of Procedure: 2/7/2022   Evaluation Date: 3/2/2022   Insurance Authorization Period Expiration: 3/2/2022 - 12/31/2022   Plan of Care Expiration: 3/2/2022 - 5/25/2022   Progress Note Due: 5/11/2022   Date of Return to MD: none scheduled at this time    Visit # / Visits authorized: 11/ 20 (plus initial evaluation)   FOTO: 2/3   Initial= 59% / Current= 47% / Goal=34%        Precautions:  Standard and Weightbearing       Time In: 10:00  Time Out: 10:50  Total Billable Time: 50 minutes (1 P, 2 TE )     Patient is post-op: 12 weeks as of 5/3/2022  SUBJECTIVE     Pt reports: She continues to report thumb pain as 0/10.  She has had some intermittent mild shooting pains at the base of her thumb at CMC over the past few days. She continues to report tenderness over palmar incision.    She was compliant with home exercise program given last session.   Response to previous treatment: increased thumb movement without pain    Functional change: was able to weedeat/do yardwork     Pain: 0/10    Location: right hand        OBJECTIVE       Objective Measures updated at progress report unless specified.        Treatment     Linette received the treatments listed below:     Supervised modalities after being cleared for contradictions:   Paraffin bath - Paraffin w/ MHP to right hand for 10 min,  pre-tx to decrease pain & increase tissue extensibility.   Cold Pack - N/A     Direct contact modalities after being cleared for contraindications:   Ultrasound - N/A     Manual therapy techniques: Joint mobilizations, Manual Lymphatic Drainage, Soft tissue Mobilization and Scar Massage were applied to the: right hand for 0 minutes, including:  -    Therapeutic exercises to develop strength, endurance, ROM and flexibility for 50 minutes, including:       Thumb circumduction, both directions  20 reps    Thumb palmar abduction  20 reps     Thumb radial abduction 20 reps    Retropulsion  20 reps    Theraputty yellow   -logrolling/finger extension  10 reps    -full grasping, with thumb  10 reps    -lateral pinch  5 logs    -tripod 5 logs        isopheres pronated and supinated hand 2 min each    Digiflex yellow-neutral/pronation/  supination  3/10 reps    Hand master- purple, light gripping 30 reps     Hand helper- 2 yellow rubberbands 30 reps    Tan T bar frowns/smiles  2/10 reps each   Intrinsic stretch to digit #4 5 X 10 sec         Therapeutic activities to improve functional performance for 0 minutes, including:     marbles-opposition  Not performed                                      Patient Education and Home Exercises      Education provided:  - intrinsic stretch to RF 4/29/2022   - using dycem use for scar massage; issued dycem 03/07/2022  - use of silicone scar gel sheets on dorsal thumb incision to prevent hypertrophic scarring   - Progress towards goals     Written Home Exercises Provided: Patient instructed to cont prior HEP.    Exercises were reviewed and Linette was able to demonstrate them prior to the end of the session.  Linette demonstrated good  understanding of the HEP provided. See EMR under Patient Instructions for exercises provided during therapy sessions.       Assessment   Patient is continuing to report pain/tenderness with palpation just distal to the nodule that is present radial to mid  palmar incision. Advanced strengthening exercises; patient tolerated well without significant increase in pain or difficulty. Pt would continue to benefit from skilled OT for Decreased ROM, Decreased  strength, Decreased pinch strength, Decreased functional hand use, Increased pain, Edema, Joint Stiffness and Scar Adhesions. Patient progressing well with possible discharge in next few weeks.        Linette is progressing well towards her goals and there are no updates to goals at this time. Pt prognosis is Good.     Pt will continue to benefit from skilled outpatient occupational therapy to address the deficits listed in the problem list on initial evaluation provide pt/family education and to maximize pt's level of independence in the home and community environment.     Pt's spiritual, cultural and educational needs considered and pt agreeable to plan of care and goals.    Anticipated barriers to occupational therapy: compliance with HEP, therapy attendance       Goals:   Short Term Goals: (in 6 weeks)  1) Patient will be independent in HEP -Met 3/11/2022  2) Patient will be independent with scar management techniques  -Met 3/18/2022   3) Decrease pain in right hand to no more than 4/10 worst in ADL/IADL's -Met 3/18/2022  4) Increase AROM in right thumb for improved functioning in ADL/IADL's -Partially met 4/6/2022  5) Decrease edema in right thumb by .3cm -Partially met 4/6/2022 (met for MCPs at dorsal hand)         Long Term Goals: (in 12 weeks)  1) Decrease pain in right hand to no more than 2/10 worst in ADL/IADL's -Met 5/3/2022   2) Increase AROM of right  hand to WFL for increased functioning in ADL/IADL's -Progressing  3) Increase strength in right  hand to WFL for improved functioning in ADL/IADL's -Progressing   4) Assess  strength right hand to increase functional use of left hand for improved functioning in ADL/IADL's-Progressing  5) Decrease edema in left hand to trace or none-Progressing  6)  Increased functioning in ADL/IADL's, as evidenced by a FOTO impairment rating of no more than 35%-Progressing          PLAN   Outpatient Occupational Therapy 2 times weekly for 12 weeks to include the following interventions: Paraffin, Fluidotherapy, Modalities for pain management, US 3 mhz, Therapeutic exercises/activities., Strengthening, Orthotic Fabrication/Fit/Training, Edema Control, Scar Management and Joint Protection.     Updates/Grading for next session: continue with strengthening as tolerated by patient       Hermes Miramontes, OT

## 2022-05-04 ENCOUNTER — CLINICAL SUPPORT (OUTPATIENT)
Dept: REHABILITATION | Facility: HOSPITAL | Age: 57
End: 2022-05-04
Payer: OTHER GOVERNMENT

## 2022-05-04 DIAGNOSIS — R53.1 DECREASED STRENGTH: ICD-10-CM

## 2022-05-04 DIAGNOSIS — L90.5 SCAR ADHERENT: ICD-10-CM

## 2022-05-04 DIAGNOSIS — R52 PAIN: Primary | ICD-10-CM

## 2022-05-04 DIAGNOSIS — M25.60 DECREASED RANGE OF MOTION: ICD-10-CM

## 2022-05-04 PROCEDURE — 97110 THERAPEUTIC EXERCISES: CPT | Mod: PN

## 2022-05-04 PROCEDURE — 97018 PARAFFIN BATH THERAPY: CPT | Mod: PN

## 2022-05-04 NOTE — PROGRESS NOTES
[facility]  Occupational Therapy Treatment Note    Date: 5/4/2022  Name: Linette LIU [facility]  Clinic Number: [id number]    Therapy Diagnosis:   Encounter Diagnoses   Name Primary?    Pain Yes    Decreased range of motion     Decreased strength     Scar adherent      Physician: Azael Curran PA-C      Physician Orders: Evaluate and treat   Medical Diagnosis: M18.0 (ICD-10-CM) - Arthritis of carpometacarpal (CMC) joint of both thumbs    Surgical Procedure and Date:    Procedure(s) (LRB):  RELEASE, TRIGGER FINGER (Right) ring trigger finger   ARTHROPLASTY, CMC JOINT (Right) thumb using Arthrex internal brace  Date of Procedure: 2/7/2022   Evaluation Date: 3/2/2022   Insurance Authorization Period Expiration: 3/2/2022 - 12/31/2022   Plan of Care Expiration: 3/2/2022 - 5/25/2022   Progress Note Due: 5/11/2022   Date of Return to MD: none scheduled at this time    Visit # / Visits authorized: 12/ 20 (plus initial evaluation)   FOTO: 2/3   Initial= 59% / Current= 47% / Goal=34%        Precautions:  Standard and Weightbearing       Time In: 9:15  Time Out: 10:00  Total Billable Time: 45 minutes (1 P, 2 TE )     Patient is post-op: 12 weeks as of 5/4/2022  SUBJECTIVE     Pt reports: She continues to report thumb pain as 0/10.  She has no post exercise soreness from yesterday.  She continues to report tenderness over palmar incision.    She was compliant with home exercise program given last session.   Response to previous treatment: increased hand strength    Functional change: was able to weedeat/do yardwork     Pain: 0/10    Location: right hand        OBJECTIVE       Objective Measures updated at progress report unless specified.        Treatment     Linette received the treatments listed below:     Supervised modalities after being cleared for contradictions:   Paraffin bath - Paraffin w/ MHP to right hand for 10 min, pre-tx to decrease pain & increase tissue extensibility.   Cold Pack -  N/A     Direct contact modalities after being cleared for contraindications:   Ultrasound - N/A     Manual therapy techniques: Joint mobilizations, Manual Lymphatic Drainage, Soft tissue Mobilization and Scar Massage were applied to the: right hand for 0 minutes, including:  -    Therapeutic exercises to develop strength, endurance, ROM and flexibility for 35 minutes, including:   Bolded performed today.     Thumb circumduction, both directions  20 reps    Thumb flexion to digit #5 MCP   20 reps     Thumb palmar abduction  20 reps     Thumb radial abduction 20 reps    Retropulsion  20 reps    First dorsal interossei  10 X 5 sec hold    Wrist flexion/ extension fisted  20 reps    Wrist radial deviation/ulnar deviation fisted  20 reps    Theraputty yellow   -logrolling/finger extension  10 reps    -full grasping, with thumb  10 reps    -lateral pinch  5 logs    -tripod 5 logs        isopheres pronated and supinated hand 2 min each     Digiflex yellow-neutral/pronation/  supination  3/10 reps    Hand master- purple, light gripping 30 reps     Hand helper- 2 yellow rubberbands 30 reps    Tan T bar frowns/smiles  2/10 reps each    Intrinsic stretch to digit #4 5 X 10 sec        Therapeutic activities to improve functional performance for 0 minutes, including:     marbles-opposition  Not performed                                      Patient Education and Home Exercises      Education provided:  - intrinsic stretch to RF 4/29/2022   - using dycem use for scar massage; issued dycem 03/07/2022  - use of silicone scar gel sheets on dorsal thumb incision to prevent hypertrophic scarring   - Progress towards goals     Written Home Exercises Provided: Patient instructed to cont prior HEP.    Exercises were reviewed and Linette was able to demonstrate them prior to the end of the session.  Linette demonstrated good  understanding of the HEP provided. See EMR under Patient Instructions for exercises provided during therapy  sessions.       Assessment   Patient is continuing to report pain/tenderness with palpation just distal to the nodule that is present radial to mid palmar incision. Continued with advanced strengthening exercises; patient tolerated well without significant increase in pain or difficulty. Pt would continue to benefit from skilled OT for Decreased ROM, Decreased  strength, Decreased pinch strength, Decreased functional hand use, Increased pain, Edema, Joint Stiffness and Scar Adhesions. Patient progressing well with possible discharge in next week or two.        Linette is progressing well towards her goals and there are no updates to goals at this time. Pt prognosis is Good.     Pt will continue to benefit from skilled outpatient occupational therapy to address the deficits listed in the problem list on initial evaluation provide pt/family education and to maximize pt's level of independence in the home and community environment.     Pt's spiritual, cultural and educational needs considered and pt agreeable to plan of care and goals.    Anticipated barriers to occupational therapy: compliance with HEP, therapy attendance       Goals:   Short Term Goals: (in 6 weeks)  1) Patient will be independent in HEP -Met 3/11/2022  2) Patient will be independent with scar management techniques  -Met 3/18/2022   3) Decrease pain in right hand to no more than 4/10 worst in ADL/IADL's -Met 3/18/2022  4) Increase AROM in right thumb for improved functioning in ADL/IADL's -Partially met 4/6/2022  5) Decrease edema in right thumb by .3cm -Met 5/4/2022          Long Term Goals: (in 12 weeks)  1) Decrease pain in right hand to no more than 2/10 worst in ADL/IADL's -Met 5/4/2022   2) Increase AROM of right  hand to WFL for increased functioning in ADL/IADL's -Progressing  3) Increase strength in right  hand to WFL for improved functioning in ADL/IADL's -Progressing   4) Assess  strength right hand to increase functional use of left  hand for improved functioning in ADL/IADL's-Progressing  5) Decrease edema in left hand to trace or none -Met 5/4/2022   6) Increased functioning in ADL/IADL's, as evidenced by a FOTO impairment rating of no more than 35%-Progressing          PLAN   Outpatient Occupational Therapy 2 times weekly for 12 weeks to include the following interventions: Paraffin, Fluidotherapy, Modalities for pain management, US 3 mhz, Therapeutic exercises/activities., Strengthening, Orthotic Fabrication/Fit/Training, Edema Control, Scar Management and Joint Protection.     Updates/Grading for next session: continue with strengthening as tolerated by patient       Hermes Miramontes, OT

## 2022-05-10 ENCOUNTER — CLINICAL SUPPORT (OUTPATIENT)
Dept: REHABILITATION | Facility: HOSPITAL | Age: 57
End: 2022-05-10
Payer: OTHER GOVERNMENT

## 2022-05-10 DIAGNOSIS — M25.60 DECREASED RANGE OF MOTION: ICD-10-CM

## 2022-05-10 DIAGNOSIS — R52 PAIN: Primary | ICD-10-CM

## 2022-05-10 DIAGNOSIS — L90.5 SCAR ADHERENT: ICD-10-CM

## 2022-05-10 DIAGNOSIS — R53.1 DECREASED STRENGTH: ICD-10-CM

## 2022-05-10 PROCEDURE — 97110 THERAPEUTIC EXERCISES: CPT | Mod: PN

## 2022-05-10 PROCEDURE — 97018 PARAFFIN BATH THERAPY: CPT | Mod: PN

## 2022-05-10 NOTE — PROGRESS NOTES
[facility] OUTPATIENT THERAPY AND WELLNESS  Occupational Therapy Treatment and Discharge Note    Date: 5/10/2022  Name: Linette LIU Iraj  [facility] Number: [id number]    Therapy Diagnosis:   Encounter Diagnoses   Name Primary?    Pain Yes    Decreased range of motion     Decreased strength     Scar adherent      Physician: Azael Curran, OSIEL      Physician Orders: Evaluate and treat   Medical Diagnosis: M18.0 (ICD-10-CM) - Arthritis of carpometacarpal (CMC) joint of both thumbs    Surgical Procedure and Date:    Procedure(s) (LRB):  RELEASE, TRIGGER FINGER (Right) ring trigger finger   ARTHROPLASTY, CMC JOINT (Right) thumb using Arthrex internal brace  Date of Procedure: 2/7/2022   Evaluation Date: 3/2/2022   Insurance Authorization Period Expiration: 3/2/2022 - 12/31/2022   Plan of Care Expiration: 3/2/2022 - 5/25/2022   Progress Note Due: 5/11/2022   Date of Return to MD: none scheduled at this time    Visit # / Visits authorized: 12/ 20 (plus initial evaluation)   FOTO: 2/3   Initial= 59% / Current= 47% / Goal=34% / Discharge= 26%         Precautions:  Standard and Weightbearing       Time In: 8:30  Time Out: 9:15  Total Billable Time: 45 minutes (1 P, 2 TE )     Patient is post-op: 12 weeks and 5 days as of 5/4/2022  SUBJECTIVE     Pt reports: She continues to report thumb pain as 0/10.  She continues to report tenderness over palmar incision.    She was compliant with home exercise program given last session.   Response to previous treatment: increased hand strength; good mobility of thumb     Functional change: was able to clean shed and cut grass       Pain: 0/10    Location: right hand        OBJECTIVE     Objective Measures updated today, 5/10/2022 for discharge note.        3/2/2022 3/2/2022 4/6/2022 5/10/2022     Left Right Right Right       Wrist           Extension 69 60 62 70   Flexion 70 62 69 68   Radial deviation  16 25 20 29   Ulnar deviation  31 15 25 25   Supination  WNL  WFL  WFL WFL    Pronation WNL  WFL  WFL WFL                         Thumb: MP 0/62 0/40 0/48 0/49                IP 0/50 0/54 0/58 0/65       Rad ABD 31 34 40 40       Pal ABD 41 40 40 39          Opposition WNL  WNL  WNL  WNL           Manual Muscle Testing    Action Left  Action  Right  Right   Forearm Supination 4+/5 Forearm Supination 4-/5 5/5   Forearm Pronation 4+/5 Forearm Pronation 4-/5 5/5   Wrist Extension  5/5 Wrist Extension  4/5 5/5   Wrist Flexion 5/5 Wrist Flexion* 4-/5 4+/5   Wrist Radial Deviation 5/5 Wrist Radial Deviation 4-/5 5/5   Wrist Ulnar Deviation 5/5 Wrist Ulnar Deviation 4-/5 5/5         Strength (Dyanmometer) and Pinch Strength (Pinch Gauge)  Measured in pounds and psi. Average of three trials.   5/10/2022 5/10/2022    Left Right   Rung II 45# 51#   Key Pinch NT  NT    3pt Pinch 10# 9#   2pt Pinch NT  NT          Treatment     Linette received the treatments listed below:     Supervised modalities after being cleared for contradictions:   Paraffin bath - Paraffin w/ MHP to right hand for 10 min, pre-tx to decrease pain & increase tissue extensibility.   Cold Pack - N/A     Direct contact modalities after being cleared for contraindications:   Ultrasound - N/A     Manual therapy techniques: Joint mobilizations, Manual Lymphatic Drainage, Soft tissue Mobilization and Scar Massage were applied to the: right hand for 0 minutes, including:  -    Therapeutic exercises to develop strength, endurance, ROM and flexibility for 35 minutes, including:   Bolded performed today.     Thumb circumduction, both directions  20 reps    Thumb flexion to digit #5 MCP   20 reps     Thumb palmar abduction  20 reps     Thumb radial abduction 20 reps    Retropulsion  20 reps    First dorsal interossei  10 X 5 sec hold    Wrist flexion/ extension fisted  20 reps    Wrist radial deviation/ulnar deviation fisted  20 reps    Theraputty yellow   -logrolling/finger extension  10 reps    -full grasping, with thumb  10 reps     -lateral pinch  5 logs    -tripod 5 logs        isopheres pronated and supinated hand 2 min each     Digiflex green-neutral/pronation/  supination  3/10 reps *progressed   Hand master- purple, light gripping 30 reps     Hand helper- 3 yellow rubberbands 30 reps *progressed    red T bar frowns/smiles  2/10 reps each *progressed    Intrinsic stretch to digit #4 5 X 10 sec        Therapeutic activities to improve functional performance for 0 minutes, including:     marbles-opposition  Not performed                                      Patient Education and Home Exercises      Education provided:  - intrinsic stretch to RF 4/29/2022   - using dycem use for scar massage; issued dycem 03/07/2022  - use of silicone scar gel sheets on dorsal thumb incision to prevent hypertrophic scarring   - Progress towards goals     Written Home Exercises Provided: Patient instructed to cont prior HEP.    Exercises were reviewed and Linette was able to demonstrate them prior to the end of the session.  Linette demonstrated good  understanding of the HEP provided. See EMR under Patient Instructions for exercises provided during therapy sessions.       Assessment   Patient is continuing to report pain/tenderness with palpation just distal to the nodule that is present radial to mid palmar incision. Advanced strengthening exercises and patient tolerated well.  strength and tripod pinch strength was measured; please see objectives above. Patient has currently met all STGs and LTGs and has progressed well therapy.  She will be discharged from occupational therapy at this time.     Linette is progressing well towards her goals and there are no updates to goals at this time. Pt prognosis is Good.     Pt will continue to benefit from skilled outpatient occupational therapy to address the deficits listed in the problem list on initial evaluation provide pt/family education and to maximize pt's level of independence in the home and community  environment.     Pt's spiritual, cultural and educational needs considered and pt agreeable to plan of care and goals.    Anticipated barriers to occupational therapy: compliance with HEP, therapy attendance       Goals:   Short Term Goals: (in 6 weeks)  1) Patient will be independent in HEP -Met 3/11/2022  2) Patient will be independent with scar management techniques  -Met 3/18/2022   3) Decrease pain in right hand to no more than 4/10 worst in ADL/IADL's -Met 3/18/2022  4) Increase AROM in right thumb for improved functioning in ADL/IADL's -Met 5/10/2022   5) Decrease edema in right thumb by .3cm -Met 5/4/2022          Long Term Goals: (in 12 weeks)  1) Decrease pain in right hand to no more than 2/10 worst in ADL/IADL's -Met 5/10/2022   2) Increase AROM of right  hand to WFL for increased functioning in ADL/IADL's -Met 5/10/2022  3) Increase strength in right  hand to WFL for improved functioning in ADL/IADL's -Met 5/10/2022    4) Assess  strength right hand to increase functional use of left hand for improved functioning in ADL/IADL's-Met 5/10/2022   5) Decrease edema in left hand to trace or none -Met 5/4/2022   6) Increased functioning in ADL/IADL's, as evidenced by a FOTO impairment rating of no more than 35%-Met 5/10/2022           PLAN   Outpatient Occupational Therapy 2 times weekly for 12 weeks to include the following interventions: Paraffin, Fluidotherapy, Modalities for pain management, US 3 mhz, Therapeutic exercises/activities., Strengthening, Orthotic Fabrication/Fit/Training, Edema Control, Scar Management and Joint Protection.     Updates/Grading for next session: continue with strengthening as tolerated by patient       Hermes Miramontes, OT

## 2022-05-22 ENCOUNTER — PATIENT MESSAGE (OUTPATIENT)
Dept: ORTHOPEDICS | Facility: CLINIC | Age: 57
End: 2022-05-22
Payer: OTHER GOVERNMENT

## 2022-05-25 PROBLEM — R52 PAIN: Status: RESOLVED | Noted: 2022-03-02 | Resolved: 2022-05-25

## 2022-05-25 PROBLEM — R53.1 DECREASED STRENGTH: Status: RESOLVED | Noted: 2022-03-02 | Resolved: 2022-05-25

## 2022-05-25 PROBLEM — M25.60 DECREASED RANGE OF MOTION: Status: RESOLVED | Noted: 2022-03-02 | Resolved: 2022-05-25

## 2022-05-25 PROBLEM — L90.5 SCAR ADHERENT: Status: RESOLVED | Noted: 2022-03-02 | Resolved: 2022-05-25

## 2022-05-27 ENCOUNTER — PATIENT MESSAGE (OUTPATIENT)
Dept: INTERNAL MEDICINE | Facility: CLINIC | Age: 57
End: 2022-05-27
Payer: OTHER GOVERNMENT

## 2022-05-27 DIAGNOSIS — Z29.9 PREVENTIVE MEASURE: Primary | ICD-10-CM

## 2022-05-31 ENCOUNTER — LAB VISIT (OUTPATIENT)
Dept: LAB | Facility: HOSPITAL | Age: 57
End: 2022-05-31
Attending: NURSE PRACTITIONER
Payer: OTHER GOVERNMENT

## 2022-05-31 DIAGNOSIS — Z29.9 PREVENTIVE MEASURE: ICD-10-CM

## 2022-05-31 LAB
ALBUMIN SERPL BCP-MCNC: 3.9 G/DL (ref 3.5–5.2)
ALP SERPL-CCNC: 77 U/L (ref 55–135)
ALT SERPL W/O P-5'-P-CCNC: 26 U/L (ref 10–44)
ANION GAP SERPL CALC-SCNC: 9 MMOL/L (ref 8–16)
AST SERPL-CCNC: 18 U/L (ref 10–40)
BASOPHILS # BLD AUTO: 0.05 K/UL (ref 0–0.2)
BASOPHILS NFR BLD: 0.8 % (ref 0–1.9)
BILIRUB SERPL-MCNC: 0.3 MG/DL (ref 0.1–1)
BUN SERPL-MCNC: 23 MG/DL (ref 6–20)
CALCIUM SERPL-MCNC: 9.2 MG/DL (ref 8.7–10.5)
CHLORIDE SERPL-SCNC: 109 MMOL/L (ref 95–110)
CHOLEST SERPL-MCNC: 187 MG/DL (ref 120–199)
CHOLEST/HDLC SERPL: 3.7 {RATIO} (ref 2–5)
CO2 SERPL-SCNC: 25 MMOL/L (ref 23–29)
CREAT SERPL-MCNC: 0.9 MG/DL (ref 0.5–1.4)
DIFFERENTIAL METHOD: NORMAL
EOSINOPHIL # BLD AUTO: 0.3 K/UL (ref 0–0.5)
EOSINOPHIL NFR BLD: 4.3 % (ref 0–8)
ERYTHROCYTE [DISTWIDTH] IN BLOOD BY AUTOMATED COUNT: 12.9 % (ref 11.5–14.5)
EST. GFR  (AFRICAN AMERICAN): >60 ML/MIN/1.73 M^2
EST. GFR  (NON AFRICAN AMERICAN): >60 ML/MIN/1.73 M^2
ESTIMATED AVG GLUCOSE: 111 MG/DL (ref 68–131)
FERRITIN SERPL-MCNC: 115 NG/ML (ref 20–300)
GLUCOSE SERPL-MCNC: 99 MG/DL (ref 70–110)
HBA1C MFR BLD: 5.5 % (ref 4–5.6)
HCT VFR BLD AUTO: 41 % (ref 37–48.5)
HDLC SERPL-MCNC: 50 MG/DL (ref 40–75)
HDLC SERPL: 26.7 % (ref 20–50)
HGB BLD-MCNC: 13.2 G/DL (ref 12–16)
IMM GRANULOCYTES # BLD AUTO: 0.02 K/UL (ref 0–0.04)
IMM GRANULOCYTES NFR BLD AUTO: 0.3 % (ref 0–0.5)
IRON SERPL-MCNC: 66 UG/DL (ref 30–160)
LDLC SERPL CALC-MCNC: 123.6 MG/DL (ref 63–159)
LYMPHOCYTES # BLD AUTO: 1.6 K/UL (ref 1–4.8)
LYMPHOCYTES NFR BLD: 26.3 % (ref 18–48)
MCH RBC QN AUTO: 29.9 PG (ref 27–31)
MCHC RBC AUTO-ENTMCNC: 32.2 G/DL (ref 32–36)
MCV RBC AUTO: 93 FL (ref 82–98)
MONOCYTES # BLD AUTO: 0.4 K/UL (ref 0.3–1)
MONOCYTES NFR BLD: 6.4 % (ref 4–15)
NEUTROPHILS # BLD AUTO: 3.7 K/UL (ref 1.8–7.7)
NEUTROPHILS NFR BLD: 61.9 % (ref 38–73)
NONHDLC SERPL-MCNC: 137 MG/DL
NRBC BLD-RTO: 0 /100 WBC
PLATELET # BLD AUTO: 185 K/UL (ref 150–450)
PMV BLD AUTO: 12.1 FL (ref 9.2–12.9)
POTASSIUM SERPL-SCNC: 4 MMOL/L (ref 3.5–5.1)
PROT SERPL-MCNC: 6.7 G/DL (ref 6–8.4)
RBC # BLD AUTO: 4.42 M/UL (ref 4–5.4)
SATURATED IRON: 20 % (ref 20–50)
SODIUM SERPL-SCNC: 143 MMOL/L (ref 136–145)
TOTAL IRON BINDING CAPACITY: 332 UG/DL (ref 250–450)
TRANSFERRIN SERPL-MCNC: 224 MG/DL (ref 200–375)
TRIGL SERPL-MCNC: 67 MG/DL (ref 30–150)
TSH SERPL DL<=0.005 MIU/L-ACNC: 2.87 UIU/ML (ref 0.4–4)
VIT B12 SERPL-MCNC: 411 PG/ML (ref 210–950)
WBC # BLD AUTO: 5.98 K/UL (ref 3.9–12.7)

## 2022-05-31 PROCEDURE — 80061 LIPID PANEL: CPT | Performed by: NURSE PRACTITIONER

## 2022-05-31 PROCEDURE — 36415 COLL VENOUS BLD VENIPUNCTURE: CPT | Mod: PO | Performed by: NURSE PRACTITIONER

## 2022-05-31 PROCEDURE — 87389 HIV-1 AG W/HIV-1&-2 AB AG IA: CPT | Performed by: NURSE PRACTITIONER

## 2022-05-31 PROCEDURE — 86803 HEPATITIS C AB TEST: CPT | Performed by: NURSE PRACTITIONER

## 2022-05-31 PROCEDURE — 84466 ASSAY OF TRANSFERRIN: CPT | Performed by: NURSE PRACTITIONER

## 2022-05-31 PROCEDURE — 83036 HEMOGLOBIN GLYCOSYLATED A1C: CPT | Performed by: NURSE PRACTITIONER

## 2022-05-31 PROCEDURE — 80053 COMPREHEN METABOLIC PANEL: CPT | Performed by: NURSE PRACTITIONER

## 2022-05-31 PROCEDURE — 84443 ASSAY THYROID STIM HORMONE: CPT | Performed by: NURSE PRACTITIONER

## 2022-05-31 PROCEDURE — 85025 COMPLETE CBC W/AUTO DIFF WBC: CPT | Performed by: NURSE PRACTITIONER

## 2022-05-31 PROCEDURE — 82607 VITAMIN B-12: CPT | Performed by: NURSE PRACTITIONER

## 2022-05-31 PROCEDURE — 82728 ASSAY OF FERRITIN: CPT | Performed by: NURSE PRACTITIONER

## 2022-06-01 ENCOUNTER — OFFICE VISIT (OUTPATIENT)
Dept: ORTHOPEDICS | Facility: CLINIC | Age: 57
End: 2022-06-01
Payer: OTHER GOVERNMENT

## 2022-06-01 VITALS — BODY MASS INDEX: 40.97 KG/M2 | HEIGHT: 64 IN | WEIGHT: 240 LBS

## 2022-06-01 DIAGNOSIS — M72.0 DUPUYTREN'S CONTRACTURE OF RIGHT HAND: Primary | ICD-10-CM

## 2022-06-01 LAB — HIV 1+2 AB+HIV1 P24 AG SERPL QL IA: NEGATIVE

## 2022-06-01 PROCEDURE — 99213 OFFICE O/P EST LOW 20 MIN: CPT | Mod: PBBFAC | Performed by: ORTHOPAEDIC SURGERY

## 2022-06-01 PROCEDURE — 99213 OFFICE O/P EST LOW 20 MIN: CPT | Mod: S$PBB,,, | Performed by: ORTHOPAEDIC SURGERY

## 2022-06-01 PROCEDURE — 99999 PR PBB SHADOW E&M-EST. PATIENT-LVL III: CPT | Mod: PBBFAC,,, | Performed by: ORTHOPAEDIC SURGERY

## 2022-06-01 PROCEDURE — 99999 PR PBB SHADOW E&M-EST. PATIENT-LVL III: ICD-10-PCS | Mod: PBBFAC,,, | Performed by: ORTHOPAEDIC SURGERY

## 2022-06-01 PROCEDURE — 99213 PR OFFICE/OUTPT VISIT, EST, LEVL III, 20-29 MIN: ICD-10-PCS | Mod: S$PBB,,, | Performed by: ORTHOPAEDIC SURGERY

## 2022-06-01 NOTE — H&P (VIEW-ONLY)
Subjective:     Patient ID: Linette Galo is a 56 y.o. female.    Chief Complaint: Pain of the Right Forearm      HPI:  The patient is a 56-year-old female status post right thumb basal joint arthroplasty and right ring trigger finger release date of surgery is 02/07/2022.  She has developed Dupuytren's contracture ring finger ray with a Dupuytren's nodule and pretendinous cord that is symptomatic.  She has difficulty holding her grandbaby.  She wishes to have a partial palmar fasciectomy.  She declines injection    Past Medical History:   Diagnosis Date    ADD (attention deficit disorder)     Anemia     Anxiety     Followed by Psychology    Depression     bipolar, anxiety, ADD    Family history of colon cancer 1/22/2019    Her grandmother had colon cancer.    Positive ARIK (antinuclear antibody)     Dr. Ross//following     Past Surgical History:   Procedure Laterality Date    CARPAL TUNNEL RELEASE      both hands    COLONOSCOPY N/A 1/22/2019    Procedure: COLONOSCOPY;  Surgeon: Moe Jimenez MD;  Location: [facility];  Service: Endoscopy;  Laterality: N/A;    GASTRIC BYPASS  2008    HYSTERECTOMY  2011    RALH (retains ovaries)    INTERPOSITION ARTHROPLASTY OF CARPOMETACARPAL JOINTS Right 2/7/2022    Procedure: INTERPOSITION ARTHROPLASTY, CMC JOINT;  Surgeon: Sterling Jade MD;  Location: [facility] OR;  Service: Orthopedics;  Laterality: Right;  right thumb basal joint arthroplasty     ARTHREX - LATRICE AWARE    TONSILLECTOMY      TRIGGER FINGER RELEASE Right 2/7/2022    Procedure: RELEASE, TRIGGER FINGER;  Surgeon: Sterling Jade MD;  Location: [facility] OR;  Service: Orthopedics;  Laterality: Right;   right ring trigger finger release      Family History   Problem Relation Age of Onset    Breast cancer Maternal Grandmother     Colon cancer Maternal Grandmother     Hypertension Maternal Grandmother     Diabetes Maternal Grandmother     Thrombophilia Father     Parkinsonism Father      Lumbar disc disease Father     Stroke Father     Heart disease Father     Thrombophilia Sister     Hypertension Mother     Diabetes Mother     Hypertension Paternal Grandmother     Diabetes Paternal Grandmother      Social History     Socioeconomic History    Marital status:    Tobacco Use    Smoking status: Never Smoker    Smokeless tobacco: Never Used   Substance and Sexual Activity    Alcohol use: Not Currently     Comment: socially    Drug use: No    Sexual activity: Yes     Partners: Male     Birth control/protection: Surgical     Comment: mut monog     Social Determinants of Health     Financial Resource Strain: Low Risk     Difficulty of Paying Living Expenses: Not hard at all   Food Insecurity: No Food Insecurity    Worried About Running Out of Food in the Last Year: Never true    Ran Out of Food in the Last Year: Never true   Transportation Needs: No Transportation Needs    Lack of Transportation (Medical): No    Lack of Transportation (Non-Medical): No   Physical Activity: Unknown    Days of Exercise per Week: 2 days   Stress: Stress Concern Present    Feeling of Stress : Rather much   Social Connections: Unknown    Frequency of Communication with Friends and Family: More than three times a week    Frequency of Social Gatherings with Friends and Family: Once a week    Active Member of Clubs or Organizations: No    Attends Club or Organization Meetings: Never    Marital Status:      Medication List with Changes/Refills   Current Medications    BUPROPION (WELLBUTRIN SR) 200 MG SR12        CHOLECALCIFEROL, VITAMIN D3, (VITAMIN D3) 1,000 UNIT CAPSULE    Take 1 capsule (1,000 Units total) by mouth once daily.    CYANOCOBALAMIN, VITAMIN B-12, 50 MCG TABLET    Take 50 mcg by mouth once daily.    DULOXETINE (CYMBALTA) 20 MG CAPSULE    Take 60 mg by mouth once daily.    LAMOTRIGINE (LAMICTAL) 100 MG TABLET    Take by mouth once daily.    MULTIVITAMIN CAPSULE    Take 1  capsule by mouth once daily.    VYVANSE 70 MG CAPSULE    Take 70 mg by mouth every morning.      Review of patient's allergies indicates:  No Known Allergies  Review of Systems   Constitutional: Negative for malaise/fatigue.   HENT: Negative for hearing loss.    Eyes: Negative for double vision and visual disturbance.   Cardiovascular: Negative for chest pain.   Respiratory: Negative for shortness of breath.    Endocrine: Negative for cold intolerance.   Hematologic/Lymphatic: Does not bruise/bleed easily.   Skin: Negative for poor wound healing and suspicious lesions.   Musculoskeletal: Positive for arthritis, joint pain and joint swelling. Negative for gout.   Gastrointestinal: Positive for abdominal pain. Negative for nausea and vomiting.   Genitourinary: Negative for dysuria.   Neurological: Positive for numbness, paresthesias and sensory change.   Psychiatric/Behavioral: Positive for altered mental status and depression. Negative for memory loss and substance abuse. The patient is nervous/anxious.    Allergic/Immunologic: Negative for persistent infections.       Objective:   Body mass index is 41.2 kg/m².  There were no vitals filed for this visit.             General    Constitutional: She is oriented to person, place, and time. She appears well-developed and well-nourished. No distress.   HENT:   Head: Normocephalic.   Mouth/Throat: Oropharynx is clear and moist.   Eyes: EOM are normal.   Cardiovascular: Normal rate.    Pulmonary/Chest: Effort normal.   Abdominal: Soft.   Neurological: She is alert and oriented to person, place, and time. No cranial nerve deficit.   Psychiatric: She has a normal mood and affect.             Right Hand/Wrist Exam     Inspection   Scars: Wrist - present Hand -  present  Effusion: Wrist - absent Hand -  absent    Pain   Hand - The patient exhibits pain of the ring MCP.    Other     Neuorologic Exam    Median Distribution: normal  Ulnar Distribution: normal  Radial Distribution:  normal    Comments:  .  Table top test is negative.  There is no active triggering noted.  The patient has a well-healed carpal tunnel scar as well as basal joint arthroplasty scar and right ring trigger finger release scar.  She does have Dupuytren's nodule in the ring finger ray with a pretendinous cord that extends just to the distal border of the transverse carpal          Vascular Exam       Capillary Refill  Right Hand: normal capillary refill           radiographs were not obtained today  Assessment:     Encounter Diagnosis   Name Primary?    Dupuytren's contracture of right hand Yes        Plan:     The patient wishes to have a partial palmar fasciectomy right hand ring finger ray.  Risk complications alternatives were discussed including the risk of infection, anesthetic risk, injury to nerves and vessels, loss of motion, and possible need for additional surgeries were discussed.  She seems to understand and agree that surgery.  All questions were answered.                Disclaimer: This note was prepared using a voice recognition system and is likely to have sound alike errors within the text.

## 2022-06-01 NOTE — PROGRESS NOTES
Subjective:     Patient ID: Linette Gaol is a 56 y.o. female.    Chief Complaint: Pain of the Right Forearm      HPI:  The patient is a 56-year-old female status post right thumb basal joint arthroplasty and right ring trigger finger release date of surgery is 02/07/2022.  She has developed Dupuytren's contracture ring finger ray with a Dupuytren's nodule and pretendinous cord that is symptomatic.  She has difficulty holding her grandbaby.  She wishes to have a partial palmar fasciectomy.  She declines injection    Past Medical History:   Diagnosis Date    ADD (attention deficit disorder)     Anemia     Anxiety     Followed by Psychology    Depression     bipolar, anxiety, ADD    Family history of colon cancer 1/22/2019    Her grandmother had colon cancer.    Positive ARIK (antinuclear antibody)     Dr. Ross//following     Past Surgical History:   Procedure Laterality Date    CARPAL TUNNEL RELEASE      both hands    COLONOSCOPY N/A 1/22/2019    Procedure: COLONOSCOPY;  Surgeon: Moe Jimenez MD;  Location: [facility];  Service: Endoscopy;  Laterality: N/A;    GASTRIC BYPASS  2008    HYSTERECTOMY  2011    RALH (retains ovaries)    INTERPOSITION ARTHROPLASTY OF CARPOMETACARPAL JOINTS Right 2/7/2022    Procedure: INTERPOSITION ARTHROPLASTY, CMC JOINT;  Surgeon: Sterling Jade MD;  Location: [facility] OR;  Service: Orthopedics;  Laterality: Right;  right thumb basal joint arthroplasty     ARTHREX - LATRICE AWARE    TONSILLECTOMY      TRIGGER FINGER RELEASE Right 2/7/2022    Procedure: RELEASE, TRIGGER FINGER;  Surgeon: Sterling Jade MD;  Location: [facility] OR;  Service: Orthopedics;  Laterality: Right;   right ring trigger finger release      Family History   Problem Relation Age of Onset    Breast cancer Maternal Grandmother     Colon cancer Maternal Grandmother     Hypertension Maternal Grandmother     Diabetes Maternal Grandmother     Thrombophilia Father     Parkinsonism Father      Lumbar disc disease Father     Stroke Father     Heart disease Father     Thrombophilia Sister     Hypertension Mother     Diabetes Mother     Hypertension Paternal Grandmother     Diabetes Paternal Grandmother      Social History     Socioeconomic History    Marital status:    Tobacco Use    Smoking status: Never Smoker    Smokeless tobacco: Never Used   Substance and Sexual Activity    Alcohol use: Not Currently     Comment: socially    Drug use: No    Sexual activity: Yes     Partners: Male     Birth control/protection: Surgical     Comment: mut monog     Social Determinants of Health     Financial Resource Strain: Low Risk     Difficulty of Paying Living Expenses: Not hard at all   Food Insecurity: No Food Insecurity    Worried About Running Out of Food in the Last Year: Never true    Ran Out of Food in the Last Year: Never true   Transportation Needs: No Transportation Needs    Lack of Transportation (Medical): No    Lack of Transportation (Non-Medical): No   Physical Activity: Unknown    Days of Exercise per Week: 2 days   Stress: Stress Concern Present    Feeling of Stress : Rather much   Social Connections: Unknown    Frequency of Communication with Friends and Family: More than three times a week    Frequency of Social Gatherings with Friends and Family: Once a week    Active Member of Clubs or Organizations: No    Attends Club or Organization Meetings: Never    Marital Status:      Medication List with Changes/Refills   Current Medications    BUPROPION (WELLBUTRIN SR) 200 MG SR12        CHOLECALCIFEROL, VITAMIN D3, (VITAMIN D3) 1,000 UNIT CAPSULE    Take 1 capsule (1,000 Units total) by mouth once daily.    CYANOCOBALAMIN, VITAMIN B-12, 50 MCG TABLET    Take 50 mcg by mouth once daily.    DULOXETINE (CYMBALTA) 20 MG CAPSULE    Take 60 mg by mouth once daily.    LAMOTRIGINE (LAMICTAL) 100 MG TABLET    Take by mouth once daily.    MULTIVITAMIN CAPSULE    Take 1  capsule by mouth once daily.    VYVANSE 70 MG CAPSULE    Take 70 mg by mouth every morning.      Review of patient's allergies indicates:  No Known Allergies  Review of Systems   Constitutional: Negative for malaise/fatigue.   HENT: Negative for hearing loss.    Eyes: Negative for double vision and visual disturbance.   Cardiovascular: Negative for chest pain.   Respiratory: Negative for shortness of breath.    Endocrine: Negative for cold intolerance.   Hematologic/Lymphatic: Does not bruise/bleed easily.   Skin: Negative for poor wound healing and suspicious lesions.   Musculoskeletal: Positive for arthritis, joint pain and joint swelling. Negative for gout.   Gastrointestinal: Positive for abdominal pain. Negative for nausea and vomiting.   Genitourinary: Negative for dysuria.   Neurological: Positive for numbness, paresthesias and sensory change.   Psychiatric/Behavioral: Positive for altered mental status and depression. Negative for memory loss and substance abuse. The patient is nervous/anxious.    Allergic/Immunologic: Negative for persistent infections.       Objective:   Body mass index is 41.2 kg/m².  There were no vitals filed for this visit.             General    Constitutional: She is oriented to person, place, and time. She appears well-developed and well-nourished. No distress.   HENT:   Head: Normocephalic.   Mouth/Throat: Oropharynx is clear and moist.   Eyes: EOM are normal.   Cardiovascular: Normal rate.    Pulmonary/Chest: Effort normal.   Abdominal: Soft.   Neurological: She is alert and oriented to person, place, and time. No cranial nerve deficit.   Psychiatric: She has a normal mood and affect.             Right Hand/Wrist Exam     Inspection   Scars: Wrist - present Hand -  present  Effusion: Wrist - absent Hand -  absent    Pain   Hand - The patient exhibits pain of the ring MCP.    Other     Neuorologic Exam    Median Distribution: normal  Ulnar Distribution: normal  Radial Distribution:  normal    Comments:  .  Table top test is negative.  There is no active triggering noted.  The patient has a well-healed carpal tunnel scar as well as basal joint arthroplasty scar and right ring trigger finger release scar.  She does have Dupuytren's nodule in the ring finger ray with a pretendinous cord that extends just to the distal border of the transverse carpal          Vascular Exam       Capillary Refill  Right Hand: normal capillary refill           radiographs were not obtained today  Assessment:     Encounter Diagnosis   Name Primary?    Dupuytren's contracture of right hand Yes        Plan:     The patient wishes to have a partial palmar fasciectomy right hand ring finger ray.  Risk complications alternatives were discussed including the risk of infection, anesthetic risk, injury to nerves and vessels, loss of motion, and possible need for additional surgeries were discussed.  She seems to understand and agree that surgery.  All questions were answered.                Disclaimer: This note was prepared using a voice recognition system and is likely to have sound alike errors within the text.

## 2022-06-06 ENCOUNTER — DOCUMENTATION ONLY (OUTPATIENT)
Dept: PREADMISSION TESTING | Facility: HOSPITAL | Age: 57
End: 2022-06-06
Payer: OTHER GOVERNMENT

## 2022-06-06 NOTE — PROGRESS NOTES
Pre op instructions reviewed with patient per phone.    Spoke about pre op process and surgery instructions, verbalized understanding.    To confirm, Your surgeon has instructed you:  Surgery is scheduled on 6/9/2022 at [facility].      Please report to [facility], 1st floor.    The address is [address].  [place]  [id number]       The Pre Admissions will call you the day prior to surgery with your arrival time.        INSTRUCTIONS IMPORTANT!!!  Do Not Eat, Drink, or Smoke after 12 midnight! NO WATER after midnight! OK to brush teeth, no gum, candy or mints!        *Take only these medicines with a small swallow of water-morning of surgery.    Wellbutrin  Cymbalta  Lamictal        ____  Do Not wear makeup, mascara nail polish or artificial nails  ____  NO powder, lotions, deodorants or creams to surgical area.  ____  Please remove all jewelry, including piercings and leave at home.  SURGERY WILL BE CANCELLED IF PIERCINGS ARE PRESENT!!!  ____  Dentures, Hearing Aids and Contact Lens will need to be removed prior to the start of surgery.  ____  Please bring photo ID and insurance information to hospital (Leave Valuables at Home)  ____  If going home the same day, arrange for a ride home. You will not be able to            drive if Anesthesia was used.    ____  Wear clean, loose fitting clothing. Allow for dressings, bandages.  ____  Stop Aspirin, Ibuprofen, Motrin and Aleve at least 5-7 days before surgery, unless otherwise instructed by your doctor, or the nurse. You MAY use Tylenol/acetaminophen until day of               surgery.  ____  If you take diabetic medication, do NOT take morning of surgery unless instructed by            Doctor. Metformin to be stopped 24 hrs prior to surgery time.   ____ Stop taking any Fish Oil supplements or Vitamins at least 5 days prior to surgery, unless instructed otherwise by your Doctor.         Bathing Instructions: The night  before surgery and the morning prior to coming to the hospital:              -Do not shave your face.  -Do not shave pubic hair 7 days prior to surgery (gyn pt's).  -Do not shave legs/underarms 3 days prior to surgery.              -Shower & Rinse your body as usual with anti-bacterial Soap (Dial, Lever 2000, or Hibiclens)              -Do not use hibiclens on your head, face, or genitals.              -Do not wash with anti-bacterial soap after you use the hibiclens.              -Rinse your body thoroughly.       Pediatric patients do not need to use anti-bacterial soap or Hibiclens.            [facility] Visitor/Ride Policy:  Only 1 adult allowed (over the age of 18) to accompany you into Pre-op/Recovery Surgery Dept and must stay through the entire length of admission.    Must have a ride home from a responsible adult that you know and trust.   Pediatric Patients are allowed 2 adult visitors.    Medical Transport, Uber or Lyft can only be used if patient has a responsible adult to accompany them during ride home.     Post-Op Instructions: You will receive surgery post-op instructions by your Discharge Nurse prior to going home.     Surgical Site Infection:     Prevention of surgical site infections:                 -Keep incisions clean and dry.              -Do not soak/submerge incisions in water until completely healed.              -Do not apply lotions, powders, creams, or deodorants to site.              -Always make sure hands are cleaned with antibacterial soap/ alcohol-based   prior to touching the surgical site.       Signs and symptoms:              -Redness and pain around the area where you had surgery              -Drainage of cloudy fluid from your surgical wound              -Fever over 100.4 or chills  >>>Call Surgeon office/on-call Surgeon if you experience any of these signs & symptoms post-surgery.        *Please Call [facility] Pre-[facility] Department with surgery instruction questions  at [phone].     *Insurance Questions, please call [phone].    Pre admit office to call afternoon prior to surgery with final arrival time.

## 2022-06-07 ENCOUNTER — ANESTHESIA EVENT (OUTPATIENT)
Dept: SURGERY | Facility: HOSPITAL | Age: 57
End: 2022-06-07
Payer: OTHER GOVERNMENT

## 2022-06-07 NOTE — ANESTHESIA PREPROCEDURE EVALUATION
"                                                                                                             06/07/2022    Pre-operative evaluation for FASCIECTOMY (Right)    Linette Galo is a 56 y.o. female     Past Medical History:   Diagnosis Date    ADD (attention deficit disorder)     Anemia     Anxiety     Followed by Psychology    Arthritis     Depression     bipolar, anxiety, ADD    Family history of colon cancer 1/22/2019    Her grandmother had colon cancer.    General anesthetics causing adverse effect in therapeutic use     Patient reports h/o "combativeness", after Gastric and Hysterectomy.     Positive ARIK (antinuclear antibody)     Dr. Ross//following       Past Surgical History:   Procedure Laterality Date    CARPAL TUNNEL RELEASE      both hands    COLONOSCOPY N/A 1/22/2019    Procedure: COLONOSCOPY;  Surgeon: Moe Jimenez MD;  Location: [facility];  Service: Endoscopy;  Laterality: N/A;    GASTRIC BYPASS  2008    HYSTERECTOMY  2011    RALH (retains ovaries)    INTERPOSITION ARTHROPLASTY OF CARPOMETACARPAL JOINTS Right 2/7/2022    Procedure: INTERPOSITION ARTHROPLASTY, CMC JOINT;  Surgeon: Sterling Jade MD;  Location: [facility] OR;  Service: Orthopedics;  Laterality: Right;  right thumb basal joint arthroplasty     ARTHREX - LATRICE AWARE    TONSILLECTOMY      TRIGGER FINGER RELEASE Right 2/7/2022    Procedure: RELEASE, TRIGGER FINGER;  Surgeon: Sterling Jade MD;  Location: [facility] OR;  Service: Orthopedics;  Laterality: Right;   right ring trigger finger release        Vital Signs Range (Last 24H):         CBC:     Recent Labs   Lab 05/31/22  0712   WBC 5.98   RBC 4.42   HGB 13.2   HCT 41.0      MCV 93   MCH 29.9   MCHC 32.2       CMP:   Recent Labs   Lab 05/31/22  0712      K 4.0      CO2 25   BUN 23*   CREATININE 0.9   GLU 99   CALCIUM 9.2   ALBUMIN 3.9   PROT 6.7   ALKPHOS 77   ALT 26   AST 18   BILITOT 0.3       INR:  No results for " "input(s): PT, INR, PROTIME, APTT in the last 720 hours.        Pre-op Assessment    I have reviewed the Patient Summary Reports.    I have reviewed the Nursing Notes. I have reviewed the NPO Status.   I have reviewed the Medications.     Review of Systems  Anesthesia Hx:  Hx of Anesthetic complications "Combative" after GA. History of prior surgery of interest to airway management or planning: gastric bypass. Previous anesthesia: MAC Denies Family Hx of Anesthesia complications.  Personal Hx of Anesthesia complications   Social:  Non-Smoker    Hematology/Oncology:         -- Anemia:   Cardiovascular:   ECG has been reviewed.    Neurological:   Peripheral Neuropathy    Psych:   Psychiatric History anxiety depression          Physical Exam  General:  Obesity      Airway/Jaw/Neck:  Airway Findings: Mouth Opening: Normal   Tongue: Normal   General Airway Assessment: Adult      Chest/Lungs:  Chest/Lungs Findings: Clear to auscultation      Heart/Vascular:  Heart Findings: Rate: Normal        Mental Status:  Mental Status Findings:  Alert and Oriented, Cooperative         Anesthesia Plan  Type of Anesthesia, risks & benefits discussed:  Anesthesia Type:  Gen Supraglottic Airway    Patient's Preference:   Plan Factors:          Intra-op Monitoring Plan: standard ASA monitors  Intra-op Monitoring Plan Comments:   Post Op Pain Control Plan: per primary service following discharge from PACU, multimodal analgesia and peripheral nerve block  Post Op Pain Control Plan Comments:     Induction:   IV  Beta Blocker:  Patient is not currently on a Beta-Blocker (No further documentation required).       Informed Consent: Informed consent signed with the Patient and all parties understand the risks and agree with anesthesia plan.  All questions answered.  Anesthesia consent signed with patient.  ASA Score: 2     Day of Surgery Review of History & Physical:    H&P Update referred to the surgeon/provider.          Ready For Surgery From " Anesthesia Perspective.           Physical Exam  General: Obesity    Airway:  Mouth Opening: Normal  Tongue: Normal    Chest/Lungs:  Clear to auscultation    Heart:  Rate: Normal          Anesthesia Plan  Type of Anesthesia, risks & benefits discussed:    Anesthesia Type: Gen Supraglottic Airway  Intra-op Monitoring Plan: standard ASA monitors  Post Op Pain Control Plan: per primary service following discharge from PACU, multimodal analgesia and peripheral nerve block  Induction:  IV  Informed Consent: Informed consent signed with the Patient and all parties understand the risks and agree with anesthesia plan.  All questions answered.   ASA Score: 2  Day of Surgery Review of History & Physical: H&P Update referred to the surgeon/provider.    Ready For Surgery From Anesthesia Perspective.       .

## 2022-06-08 ENCOUNTER — TELEPHONE (OUTPATIENT)
Dept: PREADMISSION TESTING | Facility: HOSPITAL | Age: 57
End: 2022-06-08
Payer: OTHER GOVERNMENT

## 2022-06-08 NOTE — DISCHARGE INSTRUCTIONS
Nozin Instructions  Goal: the goal of Nozin is to reduce the risk of post-procedural infections by bacteria in the nasal cavity. Think of it as hand  for your nose.    How to use:    1. Shake Nozin bottle well    2. Take a cotton swab and apply 4 drops to one tip    3. Insert cotton tip into one nostril, being sure not to go deeper into nose than tip of the swab.    4. Swab nostril 6 times counterclockwise and 6 times clockwise. Make sure to swab the inside front pocket of the nostril.    5. Take swab out and apply 2 drops to the same cotton tip. Repeat steps 3 and 4 in the other nostril.        Do steps 1-5 twice a day for 7 days.        After Hand Surgery  After surgery, the better you take care of yourself--especially your hand--the sooner it will heal. Follow your surgeons instructions. Try not to bump your hand, and dont move or lift anything while youre still wearing bandages, a splint, or a cast.  Care for your hand    Keep your hand elevated above heart level as much as possible for the first several days after surgery. This helps reduce swelling and pain.  To help prevent infection and speed healing, take care not to get your cast or bandages wet.  Relieve pain as directed  Your surgeon may prescribe pain medicine or suggest you take an anti-inflammatory medicine. You might also be instructed to apply ice (or another cold source) to your hand. If you use ice cubes, put them in a plastic bag and rest it on top of your bandages. Leave the cold source on your hand for as long as its comfortable. Do this several times a day for the first few days after surgery. It may take several minutes before you can feel the cold through the cast or bandages.  Follow up with your surgeon  During a follow-up visit after surgery, your surgeon will check your progress. The stitches, bandages, splint, or cast may be removed. A new cast or splint may be placed. If your hand has healed enough, your surgeon may  prescribe exercises.  Do prescribed hand exercises  Your surgeon may recommend that you do exercises. These may be done under the guidance of a physical or occupational therapist. The exercises strengthen your hand, help you regain flexibility, and restore proper function. Do the exercises as advised.  Call your surgeon if you have...  A fever higher than 100.4°F (38.0°C) taken by mouth  Side effects from your medicine, such as prolonged nausea  A wet or loose dressing, or a dressing that is too tight  Excessive bleeding  Increased, ongoing pain or numbness  Signs of infection (such as drainage, warmth, or redness) at the incision site   Date Last Reviewed: 11/11/2015  © 9221-0124 [employer]. [address] [place] [address], [place] [address]. All rights reserved. This information is not intended as a substitute for professional medical care. Always follow your healthcare professional's instructions.

## 2022-06-09 ENCOUNTER — ANESTHESIA (OUTPATIENT)
Dept: SURGERY | Facility: HOSPITAL | Age: 57
End: 2022-06-09
Payer: OTHER GOVERNMENT

## 2022-06-09 ENCOUNTER — HOSPITAL ENCOUNTER (OUTPATIENT)
Facility: HOSPITAL | Age: 57
Discharge: HOME | End: 2022-06-09
Attending: ORTHOPAEDIC SURGERY | Admitting: ORTHOPAEDIC SURGERY
Payer: OTHER GOVERNMENT

## 2022-06-09 VITALS
HEIGHT: 64 IN | WEIGHT: 232.81 LBS | OXYGEN SATURATION: 97 % | HEART RATE: 84 BPM | DIASTOLIC BLOOD PRESSURE: 70 MMHG | SYSTOLIC BLOOD PRESSURE: 154 MMHG | RESPIRATION RATE: 18 BRPM | BODY MASS INDEX: 39.75 KG/M2 | TEMPERATURE: 98 F

## 2022-06-09 DIAGNOSIS — M72.0 DUPUYTREN'S CONTRACTURE OF RIGHT HAND: Primary | ICD-10-CM

## 2022-06-09 PROCEDURE — 71000033 HC RECOVERY, INTIAL HOUR: Performed by: ORTHOPAEDIC SURGERY

## 2022-06-09 PROCEDURE — 26123 PR PART PALMAR FASCIEC,OPEN 1 DIGIT: ICD-10-PCS | Mod: F8,,, | Performed by: ORTHOPAEDIC SURGERY

## 2022-06-09 PROCEDURE — 63600175 PHARM REV CODE 636 W HCPCS: Performed by: NURSE ANESTHETIST, CERTIFIED REGISTERED

## 2022-06-09 PROCEDURE — 36000706: Performed by: ORTHOPAEDIC SURGERY

## 2022-06-09 PROCEDURE — 88304 PR  SURG PATH,LEVEL III: ICD-10-PCS | Mod: 26,,, | Performed by: PATHOLOGY

## 2022-06-09 PROCEDURE — 63600175 PHARM REV CODE 636 W HCPCS

## 2022-06-09 PROCEDURE — 71000015 HC POSTOP RECOV 1ST HR: Performed by: ORTHOPAEDIC SURGERY

## 2022-06-09 PROCEDURE — 25000003 PHARM REV CODE 250: Performed by: NURSE ANESTHETIST, CERTIFIED REGISTERED

## 2022-06-09 PROCEDURE — 26123 RELEASE PALM CONTRACTURE: CPT | Mod: F8,,, | Performed by: ORTHOPAEDIC SURGERY

## 2022-06-09 PROCEDURE — 36000707: Performed by: ORTHOPAEDIC SURGERY

## 2022-06-09 PROCEDURE — 37000008 HC ANESTHESIA 1ST 15 MINUTES: Performed by: ORTHOPAEDIC SURGERY

## 2022-06-09 PROCEDURE — 63600175 PHARM REV CODE 636 W HCPCS: Performed by: ANESTHESIOLOGY

## 2022-06-09 PROCEDURE — 88304 TISSUE EXAM BY PATHOLOGIST: CPT | Performed by: PATHOLOGY

## 2022-06-09 PROCEDURE — 25000003 PHARM REV CODE 250: Performed by: ORTHOPAEDIC SURGERY

## 2022-06-09 PROCEDURE — 88304 TISSUE EXAM BY PATHOLOGIST: CPT | Mod: 26,,, | Performed by: PATHOLOGY

## 2022-06-09 PROCEDURE — 01810 ANES PX NRV MUSC F/ARM WRST: CPT | Performed by: ORTHOPAEDIC SURGERY

## 2022-06-09 PROCEDURE — D9220A PRA ANESTHESIA: Mod: ,,, | Performed by: NURSE ANESTHETIST, CERTIFIED REGISTERED

## 2022-06-09 PROCEDURE — D9220A PRA ANESTHESIA: ICD-10-PCS | Mod: ,,, | Performed by: NURSE ANESTHETIST, CERTIFIED REGISTERED

## 2022-06-09 PROCEDURE — 37000009 HC ANESTHESIA EA ADD 15 MINS: Performed by: ORTHOPAEDIC SURGERY

## 2022-06-09 RX ORDER — CHLORHEXIDINE GLUCONATE ORAL RINSE 1.2 MG/ML
10 SOLUTION DENTAL
Status: DISCONTINUED | OUTPATIENT
Start: 2022-06-09 | End: 2022-11-04

## 2022-06-09 RX ORDER — CHLORHEXIDINE GLUCONATE ORAL RINSE 1.2 MG/ML
10 SOLUTION DENTAL 2 TIMES DAILY
Status: DISCONTINUED | OUTPATIENT
Start: 2022-06-09 | End: 2022-06-09 | Stop reason: HOSPADM

## 2022-06-09 RX ORDER — DIPHENHYDRAMINE HYDROCHLORIDE 50 MG/ML
25 INJECTION INTRAMUSCULAR; INTRAVENOUS EVERY 6 HOURS PRN
Status: DISCONTINUED | OUTPATIENT
Start: 2022-06-09 | End: 2022-06-09 | Stop reason: HOSPADM

## 2022-06-09 RX ORDER — PROPOFOL 10 MG/ML
VIAL (ML) INTRAVENOUS
Status: DISCONTINUED | OUTPATIENT
Start: 2022-06-09 | End: 2022-06-09

## 2022-06-09 RX ORDER — MIDAZOLAM HYDROCHLORIDE 1 MG/ML
INJECTION INTRAMUSCULAR; INTRAVENOUS
Status: DISCONTINUED | OUTPATIENT
Start: 2022-06-09 | End: 2022-06-09

## 2022-06-09 RX ORDER — MEPERIDINE HYDROCHLORIDE 25 MG/ML
12.5 INJECTION INTRAMUSCULAR; INTRAVENOUS; SUBCUTANEOUS ONCE
Status: DISCONTINUED | OUTPATIENT
Start: 2022-06-09 | End: 2022-06-09 | Stop reason: HOSPADM

## 2022-06-09 RX ORDER — DEXMEDETOMIDINE HYDROCHLORIDE 100 UG/ML
INJECTION, SOLUTION INTRAVENOUS
Status: DISCONTINUED | OUTPATIENT
Start: 2022-06-09 | End: 2022-06-09

## 2022-06-09 RX ORDER — FENTANYL CITRATE 50 UG/ML
25 INJECTION, SOLUTION INTRAMUSCULAR; INTRAVENOUS EVERY 5 MIN PRN
Status: DISCONTINUED | OUTPATIENT
Start: 2022-06-09 | End: 2022-06-09 | Stop reason: HOSPADM

## 2022-06-09 RX ORDER — HYDROCODONE BITARTRATE AND ACETAMINOPHEN 5; 325 MG/1; MG/1
1 TABLET ORAL EVERY 4 HOURS PRN
Status: DISCONTINUED | OUTPATIENT
Start: 2022-06-09 | End: 2022-06-09 | Stop reason: HOSPADM

## 2022-06-09 RX ORDER — ONDANSETRON 2 MG/ML
4 INJECTION INTRAMUSCULAR; INTRAVENOUS ONCE AS NEEDED
Status: COMPLETED | OUTPATIENT
Start: 2022-06-09 | End: 2022-06-09

## 2022-06-09 RX ORDER — BUPIVACAINE HYDROCHLORIDE 2.5 MG/ML
INJECTION, SOLUTION EPIDURAL; INFILTRATION; INTRACAUDAL
Status: DISCONTINUED
Start: 2022-06-09 | End: 2022-06-09 | Stop reason: HOSPADM

## 2022-06-09 RX ORDER — EPHEDRINE SULFATE 50 MG/ML
INJECTION, SOLUTION INTRAVENOUS
Status: DISCONTINUED | OUTPATIENT
Start: 2022-06-09 | End: 2022-06-09

## 2022-06-09 RX ORDER — ALBUTEROL SULFATE 0.83 MG/ML
2.5 SOLUTION RESPIRATORY (INHALATION) EVERY 4 HOURS PRN
Status: DISCONTINUED | OUTPATIENT
Start: 2022-06-09 | End: 2022-06-09 | Stop reason: HOSPADM

## 2022-06-09 RX ORDER — FENTANYL CITRATE 50 UG/ML
INJECTION, SOLUTION INTRAMUSCULAR; INTRAVENOUS
Status: DISCONTINUED | OUTPATIENT
Start: 2022-06-09 | End: 2022-06-09

## 2022-06-09 RX ORDER — SODIUM CHLORIDE, SODIUM LACTATE, POTASSIUM CHLORIDE, CALCIUM CHLORIDE 600; 310; 30; 20 MG/100ML; MG/100ML; MG/100ML; MG/100ML
INJECTION, SOLUTION INTRAVENOUS CONTINUOUS
Status: DISCONTINUED | OUTPATIENT
Start: 2022-06-09 | End: 2022-11-04

## 2022-06-09 RX ORDER — BUPIVACAINE HYDROCHLORIDE 2.5 MG/ML
INJECTION, SOLUTION EPIDURAL; INFILTRATION; INTRACAUDAL
Status: DISCONTINUED | OUTPATIENT
Start: 2022-06-09 | End: 2022-06-09 | Stop reason: HOSPADM

## 2022-06-09 RX ORDER — LIDOCAINE HCL/PF 100 MG/5ML
SYRINGE (ML) INTRAVENOUS
Status: DISCONTINUED | OUTPATIENT
Start: 2022-06-09 | End: 2022-06-09

## 2022-06-09 RX ORDER — CEFAZOLIN SODIUM 2 G/50ML
2 SOLUTION INTRAVENOUS
Status: COMPLETED | OUTPATIENT
Start: 2022-06-09 | End: 2022-06-09

## 2022-06-09 RX ORDER — SODIUM CHLORIDE 0.9 % (FLUSH) 0.9 %
10 SYRINGE (ML) INJECTION
Status: DISCONTINUED | OUTPATIENT
Start: 2022-06-09 | End: 2022-06-09 | Stop reason: HOSPADM

## 2022-06-09 RX ORDER — DEXAMETHASONE SODIUM PHOSPHATE 4 MG/ML
INJECTION, SOLUTION INTRA-ARTICULAR; INTRALESIONAL; INTRAMUSCULAR; INTRAVENOUS; SOFT TISSUE
Status: DISCONTINUED | OUTPATIENT
Start: 2022-06-09 | End: 2022-06-09

## 2022-06-09 RX ORDER — HYDROCODONE BITARTRATE AND ACETAMINOPHEN 5; 325 MG/1; MG/1
1 TABLET ORAL
Status: DISCONTINUED | OUTPATIENT
Start: 2022-06-09 | End: 2022-06-09 | Stop reason: HOSPADM

## 2022-06-09 RX ADMIN — PROPOFOL 160 MG: 10 INJECTION, EMULSION INTRAVENOUS at 09:06

## 2022-06-09 RX ADMIN — Medication 20 MG: at 09:06

## 2022-06-09 RX ADMIN — ONDANSETRON 4 MG: 2 INJECTION, SOLUTION INTRAMUSCULAR; INTRAVENOUS at 10:06

## 2022-06-09 RX ADMIN — EPHEDRINE SULFATE 10 MG: 50 INJECTION INTRAVENOUS at 10:06

## 2022-06-09 RX ADMIN — CEFAZOLIN SODIUM 3 G: 2 SOLUTION INTRAVENOUS at 09:06

## 2022-06-09 RX ADMIN — FENTANYL CITRATE 50 MCG: 50 INJECTION, SOLUTION INTRAMUSCULAR; INTRAVENOUS at 09:06

## 2022-06-09 RX ADMIN — SODIUM CHLORIDE, SODIUM LACTATE, POTASSIUM CHLORIDE, AND CALCIUM CHLORIDE: 600; 310; 30; 20 INJECTION, SOLUTION INTRAVENOUS at 09:06

## 2022-06-09 RX ADMIN — DEXAMETHASONE SODIUM PHOSPHATE 4 MG: 4 INJECTION, SOLUTION INTRA-ARTICULAR; INTRALESIONAL; INTRAMUSCULAR; INTRAVENOUS; SOFT TISSUE at 10:06

## 2022-06-09 RX ADMIN — FENTANYL CITRATE 50 MCG: 50 INJECTION, SOLUTION INTRAMUSCULAR; INTRAVENOUS at 10:06

## 2022-06-09 RX ADMIN — MIDAZOLAM HYDROCHLORIDE 2 MG: 1 INJECTION INTRAMUSCULAR; INTRAVENOUS at 09:06

## 2022-06-09 RX ADMIN — DEXMEDETOMIDINE HYDROCHLORIDE 4 MCG: 100 INJECTION, SOLUTION INTRAVENOUS at 10:06

## 2022-06-09 NOTE — ANESTHESIA PROCEDURE NOTES
Intubation    Date/Time: 6/9/2022 9:56 AM  Performed by: Kristin Vaughn CRNA  Authorized by: Yazmin Irwin MD     Intubation:     Induction:  Intravenous    Intubated:  Postinduction    Attempts:  1    Attempted By:  CRNA    Difficult Airway Encountered?: No      Complications:  None    Airway Device:  Supraglottic airway/LMA    Airway Device Size:  3.0    Style/Cuff Inflation:  Cuffed    Inflation Amount (mL):  2    Placement Verified By:  Capnometry    Complicating Factors:  None    Findings Post-Intubation:  BS equal bilateral

## 2022-06-09 NOTE — DISCHARGE SUMMARY
[facility]  Discharge Note  Short Stay    Procedure(s) (LRB):  Partial palmar FASCIECTOMY (Right) ring finger ray    OUTCOME: Patient tolerated treatment/procedure well without complication and is now ready for discharge.    DISPOSITION: Home or Self Care    FINAL DIAGNOSIS:  Dupuytren's contracture right ring finger ray    FOLLOWUP: In clinic    DISCHARGE INSTRUCTIONS:    Discharge Procedure Orders   Diet general     Call MD for:  temperature >100.4     Call MD for:  persistent nausea and vomiting     Call MD for:  severe uncontrolled pain     Call MD for:  difficulty breathing, headache or visual disturbances     Call MD for:  redness, tenderness, or signs of infection (pain, swelling, redness, odor or green/yellow discharge around incision site)     Call MD for:  hives     Call MD for:  persistent dizziness or light-headedness     Call MD for:  extreme fatigue        TIME SPENT ON DISCHARGE:  20 minutes

## 2022-06-09 NOTE — OP NOTE
[facility]  General Surgery  Operative Note    SUMMARY     Date of Procedure: 6/9/2022     Procedure: Procedure(s) (LRB):  Partial palmar FASCIECTOMY (Right)   had    Surgeon(s) and Role:     * Sterling Jade MD - Primary    Assisting Surgeon: None    Pre-Operative Diagnosis: Dupuytren's contracture of right hand [M72.0] ring finger ray    Post-Operative Diagnosis: Post-Op Diagnosis Codes:     * Dupuytren's contracture of right hand [M72.0] ring finger ray    Anesthesia:  General    Description:  The patient is a 56-year-old female with right hand Dupuytren's contracture and fairly rapidly progressing contracture of her ring finger with a positive table top test today and about a 15 degree flexion contracture PIP joint that had advanced since the last time I saw her she had pain in this area with a strong family history and wished to have a partial palmar fasciectomy.  She was taken to the operating room where general anesthesia was administered.  Satisfactory anesthesia had been achieved the right hand was prepped and draped in the usual sterile fashion.  After exsanguination of the extremity a proximal tourniquet was inflated to 250 mmHg and the patient received 3 g of Ancef intravenously preoperatively.  At this time a zigzag incision was made from the DIP joint extending proximally to the distal border the transverse carpal ligament.  She had a pretendinous cord in this area.  Dissection was carried from proximal to distal.  The skin flaps were sewed back for visualization.  The radial and ulnar neurovascular bundles were identified and carefully retracted throughout the length of the incision.  The ulnar neurovascular bundle had been translated medially by a spiral cord and was adequately decompressed.  The palmar fascia was excised through the length of the incision of the ring finger and submitted to pathology for examination.  No additional pathology was encountered skin was sewn  loosely using horizontal mattress 4-0 nylon suture.  10 cc of quarter % plain Marcaine were injected at the base of the ring finger digit.  Xeroform gauze 4x4s and 2 ABD pads were over wrapped with 3 in gauze dressing.  The patient tolerated the procedure well was transferred to the recovery room in satisfactory condition.    Significant Surgical Tasks Conducted by the Assistant(s), if Applicable:  No assistant    Complications:  None     Estimated Blood Loss (EBL):  5 mL           Implants: None    Specimens: Palmar fascia           Condition: Good    Disposition: PACU - hemodynamically stable.    Attestation: I was present and scrubbed for the entire procedure.

## 2022-06-09 NOTE — ANESTHESIA POSTPROCEDURE EVALUATION
Anesthesia Post Evaluation    Patient: Linette Galo    Procedure(s) Performed: Procedure(s) (LRB):  FASCIECTOMY (Right)    Final Anesthesia Type: general      Patient location during evaluation: PACU  Patient participation: Yes- Able to Participate  Level of consciousness: awake and alert and oriented  Post-procedure vital signs: reviewed and stable  Pain management: adequate  Airway patency: patent    PONV status at discharge: No PONV  Anesthetic complications: no      Cardiovascular status: blood pressure returned to baseline, stable and hemodynamically stable  Respiratory status: unassisted  Hydration status: euvolemic  Follow-up not needed.          Vitals Value Taken Time   /70 06/09/22 1113   Temp 36.6 °C (97.8 °F) 06/09/22 1047   Pulse 87 06/09/22 1117   Resp 19 06/09/22 1117   SpO2 97 % 06/09/22 1117   Vitals shown include unvalidated device data.      No case tracking events are documented in the log.      Pain/Christine Score: Christine Score: 10 (6/9/2022 11:15 AM)

## 2022-06-14 ENCOUNTER — OFFICE VISIT (OUTPATIENT)
Dept: ORTHOPEDICS | Facility: CLINIC | Age: 57
End: 2022-06-14
Payer: OTHER GOVERNMENT

## 2022-06-14 VITALS
DIASTOLIC BLOOD PRESSURE: 91 MMHG | HEART RATE: 110 BPM | HEIGHT: 64 IN | SYSTOLIC BLOOD PRESSURE: 153 MMHG | WEIGHT: 232 LBS | TEMPERATURE: 98 F | BODY MASS INDEX: 39.61 KG/M2

## 2022-06-14 DIAGNOSIS — Z98.890 STATUS POST DUPUYTREN'S FASCIECTOMY: Primary | ICD-10-CM

## 2022-06-14 LAB
FINAL PATHOLOGIC DIAGNOSIS: NORMAL
GROSS: NORMAL
Lab: NORMAL

## 2022-06-14 PROCEDURE — 99999 PR PBB SHADOW E&M-EST. PATIENT-LVL IV: CPT | Mod: PBBFAC,,, | Performed by: PHYSICIAN ASSISTANT

## 2022-06-14 PROCEDURE — 99024 POSTOP FOLLOW-UP VISIT: CPT | Mod: ,,, | Performed by: PHYSICIAN ASSISTANT

## 2022-06-14 PROCEDURE — 99024 PR POST-OP FOLLOW-UP VISIT: ICD-10-PCS | Mod: ,,, | Performed by: PHYSICIAN ASSISTANT

## 2022-06-14 PROCEDURE — 99214 OFFICE O/P EST MOD 30 MIN: CPT | Mod: PBBFAC | Performed by: PHYSICIAN ASSISTANT

## 2022-06-14 PROCEDURE — 99999 PR PBB SHADOW E&M-EST. PATIENT-LVL IV: ICD-10-PCS | Mod: PBBFAC,,, | Performed by: PHYSICIAN ASSISTANT

## 2022-06-14 NOTE — PROGRESS NOTES
"Subjective:      Patient ID: Linette Galo is a 56 y.o. female.    Chief Complaint: Pain and Post-op Evaluation of the Right Hand      HPI: Linette Galo is a 56-year-old female in clinic today for postoperative follow-up.  Patient is 5 days status post right partial palmar fasciectomy for Dupuytren's contracture release.  Patient is doing well this time.  No new complaints    Past Medical History:   Diagnosis Date    ADD (attention deficit disorder)     Anemia     Anxiety     Followed by Psychology    Arthritis     Depression     bipolar, anxiety, ADD    Family history of colon cancer 1/22/2019    Her grandmother had colon cancer.    General anesthetics causing adverse effect in therapeutic use     Patient reports h/o "combativeness", after Gastric and Hysterectomy.     Positive ARIK (antinuclear antibody)     Dr. Ross//following       Current Outpatient Medications:     buPROPion (WELLBUTRIN SR) 200 MG SR12, , Disp: , Rfl:     cholecalciferol, vitamin D3, (VITAMIN D3) 1,000 unit capsule, Take 1 capsule (1,000 Units total) by mouth once daily., Disp: 100 capsule, Rfl: 12    cyanocobalamin, vitamin B-12, 50 mcg tablet, Take 50 mcg by mouth once daily., Disp: , Rfl:     DULoxetine (CYMBALTA) 20 MG capsule, Take 60 mg by mouth once daily., Disp: , Rfl:     lamotrigine (LAMICTAL) 100 MG tablet, Take by mouth once daily., Disp: , Rfl:     multivitamin capsule, Take 1 capsule by mouth once daily., Disp: , Rfl:     VYVANSE 70 mg capsule, Take 70 mg by mouth every morning. , Disp: , Rfl: 0  No current facility-administered medications for this visit.    Facility-Administered Medications Ordered in Other Visits:     chlorhexidine 0.12 % solution 10 mL, 10 mL, Mouth/Throat, On Call Procedure, Radha Craig PA-C    lactated ringers infusion, , Intravenous, Continuous, Yazmin Irwin MD, Last Rate: 100 mL/hr at 06/09/22 0926, New Bag at 06/09/22 0926  Review of patient's allergies indicates:  No Known " "Allergies    BP (!) 153/91   Pulse 110   Temp 97.9 °F (36.6 °C) (Oral)   Ht 5' 4" (1.626 m)   Wt 105.2 kg (232 lb)   BMI 39.82 kg/m²     ROS      Objective:    Ortho Exam   Right palm:  Sutures intact, wound margins well approximated, no signs of infection  Mild to moderate edema  ROM decreased secondary to pain  Motor exam normal  Sensation and pulses intact  Cap refill brisk    GEN: Well developed, well nourished female. AAOX3. No acute distress.   Normocephalic, atraumatic.   FLAKITO  Breathing unlabored.  Mood and affect appropriate.        Assessment:     Imaging:  No new imaging ordered today        1. Status post Dupuytren's fasciectomy          Plan:       Incision cleaned with hydrogen peroxide and covered with Xeroform, 4 x 4, and light wrap.  Patient instructed to begin gentle ROM.  Change dressing every day or 2. Keep sutures clean and dry until they were removed.  Return to clinic in about 2 weeks for suture removal     Follow up in about 2 weeks (around 6/28/2022).          Patient note was created using MModal Dictation.  Any errors in syntax or even information may not have been identified and edited on initial review prior to signing this note.    "

## 2022-06-22 ENCOUNTER — OFFICE VISIT (OUTPATIENT)
Dept: ORTHOPEDICS | Facility: CLINIC | Age: 57
End: 2022-06-22
Payer: OTHER GOVERNMENT

## 2022-06-22 VITALS — HEIGHT: 64 IN | BODY MASS INDEX: 39.6 KG/M2 | WEIGHT: 231.94 LBS

## 2022-06-22 DIAGNOSIS — M18.12 ARTHRITIS OF CARPOMETACARPAL (CMC) JOINT OF LEFT THUMB: ICD-10-CM

## 2022-06-22 DIAGNOSIS — Z98.890 POST-OPERATIVE STATE: Primary | ICD-10-CM

## 2022-06-22 PROCEDURE — 20600 SMALL JOINT ASPIRATION/INJECTION: L THUMB CMC: ICD-10-PCS | Mod: S$PBB,79,LT,

## 2022-06-22 PROCEDURE — 99999 PR PBB SHADOW E&M-EST. PATIENT-LVL III: ICD-10-PCS | Mod: PBBFAC,,, | Performed by: ORTHOPAEDIC SURGERY

## 2022-06-22 PROCEDURE — 99213 OFFICE O/P EST LOW 20 MIN: CPT | Mod: PBBFAC,25 | Performed by: ORTHOPAEDIC SURGERY

## 2022-06-22 PROCEDURE — 99999 PR PBB SHADOW E&M-EST. PATIENT-LVL III: CPT | Mod: PBBFAC,,, | Performed by: ORTHOPAEDIC SURGERY

## 2022-06-22 PROCEDURE — 20600 DRAIN/INJ JOINT/BURSA W/O US: CPT | Mod: PBBFAC,LT

## 2022-06-22 PROCEDURE — 99024 PR POST-OP FOLLOW-UP VISIT: ICD-10-PCS | Mod: ,,, | Performed by: ORTHOPAEDIC SURGERY

## 2022-06-22 PROCEDURE — 99024 POSTOP FOLLOW-UP VISIT: CPT | Mod: ,,, | Performed by: ORTHOPAEDIC SURGERY

## 2022-06-22 PROCEDURE — 20600 DRAIN/INJ JOINT/BURSA W/O US: CPT | Mod: S$PBB,79,LT,

## 2022-06-22 RX ORDER — TRIAMCINOLONE ACETONIDE 40 MG/ML
40 INJECTION, SUSPENSION INTRA-ARTICULAR; INTRAMUSCULAR
Status: DISCONTINUED | OUTPATIENT
Start: 2022-06-22 | End: 2022-06-22 | Stop reason: HOSPADM

## 2022-06-22 RX ADMIN — TRIAMCINOLONE ACETONIDE 40 MG: 200 INJECTION, SUSPENSION INTRA-ARTICULAR; INTRAMUSCULAR at 01:06

## 2022-06-22 NOTE — PROGRESS NOTES
"Subjective:      Patient ID: Linette Galo is a 56 y.o. female.    Chief Complaint: Pain of the Left Hand    HPI: Linette Galo is a 56-year-old female in clinic today for postoperative follow-up #2.  Patient is 13 days status post right partial palmar fasciectomy for Dupuytren's contracture release. She states her pain has improved and she is not taking any pain medication. She has been working on her finger ROM. She is interested in OT. Denies fever, chills, n/v/d/c.    She also presents to clinic for follow up of CMC arthritis of the left thumb. Her last injection was 3/9/22, which gave her good relief until recently. She wishes to have another injection today.    Past Medical History:   Diagnosis Date    ADD (attention deficit disorder)     Anemia     Anxiety     Followed by Psychology    Arthritis     Depression     bipolar, anxiety, ADD    Family history of colon cancer 1/22/2019    Her grandmother had colon cancer.    General anesthetics causing adverse effect in therapeutic use     Patient reports h/o "combativeness", after Gastric and Hysterectomy.     Positive ARIK (antinuclear antibody)     Dr. Ross//following       Current Outpatient Medications:     buPROPion (WELLBUTRIN SR) 200 MG SR12, , Disp: , Rfl:     cholecalciferol, vitamin D3, (VITAMIN D3) 1,000 unit capsule, Take 1 capsule (1,000 Units total) by mouth once daily., Disp: 100 capsule, Rfl: 12    cyanocobalamin, vitamin B-12, 50 mcg tablet, Take 50 mcg by mouth once daily., Disp: , Rfl:     DULoxetine (CYMBALTA) 20 MG capsule, Take 60 mg by mouth once daily., Disp: , Rfl:     lamotrigine (LAMICTAL) 100 MG tablet, Take by mouth once daily., Disp: , Rfl:     multivitamin capsule, Take 1 capsule by mouth once daily., Disp: , Rfl:     VYVANSE 70 mg capsule, Take 70 mg by mouth every morning. , Disp: , Rfl: 0  No current facility-administered medications for this visit.    Facility-Administered Medications Ordered in Other " "Visits:     chlorhexidine 0.12 % solution 10 mL, 10 mL, Mouth/Throat, On Call Procedure, Radha Craig PA-C    lactated ringers infusion, , Intravenous, Continuous, Yazmin Irwin MD, Last Rate: 100 mL/hr at 06/09/22 0926, New Bag at 06/09/22 0926  Review of patient's allergies indicates:  No Known Allergies    Ht 5' 4" (1.626 m)   Wt 105.2 kg (231 lb 14.8 oz)   BMI 39.81 kg/m²     Review of Systems   Constitutional: Negative for malaise/fatigue.   HENT: Negative for hearing loss.    Eyes: Negative for double vision and visual disturbance.   Cardiovascular: Negative for chest pain.   Respiratory: Negative for shortness of breath.    Endocrine: Negative for cold intolerance.   Hematologic/Lymphatic: Does not bruise/bleed easily.   Skin: Negative for poor wound healing and suspicious lesions.   Musculoskeletal: Negative for gout.   Gastrointestinal:  Negative for nausea and vomiting.   Genitourinary: Negative for dysuria.   Neurological: Negative for sensory change, numbness, paresthesias.  Psychiatric/Behavioral: Negative for memory loss and substance abuse.  Allergic/Immunologic: Negative for persistent infections.       Objective:    Ortho Exam   Right palm:  Sutures intact, wound margins well approximated, no signs of infection  Minimal edema  Mildly TTP  ROM improving  Motor exam normal  Sensation and pulses intact  Cap refill brisk    Left hand:  TTP CMC joint of thumb  No motor or sensory deficits.    GEN: Well developed, well nourished female. AAOX3. No acute distress.   Normocephalic, atraumatic.   FLAKITO  Breathing unlabored.  Mood and affect appropriate.        Assessment:     Imaging:  No new imaging ordered today        1. Post-operative state    2. Arthritis of carpometacarpal (CMC) joint of left thumb          Plan:         - PO instruction reviewed and provided to patient  - The incision was cleaned with hydrogen peroxide and sutures removed without difficulty. Steri-strips applied.  - Normal wound " care discussed.  - Referral to OT placed.  - Left CMC joint injection today.  - Patient should notify the office of any signs or symptoms of infection including fevers, erythema, purulent drainage, increasing pain.    - Follow up 4 weeks or sooner if needed.    PROCEDURE NOTE:  She has a diagnosis of CMC osteoarthritis. Dr. Jade and the patient have discussed surgical and non-surgical options at this point. Risks and benefits of corticosteroid injections discussed in detail. Patient wishes to proceed. After verbal consent and pause for timeout, the base of the thumb was prepped in sterile fashion. The left thumb CMC joint was injected with 0.5 cc Kenalog and 0.5 cc 2% plain lidocaine. The patient tolerated the injection well.     Randi Seneca, PA-C [facility]     Orders Placed This Encounter    Small Joint Aspiration/Injection: L thumb CMC    Ambulatory referral/consult to Physical/Occupational Therapy     Follow up in about 4 weeks (around 7/20/2022).          Patient note was created using MModal Dictation.  Any errors in syntax or even information may not have been identified and edited on initial review prior to signing this note.

## 2022-06-22 NOTE — PROCEDURES
Small Joint Aspiration/Injection: L thumb CMC    Date/Time: 6/22/2022 1:00 PM  Performed by: Radha Craig PA-C  Authorized by: Radha Craig PA-C     Consent Done?:  Yes (Verbal)  Indications:  Pain and arthritis  Site marked: the procedure site was marked    Timeout: prior to procedure the correct patient, procedure, and site was verified    Prep: patient was prepped and draped in usual sterile fashion      Local anesthesia used?: Yes    Local anesthetic:  Lidocaine 2% without epinephrine  Anesthetic total (ml):  0.5    Location:  Thumb  Site:  L thumb CMC  Ultrasonic guidance for needle placement?: No    Needle size:  25 G  Approach:  Radial  Medications:  40 mg triamcinolone acetonide 40 mg/mL  Patient tolerance:  Patient tolerated the procedure well with no immediate complications

## 2022-06-28 ENCOUNTER — CLINICAL SUPPORT (OUTPATIENT)
Dept: REHABILITATION | Facility: HOSPITAL | Age: 57
End: 2022-06-28
Payer: OTHER GOVERNMENT

## 2022-06-28 DIAGNOSIS — R60.9 EDEMA, UNSPECIFIED TYPE: ICD-10-CM

## 2022-06-28 DIAGNOSIS — R52 PAIN: Primary | ICD-10-CM

## 2022-06-28 DIAGNOSIS — Z98.890 POST-OPERATIVE STATE: ICD-10-CM

## 2022-06-28 DIAGNOSIS — L90.5 SCAR ADHERENT: ICD-10-CM

## 2022-06-28 DIAGNOSIS — R29.898 DECREASED GRIP STRENGTH OF RIGHT HAND: ICD-10-CM

## 2022-06-28 DIAGNOSIS — M25.60 DECREASED RANGE OF MOTION: ICD-10-CM

## 2022-06-28 PROCEDURE — 97110 THERAPEUTIC EXERCISES: CPT | Mod: PN

## 2022-06-28 PROCEDURE — 97165 OT EVAL LOW COMPLEX 30 MIN: CPT | Mod: PN

## 2022-06-28 PROCEDURE — 97140 MANUAL THERAPY 1/> REGIONS: CPT | Mod: PN

## 2022-06-29 PROBLEM — R29.898 DECREASED GRIP STRENGTH OF RIGHT HAND: Status: ACTIVE | Noted: 2022-06-29

## 2022-06-29 PROBLEM — R60.9 EDEMA: Status: ACTIVE | Noted: 2022-06-29

## 2022-06-29 NOTE — PLAN OF CARE
[facility]  Occupational Therapy Initial Evaluation    Date: 6/28/2022  Name: Linette LIU [facility]  Clinic Number: [id number]    Therapy Diagnosis:   Encounter Diagnoses   Name Primary?    Decreased  strength of right hand     Edema, unspecified type     Post-operative state     Pain Yes    Decreased range of motion     Scar adherent      Physician: Radha Craig PA-C      Physician Orders: Evaluate and treat  status post right partial palmar fasciectomy for Dupuytren's contracture release  eval and treat, modalities as needed  Medical Diagnosis: Post-operative state [Z98.890]; Dupuytren's contracture of right hand [M72.0] ring finger ray   Surgical Procedure and Date: Procedure(s) (LRB): Partial palmar FASCIECTOMY (Right),  6/9/2022   Evaluation Date: 6/28/2022   Insurance Authorization Period Expiration: 6/22/2022 - 12/31/2022   Plan of Care Certification Period: 6/28/2022 - 8/23/2022   Progress Note Due: 8/2/2022   Date of Return to MD: 7/19/2022  Visit # / Visits authorized: 1 / 1  FOTO: 1/3  Initial=49% / Goal=27%     Precautions:  Standard    Time In: 8:30  Time Out: 9:30   Total Appointment Time (timed & untimed codes): 60 minutes    SUBJECTIVE     Date of Onset: May 2022  History of Current Condition/Mechanism of Injury: Linette reports: within 2 weeks after discharge from OT for L hand CMC arthroplasty rehabilitation, she continued with pain and swelling over the nodule in her left palm. She reports that the pain and swelling also extended distally into her finger. She scheduled a consult visit with Dr. Jade where it was determined she had Dupuytren's contracture of right ring finger. Patient was scheduled and underwent partial palmar fasciectomy right ring finger on June 9th.  Patient also has CMC arthritis L thumb; she received cortisone injection on 6/22/2022.      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     Current Functional Status   Home/Living environment: lives with their family      Limitation of Functional Status as follows:   ADLs/IADLs:     - Feeding: independent    - Bathing: independent    - Dressing/Grooming: minimal difficulty grasping objects needed for grooming     - Driving:  Independent         Leisure: coloring, yard work      Pain:  Functional Pain Scale Rating 0-10: Current 0/10, worst 4/10 ("tenderness"), best 0/10    Location: right RF and palm of hand   Description: Aching  Aggravating Factors: using hand with pinching activity   Easing Factors: stretching, ice as needed     Patient's Goals for Therapy: "To be able to use it, back to or as close to where I was."     Medical History:   Past Medical History:   Diagnosis Date    ADD (attention deficit disorder)     Anemia     Anxiety     Followed by Psychology    Arthritis     Depression     bipolar, anxiety, ADD    Family history of colon cancer 1/22/2019    Her grandmother had colon cancer.    General anesthetics causing adverse effect in therapeutic use     Patient reports h/o "combativeness", after Gastric and Hysterectomy.     Positive ARIK (antinuclear antibody)     Dr. Ross//following       Surgical History:    has a past surgical history that includes Gastric bypass (2008); Carpal tunnel release; Tonsillectomy; Colonoscopy (N/A, 1/22/2019); Hysterectomy (2011); Trigger finger release (Right, 2/7/2022); Interposition arthroplasty of carpometacarpal joints (Right, 2/7/2022); and Surgical removal of fascia (Right, 6/9/2022).    Medications:   has a current medication list which includes the following prescription(s): " bupropion, cholecalciferol (vitamin d3), cyanocobalamin (vitamin b-12), duloxetine, lamotrigine, multivitamin, and vyvanse, and the following Facility-Administered Medications: chlorhexidine and lactated ringers.    Allergies:   Review of patient's allergies indicates:  No Known Allergies       OBJECTIVE     Observation/Appearance:  Skin dry and Edema present and healing incision present: 9 cm total length extending from palm into ring finger.   Incision:    -Area of mild redness/edema concentrated around MCP joint; no significant redness/swelling, or increased skin warmth noted.    -6 cm zigzag into palm and 3 cm into ring finger just past PIP; area of scabbing present at distal end of incision over P1 area      -Moderate density in tissue ulnar and radial sides of incision at MCP (ulnar > radial)   -Patient reports increased tenderness to touch radial side of incision at MCP       Edema. Measured in centimeters.   6/28/2022 6/28/2022    Left Right   MCPs 20.3 20.4        Ring:       P1            5.9 7.3    PIP            6.2 7.0     Hand ROM. Measured in degrees.   6/28/2022      Right       Ring:   MP -11/76              PIP -25/72              DIP         (hook fist) 0/40              BRITO 152       Fisting:    Composite  Unable touch DPC with RF    Hook to DPC -3.0 cm    Flat to DPC  -2.8 cm        Sensation  Patient denies numbness & tingling at this time. Light touch, temperature, sharp and dull are grossly intact in right hand wrist/hand       Strength (Dyanmometer) and Pinch Strength (Pinch Gauge)  Deferred at this time due to pain, decreased finger range of motion, and healing incision.  Will assess at later session as appropriate.       Manual Muscle Testing  Deferred at this time due to pain, decreased range of motion, and healing incision. Will assess at later session as appropriate.   *Observed to be at least 3-/5 for right RF.         Limitation/Restriction for FOTO hand Survey    Therapist  reviewed FOTO scores for Linette Galo on 6/28/2022.   FOTO documents entered into [employer] - see Media section.    Limitation Score: 49%         Treatment   Total Treatment time (time-based codes) separate from Evaluation: 30 minutes    Linette received the treatments listed below:     Supervised modalities after being cleared for contradictions:   Hot Pack -Patient received MH x 10 min to right hand to increase blood flow, circulation and tissue elasticity prior to therex     Manual therapy techniques: Soft tissue Mobilization and retrograde massage were applied to the: right hand/RF for 15 minutes, including:  Use of hand techniques, IASTM to decrease pain, edema, tissue adhesions, and increase circulation and tissue extensibility  -Light Coban wrap to RF to decrease edema; issued tubrigrip size C for palm of hand to decrease edema     Therapeutic exercises to develop strength and ROM for 5 minutes, including:  -TGEs  -AROM Right RF MCP/PIP/DIP flexion/ extension with PROM assist to full extension X 10 reps       Patient Education and Home Exercises      Education provided:   - edema control techniques  - scar massage techniques   - review HEP exercises     Written Home Exercises Provided: yes.  Exercises were reviewed and Linette was able to demonstrate them prior to the end of the session.  Linette demonstrated good  understanding of the education provided. See EMR under Patient Instructions for exercises provided during therapy sessions.     Pt was advised to perform these exercises free of pain, and to stop performing them if pain occurs.    Patient/Family Education: role of OT, goals for OT, scheduling/cancellations - pt verbalized understanding. Discussed insurance limitations with patient.      ASSESSMENT     Linette Galo is a 56 y.o. female referred to outpatient occupational therapy and presents with a medical diagnosis of Z98.890 (ICD-10-CM) - Post-operative state .  Patient presents with the  following therapy deficits: Decreased ROM, Decreased  strength, Decreased muscle strength, Decreased functional hand use, Increased pain, Edema, Joint Stiffness, Scar Adhesions and Diminished/Impaired Coordination and demonstrates limitations as described in the chart below. Following medical record review it is determined that pt will benefit from occupational therapy services in order to maximize pain free and/or functional use of right hand. The following goals were discussed with the patient and patient is in agreement with them as to be addressed in the treatment plan. The patient's rehab potential is Good.     Anticipated barriers to occupational therapy: therapy attendance, compliance with HEP   Pt has no cultural, educational or language barriers to learning provided.    Profile and History Assessment of Occupational Performance Level of Clinical Decision Making Complexity Score   Occupational Profile:   Linette Galo is a 56 y.o. 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      Comorbidities:    has a past medical history of ADD (attention deficit disorder), Anemia, Anxiety, Arthritis, Depression, Family history of colon cancer, General anesthetics causing adverse effect in therapeutic use, and Positive ARIK (antinuclear antibody).    Medical and Therapy History Review:   Brief               Performance Deficits    Physical:  Joint Mobility  Muscle Power/Strength  Muscle Endurance  Skin Integrity/Scar Formation  Edema   Strength  Pinch Strength  Pain    Cognitive:  No Deficits    Psychosocial:    No Deficits     Clinical Decision Making:  low    Assessment Process:  Problem-Focused Assessments    Modification/Need for Assistance:  Not Necessary    Intervention Selection:  Several Treatment Options       low  Based on PMHX, co  morbidities , data from assessments and functional level of assistance required with task and clinical presentation directly impacting function.       The following goals were discussed with the patient and patient is in agreement with them as to be addressed in the treatment plan.     Goals:   Short Term Goals: (in 4 weeks)  1) Patient will be independent in HEP  2) Decrease pain in right hand to no more than 2-3/10 at worst in ADL/IADL's  3) Increase TAMS in right RF by 5-10 degrees for improved functioning in ADL/IADL's  4) Decrease edema in right hand/RF by .3 cm for improved functioning in ADL/IADL's  5) Assess  strength for increased functional use in ADL/IADL's         Long Term Goals: (in 8 weeks)  1) Decrease pain in right hand to no more than 1-2/10 at worst in ADL/IADL's  2) Increase TAMS in right hand by 15-20 degrees for increased functioning in ADL/IADL's  3) Increase  strength in right hand by 10% of original measures for improved functioning in ADL/IADL's  4) Decrease edema in right hand by to trace or none   4) Increased functioning in ADL/IADL's, as evidenced by a FOTO impairment rating of no more than 30%        PLAN   Plan of Care Certification: 6/28/2022 to 8/23/2022.     Outpatient Occupational Therapy 2 times weekly for 8 weeks to include the following interventions: Paraffin, Fluidotherapy, Manual therapy/joint mobilizations, Modalities for pain management, US 3 mhz, Therapeutic exercises/activities., Strengthening, Edema Control and Scar Management.      Hermes Miramontes OT      I CERTIFY THE NEED FOR THESE SERVICES FURNISHED UNDER THIS PLAN OF TREATMENT AND WHILE UNDER MY CARE  Physician's comments:      Physician's Signature: ___________________________________________________

## 2022-07-05 ENCOUNTER — CLINICAL SUPPORT (OUTPATIENT)
Dept: REHABILITATION | Facility: HOSPITAL | Age: 57
End: 2022-07-05
Payer: OTHER GOVERNMENT

## 2022-07-05 DIAGNOSIS — M25.60 DECREASED RANGE OF MOTION: ICD-10-CM

## 2022-07-05 DIAGNOSIS — R60.9 EDEMA, UNSPECIFIED TYPE: ICD-10-CM

## 2022-07-05 DIAGNOSIS — R29.898 DECREASED GRIP STRENGTH OF RIGHT HAND: ICD-10-CM

## 2022-07-05 DIAGNOSIS — L90.5 SCAR ADHERENT: ICD-10-CM

## 2022-07-05 DIAGNOSIS — R52 PAIN: Primary | ICD-10-CM

## 2022-07-05 PROCEDURE — 97110 THERAPEUTIC EXERCISES: CPT | Mod: PN

## 2022-07-05 PROCEDURE — 97022 WHIRLPOOL THERAPY: CPT | Mod: PN

## 2022-07-05 PROCEDURE — 97140 MANUAL THERAPY 1/> REGIONS: CPT | Mod: PN

## 2022-07-05 NOTE — PROGRESS NOTES
[facility]  Occupational Therapy Treatment Note    Date: 7/5/2022  Name: Linette LIU [facility]  Clinic Number: [id number]    Therapy Diagnosis:   Encounter Diagnoses   Name Primary?    Pain Yes    Decreased range of motion     Scar adherent     Edema, unspecified type     Decreased  strength of right hand      Physician: Azael Curran PA-C    Physician Orders: Evaluate and treat  status post right partial palmar fasciectomy for Dupuytren's contracture release  eval and treat, modalities as needed  Medical Diagnosis: Post-operative state [Z98.890]; Dupuytren's contracture of right hand [M72.0] ring finger ray   Surgical Procedure and Date: Procedure(s) (LRB): Partial palmar FASCIECTOMY (Right),  6/9/2022   Evaluation Date: 6/28/2022   Insurance Authorization Period Expiration: 6/22/2022 - 12/31/2022   Plan of Care Certification Period: 6/28/2022 - 8/23/2022   Progress Note Due: 8/2/2022   Date of Return to MD: 7/19/2022   Visit # / Visits authorized: 2 /20 (including initial evaluation)   FOTO: 1/3  Initial=49% / Goal=27%      Precautions:  Standard      Time In: 8:30  Time Out: 9:15  Total Billable Time: 45 minutes (1FL, 1MT, 1TE)       SUBJECTIVE     Pt reports: she has lot of stiffness throughout finger into palm   She was compliant with home exercise program given last session. She has been doing scar massage at home with manual techniques and vibration tool; reports doing 5-6 times a day. Reports doing HEP exercises 2-3 times per day.    Response to previous treatment: first treatment following evaluation  Functional change: first treatment following evaluation     Pain: 1-2/10  Location: right hand      OBJECTIVE     Objective Measures updated at progress report unless specified.    Treatment     Linette received the treatments listed below:     Supervised modalities after being cleared for contradictions:   Fluidotherapy - to right hand/forearm for 15 min, continuous air, 130  deg, air speed 50 to decrease pain, edema & scar tissue and increased tissue extensibility.      Manual therapy techniques: Soft tissue Mobilization and retrograde massage and scar massage were applied to the: right hand for 15 minutes, including:  -use of manual techniques, IASTM tool to decrease tissue adhesions, pain, edema and increase circulation and tissue extensibility    Therapeutic exercises to develop strength, endurance, ROM and edema reduction for 15 minutes, including:    TGEs-waves, flat fist, hook fist  10 reps each    Joint blocking RF flexion MCP/PIP/DIP  8 reps    Finger lifts isolated and composite   8 reps each    Gentle PROM RF flexion/ extension  MCP/PIP/DIP   10 reps                        Patient Education and Home Exercises      Education provided:   - edema control techniques  - scar massage techniques   - review HEP exercises   - Progress towards goals     Written Home Exercises Provided: Patient instructed to cont prior HEP.  Exercises were reviewed and Linette was able to demonstrate them prior to the end of the session.  Linette demonstrated good  understanding of the HEP provided. See EMR under Patient Instructions for exercises provided during therapy sessions.       Assessment   Linette Galo is a 56 y.o. female referred to outpatient occupational therapy and presents with a medical diagnosis of Z98.890 (ICD-10-CM) - Post-operative state .  Pt would continue to benefit from skilled OT for Decreased ROM, Decreased  strength, Decreased muscle strength, Decreased functional hand use, Increased pain, Edema, Joint Stiffness, Scar Adhesions and Diminished/Impaired Coordination and demonstrates limitations as described in the chart below. Following medical record review it is determined that pt will benefit from occupational therapy services in order to maximize pain free and/or functional use of right hand. Jane has reports of sensitivity over incision at P1, PIP joint, and at MCP  joint; sensitivity at MCP joint decreased at comparison to initial evaluation. Patient with increased redness along incision extending into finger; stiffness of finger noted at beginning of session.  Redness significantly decreased following soft tissue mobilization and retrograde massage; no increased warmth or drainage noted. Scabbed area present at initial evaluation significantly decreased in size with no open areas; patient was placed in fluidotherapy today. She tolerated fluidotherapy well without adverse effects.  Patient tolerated all exercises well without increased difficulty or pain.  Patient would benefit from fabrication of night-time wear splint to prevent PIP soft tissue contracture and for utilization with scar tissue management.           Linette is progressing well towards her goals and there are no updates to goals at this time. Pt prognosis is Good.     Pt will continue to benefit from skilled outpatient occupational therapy to address the deficits listed in the problem list on initial evaluation provide pt/family education and to maximize pt's level of independence in the home and community environment.     Pt's spiritual, cultural and educational needs considered and pt agreeable to plan of care and goals.    Anticipated barriers to occupational therapy: therapy attendance, compliance with HEP       Goals:   Short Term Goals: (in 4 weeks)  1) Patient will be independent in HEP -Met 7/5/2022  2) Decrease pain in right hand to no more than 2-3/10 at worst in ADL/IADL's -Progressing  3) Increase TAMS in right RF by 5-10 degrees for improved functioning in ADL/IADL's -Progressing  4) Decrease edema in right hand/RF by .3 cm for improved functioning in ADL/IADL's -Progressing  5) Assess  strength for increased functional use in ADL/IADL's -Progressing           Long Term Goals: (in 8 weeks)  1) Decrease pain in right hand to no more than 1-2/10 at worst in ADL/IADL's  2) Increase TAMS in right hand  by 15-20 degrees for increased functioning in ADL/IADL's  3) Increase  strength in right hand by 10% of original measures for improved functioning in ADL/IADL's  4) Decrease edema in right hand by to trace or none   4) Increased functioning in ADL/IADL's, as evidenced by a FOTO impairment rating of no more than 30%         PLAN     Plan of Care Certification: 6/28/2022 to 8/23/2022.      Outpatient Occupational Therapy 2 times weekly for 8 weeks to include the following interventions: Paraffin, Fluidotherapy, Manual therapy/joint mobilizations, Modalities for pain management, US 3 mhz, Therapeutic exercises/activities., Strengthening, Edema Control and Scar Management.       Updates/Grading for next session: continue with soft tissue management, AROM/PROM exercises, and possible splint fabrication for night time wear.       Hermes Miramontes, OT

## 2022-07-06 ENCOUNTER — CLINICAL SUPPORT (OUTPATIENT)
Dept: REHABILITATION | Facility: HOSPITAL | Age: 57
End: 2022-07-06
Payer: OTHER GOVERNMENT

## 2022-07-06 DIAGNOSIS — L90.5 SCAR ADHERENT: ICD-10-CM

## 2022-07-06 DIAGNOSIS — M25.60 DECREASED RANGE OF MOTION: ICD-10-CM

## 2022-07-06 DIAGNOSIS — R52 PAIN: Primary | ICD-10-CM

## 2022-07-06 DIAGNOSIS — R60.9 EDEMA, UNSPECIFIED TYPE: ICD-10-CM

## 2022-07-06 DIAGNOSIS — R29.898 DECREASED GRIP STRENGTH OF RIGHT HAND: ICD-10-CM

## 2022-07-06 PROCEDURE — 97760 ORTHOTIC MGMT&TRAING 1ST ENC: CPT | Mod: PN

## 2022-07-06 PROCEDURE — L3921 HFO W/JOINT(S) CF: HCPCS | Mod: PN

## 2022-07-06 NOTE — PROGRESS NOTES
[facility]  Occupational Therapy Treatment Note    Date: 7/6/2022  Name: Linette LIU [facility]  Clinic Number: [id number]    Therapy Diagnosis:   Encounter Diagnoses   Name Primary?    Pain Yes    Decreased range of motion     Scar adherent     Edema, unspecified type     Decreased  strength of right hand      Physician: Azael Curran PA-C    Physician Orders: Evaluate and treat  status post right partial palmar fasciectomy for Dupuytren's contracture release  eval and treat, modalities as needed  Medical Diagnosis: Post-operative state [Z98.890]; Dupuytren's contracture of right hand [M72.0] ring finger ray   Surgical Procedure and Date: Procedure(s) (LRB): Partial palmar FASCIECTOMY (Right),  6/9/2022   Evaluation Date: 6/28/2022   Insurance Authorization Period Expiration: 6/22/2022 - 12/31/2022   Plan of Care Certification Period: 6/28/2022 - 8/23/2022    Progress Note Due: 8/2/2022   Date of Return to MD: 7/19/2022   Visit # / Visits authorized: 3/20 (including initial evaluation)   FOTO: 1/3   Initial=49% / Goal=27%      Precautions:  Standard      Time In: 9:15   Time Out: 10:15   Total Billable Time: 60 minutes (, 3 ortho fit and train)       SUBJECTIVE     Pt reports: less stiffness in palm/finger today   She was compliant with home exercise program given last session. She has been doing scar massage at home with manual techniques and vibration tool; reports doing 5-6 times a day. Reports doing HEP exercises 2-3 times per day.    Response to previous treatment: less redness along incisional scar   Functional change: none reported      Pain: 1-2/10  Location: right hand      Patient is 3 weeks and 6 days post-op as of 7/6/2022.    OBJECTIVE     Objective Measures updated at progress report unless specified.    Treatment     Linette received the treatments listed below:     Supervised modalities after being cleared for contradictions:       Manual therapy techniques:  Soft tissue Mobilization and retrograde massage and scar massage were applied to the: right hand for 0 minutes, including:  -use of manual techniques, IASTM tool to decrease tissue adhesions, pain, edema and increase circulation and tissue extensibility    Therapeutic exercises to develop strength, endurance, ROM and edema reduction for 0 minutes, including:    TGEs-waves, flat fist, hook fist  10 reps each    Joint blocking RF flexion MCP/PIP/DIP  8 reps    Finger lifts isolated and composite   8 reps each    Gentle PROM RF flexion/ extension  MCP/PIP/DIP   10 reps                    Linette participated in Orthotic fitting and training for 60 minutes including the following:  A custom volar hand based resting pan orthosis with RF/SF in extension was fabricated today to address soft tissue adhesions/incisional scar tightness/flexion posturing in RF and palm in patient's right hand. Good fit and positioning was achieved upon completion of fabrication.  Patient was provided with instruct on purpose of orthosis, proper donning/doffing, wear/care schedule and precautions to monitor.  Patient verbalized understanding of all instruction provided.        Patient Education and Home Exercises      Education provided:   -purpose of orthosis, proper donning/doffing, wear/care schedule and precautions to monitor 7/6/2022   - edema control techniques  - scar massage techniques   - review HEP exercises   - Progress towards goals     Written Home Exercises Provided: Patient instructed to cont prior HEP.  Exercises were reviewed and Linette was able to demonstrate them prior to the end of the session.  Linette demonstrated good  understanding of the HEP provided. See EMR under Patient Instructions for exercises provided during therapy sessions.       Assessment   Linette Galo is a 56 y.o. female referred to outpatient occupational therapy and presents with a medical diagnosis of Z98.890 (ICD-10-CM) - Post-operative state .  Pt  would continue to benefit from skilled OT for Decreased ROM, Decreased  strength, Decreased muscle strength, Decreased functional hand use, Increased pain, Edema, Joint Stiffness, Scar Adhesions and Diminished/Impaired Coordination and demonstrates limitations as described in the chart below. Following medical record review it is determined that pt will benefit from occupational therapy services in order to maximize pain free and/or functional use of right hand. Patient presented with less incisional redness today.  Hand based volar resting pan splint with RF/SF in extension was fabricated for night-time wear and possibly day time wear (if needed) to address any finger stiffness and need for scar management. Patient was educated on proper wear/care/precautions to monitor.  Will adjust/modify any concerns at next session as needed.  Continue current plan of care to maximize patient's functional use of right hand.        Linette is progressing well towards her goals and there are no updates to goals at this time. Pt prognosis is Good.     Pt will continue to benefit from skilled outpatient occupational therapy to address the deficits listed in the problem list on initial evaluation provide pt/family education and to maximize pt's level of independence in the home and community environment.     Pt's spiritual, cultural and educational needs considered and pt agreeable to plan of care and goals.    Anticipated barriers to occupational therapy: therapy attendance, compliance with HEP       Goals:   Short Term Goals: (in 4 weeks)  1) Patient will be independent in HEP -Met 7/5/2022  2) Decrease pain in right hand to no more than 2-3/10 at worst in ADL/IADL's -Progressing  3) Increase TAMS in right RF by 5-10 degrees for improved functioning in ADL/IADL's -Progressing  4) Decrease edema in right hand/RF by .3 cm for improved functioning in ADL/IADL's -Progressing  5) Assess  strength for increased functional use in  ADL/IADL's -Progressing           Long Term Goals: (in 8 weeks)  1) Decrease pain in right hand to no more than 1-2/10 at worst in ADL/IADL's  2) Increase TAMS in right hand by 15-20 degrees for increased functioning in ADL/IADL's  3) Increase  strength in right hand by 10% of original measures for improved functioning in ADL/IADL's  4) Decrease edema in right hand by to trace or none   4) Increased functioning in ADL/IADL's, as evidenced by a FOTO impairment rating of no more than 30%         PLAN     Plan of Care Certification: 6/28/2022 to 8/23/2022.      Outpatient Occupational Therapy 2 times weekly for 8 weeks to include the following interventions: Paraffin, Fluidotherapy, Manual therapy/joint mobilizations, Modalities for pain management, US 3 mhz, Therapeutic exercises/activities., Strengthening, Edema Control and Scar Management.       Updates/Grading for next session: continue with soft tissue management, AROM/PROM exercises, and possible splint fabrication for night time wear.       Hermes Miramontes, OT

## 2022-07-19 ENCOUNTER — CLINICAL SUPPORT (OUTPATIENT)
Dept: REHABILITATION | Facility: HOSPITAL | Age: 57
End: 2022-07-19
Payer: OTHER GOVERNMENT

## 2022-07-19 ENCOUNTER — OFFICE VISIT (OUTPATIENT)
Dept: ORTHOPEDICS | Facility: CLINIC | Age: 57
End: 2022-07-19
Payer: OTHER GOVERNMENT

## 2022-07-19 VITALS — WEIGHT: 231 LBS | BODY MASS INDEX: 39.44 KG/M2 | HEIGHT: 64 IN

## 2022-07-19 DIAGNOSIS — M18.0 ARTHRITIS OF CARPOMETACARPAL (CMC) JOINT OF BOTH THUMBS: ICD-10-CM

## 2022-07-19 DIAGNOSIS — R52 PAIN: Primary | ICD-10-CM

## 2022-07-19 DIAGNOSIS — M25.519 SHOULDER PAIN, UNSPECIFIED CHRONICITY, UNSPECIFIED LATERALITY: Primary | ICD-10-CM

## 2022-07-19 DIAGNOSIS — Z98.890 STATUS POST DUPUYTREN'S FASCIECTOMY: Primary | ICD-10-CM

## 2022-07-19 DIAGNOSIS — M25.60 DECREASED RANGE OF MOTION: ICD-10-CM

## 2022-07-19 DIAGNOSIS — R29.898 DECREASED GRIP STRENGTH OF RIGHT HAND: ICD-10-CM

## 2022-07-19 DIAGNOSIS — R60.9 EDEMA, UNSPECIFIED TYPE: ICD-10-CM

## 2022-07-19 DIAGNOSIS — L90.5 SCAR ADHERENT: ICD-10-CM

## 2022-07-19 PROCEDURE — 99024 PR POST-OP FOLLOW-UP VISIT: ICD-10-PCS | Mod: ,,, | Performed by: ORTHOPAEDIC SURGERY

## 2022-07-19 PROCEDURE — 97035 APP MDLTY 1+ULTRASOUND EA 15: CPT | Mod: PN

## 2022-07-19 PROCEDURE — 99024 POSTOP FOLLOW-UP VISIT: CPT | Mod: ,,, | Performed by: ORTHOPAEDIC SURGERY

## 2022-07-19 PROCEDURE — 99999 PR PBB SHADOW E&M-EST. PATIENT-LVL III: CPT | Mod: PBBFAC,,, | Performed by: ORTHOPAEDIC SURGERY

## 2022-07-19 PROCEDURE — 97140 MANUAL THERAPY 1/> REGIONS: CPT | Mod: PN

## 2022-07-19 PROCEDURE — 99999 PR PBB SHADOW E&M-EST. PATIENT-LVL III: ICD-10-PCS | Mod: PBBFAC,,, | Performed by: ORTHOPAEDIC SURGERY

## 2022-07-19 PROCEDURE — 99213 OFFICE O/P EST LOW 20 MIN: CPT | Mod: PBBFAC | Performed by: ORTHOPAEDIC SURGERY

## 2022-07-19 PROCEDURE — 97110 THERAPEUTIC EXERCISES: CPT | Mod: PN

## 2022-07-19 NOTE — PROGRESS NOTES
[facility]  Occupational Therapy Treatment Note    Date: 7/19/2022  Name: Linette LIU [facility]  Clinic Number: [id number]    Therapy Diagnosis:   Encounter Diagnoses   Name Primary?    Pain Yes    Decreased range of motion     Scar adherent     Edema, unspecified type     Decreased  strength of right hand      Physician: Azael Curran PA-C    Physician Orders: Evaluate and treat  status post right partial palmar fasciectomy for Dupuytren's contracture release  eval and treat, modalities as needed  Medical Diagnosis: Post-operative state [Z98.890]; Dupuytren's contracture of right hand [M72.0] ring finger ray   Surgical Procedure and Date: Procedure(s) (LRB): Partial palmar FASCIECTOMY (Right),  6/9/2022   Evaluation Date: 6/28/2022   Insurance Authorization Period Expiration: 6/22/2022 - 12/31/2022   Plan of Care Certification Period: 6/28/2022 - 8/23/2022    Progress Note Due: 8/2/2022   Date of Return to MD: none noted in medical chart    Visit # / Visits authorized: 4/20 (including initial evaluation)   FOTO: 1/3   Initial=49% / Goal=27%      Precautions:  Standard      Time In: 12:45 pm  Time Out: 1:40 pm   Total Billable Time: 55 minutes (1US, 2MT, 1TE)       SUBJECTIVE     Pt reports: she has a lot of stiffness in finger and palm; she was unable to attend therapy last week due to illness. She had follow-up with Dr. Jade this morning and reported that he was pleased with her healing at this time.  She also reports that she has no significant pain, her primary complaint is stiffness and tightness of the finger.      She was compliant with home exercise program given last session. She has been doing light scar massage at home with manual techniques and vibration tool; reports doing 5-6 times a day. Reports doing HEP exercises 2-3 times per day.     Response to previous treatment: wearing custom fabricated hand orthosis at night without issues   Functional change: she is  able to color, but requires rest breaks due to finger stiffness      Pain: 0/10 pre-treat; 1-2 post-treat   Location: right hand       Patient is 5 weeks post-op as of 2022.    OBJECTIVE     Objective Measures updated at progress report unless specified.    Treatment     Linette received the treatments listed below:     Supervised modalities after being cleared for contradictions:     Direct contact modalities after being cleared for contraindications:   Ultrasound - 3 MHz, 0.8 W/cm2, 50% pulsed, 10 min to right RF/right palmar area, to decrease pain, edema, and to increase circulation and tissue extensibility      Manual therapy techniques: Soft tissue Mobilization and retrograde massage and scar massage were applied to the: right hand for 23 minutes, including:  -use of manual techniques, IASTM tool to decrease tissue adhesions, pain, edema and increase circulation and tissue extensibility    Therapeutic exercises to develop strength, endurance, ROM and edema reduction for 22 minutes, including:    TGEs-waves, flat fist, hook fist  5 reps each    MCP hyperextension stretch  5 reps X 10 sec hold        A/AAROM:     Joint blocking RF flexion MCP/PIP/DIP 10 reps    Gentle PROM RF flexion/ extension  MCP/PIP/DIP  5 reps    Joint blocking end range RF extension PIP/DIP 10 reps    Finger lifts isolated and composite     10 reps        Theraputty: yellow    -grasping  10 reps    -log rolls  5 reps    -pancake  10 reps                Linette participated in Orthotic fitting and training for 60 minutes including the followin2022  A custom volar hand based resting pan orthosis with RF/SF in extension was fabricated today to address soft tissue adhesions/incisional scar tightness/flexion posturing in RF and palm in patient's right hand. Good fit and positioning was achieved upon completion of fabrication.  Patient was provided with instruct on purpose of orthosis, proper donning/doffing, wear/care schedule and  precautions to monitor.  Patient verbalized understanding of all instruction provided.        Patient Education and Home Exercises      Education provided:   - issued printed HEP exercises/stretches 7/19/2022  - purpose of orthosis, proper donning/doffing, wear/care schedule and precautions to monitor 7/6/2022   - edema control techniques  - scar massage techniques   - review HEP exercises   - Progress towards goals     Written Home Exercises Provided: Patient instructed to cont prior HEP.  Exercises were reviewed and Linette was able to demonstrate them prior to the end of the session.  Linette demonstrated good  understanding of the HEP provided. See EMR under Patient Instructions for exercises provided during therapy sessions.       Assessment   Linette Galo is a 56 y.o. female referred to outpatient occupational therapy and presents with a medical diagnosis of Z98.890 (ICD-10-CM) - Post-operative state.   Patient returning to therapy today 7/19/2022 due to illness; last treatment session was 7/6/2022.  She is reporting 0/10 pain levels pre and post-treatment at this time.  Incisional scar well healed and with moderate density over palmar surface of ring finger extending into palm.  Patient is tolerating her custom fabricated orthosis at night, is compliant with wear, and reporting comfort with no issues.   No modifications to orthosis needed at this  time.  Performed ultrasound today; patient tolerated well with no adverse effects noted/reported.  Patient tolerated therapeutic activities well without significant pain or difficulty.  Pt would continue to benefit from skilled OT for Decreased ROM, Decreased  strength, Decreased muscle strength, Decreased functional hand use, Increased pain, Edema, Joint Stiffness, Scar Adhesions and Diminished/Impaired Coordination Continue current plan of care to maximize patient's functional use of right hand.         Linette is progressing well towards her goals and there  are no updates to goals at this time. Pt prognosis is Good.     Pt will continue to benefit from skilled outpatient occupational therapy to address the deficits listed in the problem list on initial evaluation provide pt/family education and to maximize pt's level of independence in the home and community environment.     Pt's spiritual, cultural and educational needs considered and pt agreeable to plan of care and goals.    Anticipated barriers to occupational therapy: therapy attendance, compliance with HEP       Goals:   Short Term Goals: (in 4 weeks)  1) Patient will be independent in HEP -Met 7/5/2022  2) Decrease pain in right hand to no more than 2-3/10 at worst in ADL/IADL's -Progressing  3) Increase TAMS in right RF by 5-10 degrees for improved functioning in ADL/IADL's -Progressing  4) Decrease edema in right hand/RF by .3 cm for improved functioning in ADL/IADL's -Progressing  5) Assess  strength for increased functional use in ADL/IADL's -Progressing            Long Term Goals: (in 8 weeks)  1) Decrease pain in right hand to no more than 1-2/10 at worst in ADL/IADL's  2) Increase TAMS in right hand by 15-20 degrees for increased functioning in ADL/IADL's  3) Increase  strength in right hand by 10% of original measures for improved functioning in ADL/IADL's  4) Decrease edema in right hand by to trace or none   4) Increased functioning in ADL/IADL's, as evidenced by a FOTO impairment rating of no more than 30%         PLAN     Plan of Care Certification: 6/28/2022 to 8/23/2022.      Outpatient Occupational Therapy 2 times weekly for 8 weeks to include the following interventions: Paraffin, Fluidotherapy, Manual therapy/joint mobilizations, Modalities for pain management, US 3 mhz, Therapeutic exercises/activities., Strengthening, Edema Control and Scar Management.       Updates/Grading for next session: continue with soft tissue management, AROM/PROM exercises, and possible splint fabrication for  night time wear.       Hermes Miramontes, OT

## 2022-07-19 NOTE — PROGRESS NOTES
"Subjective:     Patient ID: Linette Galo is a 56 y.o. female.    Chief Complaint: Post-op Evaluation of the Right Hand      HPI:  The patient is a 56-year-old female status post right hand partial palmar fasciectomy 06/09/2022.  She seems to be doing well and is pleased with the result to date.    Past Medical History:   Diagnosis Date    ADD (attention deficit disorder)     Anemia     Anxiety     Followed by Psychology    Arthritis     Depression     bipolar, anxiety, ADD    Family history of colon cancer 1/22/2019    Her grandmother had colon cancer.    General anesthetics causing adverse effect in therapeutic use     Patient reports h/o "combativeness", after Gastric and Hysterectomy.     Positive ARIK (antinuclear antibody)     Dr. Ross//following     Past Surgical History:   Procedure Laterality Date    CARPAL TUNNEL RELEASE      both hands    COLONOSCOPY N/A 1/22/2019    Procedure: COLONOSCOPY;  Surgeon: Moe Jimenez MD;  Location: [facility];  Service: Endoscopy;  Laterality: N/A;    GASTRIC BYPASS  2008    HYSTERECTOMY  2011    RALH (retains ovaries)    INTERPOSITION ARTHROPLASTY OF CARPOMETACARPAL JOINTS Right 2/7/2022    Procedure: INTERPOSITION ARTHROPLASTY, CMC JOINT;  Surgeon: Sterling Jade MD;  Location: [facility];  Service: Orthopedics;  Laterality: Right;  right thumb basal joint arthroplasty     ARTHREX - LATRICE AWARE    SURGICAL REMOVAL OF FASCIA Right 6/9/2022    Procedure: FASCIECTOMY;  Surgeon: Sterling Jade MD;  Location: [facility] OR;  Service: Orthopedics;  Laterality: Right;  partial palmar fasciectomy right hand ring finger     TONSILLECTOMY      TRIGGER FINGER RELEASE Right 2/7/2022    Procedure: RELEASE, TRIGGER FINGER;  Surgeon: Sterling Jade MD;  Location: [facility];  Service: Orthopedics;  Laterality: Right;   right ring trigger finger release      Family History   Problem Relation Age of Onset    Breast cancer Maternal Grandmother     " Colon cancer Maternal Grandmother     Hypertension Maternal Grandmother     Diabetes Maternal Grandmother     Parkinsonism Father     Lumbar disc disease Father     Stroke Father     Heart disease Father     Thrombophilia Sister     Hypertension Mother     Diabetes Mother     Hypertension Paternal Grandmother     Diabetes Paternal Grandmother     No Known Problems Brother     No Known Problems Maternal Grandfather     Heart disease Paternal Grandfather      Social History     Socioeconomic History    Marital status:    Tobacco Use    Smoking status: Never Smoker    Smokeless tobacco: Never Used   Substance and Sexual Activity    Alcohol use: Not Currently     Comment: socially    Drug use: No    Sexual activity: Yes     Partners: Male     Birth control/protection: Surgical     Comment: mut monog     Social Determinants of Health     Financial Resource Strain: Low Risk     Difficulty of Paying Living Expenses: Not hard at all   Food Insecurity: No Food Insecurity    Worried About Running Out of Food in the Last Year: Never true    Ran Out of Food in the Last Year: Never true   Transportation Needs: No Transportation Needs    Lack of Transportation (Medical): No    Lack of Transportation (Non-Medical): No   Physical Activity: Unknown    Days of Exercise per Week: 2 days   Stress: Stress Concern Present    Feeling of Stress : Rather much   Social Connections: Unknown    Frequency of Communication with Friends and Family: More than three times a week    Frequency of Social Gatherings with Friends and Family: Once a week    Active Member of Clubs or Organizations: No    Attends Club or Organization Meetings: Never    Marital Status:      Medication List with Changes/Refills   Current Medications    BUPROPION (WELLBUTRIN SR) 200 MG SR12        CHOLECALCIFEROL, VITAMIN D3, (VITAMIN D3) 1,000 UNIT CAPSULE    Take 1 capsule (1,000 Units total) by mouth once daily.     CYANOCOBALAMIN, VITAMIN B-12, 50 MCG TABLET    Take 50 mcg by mouth once daily.    DULOXETINE (CYMBALTA) 20 MG CAPSULE    Take 60 mg by mouth once daily.    LAMOTRIGINE (LAMICTAL) 100 MG TABLET    Take by mouth once daily.    MULTIVITAMIN CAPSULE    Take 1 capsule by mouth once daily.    VYVANSE 70 MG CAPSULE    Take 70 mg by mouth every morning.      Review of patient's allergies indicates:  No Known Allergies  Review of Systems   Constitutional: Negative for malaise/fatigue.   HENT: Negative for hearing loss.    Eyes: Negative for double vision and visual disturbance.   Cardiovascular: Negative for chest pain.   Respiratory: Negative for shortness of breath.    Endocrine: Negative for cold intolerance.   Hematologic/Lymphatic: Does not bruise/bleed easily.   Skin: Negative for poor wound healing and suspicious lesions.   Musculoskeletal: Positive for arthritis, joint pain, joint swelling and muscle weakness. Negative for gout.   Gastrointestinal: Negative for nausea and vomiting.   Genitourinary: Negative for dysuria.   Neurological: Negative for numbness, paresthesias and sensory change.   Psychiatric/Behavioral: Positive for depression and substance abuse. Negative for memory loss. The patient is nervous/anxious.    Allergic/Immunologic: Negative for persistent infections.       Objective:   Body mass index is 39.65 kg/m².  There were no vitals filed for this visit.             General    Constitutional: She is oriented to person, place, and time. She appears well-developed and well-nourished. No distress.   HENT:   Head: Normocephalic.   Eyes: EOM are normal.   Pulmonary/Chest: Effort normal.   Neurological: She is oriented to person, place, and time.   Psychiatric: She has a normal mood and affect.             Right Hand/Wrist Exam     Inspection   Scars: Wrist - present Hand -  present  Effusion: Wrist - absent Hand -  absent    Other     Neuorologic Exam    Median Distribution: normal  Ulnar Distribution:  normal  Radial Distribution: normal    Comments:  The patient has a well-healed scar basal joint right thumb.  She also has a well-healed scar over the volar ring finger from her fasciectomy.  There are no motor or sensory deficits and complete correction of her contracture.          Vascular Exam       Capillary Refill  Right Hand: normal capillary refill               radiographs left shoulder were ordered today but not done at this time  Assessment:     Encounter Diagnoses   Name Primary?    Status post Dupuytren's fasciectomy Yes    Arthritis of carpometacarpal (CMC) joint of both thumbs         Plan:       The patient says she seems to be doing well.  She says her next surgery will be her left thumb basal joint.  However she hurt her left shoulder and is referred to Dr. Garcia for that issue.  Radiographs were obtained today.  She will return when she wishes to have a left thumb basal joint arthroplasty.              Disclaimer: This note was prepared using a voice recognition system and is likely to have sound alike errors within the text.

## 2022-07-20 ENCOUNTER — CLINICAL SUPPORT (OUTPATIENT)
Dept: REHABILITATION | Facility: HOSPITAL | Age: 57
End: 2022-07-20
Payer: OTHER GOVERNMENT

## 2022-07-20 DIAGNOSIS — L90.5 SCAR ADHERENT: ICD-10-CM

## 2022-07-20 DIAGNOSIS — R60.9 EDEMA, UNSPECIFIED TYPE: ICD-10-CM

## 2022-07-20 DIAGNOSIS — M25.60 DECREASED RANGE OF MOTION: ICD-10-CM

## 2022-07-20 DIAGNOSIS — R52 PAIN: Primary | ICD-10-CM

## 2022-07-20 DIAGNOSIS — R29.898 DECREASED GRIP STRENGTH OF RIGHT HAND: ICD-10-CM

## 2022-07-20 PROCEDURE — 97018 PARAFFIN BATH THERAPY: CPT | Mod: PN,59

## 2022-07-20 PROCEDURE — 97140 MANUAL THERAPY 1/> REGIONS: CPT | Mod: PN

## 2022-07-20 PROCEDURE — 97110 THERAPEUTIC EXERCISES: CPT | Mod: PN

## 2022-07-20 NOTE — PROGRESS NOTES
[facility]  Occupational Therapy Treatment Note    Date: 7/20/2022  Name: Linette LIU Iraj    Clinic Number: [id number]    Therapy Diagnosis:   Encounter Diagnoses   Name Primary?    Pain Yes    Decreased range of motion     Scar adherent     Edema, unspecified type     Decreased  strength of right hand      Physician: Azael Curran PA-C    Physician Orders: Evaluate and treat  status post right partial palmar fasciectomy for Dupuytren's contracture release  eval and treat, modalities as needed  Medical Diagnosis: Post-operative state [Z98.890]; Dupuytren's contracture of right hand [M72.0] ring finger ray   Surgical Procedure and Date: Procedure(s) (LRB): Partial palmar FASCIECTOMY (Right),  6/9/2022   Evaluation Date: 6/28/2022   Insurance Authorization Period Expiration: 6/22/2022 - 12/31/2022   Plan of Care Certification Period: 6/28/2022 - 8/23/2022    Progress Note Due: 8/2/2022   Date of Return to MD: none noted in medical chart    Visit # / Visits authorized: 5/20 (including initial evaluation)   FOTO: 1/3   Initial=49% / Goal=27%      Precautions:  Standard      Time In: 9:15 pm  Time Out: 10:05 pm   Total Billable Time: 50 minutes (1P, 2MT, 1TE)       SUBJECTIVE     Pt reports: she has a lot of stiffness in finger and palm.  She also reports that she has no significant pain, her primary complaint is stiffness and tightness of the finger.      She was compliant with home exercise program given last session. She has been doing light scar massage at home with manual techniques and vibration tool; reports doing 5-6 times a day. Reports doing HEP exercises 2-3 times per day.    Response to previous treatment: none reported   Functional change: none reported      Pain: 0/10 pre-treat; 1-2 post-treat   Location: right hand       Patient is 6 weeks post-op as of 7/20/2022.    OBJECTIVE     Objective Measures updated at progress report unless specified.    Treatment      Linette received the treatments listed below:     Supervised modalities after being cleared for contradictions:   Paraffin w/ MHP to right hand for 10 min, pre-tx to decrease pain & increase tissue extensibility    Direct contact modalities after being cleared for contraindications:           Manual therapy techniques: Soft tissue Mobilization and retrograde massage and scar massage were applied to the: right hand for 30 minutes, including:  -use of manual techniques, IASTM tool to decrease tissue adhesions, pain, edema and increase circulation and tissue extensibility  -Elastomer putty was used to create incision sized strip use during night time wear of splint to address scar tissue density and adhesions.        Therapeutic exercises to develop strength, endurance, ROM and edema reduction for 10 minutes, including:    TGEs-waves, flat fist, hook fist  5 reps each        A/AAROM:     Joint blocking RF flexion MCP/PIP/DIP 10 reps    Gentle PROM RF flexion/ extension  MCP/PIP/DIP  5 reps    Joint blocking end range RF extension PIP/DIP 10 reps    EDC glides  10 reps    Theraputty: sunny Sue participated in Orthotic fitting and training for 60 minutes including the followin2022  A custom volar hand based resting pan orthosis with RF/SF in extension was fabricated today to address soft tissue adhesions/incisional scar tightness/flexion posturing in RF and palm in patient's right hand. Good fit and positioning was achieved upon completion of fabrication.  Patient was provided with instruct on purpose of orthosis, proper donning/doffing, wear/care schedule and precautions to monitor.  Patient verbalized understanding of all instruction provided.        Patient Education and Home Exercises      Education provided:   - issued printed HEP exercises/stretches 2022  - purpose of orthosis, proper donning/doffing, wear/care schedule and precautions to monitor 2022   - edema control  techniques  - scar massage techniques   - review HEP exercises   - Progress towards goals     Written Home Exercises Provided: Patient instructed to cont prior HEP.  Exercises were reviewed and Linette was able to demonstrate them prior to the end of the session.  Linette demonstrated good  understanding of the HEP provided. See EMR under Patient Instructions for exercises provided during therapy sessions.       Assessment   Linette Galo is a 56 y.o. female referred to outpatient occupational therapy and presents with a medical diagnosis of Z98.890 (ICD-10-CM) - Post-operative state.  She is reporting 0/10 pain levels pre and post-treatment at this time.  Incisional scar with moderate density over palmar surface of ring finger extending into palm.  Following scar massage/soft tissue mobilization, scar tissue remains dense, but more mobile.  Elastomer putty strip was fabricated to wear with night time splint to address scar tissue.  Patient tolerated therapeutic activities well without significant pain or difficulty.  Pt would continue to benefit from skilled OT for Decreased ROM, Decreased  strength, Decreased muscle strength, Decreased functional hand use, Increased pain, Edema, Joint Stiffness, Scar Adhesions and Diminished/Impaired Coordination Continue current plan of care to maximize patient's functional use of right hand.         Linette is progressing well towards her goals and there are no updates to goals at this time. Pt prognosis is Good.     Pt will continue to benefit from skilled outpatient occupational therapy to address the deficits listed in the problem list on initial evaluation provide pt/family education and to maximize pt's level of independence in the home and community environment.     Pt's spiritual, cultural and educational needs considered and pt agreeable to plan of care and goals.    Anticipated barriers to occupational therapy: therapy attendance, compliance with HEP       Goals:    Short Term Goals: (in 4 weeks)  1) Patient will be independent in HEP -Met 7/5/2022  2) Decrease pain in right hand to no more than 2-3/10 at worst in ADL/IADL's -Progressing  3) Increase TAMS in right RF by 5-10 degrees for improved functioning in ADL/IADL's -Progressing  4) Decrease edema in right hand/RF by .3 cm for improved functioning in ADL/IADL's -Progressing  5) Assess  strength for increased functional use in ADL/IADL's -Progressing            Long Term Goals: (in 8 weeks)  1) Decrease pain in right hand to no more than 1-2/10 at worst in ADL/IADL's  2) Increase TAMS in right hand by 15-20 degrees for increased functioning in ADL/IADL's  3) Increase  strength in right hand by 10% of original measures for improved functioning in ADL/IADL's  4) Decrease edema in right hand by to trace or none   4) Increased functioning in ADL/IADL's, as evidenced by a FOTO impairment rating of no more than 30%         PLAN     Plan of Care Certification: 6/28/2022 to 8/23/2022.      Outpatient Occupational Therapy 2 times weekly for 8 weeks to include the following interventions: Paraffin, Fluidotherapy, Manual therapy/joint mobilizations, Modalities for pain management, US 3 mhz, Therapeutic exercises/activities., Strengthening, Edema Control and Scar Management.       Updates/Grading for next session: continue with soft tissue management, AROM/PROM exercises       Hermes Miramontes, OT

## 2022-07-20 NOTE — PATIENT INSTRUCTIONS
Issued Via [email or web address] (see logo above) scan QR code for pt specific program          View at [email or web address] using code: [email or web address]

## 2022-07-21 ENCOUNTER — TELEPHONE (OUTPATIENT)
Dept: ORTHOPEDICS | Facility: CLINIC | Age: 57
End: 2022-07-21
Payer: OTHER GOVERNMENT

## 2022-07-21 DIAGNOSIS — M25.512 LEFT SHOULDER PAIN, UNSPECIFIED CHRONICITY: Primary | ICD-10-CM

## 2022-07-21 NOTE — TELEPHONE ENCOUNTER
Spoke to patient and scheduled her appointment with Dr. Garcia on 8/23 at 9am with her x-ray preceeding at 9:15am.

## 2022-07-21 NOTE — TELEPHONE ENCOUNTER
----- Message from Alvin Rivers sent at 7/21/2022 10:37 AM CDT -----  Contact: [phone]  Type:  Patient Returning Call    Who Called:Linette   Who Left Message for Patient:leonard   Does the patient know what this is regarding?:appt   Would the patient rather a call back or a response via [facility]? Call back   Best Call Back Number:[phone]   Additional Information:

## 2022-07-25 ENCOUNTER — CLINICAL SUPPORT (OUTPATIENT)
Dept: REHABILITATION | Facility: HOSPITAL | Age: 57
End: 2022-07-25
Payer: OTHER GOVERNMENT

## 2022-07-25 DIAGNOSIS — R60.0 LOCALIZED EDEMA: ICD-10-CM

## 2022-07-25 DIAGNOSIS — R29.898 DECREASED GRIP STRENGTH OF RIGHT HAND: ICD-10-CM

## 2022-07-25 DIAGNOSIS — L90.5 SCAR ADHERENT: ICD-10-CM

## 2022-07-25 DIAGNOSIS — M25.60 DECREASED RANGE OF MOTION: ICD-10-CM

## 2022-07-25 DIAGNOSIS — R52 PAIN: Primary | ICD-10-CM

## 2022-07-25 PROCEDURE — 97110 THERAPEUTIC EXERCISES: CPT | Mod: PN

## 2022-07-25 PROCEDURE — 97035 APP MDLTY 1+ULTRASOUND EA 15: CPT | Mod: PN

## 2022-07-25 PROCEDURE — 97140 MANUAL THERAPY 1/> REGIONS: CPT | Mod: PN

## 2022-07-25 NOTE — PROGRESS NOTES
[facility]  Occupational Therapy Treatment Note    Date: 7/25/2022  Name: Linette LIU Iraj    Clinic Number: [id number]    Therapy Diagnosis:   Encounter Diagnoses   Name Primary?    Pain Yes    Decreased range of motion     Scar adherent     Localized edema     Decreased  strength of right hand      Physician: Azael Curran PA-C    Physician Orders: Evaluate and treat  status post right partial palmar fasciectomy for Dupuytren's contracture release  eval and treat, modalities as needed  Medical Diagnosis: Post-operative state [Z98.890]; Dupuytren's contracture of right hand [M72.0] ring finger ray   Surgical Procedure and Date: Procedure(s) (LRB): Partial palmar FASCIECTOMY (Right),  6/9/2022   Evaluation Date: 6/28/2022   Insurance Authorization Period Expiration: 6/22/2022 - 12/31/2022   Plan of Care Certification Period: 6/28/2022 - 8/23/2022    Progress Note Due: 8/2/2022   Date of Return to MD: none noted in medical chart    Visit # / Visits authorized: 6/20 (including initial evaluation)   FOTO: 1/3   Initial=49% / Goal=27%      Precautions:  Standard      Time In: 8:30 pm  Time Out: 9:20 pm   Total Billable Time: 50 minutes (1US, 2MT, 1TE)       SUBJECTIVE     Pt reports:  She reports that she has no significant pain, her primary complaint is stiffness and tightness of the finger. She is also currently reporting left shoulder pain, which is further limiting her functional activities at home.  She reports compliance with silicone sheet wear and elastomer insert with night time wear of splint.         She was compliant with home exercise program given last session. She has been doing light scar massage at home with manual techniques and vibration tool; reports doing 5-6 times a day. Reports doing HEP exercises 2-3 times per day.    Response to previous treatment: following soft tissue intervention from last session, she had increased hand soreness on day 2.  Lasted  for day and 1/2  half and then dissipated.        Functional change: none reported today     Pain: 0/10 pre-treat; 0/10 post-treat   Location: right hand       Patient is 6 weeks + 5 days post-op as of 2022.    OBJECTIVE     Objective Measures updated at progress report unless specified.    Treatment     Linette received the treatments listed below:     Supervised modalities after being cleared for contradictions:   Paraffin w/ MHP to right hand for 10 min, pre-tx to decrease pain & increase tissue extensibility    Direct contact modalities after being cleared for contraindications:   Ultrasound - 3 MHz, 0.8 W/cm2, 50% pulsed, 10 min to right RF/right palmar area, to decrease pain, edema, and to increase circulation and tissue extensibility        Manual therapy techniques: Soft tissue Mobilization and retrograde massage and scar massage were applied to the: right hand for 30 minutes, including:  -use of manual techniques, IASTM tool to decrease tissue adhesions, pain, edema and increase circulation and tissue extensibility  -Elastomer putty was used to create incision sized strip use during night time wear of splint to address scar tissue density and adhesions.        Therapeutic exercises to develop strength, endurance, ROM and edema reduction for 10 minutes, including:    TGEs-waves, flat fist, hook fist  5 reps each    MCP hyperextension stretch  5 reps X 10 sec hold    Joint blocking RF flexion MCP/PIP/DIP 10 reps    Gentle PROM RF flexion/ extension  MCP/PIP/DIP  5 reps    Joint blocking end range RF extension PIP/DIP 10 reps    Finger lifts isolated and composite     10 reps    EDC glides  10 reps        Theraputty: yellow        Isospheres  2 min            Linette participated in Orthotic fitting and training for 60 minutes including the followin2022  A custom volar hand based resting pan orthosis with RF/SF in extension was fabricated today to address soft tissue adhesions/incisional scar  tightness/flexion posturing in RF and palm in patient's right hand. Good fit and positioning was achieved upon completion of fabrication.  Patient was provided with instruct on purpose of orthosis, proper donning/doffing, wear/care schedule and precautions to monitor.  Patient verbalized understanding of all instruction provided.        Patient Education and Home Exercises      Education provided:   - issued printed HEP exercises/stretches 7/19/2022  - purpose of orthosis, proper donning/doffing, wear/care schedule and precautions to monitor 7/6/2022   - edema control techniques  - scar massage techniques   - review HEP exercises   - Progress towards goals     Written Home Exercises Provided: Patient instructed to cont prior HEP.  Exercises were reviewed and Linette was able to demonstrate them prior to the end of the session.  Linette demonstrated good  understanding of the HEP provided. See EMR under Patient Instructions for exercises provided during therapy sessions.       Assessment   Linette Galo is a 56 y.o. female referred to outpatient occupational therapy and presents with a medical diagnosis of Z98.890 (ICD-10-CM) - Post-operative state.  She is reporting 0/10 pain levels pre and post-treatment at this time.  Incisional scar with moderate density over palmar surface of ring finger extending into palm, most density just proximal to MCP joint to PIP joint.  Noteable decreased sensitivity at distal end of scar with soft tissue intervention.  Scar tissue more mobile in comparison to last session, with increased mobility following soft tissue intervention.  Patient tolerated therapeutic activities well without significant pain or difficulty.  Pt would continue to benefit from skilled OT for Decreased ROM, Decreased  strength, Decreased muscle strength, Decreased functional hand use, Increased pain, Edema, Joint Stiffness, Scar Adhesions and Diminished/Impaired Coordination Continue current plan of care to  maximize patient's functional use of right hand.        Linette is progressing well towards her goals and there are no updates to goals at this time. Pt prognosis is Good.     Pt will continue to benefit from skilled outpatient occupational therapy to address the deficits listed in the problem list on initial evaluation provide pt/family education and to maximize pt's level of independence in the home and community environment.     Pt's spiritual, cultural and educational needs considered and pt agreeable to plan of care and goals.    Anticipated barriers to occupational therapy: therapy attendance, compliance with HEP       Goals:   Short Term Goals: (in 4 weeks)  1) Patient will be independent in HEP -Met 7/5/2022  2) Decrease pain in right hand to no more than 2-3/10 at worst in ADL/IADL's -Progressing  3) Increase TAMS in right RF by 5-10 degrees for improved functioning in ADL/IADL's -Progressing  4) Decrease edema in right hand/RF by .3 cm for improved functioning in ADL/IADL's -Progressing  5) Assess  strength for increased functional use in ADL/IADL's -Progressing            Long Term Goals: (in 8 weeks)  1) Decrease pain in right hand to no more than 1-2/10 at worst in ADL/IADL's  2) Increase TAMS in right hand by 15-20 degrees for increased functioning in ADL/IADL's  3) Increase  strength in right hand by 10% of original measures for improved functioning in ADL/IADL's  4) Decrease edema in right hand by to trace or none   4) Increased functioning in ADL/IADL's, as evidenced by a FOTO impairment rating of no more than 30%         PLAN     Plan of Care Certification: 6/28/2022 to 8/23/2022.      Outpatient Occupational Therapy 2 times weekly for 8 weeks to include the following interventions: Paraffin, Fluidotherapy, Manual therapy/joint mobilizations, Modalities for pain management, US 3 mhz, Therapeutic exercises/activities., Strengthening, Edema Control and Scar Management.       Updates/Grading for  next session: continue with soft tissue management, AROM/PROM exercises       Hermes Miramontes, OT

## 2022-07-26 NOTE — PROGRESS NOTES
[facility]  Occupational Therapy Treatment Note    Date: 7/27/2022  Name: Linette LIU Iraj    Clinic Number: [id number]    Therapy Diagnosis:   Encounter Diagnoses   Name Primary?    Pain Yes    Decreased range of motion     Scar adherent     Localized edema     Decreased  strength of right hand      Physician: Azael Curran PA-C    Physician Orders: Evaluate and treat  status post right partial palmar fasciectomy for Dupuytren's contracture release  eval and treat, modalities as needed  Medical Diagnosis: Post-operative state [Z98.890]; Dupuytren's contracture of right hand [M72.0] ring finger ray   Surgical Procedure and Date: Procedure(s) (LRB): Partial palmar FASCIECTOMY (Right),  6/9/2022   Evaluation Date: 6/28/2022   Insurance Authorization Period Expiration: 6/22/2022 - 12/31/2022   Plan of Care Certification Period: 6/28/2022 - 8/23/2022    Progress Note Due: 8/2/2022   Date of Return to MD: none noted in medical chart    Visit # / Visits authorized: 7/20 (including initial evaluation)   FOTO: 1/3   Initial=49% / Goal=27%      Precautions:  Standard      Time In: 8:30 am  Time Out: 9:20 am   Total Billable Time: 50 minutes (1US, 2MT, 1TE)       SUBJECTIVE     Pt reports:  She reports that she has no significant hand pain, her primary complaint is stiffness and tightness of the finger. She feels as if the finger stiffness increases at a certain time each day.  She is also currently reporting left shoulder pain, which is further limiting her functional activities at home.     She was compliant with home exercise program given last session. She has been doing light scar massage at home with manual techniques and vibration tool.  Reports doing HEP exercises 2-3 times per day.    Response to previous treatment: more mobility in scar tissue        Functional change:can  a pencil longer period of time without needing to stretch finger      Pain: 0/10 pre-treat; 0/10  post-treat   Location: right hand       Patient is 7 weeks 2022.    OBJECTIVE     Objective Measures updated at progress report unless specified.    Treatment     Linette received the treatments listed below:     Supervised modalities after being cleared for contradictions:   Paraffin w/ MHP to right hand for 8 min, pre-tx to decrease pain & increase tissue extensibility    Direct contact modalities after being cleared for contraindications:   Ultrasound - 3 MHz, 0.8 W/cm2, 50% pulsed, 10 min to right RF/right palmar area, to decrease pain, edema, and to increase circulation and tissue extensibility        Manual therapy techniques: Soft tissue Mobilization and retrograde massage and scar massage were applied to the: right hand for 23 minutes, including:  -use of manual techniques, IASTM tool to decrease tissue adhesions, pain, edema and increase circulation and tissue extensibility  -Elastomer putty was used to create incision sized strip use during night time wear of splint to address scar tissue density and adhesions.        Therapeutic exercises to develop strength, endurance, ROM and edema reduction for 10 minutes, including:    TGEs-waves, flat fist, hook fist  5 reps each    MCP hyperextension stretch  5 reps X 10 sec hold    Joint blocking RF flexion MCP/PIP/DIP 10 reps    Gentle PROM RF flexion/ extension  MCP/PIP/DIP  5 reps    Joint blocking end range RF extension PIP/DIP 10 reps    Finger lifts isolated and composite     10 reps        Theraputty: yellow        Isospheres  3 min            Linette participated in Orthotic fitting and training for 60 minutes including the followin2022  A custom volar hand based resting pan orthosis with RF/SF in extension was fabricated today to address soft tissue adhesions/incisional scar tightness/flexion posturing in RF and palm in patient's right hand. Good fit and positioning was achieved upon completion of fabrication.  Patient was provided with instruct on  purpose of orthosis, proper donning/doffing, wear/care schedule and precautions to monitor.  Patient verbalized understanding of all instruction provided.        Patient Education and Home Exercises      Education provided:   - issued printed HEP exercises/stretches 7/19/2022  - purpose of orthosis, proper donning/doffing, wear/care schedule and precautions to monitor 7/6/2022   - edema control techniques  - scar massage techniques   - review HEP exercises   - Progress towards goals     Written Home Exercises Provided: Patient instructed to cont prior HEP.  Exercises were reviewed and Linette was able to demonstrate them prior to the end of the session.  Linette demonstrated good  understanding of the HEP provided. See EMR under Patient Instructions for exercises provided during therapy sessions.       Assessment   Linette Galo is a 56 y.o. female referred to outpatient occupational therapy and presents with a medical diagnosis of Z98.890 (ICD-10-CM) - Post-operative state.  She is reporting 0/10 pain levels pre and post-treatment at this time.  Incisional scar with minimal to moderate density over palmar surface of ring finger extending into palm, most density just proximal to MCP joint to PIP joint.  Noteable decreased sensitivity at distal end of scar with soft tissue intervention.  Scar tissue continues to become more mobile following soft tissue intervention.  Patient tolerated therapeutic activities well without significant pain or difficulty.  Pt would continue to benefit from skilled OT for Decreased ROM, Decreased  strength, Decreased muscle strength, Decreased functional hand use, Increased pain, Edema, Joint Stiffness, Scar Adhesions and Diminished/Impaired Coordination Continue current plan of care to maximize patient's functional use of right hand.        Linette is progressing well towards her goals and there are no updates to goals at this time. Pt prognosis is Good.     Pt will continue to benefit  from skilled outpatient occupational therapy to address the deficits listed in the problem list on initial evaluation provide pt/family education and to maximize pt's level of independence in the home and community environment.     Pt's spiritual, cultural and educational needs considered and pt agreeable to plan of care and goals.    Anticipated barriers to occupational therapy: therapy attendance, compliance with HEP       Goals:   Short Term Goals: (in 4 weeks)  1) Patient will be independent in HEP -Met 7/5/2022  2) Decrease pain in right hand to no more than 2-3/10 at worst in ADL/IADL's -Progressing  3) Increase TAMS in right RF by 5-10 degrees for improved functioning in ADL/IADL's -Progressing  4) Decrease edema in right hand/RF by .3 cm for improved functioning in ADL/IADL's -Progressing  5) Assess  strength for increased functional use in ADL/IADL's -Progressing            Long Term Goals: (in 8 weeks)  1) Decrease pain in right hand to no more than 1-2/10 at worst in ADL/IADL's  2) Increase TAMS in right hand by 15-20 degrees for increased functioning in ADL/IADL's  3) Increase  strength in right hand by 10% of original measures for improved functioning in ADL/IADL's  4) Decrease edema in right hand by to trace or none   4) Increased functioning in ADL/IADL's, as evidenced by a FOTO impairment rating of no more than 30%         PLAN     Plan of Care Certification: 6/28/2022 to 8/23/2022.      Outpatient Occupational Therapy 2 times weekly for 8 weeks to include the following interventions: Paraffin, Fluidotherapy, Manual therapy/joint mobilizations, Modalities for pain management, US 3 mhz, Therapeutic exercises/activities., Strengthening, Edema Control and Scar Management.       Updates/Grading for next session: continue with soft tissue management, AROM/PROM exercises       Hermes Miramontes, OT

## 2022-07-27 ENCOUNTER — CLINICAL SUPPORT (OUTPATIENT)
Dept: REHABILITATION | Facility: HOSPITAL | Age: 57
End: 2022-07-27
Payer: OTHER GOVERNMENT

## 2022-07-27 DIAGNOSIS — R60.0 LOCALIZED EDEMA: ICD-10-CM

## 2022-07-27 DIAGNOSIS — R52 PAIN: Primary | ICD-10-CM

## 2022-07-27 DIAGNOSIS — L90.5 SCAR ADHERENT: ICD-10-CM

## 2022-07-27 DIAGNOSIS — R29.898 DECREASED GRIP STRENGTH OF RIGHT HAND: ICD-10-CM

## 2022-07-27 DIAGNOSIS — M25.60 DECREASED RANGE OF MOTION: ICD-10-CM

## 2022-07-27 PROCEDURE — 97110 THERAPEUTIC EXERCISES: CPT | Mod: PN

## 2022-07-27 PROCEDURE — 97140 MANUAL THERAPY 1/> REGIONS: CPT | Mod: PN

## 2022-07-27 PROCEDURE — 97035 APP MDLTY 1+ULTRASOUND EA 15: CPT | Mod: PN

## 2022-08-09 ENCOUNTER — PATIENT MESSAGE (OUTPATIENT)
Dept: ADMINISTRATIVE | Facility: HOSPITAL | Age: 57
End: 2022-08-09
Payer: OTHER GOVERNMENT

## 2022-08-16 NOTE — PROGRESS NOTES
[facility]  Occupational Therapy Treatment Note    Date: 8/17/2022  Name: Linette LIU Iraj    Clinic Number: [id number]    Therapy Diagnosis:   Encounter Diagnoses   Name Primary?    Pain Yes    Decreased range of motion     Scar adherent     Localized edema     Decreased  strength of right hand      Physician: Radha Craig PA-C    Physician Orders: Evaluate and treat  status post right partial palmar fasciectomy for Dupuytren's contracture release  eval and treat, modalities as needed  Medical Diagnosis: Post-operative state [Z98.890]; Dupuytren's contracture of right hand [M72.0] ring finger ray   Surgical Procedure and Date: Procedure(s) (LRB): Partial palmar FASCIECTOMY (Right),  6/9/2022   Evaluation Date: 6/28/2022   Insurance Authorization Period Expiration: 6/22/2022 - 12/31/2022   Plan of Care Certification Period: 6/28/2022 - 8/23/2022    Progress Note Due: 8/2/2022   Date of Return to MD: none noted in medical chart    Visit # / Visits authorized: 7/20 (including initial evaluation)   FOTO: 1/3   Initial=49% / Goal=27%      Precautions:  Standard      Time In: 8:30 am  Time Out: 9:20 am   Total Billable Time: 55 minutes (1US, 2MT, 1TE)       SUBJECTIVE     Pt reports:  She reports that she has no significant hand pain, but stiffness and tightness of the finger persists.       She was compliant with home exercise program given last session. She has been doing light scar massage.  She reports doing HEP exercises, but not as consistently as she would like due to busier than usual home life lately.   Response to previous treatment: none noted          Functional change: none reported     Pain: 0/10 pre-treat; 0/10 post-treat   Location: right hand       Patient is 10 weeks 8/17/2022.    OBJECTIVE     Objective Measures updated at progress report unless specified.         strength measures taken today, 8/17/2022.           Strength (Dyanmometer) and Pinch  Strength (Pinch Gauge)  Measured in pounds and psi. Average of three trials.   2022    Left Right   Rung II 62 54   Key Pinch NT  NT    3pt Pinch NT  NT    2pt Pinch NT  NT               Treatment     Linette received the treatments listed below:     Supervised modalities after being cleared for contradictions:   Paraffin w/ MHP to right hand for 8 min, pre-tx to decrease pain & increase tissue extensibility    Direct contact modalities after being cleared for contraindications:   Ultrasound - 3 MHz, 0.8 W/cm2, 50% pulsed, 10 min to right RF/right palmar area, to decrease pain, edema, and to increase circulation and tissue extensibility        Manual therapy techniques: Soft tissue Mobilization and retrograde massage and scar massage were applied to the: right hand for 23 minutes, including:  -use of manual techniques, IASTM tool to decrease tissue adhesions, pain, edema and increase circulation and tissue extensibility  -Elastomer putty was used to create incision sized strip use during night time wear of splint to address scar tissue density and adhesions.        Therapeutic exercises to develop strength, endurance, ROM and edema reduction for 17 minutes, including:    MCP hyperextension stretch  5 reps X 10 sec hold    Joint blocking end range RF extension PIP/DIP 10 reps    Finger lifts isolated and composite     10 reps    Intrinsic stretch 5 x 10 sec hold Finger extension with rb 15 reps     Theraputty: yellow    -log rolls  5 reps    -pancake and bloom  3 trials         Isospheres  3 min            Linette participated in Orthotic fitting and training for 60 minutes including the followin2022  A custom volar hand based resting pan orthosis with RF/SF in extension was fabricated today to address soft tissue adhesions/incisional scar tightness/flexion posturing in RF and palm in patient's right hand. Good fit and positioning was achieved upon completion of fabrication.  Patient was provided with  instruct on purpose of orthosis, proper donning/doffing, wear/care schedule and precautions to monitor.  Patient verbalized understanding of all instruction provided.        Patient Education and Home Exercises      Education provided:   - issued printed HEP exercises/stretches 7/19/2022 and 8/17/2022  - purpose of orthosis, proper donning/doffing, wear/care schedule and precautions to monitor 7/6/2022   - edema control techniques  - scar massage techniques   - review HEP exercises   - Progress towards goals     Written Home Exercises Provided: Patient instructed to cont prior HEP.  Exercises were reviewed and Linette was able to demonstrate them prior to the end of the session.  Linette demonstrated good  understanding of the HEP provided. See EMR under Patient Instructions for exercises provided during therapy sessions.       Assessment   Linette Galo is a 56 y.o. female referred to outpatient occupational therapy and presents with a medical diagnosis of Z98.890 (ICD-10-CM) - Post-operative state.  Last attended treatment session was 7/26/2022.  PIP joint of RF noted to have flexion posture.  Scar tissue noted to be of moderate density;  improved pliability noted following soft tissue intervention.  Patient tolerated therapeutic activities well without significant pain or difficulty.  Pt would continue to benefit from skilled OT for Decreased ROM, Decreased  strength, Decreased muscle strength, Decreased functional hand use, Increased pain, Edema, Joint Stiffness, Scar Adhesions and Diminished/Impaired Coordination Continue current plan of care to maximize patient's functional use of right hand.        Linette is progressing well towards her goals and there are no updates to goals at this time. Pt prognosis is Good.     Pt will continue to benefit from skilled outpatient occupational therapy to address the deficits listed in the problem list on initial evaluation provide pt/family education and to maximize pt's  level of independence in the home and community environment.     Pt's spiritual, cultural and educational needs considered and pt agreeable to plan of care and goals.    Anticipated barriers to occupational therapy: therapy attendance, compliance with HEP       Goals:   Short Term Goals: (in 4 weeks)  1) Patient will be independent in HEP -Met 7/5/2022  2) Decrease pain in right hand to no more than 2-3/10 at worst in ADL/IADL's -Progressing  3) Increase TAMS in right RF by 5-10 degrees for improved functioning in ADL/IADL's -Progressing  4) Decrease edema in right hand/RF by .3 cm for improved functioning in ADL/IADL's -Progressing  5) Assess  strength for increased functional use in ADL/IADL's -Progressing            Long Term Goals: (in 8 weeks)  1) Decrease pain in right hand to no more than 1-2/10 at worst in ADL/IADL's  2) Increase TAMS in right hand by 15-20 degrees for increased functioning in ADL/IADL's  3) Increase  strength in right hand by 10% of original measures for improved functioning in ADL/IADL's  4) Decrease edema in right hand by to trace or none   4) Increased functioning in ADL/IADL's, as evidenced by a FOTO impairment rating of no more than 30%         PLAN     Plan of Care Certification: 6/28/2022 to 8/23/2022.      Outpatient Occupational Therapy 2 times weekly for 8 weeks to include the following interventions: Paraffin, Fluidotherapy, Manual therapy/joint mobilizations, Modalities for pain management, US 3 mhz, Therapeutic exercises/activities., Strengthening, Edema Control and Scar Management.       Updates/Grading for next session: continue with soft tissue management, AROM/PROM exercises       Hermes Miramontes, OT

## 2022-08-17 ENCOUNTER — CLINICAL SUPPORT (OUTPATIENT)
Dept: REHABILITATION | Facility: HOSPITAL | Age: 57
End: 2022-08-17
Payer: OTHER GOVERNMENT

## 2022-08-17 DIAGNOSIS — R29.898 DECREASED GRIP STRENGTH OF RIGHT HAND: ICD-10-CM

## 2022-08-17 DIAGNOSIS — L90.5 SCAR ADHERENT: ICD-10-CM

## 2022-08-17 DIAGNOSIS — R60.0 LOCALIZED EDEMA: ICD-10-CM

## 2022-08-17 DIAGNOSIS — M25.60 DECREASED RANGE OF MOTION: ICD-10-CM

## 2022-08-17 DIAGNOSIS — R52 PAIN: Primary | ICD-10-CM

## 2022-08-17 PROCEDURE — 97140 MANUAL THERAPY 1/> REGIONS: CPT | Mod: PN

## 2022-08-17 PROCEDURE — 97110 THERAPEUTIC EXERCISES: CPT | Mod: PN

## 2022-08-17 PROCEDURE — 97035 APP MDLTY 1+ULTRASOUND EA 15: CPT | Mod: PN

## 2022-08-17 NOTE — PATIENT INSTRUCTIONS
Issued Via [email or web address] (see logo above) scan QR code for pt specific program         View at [email or web address] using code: [email or web address]

## 2022-08-23 ENCOUNTER — OFFICE VISIT (OUTPATIENT)
Dept: ORTHOPEDICS | Facility: CLINIC | Age: 57
End: 2022-08-23
Payer: OTHER GOVERNMENT

## 2022-08-23 ENCOUNTER — HOSPITAL ENCOUNTER (OUTPATIENT)
Dept: RADIOLOGY | Facility: HOSPITAL | Age: 57
Discharge: HOME | End: 2022-08-23
Attending: STUDENT IN AN ORGANIZED HEALTH CARE EDUCATION/TRAINING PROGRAM
Payer: OTHER GOVERNMENT

## 2022-08-23 VITALS — WEIGHT: 231 LBS | BODY MASS INDEX: 39.44 KG/M2 | HEIGHT: 64 IN

## 2022-08-23 DIAGNOSIS — M25.512 LEFT SHOULDER PAIN, UNSPECIFIED CHRONICITY: ICD-10-CM

## 2022-08-23 DIAGNOSIS — M75.52 SUBACROMIAL BURSITIS OF LEFT SHOULDER JOINT: Primary | ICD-10-CM

## 2022-08-23 DIAGNOSIS — M25.519 SHOULDER PAIN, UNSPECIFIED CHRONICITY, UNSPECIFIED LATERALITY: ICD-10-CM

## 2022-08-23 PROCEDURE — 20610 DRAIN/INJ JOINT/BURSA W/O US: CPT | Mod: S$PBB,,, | Performed by: PHYSICIAN ASSISTANT

## 2022-08-23 PROCEDURE — 73030 X-RAY EXAM OF SHOULDER: CPT | Mod: 26,LT,, | Performed by: RADIOLOGY

## 2022-08-23 PROCEDURE — 20610 LARGE JOINT ASPIRATION/INJECTION: L SUBACROMIAL BURSA: ICD-10-PCS | Mod: S$PBB,,, | Performed by: PHYSICIAN ASSISTANT

## 2022-08-23 PROCEDURE — 99999 PR PBB SHADOW E&M-EST. PATIENT-LVL IV: ICD-10-PCS | Mod: PBBFAC,,, | Performed by: STUDENT IN AN ORGANIZED HEALTH CARE EDUCATION/TRAINING PROGRAM

## 2022-08-23 PROCEDURE — 99213 OFFICE O/P EST LOW 20 MIN: CPT | Mod: S$PBB,25,, | Performed by: STUDENT IN AN ORGANIZED HEALTH CARE EDUCATION/TRAINING PROGRAM

## 2022-08-23 PROCEDURE — 73030 X-RAY EXAM OF SHOULDER: CPT | Mod: TC,LT

## 2022-08-23 PROCEDURE — 99213 PR OFFICE/OUTPT VISIT, EST, LEVL III, 20-29 MIN: ICD-10-PCS | Mod: S$PBB,25,, | Performed by: STUDENT IN AN ORGANIZED HEALTH CARE EDUCATION/TRAINING PROGRAM

## 2022-08-23 PROCEDURE — 99214 OFFICE O/P EST MOD 30 MIN: CPT | Mod: PBBFAC | Performed by: STUDENT IN AN ORGANIZED HEALTH CARE EDUCATION/TRAINING PROGRAM

## 2022-08-23 PROCEDURE — 20610 DRAIN/INJ JOINT/BURSA W/O US: CPT | Mod: PBBFAC | Performed by: PHYSICIAN ASSISTANT

## 2022-08-23 PROCEDURE — 99999 PR PBB SHADOW E&M-EST. PATIENT-LVL IV: CPT | Mod: PBBFAC,,, | Performed by: STUDENT IN AN ORGANIZED HEALTH CARE EDUCATION/TRAINING PROGRAM

## 2022-08-23 PROCEDURE — 73030 XR SHOULDER COMPLETE 2 OR MORE VIEWS LEFT: ICD-10-PCS | Mod: 26,LT,, | Performed by: RADIOLOGY

## 2022-08-23 RX ORDER — TRIAMCINOLONE ACETONIDE 40 MG/ML
40 INJECTION, SUSPENSION INTRA-ARTICULAR; INTRAMUSCULAR
Status: DISCONTINUED | OUTPATIENT
Start: 2022-08-23 | End: 2022-08-23 | Stop reason: HOSPADM

## 2022-08-23 RX ADMIN — TRIAMCINOLONE ACETONIDE 40 MG: 200 INJECTION, SUSPENSION INTRA-ARTICULAR; INTRAMUSCULAR at 09:08

## 2022-08-23 NOTE — PROGRESS NOTES
"Orthopaedics Sports Medicine     Shoulder Initial Visit         8/23/2022    Referring MD: Self, Aaareferral    Chief Complaint   Patient presents with    Left Shoulder - Pain         History of Present Illness:   Linette Galo is a 56 y.o. right-hand dominant female who presents with left shoulder pain and dysfunction.    Onset of the symptoms was 5 weeks ago    Inciting event: Patient recently had Right handed Dupuytrens contracture surgery with Dr. Jade on 6/9/22, and since then has been using her left arm more frequently. She was moving a piece of furniture with her left arm when she felt a sharp pain in her shoulder    Current symptoms include pain, weakness, limited ROM     Pain is aggravated by movement, night time pain     Evaluation to date: X-Ray     Treatment to date: Rest, activity modification, ibuprofen     Past Medical History:   Past Medical History:   Diagnosis Date    ADD (attention deficit disorder)     Anemia     Anxiety     Followed by Psychology    Arthritis     Depression     bipolar, anxiety, ADD    Family history of colon cancer 1/22/2019    Her grandmother had colon cancer.    General anesthetics causing adverse effect in therapeutic use     Patient reports h/o "combativeness", after Gastric and Hysterectomy.     Positive ARIK (antinuclear antibody)     Dr. Ross//following       Past Surgical History:   Past Surgical History:   Procedure Laterality Date    CARPAL TUNNEL RELEASE      both hands    COLONOSCOPY N/A 1/22/2019    Procedure: COLONOSCOPY;  Surgeon: Moe Jimenez MD;  [facility];  Service: Endoscopy;  Laterality: N/A;    GASTRIC BYPASS  2008    HYSTERECTOMY  2011    RALH (retains ovaries)    INTERPOSITION ARTHROPLASTY OF CARPOMETACARPAL JOINTS Right 2/7/2022    Procedure: INTERPOSITION ARTHROPLASTY, CMC JOINT;  Surgeon: Sterling Jade MD;  Location: [facility];  Service: Orthopedics;  Laterality: Right;  right thumb basal joint arthroplasty " "    ARTHREX - LATRICE AWARE    SURGICAL REMOVAL OF FASCIA Right 6/9/2022    Procedure: FASCIECTOMY;  Surgeon: Sterling Jade MD;  Location: [facility] OR;  Service: Orthopedics;  Laterality: Right;  partial palmar fasciectomy right hand ring finger     TONSILLECTOMY      TRIGGER FINGER RELEASE Right 2/7/2022    Procedure: RELEASE, TRIGGER FINGER;  Surgeon: Sterling Jade MD;  Location: [facility] OR;  Service: Orthopedics;  Laterality: Right;   right ring trigger finger release        Medications:  Patient's Medications   New Prescriptions    No medications on file   Previous Medications    BUPROPION (WELLBUTRIN SR) 200 MG SR12        CHOLECALCIFEROL, VITAMIN D3, (VITAMIN D3) 1,000 UNIT CAPSULE    Take 1 capsule (1,000 Units total) by mouth once daily.    CYANOCOBALAMIN, VITAMIN B-12, 50 MCG TABLET    Take 50 mcg by mouth once daily.    DULOXETINE (CYMBALTA) 20 MG CAPSULE    Take 60 mg by mouth once daily.    LAMOTRIGINE (LAMICTAL) 100 MG TABLET    Take by mouth once daily.    MULTIVITAMIN CAPSULE    Take 1 capsule by mouth once daily.    VYVANSE 70 MG CAPSULE    Take 70 mg by mouth every morning.    Modified Medications    No medications on file   Discontinued Medications    No medications on file       Allergies: Review of patient's allergies indicates:  No Known Allergies    Social History:   Home town: [place]  Occupation: stay home  Alcohol use: She reports previous alcohol use.  Tobacco use: She reports that she has never smoked. She has never used smokeless tobacco.    Review of systems:  History of recent illness, fevers, shakes, or chills: no  History of cardiac problems or chest pain: no  History of pulmonary problems or asthma: no  History of diabetes: no  History of prior dvt or clotting problems: no  History of sleep apnea: no      Physical Examination:  Estimated body mass index is 39.65 kg/m² as calculated from the following:    Height as of this encounter: 5' 4" (1.626 m).    Weight as of this " encounter: 104.8 kg (231 lb).    General  Healthy appearing female in no acute distress  Alert and oriented, normal mood, appropriate affect    Shoulder Examination:  Patient is alert and oriented, no distress. Skin is intact. Neuro is normal with no focal motor or sensory findings.    Cervical exam is unremarkable. Intact cervical ROM. Negative Spurling's test    Physical Exam:  RIGHT    LEFT    Scap Dyskinesis/Winging (-)    (-)    Tenderness:          Greater Tuberosity             (-)    +  Bicipital Groove  (-)    (-)  AC joint   (-)    (-)  Other:     ROM:  Forward Elevation 180    180  Abduction  120    120  ER (at side)  80    80  IR   T8    T8    Strength:   Supraspinatus  5/5    4+/5  Infraspinatus  5/5    5/5  Subscap / IR  5/5    5/5     Special Tests:   Neer:    (-)    +   Trinh:   (-)    +   SS Stress:   (-)    +   Bear Hug:   (-)    (-)   [place]'s:   (-)    (-)   Resisted Thrower's:   (-)    +   Cross Arm Abduction:  (-)    (-)    Neurovascular examination  - Motor grossly intact bilaterally to shoulder abduction, elbow flexion and extension, wrist flexion and extension, and intrinsic hand musculature  - Sensation intact to light touch bilaterally in axillary, median, radial, and ulnar distributions  - Symmetrical radial pulses      Imaging:  X-Ray Shoulder 2 or More Views Left  Narrative: EXAMINATION:  XR SHOULDER COMPLETE 2 OR MORE VIEWS LEFT    CLINICAL HISTORY:  Pain in left shoulder    TECHNIQUE:  Two or three views of the left shoulder were performed.    COMPARISON:  None    FINDINGS:  There is no radiographic evidence of acute osseous, articular, or soft tissue abnormality.  Joint spaces are preserved.  Impression: No acute findings    Electronically signed by: Kai Dean MD  Date:    08/23/2022  Time:    08:13       Physician Read: I agree with the above impression.      Impression:  56 y.o. female with left shoulder subacromial impingement and resultant  bursitis      Plan:  1. Discussed diagnosis and treatment options with patient today. Her history and physical exam are consistent with subacromial impingement and resultant bursitis  2. We discussed a variety of treatment options including medications, injections, physical therapy and other alternative treatments. Ultimately we elected to proceed with CSI and physical therapy at this time.  3. Left shoulder subacromial CSI given today  4. Physical therapy order placed to [facility]  5. Follow up 6-8 weeks, if no improvement will consider MRI           Jake Garcia MD

## 2022-08-23 NOTE — PROCEDURES
Large Joint Aspiration/Injection: L subacromial bursa    Date/Time: 8/23/2022 9:00 AM  Performed by: Ira Rhodes PA-C  Authorized by: Ira Rhodes PA-C     Consent Done?:  Yes (Verbal)  Indications:  Pain  Site marked: the procedure site was marked    Timeout: prior to procedure the correct patient, procedure, and site was verified    Prep: patient was prepped and draped in usual sterile fashion      Local anesthesia used?: Yes    Anesthesia:  Local infiltration  Local anesthetic:  Lidocaine 1% without epinephrine    Details:  Needle Size:  22 G  Ultrasonic Guidance for needle placement?: No    Approach:  Posterior  Location:  Shoulder  Site:  L subacromial bursa  Medications:  40 mg triamcinolone acetonide 40 mg/mL  Patient tolerance:  Patient tolerated the procedure well with no immediate complications

## 2022-08-24 ENCOUNTER — CLINICAL SUPPORT (OUTPATIENT)
Dept: REHABILITATION | Facility: HOSPITAL | Age: 57
End: 2022-08-24
Payer: OTHER GOVERNMENT

## 2022-08-24 DIAGNOSIS — L90.5 SCAR ADHERENT: ICD-10-CM

## 2022-08-24 DIAGNOSIS — R52 PAIN: Primary | ICD-10-CM

## 2022-08-24 DIAGNOSIS — R29.898 DECREASED GRIP STRENGTH OF RIGHT HAND: ICD-10-CM

## 2022-08-24 DIAGNOSIS — R60.0 LOCALIZED EDEMA: ICD-10-CM

## 2022-08-24 DIAGNOSIS — M25.60 DECREASED RANGE OF MOTION: ICD-10-CM

## 2022-08-24 PROCEDURE — 97760 ORTHOTIC MGMT&TRAING 1ST ENC: CPT | Mod: PN

## 2022-08-24 PROCEDURE — 97035 APP MDLTY 1+ULTRASOUND EA 15: CPT | Mod: PN

## 2022-08-24 PROCEDURE — 97140 MANUAL THERAPY 1/> REGIONS: CPT | Mod: PN

## 2022-08-24 PROCEDURE — 97110 THERAPEUTIC EXERCISES: CPT | Mod: PN,59

## 2022-08-24 NOTE — PLAN OF CARE
[facility]  Occupational Therapy Plan of Care Note    Name: Linette Galo  Clinic Number: [id number]    Therapy Diagnosis:   Encounter Diagnoses   Name Primary?    Pain Yes    Decreased range of motion     Scar adherent     Localized edema     Decreased  strength of right hand      Physician: Radha Craig PA-C    Visit Date: 8/24/2022    Physician Orders: Eval & Treat  Medical Diagnosis from Referral: Post-operative state [Z98.890]; Dupuytren's contracture of right hand [M72.0] ring finger ray   Surgical Procedure and Date: Procedure(s) (LRB): Partial palmar FASCIECTOMY (Right),  6/9/2022   Evaluation Date: 06/28/22  Authorization Period Expiration: 12/31/2022   Plan of Care Expiration: 08/23/22 to 10/04/22   Progress Note Due: 09/24/22     Visit # / Visits authorized: 8/ 20  FOTO: 1/3  Initial=49% / Goal=27%    Precautions: Standard  Functional Level Prior to Evaluation:  Independent in ADL/IADL's    SUBJECTIVE     Update: Patient reports good compliance with HEP, including scar management and orthotics program. She reports she is able to engage in meal prep and outdoor activities (pressure washing the driveway)  Pain:  Functional Pain Scale Rating 0-10: Current 0/10, worst 0/10, best 0/10    Location: right RF and palm of hand     OBJECTIVE     Update:   Observation/Appearance:  Skin remains dry in certain areas of the surgical incision, but skin is intact.  Trace to no edema is noted in the right hand/fingers.   Scar tissue is moderate to minimally dense along the length of the surgical incision.  No significant adhesions noted in right hand.       Edema. Measured in centimeters.    6/28/2022 6/28/2022 08/24/22     Left Right Right   MCPs 20.3 20.4 20.3 (-.1)            Ring:          P1            5.9 7.3 6.5 (-.8)    PIP            6.2 7.0 6.3 (-.7)      Hand ROM. Measured in degrees.    6/28/2022 08/24/22     Right Right          Ring:   MP -11/76 -12/96               PIP -25/72 -10/91              DIP         (hook fist) 0/40 0/52  (Composite)              BRITO 152 217 (+65)          Fisting:      Composite  Unable touch DPC with RF  NT   Hook to DPC -3.0 cm  NT   Flat to DPC  -2.8 cm  NT       Sensation  Patient denies numbness & tingling at this time. Light touch, temperature, sharp and dull are grossly intact in right hand wrist/hand        Strength (Dyanmometer) and Pinch Strength (Pinch Gauge)  Strength (in pounds):   08/24/22 08/24/22    Left Right    II 70, 65, 64 60, 65, 67   Lateral 13 12   Tripod 15 12   Tip 11 10       ASSESSMENT     Update: Patient has made good progress towards all goals set at initial evaluation for decreased pain, edema and scar tissue, with increased ROM, strength and coordination of R hand/ring fingers.  Deficits remain in R hand active range of motion and strength which affect patient's functioning in ADl/IADL's.  Patient also has unresolved scar tissue that would benefit from OT intervention to minimize it's effects.  Recommend to continue OT Plan of Care for at least 4-6 more visits to address remaining deficits.      Previous Short Term Goals Status:   5/5 STG's met;   New Short Term Goals Status:   Progress to unmet LTG's  Long Term Goal Status: continue per initial plan of care.  Reasons for Recertification of Therapy:   Updated Plan of Care needed for continuation of OT services    GOALS  Short Term Goals: (in 4 weeks)  1) Patient will be independent in HEP -Met 7/5/2022  2) Decrease pain in right hand to no more than 2-3/10 at worst in ADL/IADL's -Met (08/24/22)  3) Increase TAMS in right RF by 5-10 degrees for improved functioning in ADL/IADL's -Met (08/24/22)  4) Decrease edema in right hand/RF by .3 cm for improved functioning in ADL/IADL's -Met (08/24/22)  5) Assess  strength for increased functional use in ADL/IADL's -Met (08/24/22)      Long Term Goals: (in 8 weeks)  1) Decrease pain in right hand to no more than  1-2/10 at worst in ADL/IADL's-Met (08/24/22)  2) Increase TAMS in right hand by 15-20 degrees for increased functioning in ADL/IADL's-Met (08/24/22)  3) Increase  strength in right hand by 10% of original measures for improved functioning in ADL/IADL's-Progressing  4) Decrease edema in right hand by to trace or none-Met (08/24/22)   4) Increased functioning in ADL/IADL's, as evidenced by a FOTO impairment rating of no more than 30%-Progressing     PLAN     Updated Certification Period: 08/23/22 to 10/04/22     Recommended Treatment Plan: 1-2 times per week for 6 weeks:  Fluidotherapy, Manual Therapy, Moist Heat/ Ice, Neuromuscular Re-ed, Orthotic Management and Training, Paraffin, Patient Education, Self Care, Therapeutic Activities, Therapeutic Exercise and Ultrasound     Other Recommendations: progress strengthening program, including HEP, as tolerated.    Richard Piña, OT    I CERTIFY THE NEED FOR THESE SERVICES FURNISHED UNDER THIS PLAN OF TREATMENT AND WHILE UNDER MY CARE  Physician's comments:      Physician's Signature: ___________________________________________________

## 2022-08-24 NOTE — PROGRESS NOTES
[facility] OUTPATIENT THERAPY AND WELLNESS  Occupational Therapy Treatment Note    Date: 8/24/2022  Name: Linette LIU [facility] Number: [id number]    Therapy Diagnosis:   Encounter Diagnoses   Name Primary?    Pain Yes    Decreased range of motion     Scar adherent     Localized edema     Decreased  strength of right hand      Physician: Radha Craig PA-C    Physician Orders: Evaluate and treat  status post right partial palmar fasciectomy for Dupuytren's contracture release  eval and treat, modalities as needed  Medical Diagnosis: Post-operative state [Z98.890]; Dupuytren's contracture of right hand [M72.0] ring finger ray   Surgical Procedure and Date: Procedure(s) (LRB): Partial palmar FASCIECTOMY (Right),  6/9/2022   Evaluation Date: 6/28/2022   Insurance Authorization Period Expiration: 12/31/2022   Initial Plan of Care Certification Period: 6/28/2022 - 8/23/2022    Progress Note Due: 9/23/2022   Date of Return to MD: none noted in medical chart      Visit # / Visits authorized: 8 / 20 (including initial evaluation)   FOTO: 1/3   Initial=49% / Goal=27%      Precautions:  Standard    Time In: 12:45 pm  Time Out: 2:00 pm   Total Billable Time: 75 minutes (1US, 2MT, 1TE, 1Ortho)       SUBJECTIVE     Pt reports:  She reports that she has no significant hand pain, but stiffness and tightness of the finger persists.       She was compliant with home exercise program given last session. She has been doing light scar massage.  She reports doing HEP exercises, but not as consistently as she would like due to busier than usual home life lately.   Response to previous treatment: none noted          Functional change: none reported     Pain: 0/10 pre-treat; 0/10 post-treat   Location: right hand       Patient is 10 weeks 8/17/2022.    OBJECTIVE     Objective Measures updated today, 08/24/22, see Updated Plan of Care for details.    Treatment     Linette received the treatments listed below:         Direct  contact modalities after being cleared for contraindications:   Ultrasound - 3 MHz, 1.0-1.2 W/cm2, continuous 10 min to right RF/right palmar area, to decrease pain, edema, and to increase circulation and tissue extensibility    Manual therapy techniques: Extensive Soft tissue Mobilization and retrograde massage and scar massage were applied to the: right hand for 25 minutes, including:  -use of manual techniques, IASTM tool to decrease tissue adhesions, pain, edema and increase circulation and tissue extensibility        Therapeutic exercises to develop strength, endurance, ROM and edema reduction for 25 minutes, including:    Objective measures taken today, see Plan of Care Update for details    Finger lifts isolated and composite     10 reps    Intrinsic stretch 5 x 10 sec hold Finger extension with rb 15 reps Hand Master 2/10 reps, yellow band   PHG  2/10 reps, setting 29#       Theraputty: yellow/red mix    -grasping  5 reps, 5 sec hold     -log rolls w/ tripod (LF/RF) 5 reps    -pancake and bloom  3 trials         Isospheres  3 min          Linette participated in Orthotic fitting and training for 15 minutes including the followin2022  Custom volar hand based resting pan orthosis with RF/SF in extension was modified today to increase MCP & PIP extension for ring finger. Good fit and positioning was achieved upon completion of fabrication.        Patient Education and Home Exercises      Education provided:   - issued printed HEP exercises/stretches 2022 and 2022  - purpose of orthosis, proper donning/doffing, wear/care schedule and precautions to monitor 2022   - edema control techniques  - scar massage techniques   - review HEP exercises   - Progress towards goals     Written Home Exercises Provided: Patient instructed to cont prior HEP.  Exercises were reviewed and Linette was able to demonstrate them prior to the end of the session.  Linette demonstrated good  understanding of the HEP provided.  See EMR under Patient Instructions for exercises provided during therapy sessions.       Assessment     See Updated Plan of Care for details on progress and status of goals          Pt's spiritual, cultural and educational needs considered and pt agreeable to plan of care and goals.    Anticipated barriers to occupational therapy: therapy attendance, compliance with HEP      PLAN     See Updated Plan of Care for details and recommendations for treatment plan.    Richard Piña, OT

## 2022-08-29 ENCOUNTER — CLINICAL SUPPORT (OUTPATIENT)
Dept: REHABILITATION | Facility: HOSPITAL | Age: 57
End: 2022-08-29
Payer: OTHER GOVERNMENT

## 2022-08-29 DIAGNOSIS — R29.898 DECREASED GRIP STRENGTH OF RIGHT HAND: ICD-10-CM

## 2022-08-29 DIAGNOSIS — M25.60 DECREASED RANGE OF MOTION: ICD-10-CM

## 2022-08-29 DIAGNOSIS — L90.5 SCAR ADHERENT: ICD-10-CM

## 2022-08-29 DIAGNOSIS — R60.9 EDEMA, UNSPECIFIED TYPE: ICD-10-CM

## 2022-08-29 DIAGNOSIS — R52 PAIN: Primary | ICD-10-CM

## 2022-08-29 PROCEDURE — 97035 APP MDLTY 1+ULTRASOUND EA 15: CPT | Mod: PN

## 2022-08-29 PROCEDURE — 97140 MANUAL THERAPY 1/> REGIONS: CPT | Mod: PN

## 2022-08-29 PROCEDURE — 97110 THERAPEUTIC EXERCISES: CPT | Mod: PN

## 2022-08-29 NOTE — PROGRESS NOTES
[facility]  Occupational Therapy Treatment Note    Date: 8/29/2022  Name: Linette LIU [facility]    Clinic Number: [id number]    Therapy Diagnosis:   Encounter Diagnoses   Name Primary?    Pain Yes    Decreased range of motion     Scar adherent     Edema, unspecified type     Decreased  strength of right hand        Physician: Radha Craig PA-C    Physician Orders: Evaluate and treat  status post right partial palmar fasciectomy for Dupuytren's contracture release  eval and treat, modalities as needed  Medical Diagnosis: Post-operative state [Z98.890]; Dupuytren's contracture of right hand [M72.0] ring finger ray   Surgical Procedure and Date: Procedure(s) (LRB): Partial palmar FASCIECTOMY (Right),  6/9/2022   Evaluation Date: 6/28/2022   Insurance Authorization Period Expiration: 12/31/2022   Initial Plan of Care Certification Period: 6/28/2022 - 8/23/2022,  Updated 08/23/22 to 10/04/22  Progress Note Due: 9/23/2022   Date of Return to MD: none noted in medical chart      Visit # / Visits authorized: 9 / 20 (including initial evaluation)   FOTO: 1/3   Initial=49% / Goal=27%      Precautions:  Standard    Time In: 8:30 pm  Time Out: 9:25 pm   Total Billable Time: 55 minutes (1US, 2MT, 1TE)       SUBJECTIVE     Pt reports:  She reports that she has no significant hand pain with daily use.  She reports comfort and improved fit with wear of modified hand based orthotic.       She was compliant with home exercise program given last session. She has been doing light scar massage and HEP exercises at least once per day.   Response to previous treatment: none noted          Functional change: none reported     Pain: 0/10 pre-treat; 0/10 post-treat   Location: right hand       Patient is 11 weeks plus 5 days post surgical 8/29/2022.      OBJECTIVE     Objective Measures updated at progress report unless specified.     Treatment     Linette received the treatments listed below:       Direct  contact modalities after being cleared for contraindications:   Ultrasound - 3 MHz, 1.0-1.2 W/cm2, continuous 10 min to right RF/right palmar area, to decrease pain, edema, and to increase circulation and tissue extensibility    Manual therapy techniques: Extensive Soft tissue Mobilization and retrograde massage and scar massage were applied to the: right hand for 23 minutes, including:  -use of manual techniques, IASTM tool to decrease tissue adhesions, pain, edema and increase circulation and tissue extensibility          Therapeutic exercises to develop strength, endurance, ROM and edema reduction for 22 minutes, including:    Finger lifts isolated and composite     10 reps    Intrinsic stretch  5 x 10 sec hold    Hand Master 2/15 reps, black band    PHG  2/15 reps, setting 29#        Theraputty: yellow/red mix    -grasping  5 reps, 5 sec hold     -log rolls w/ tripod (LF/RF) 5 reps    -pancake and bloom  3 trials        Isospheres  3 min          Linette participated in Orthotic fitting and training for 0 minutes including the following:   Custom volar hand based resting pan orthosis with RF/SF in extension was modified today to increase MCP & PIP extension for ring finger. Good fit and positioning was achieved upon completion of fabrication.        Patient Education and Home Exercises      Education provided:   - issued printed HEP exercises/stretches 7/19/2022 and 8/17/2022  - purpose of orthosis, proper donning/doffing, wear/care schedule and precautions to monitor 7/6/2022   - edema control techniques  - scar massage techniques   - review HEP exercises   - Progress towards goals     Written Home Exercises Provided: Patient instructed to cont prior HEP.  Exercises were reviewed and Linette was able to demonstrate them prior to the end of the session.  Linette demonstrated good  understanding of the HEP provided. See EMR under Patient Instructions for exercises provided during therapy sessions.       Assessment   Linette  Jane Galo is a 56 y.o. female referred to outpatient occupational therapy and presents with a medical diagnosis of Z98.890 (ICD-10-CM) - Post-operative state .  Patient would benefit from occupational therapy for the following deficits: Decreased ROM, Decreased  strength, Decreased muscle strength, Decreased functional hand use, Increased pain, Edema, Joint Stiffness, Scar Adhesions and Diminished/Impaired Coordination.  Patient has unresolved scar tissue, with moderate to minimal density, that would benefit from OT intervention to minimize it's effects on available AROM.  She is progressing well with strengthening exercises without significant difficulty.   Recommend to continue OT Plan of Care for at least 4-6 more visits to address remaining deficits.     Pt's spiritual, cultural and educational needs considered and pt agreeable to plan of care and goals.    Anticipated barriers to occupational therapy: therapy attendance, compliance with HEP     GOALS  Short Term Goals: (in 4 weeks)  1) Patient will be independent in HEP -Met 7/5/2022  2) Decrease pain in right hand to no more than 2-3/10 at worst in ADL/IADL's -Met (08/24/22)  3) Increase TAMS in right RF by 5-10 degrees for improved functioning in ADL/IADL's -Met (08/24/22)  4) Decrease edema in right hand/RF by .3 cm for improved functioning in ADL/IADL's -Met (08/24/22)  5) Assess  strength for increased functional use in ADL/IADL's -Met (08/24/22)      Long Term Goals: (in 8 weeks)  1) Decrease pain in right hand to no more than 1-2/10 at worst in ADL/IADL's-Met (08/24/22)  2) Increase TAMS in right hand by 15-20 degrees for increased functioning in ADL/IADL's-Met (08/24/22)  3) Increase  strength in right hand by 10% of original measures for improved functioning in ADL/IADL's-Progressing  4) Decrease edema in right hand by to trace or none-Met (08/24/22)   4) Increased functioning in ADL/IADL's, as evidenced by a FOTO impairment rating of  no more than 30%-Progressing   PLAN     Updated Certification Period: 08/23/22 to 10/04/22      Recommended Treatment Plan: 1-2 times per week for 6 weeks:  Fluidotherapy, Manual Therapy, Moist Heat/ Ice, Neuromuscular Re-ed, Orthotic Management and Training, Paraffin, Patient Education, Self Care, Therapeutic Activities, Therapeutic Exercise and Ultrasound      Other Recommendations: progress strengthening program, including HEP, as tolerated      Hermes Miramontes, OT

## 2022-08-31 ENCOUNTER — CLINICAL SUPPORT (OUTPATIENT)
Dept: REHABILITATION | Facility: HOSPITAL | Age: 57
End: 2022-08-31
Payer: OTHER GOVERNMENT

## 2022-08-31 DIAGNOSIS — R52 PAIN: Primary | ICD-10-CM

## 2022-08-31 DIAGNOSIS — R60.9 EDEMA, UNSPECIFIED TYPE: ICD-10-CM

## 2022-08-31 DIAGNOSIS — L90.5 SCAR ADHERENT: ICD-10-CM

## 2022-08-31 DIAGNOSIS — R29.898 DECREASED GRIP STRENGTH OF RIGHT HAND: ICD-10-CM

## 2022-08-31 DIAGNOSIS — M25.60 DECREASED RANGE OF MOTION: ICD-10-CM

## 2022-08-31 PROCEDURE — 97035 APP MDLTY 1+ULTRASOUND EA 15: CPT | Mod: PN

## 2022-08-31 PROCEDURE — 97140 MANUAL THERAPY 1/> REGIONS: CPT | Mod: PN

## 2022-08-31 PROCEDURE — 97110 THERAPEUTIC EXERCISES: CPT | Mod: PN

## 2022-08-31 NOTE — PROGRESS NOTES
"  [facility]ERAP[facility] AND WELL[facility]  Occupational Therapy Treatment Note    Date: 8/31/2022  Name: Linette LIU [facility]    Clinic Number: [id number]    Therapy Diagnosis:   Encounter Diagnoses   Name Primary?    Pain Yes    Decreased range of motion     Scar adherent     Edema, unspecified type     Decreased  strength of right hand        Physician: Radha Craig PA-C    Physician Orders: Evaluate and treat  status post right partial palmar fasciectomy for Dupuytren's contracture release  eval and treat, modalities as needed  Medical Diagnosis: Post-operative state [Z98.890]; Dupuytren's contracture of right hand [M72.0] ring finger ray   Surgical Procedure and Date: Procedure(s) (LRB): Partial palmar FASCIECTOMY (Right),  6/9/2022   Evaluation Date: 6/28/2022   Insurance Authorization Period Expiration: 12/31/2022   Initial Plan of Care Certification Period: 6/28/2022 - 8/23/2022,  Updated 08/23/22 to 10/04/22  Progress Note Due: 9/23/2022   Date of Return to MD: none noted in medical chart      Visit # / Visits authorized: 10 / 20 (including initial evaluation)   FOTO: 1/3   Initial=49% / Goal=27%      Precautions:  Standard    Time In: 8:30 am  Time Out: 9:25 am   Total Billable Time: 55 minutes (1US, 1MT, 2TE)       SUBJECTIVE     Pt reports:  She reports that she has no significant hand pain with daily use.  She reports intermittent "burning" sensation over volar P1.        She was compliant with home exercise program given last session. She has been doing light scar massage and HEP exercises at least once per day.   Response to previous treatment: none noted          Functional change: none reported     Pain: 0/10 pre-treat; 0/10 post-treat   Location: right hand       Patient is 11 weeks plus 7 days post surgical 8/31/2022.      OBJECTIVE     Objective Measures updated at progress report unless specified.     Treatment     Linette received the treatments listed below:     Paraffin w/ MHP to right hand " for 8 min, pre-tx to decrease pain & increase tissue extensibility    Direct contact modalities after being cleared for contraindications:   Ultrasound - 3 MHz, 1.0 W/cm2, continuous 10 min to right RF/right palmar area, to decrease pain, edema, and to increase circulation and tissue extensibility    Manual therapy techniques: Soft tissue Mobilization and retrograde massage and scar massage were applied to the: right hand for 14 minutes, including:  -use of manual techniques, IASTM tool to decrease tissue adhesions, pain, edema and increase circulation and tissue extensibility          Therapeutic exercises to develop strength, endurance, ROM and edema reduction for 23 minutes, including:    MCP hyperextension stretch  5 reps X 10 sec hold    Joint blocking RF flexion MCP/PIP/DIP 10 reps    Joint blocking end range RF extension PIP/DIP 10 reps    Finger lifts isolated and composite     10 reps    Intrinsic stretch  5 x 10 sec hold    Hand Master 2/15 reps, black band    PHG  2/15 reps, setting 29#    Digiflex- red neutral, supination, pronation  15 reps each    Theraputty: yellow/red mix    -grasping  5 reps, 5 sec hold     -log rolls w/ tripod (LF/RF) 5 reps    -pancake and bloom  3 trials        Isospheres  3 min          Linette participated in Orthotic fitting and training for 0 minutes including the following:   Custom volar hand based resting pan orthosis with RF/SF in extension was modified today to increase MCP & PIP extension for ring finger. Good fit and positioning was achieved upon completion of fabrication.        Patient Education and Home Exercises      Education provided:   - issued printed HEP exercises/stretches 7/19/2022 and 8/17/2022  - purpose of orthosis, proper donning/doffing, wear/care schedule and precautions to monitor 7/6/2022   - edema control techniques  - scar massage techniques   - review HEP exercises   - Progress towards goals     Written Home Exercises Provided: Patient instructed to  cont prior HEP.  Exercises were reviewed and Linette was able to demonstrate them prior to the end of the session.  Linette demonstrated good  understanding of the HEP provided. See EMR under Patient Instructions for exercises provided during therapy sessions.       Assessment   Linette Galo is a 56 y.o. female referred to outpatient occupational therapy and presents with a medical diagnosis of Z98.890 (ICD-10-CM) - Post-operative state .  Patient would benefit from occupational therapy for the following deficits: Decreased ROM, Decreased  strength, Decreased muscle strength, Decreased functional hand use, Increased pain, Edema, Joint Stiffness, Scar Adhesions and Diminished/Impaired Coordination.  Scar tissue with minimal to moderate thickness, but mobility increasing.  Patient's ability to make composite fist touching DPC following soft tissue intervention, but limitations in flexion remain due to scar tissue.  She is progressing well with strengthening exercises without significant difficulty.  Recommend to continue OT Plan of Care for at least 4-6 more visits to address remaining deficits.      Pt's spiritual, cultural and educational needs considered and pt agreeable to plan of care and goals.    Anticipated barriers to occupational therapy: therapy attendance, compliance with HEP     GOALS  Short Term Goals: (in 4 weeks)  1) Patient will be independent in HEP -Met 7/5/2022  2) Decrease pain in right hand to no more than 2-3/10 at worst in ADL/IADL's -Met (08/24/22)  3) Increase TAMS in right RF by 5-10 degrees for improved functioning in ADL/IADL's -Met (08/24/22)  4) Decrease edema in right hand/RF by .3 cm for improved functioning in ADL/IADL's -Met (08/24/22)  5) Assess  strength for increased functional use in ADL/IADL's -Met (08/24/22)      Long Term Goals: (in 8 weeks)  1) Decrease pain in right hand to no more than 1-2/10 at worst in ADL/IADL's-Met (08/24/22)  2) Increase TAMS in right hand by  15-20 degrees for increased functioning in ADL/IADL's-Met (08/24/22)  3) Increase  strength in right hand by 10% of original measures for improved functioning in ADL/IADL's-Progressing  4) Decrease edema in right hand by to trace or none-Met (08/24/22)   4) Increased functioning in ADL/IADL's, as evidenced by a FOTO impairment rating of no more than 30%-Progressing   PLAN     Updated Certification Period: 08/23/22 to 10/04/22      Recommended Treatment Plan: 1-2 times per week for 6 weeks:  Fluidotherapy, Manual Therapy, Moist Heat/ Ice, Neuromuscular Re-ed, Orthotic Management and Training, Paraffin, Patient Education, Self Care, Therapeutic Activities, Therapeutic Exercise and Ultrasound      Other Recommendations: progress strengthening program, including HEP, as tolerated      Hermes Miramontes, OT

## 2022-09-07 NOTE — PROGRESS NOTES
"  [facility]  Occupational Therapy Treatment Note    Date: 9/8/2022  Name: Linette LIU [facility]    Clinic Number: [id number]    Therapy Diagnosis:   Encounter Diagnoses   Name Primary?    Pain Yes    Decreased range of motion     Scar adherent     Edema, unspecified type     Decreased  strength of right hand          Physician: Radha Craig PA-C    Physician Orders: Evaluate and treat  status post right partial palmar fasciectomy for Dupuytren's contracture release  eval and treat, modalities as needed  Medical Diagnosis: Post-operative state [Z98.890]; Dupuytren's contracture of right hand [M72.0] ring finger ray   Surgical Procedure and Date: Procedure(s) (LRB): Partial palmar FASCIECTOMY (Right),  6/9/2022   Evaluation Date: 6/28/2022   Insurance Authorization Period Expiration: 12/31/2022   Initial Plan of Care Certification Period: 6/28/2022 - 8/23/2022,  Updated 08/23/22 to 10/04/22  Progress Note Due: 9/23/2022   Date of Return to MD: none noted in medical chart      Visit # / Visits authorized: 11 / 20 (including initial evaluation)   FOTO: 1/3   Initial=49% / Goal=27%      Precautions:  Standard    Time In: 11:00 am  Time Out: 11:45 am   Total Billable Time: 45 minutes (1P, 2MT, 1TE)       SUBJECTIVE     Pt reports:  She continues to report intermittent "burning" sensation over volar P1.  She reports this pain woke her early morning, and she removed her splint and moved hand to relieve discomfort.      She was compliant with home exercise program given last session. She has been doing light scar massage and HEP exercises at least once per day.   Response to previous treatment: none noted          Functional change: none reported     Pain: 0/10 pre-treat; 0/10 post-treat   Location: right hand       Patient is 12 weeks post-surgical 9/8/2022.      OBJECTIVE     Objective Measures updated at progress report unless specified.     Treatment     Linette received the treatments listed " below:     Paraffin w/ MHP to right hand for 10 min, pre-tx to decrease pain & increase tissue extensibility    Direct contact modalities after being cleared for contraindications:     Manual therapy techniques: Extensive Soft tissue Mobilization and retrograde massage and scar massage were applied to the: right hand for 23 minutes, including:  -use of manual techniques, IASTM tool to decrease tissue adhesions, pain, edema and increase circulation and tissue extensibility          Therapeutic exercises to develop strength, endurance, ROM and edema reduction for 12 minutes, including:    MCP hyperextension stretch  5 reps X 10 sec hold    Joint blocking RF flexion MCP/PIP/DIP 10 reps    Joint blocking end range RF extension PIP/DIP 10 reps    Hand Master 2/15 reps, black band    PHG  2/15 reps, setting 38#    Digiflex, isolated/composite  10 reps each       Isospheres  3 min          Linette participated in Orthotic fitting and training for 0 minutes including the following:   Custom volar hand based resting pan orthosis with RF/SF in extension was modified today to increase MCP & PIP extension for ring finger. Good fit and positioning was achieved upon completion of fabrication.        Patient Education and Home Exercises      Education provided:   - issued printed HEP exercises/stretches 7/19/2022 and 8/17/2022  - purpose of orthosis, proper donning/doffing, wear/care schedule and precautions to monitor 7/6/2022   - edema control techniques  - scar massage techniques   - review HEP exercises   - Progress towards goals     Written Home Exercises Provided: Patient instructed to cont prior HEP.  Exercises were reviewed and Linette was able to demonstrate them prior to the end of the session.  Linette demonstrated good  understanding of the HEP provided. See EMR under Patient Instructions for exercises provided during therapy sessions.       Assessment   Linette Galo is a 56 y.o. female referred to outpatient  occupational therapy and presents with a medical diagnosis of Z98.890 (ICD-10-CM) - Post-operative state.  Scar tissue continues to improve with less density and more mobility, but continues to limit full finger extension at PIP and MCP due to thickness.  Extensor lag at PIP/MCP is minimal and patient has no issues with functional use of hand.  She is progressing well with strengthening exercises without significant difficulty.  Patient would benefit from occupational therapy for the following deficits: Decreased ROM, Decreased  strength, Decreased muscle strength, Decreased functional hand use, Increased pain, Edema, Joint Stiffness, Scar Adhesions and Diminished/Impaired Coordination.      Recommend to continue OT Plan of Care for at least 2-4 more visits to address remaining deficits.      Pt's spiritual, cultural and educational needs considered and pt agreeable to plan of care and goals.    Anticipated barriers to occupational therapy: therapy attendance, compliance with HEP     GOALS  Short Term Goals: (in 4 weeks)  1) Patient will be independent in HEP -Met 7/5/2022  2) Decrease pain in right hand to no more than 2-3/10 at worst in ADL/IADL's -Met (08/24/22)  3) Increase TAMS in right RF by 5-10 degrees for improved functioning in ADL/IADL's -Met (08/24/22)  4) Decrease edema in right hand/RF by .3 cm for improved functioning in ADL/IADL's -Met (08/24/22)  5) Assess  strength for increased functional use in ADL/IADL's -Met (08/24/22)      Long Term Goals: (in 8 weeks)  1) Decrease pain in right hand to no more than 1-2/10 at worst in ADL/IADL's-Met (08/24/22)  2) Increase TAMS in right hand by 15-20 degrees for increased functioning in ADL/IADL's-Met (08/24/22)  3) Increase  strength in right hand by 10% of original measures for improved functioning in ADL/IADL's-Progressing  4) Decrease edema in right hand by to trace or none-Met (08/24/22)   4) Increased functioning in ADL/IADL's, as evidenced  by a FOTO impairment rating of no more than 30%-Progressing   PLAN     Updated Certification Period: 08/23/22 to 10/04/22      Recommended Treatment Plan: 1-2 times per week for 6 weeks:  Fluidotherapy, Manual Therapy, Moist Heat/ Ice, Neuromuscular Re-ed, Orthotic Management and Training, Paraffin, Patient Education, Self Care, Therapeutic Activities, Therapeutic Exercise and Ultrasound      Other Recommendations: progress strengthening program, including HEP, as tolerated      Hermes Miramontes, OT

## 2022-09-08 ENCOUNTER — CLINICAL SUPPORT (OUTPATIENT)
Dept: REHABILITATION | Facility: HOSPITAL | Age: 57
End: 2022-09-08
Payer: OTHER GOVERNMENT

## 2022-09-08 DIAGNOSIS — R29.898 DECREASED GRIP STRENGTH OF RIGHT HAND: ICD-10-CM

## 2022-09-08 DIAGNOSIS — M25.60 DECREASED RANGE OF MOTION: ICD-10-CM

## 2022-09-08 DIAGNOSIS — R52 PAIN: Primary | ICD-10-CM

## 2022-09-08 DIAGNOSIS — R60.9 EDEMA, UNSPECIFIED TYPE: ICD-10-CM

## 2022-09-08 DIAGNOSIS — L90.5 SCAR ADHERENT: ICD-10-CM

## 2022-09-08 PROCEDURE — 97140 MANUAL THERAPY 1/> REGIONS: CPT | Mod: PN

## 2022-09-08 PROCEDURE — 97018 PARAFFIN BATH THERAPY: CPT | Mod: PN,59

## 2022-09-08 PROCEDURE — 97110 THERAPEUTIC EXERCISES: CPT | Mod: PN

## 2022-09-09 NOTE — PROGRESS NOTES
[facility]  Physical Therapy Initial Evaluation    Date: 9/12/2022   Name: Linette LIU [facility]  Clinic Number: [id number]    Therapy Diagnosis:   Encounter Diagnoses   Name Primary?    Subacromial bursitis of left shoulder joint     Muscle hypertonicity     Acute pain of left shoulder     Weakness of left upper extremity      Physician: Ira Rhodes PA-C    Physician Orders: PT Eval and Treat   Medical Diagnosis from Referral: M75.52 (ICD-10-CM) - Subacromial bursitis of left shoulder joint   Evaluation Date: 9/12/2022  Authorization Period Expiration: 12/31/2022  Plan of Care Expiration: 12/12/2022  Visit # / Visits authorized: 1/ 1    PN DUE: 10/12/2022    Time In: 9:05 AM  Time Out: 9:45 AM  Total Appointment Time (timed & untimed codes): 40 minutes    Precautions: Standard    Subjective   Date of onset: Pt reports that she has been using her left arm more since her right hand surgery on 6/9/2022. She reports that she was moving a piece of furniture when she felt a 'pop' in her left shoulder and pain. She could not lift her arm for about a week after the incident and stated that her arm felt heavy and tired. She reports that she was able to hold her coffee cup with no problems but it would feel as if she was not holding her coffee cup even though she was. She states that she had a steroid shot in her shoulder that helped to decreased her pain in her posterior shoulder, however is still having lots of pain in her anterior shoulder.     History of current condition - Linette reports: that she has lots of difficulty with yard work (mowing lawn and weed eating), house chores, styling her hair, picking up her grandchildren, and taking care of her son who has special needs. She reports that the pain does not keep her awake at night, but if she sleeps on her left side all night she wakes up with increased left shoulder pain.      Medical History:   Past Medical History:   Diagnosis Date     "ADD (attention deficit disorder)     Anemia     Anxiety     Followed by Psychology    Arthritis     Depression     bipolar, anxiety, ADD    Family history of colon cancer 1/22/2019    Her grandmother had colon cancer.    General anesthetics causing adverse effect in therapeutic use     Patient reports h/o "combativeness", after Gastric and Hysterectomy.     Positive ARIK (antinuclear antibody)     Dr. Ross//following       Surgical History:   Linette Galo  has a past surgical history that includes Gastric bypass (2008); Carpal tunnel release; Tonsillectomy; Colonoscopy (N/A, 1/22/2019); Hysterectomy (2011); Trigger finger release (Right, 2/7/2022); Interposition arthroplasty of carpometacarpal joints (Right, 2/7/2022); and Surgical removal of fascia (Right, 6/9/2022).    Medications:   Linette has a current medication list which includes the following prescription(s): bupropion, cholecalciferol (vitamin d3), cyanocobalamin (vitamin b-12), duloxetine, lamotrigine, multivitamin, and vyvanse, and the following Facility-Administered Medications: chlorhexidine and lactated ringers.    Allergies:   Review of patient's allergies indicates:  No Known Allergies     Imaging: X-Ray Shoulder 2 or More Views Left(7/21/2022): No acute findings     Prior Therapy: yes  Social History:  lives with their family  Occupation: not currently working   Prior Level of Function: Independent with functional mobility, house chores, work duties, recreational activities, and ADLs  Current Level of Function: Modified Independent with functional mobility, house chores, work duties, recreational activities, and ADLs    Pain:  Current 3/10, worst 10/10, best 3/10   Location: left shoulder    Description: Aching, Burning, Throbbing, Sharp and Shooting when lifting things  Aggravating Factors: Lifting  Easing Factors: relaxation  - rub shoulder and work through the pain     Patients goals: are to decrease pain in left shoulder, be able to " perform yard work activities and house chores, take care of her son with no difficulty, be able to hold grandchildren, and style hair with no difficulty or pain.     Objective   Observation: pt pleasant and appropriate throughout evaluation; A&O x 3     Posture: slight rounded shoulders and forwards head that can be corrected with verbal cueing    Active Range of Motion: Pt exhibits full range of motion that is equal in bilateral upper extremities. She reports pain with flexion and abduction at approximately 160 degrees. She also reports pain with both internal and external rotation.      Upper Extremity Strength:    (L) UE (R) UE   Shoulder flexion: 5/5 4/5  Pain   Shoulder Abduction: 5/5 4/5  Slight pain   Shoulder ER 5/5   4+/5  Slight pain   Shoulder IR 5/5 4/5  Pain   Lower Trap 5/5 4/5  Slight pain   Middle Trap 5/5   4/5  Slight pain   Rhomboids 5/5 4/5  Slight pain     Special Tests:     Left Right   AC Joint Compression Test Negative Negative   Neer Test Positive Negative   Trinh florian Test Positive Negative   Waller Test Positive Negative   Crossover/Adduction Negative Negative   Bear Hug Positive Negative   Speed Test Positive Negative   Full Can positive Negative   Empty can  Negative Negative   Dynamic labral Shear Test  Negative Negative   Biceps Load II Test Negative Negative   Belly Press Test Positive Negative   Apprehension (ant/post/inf) Negative Negative       Joint Mobility: Firm end feel with passive range of motion. Pain denoted at end of range of motion in flexion and abduction      Palpation: Pt reports slight tenderness to left upper trap and levator scap. No tenderness reported along joint line, anterior/posterior shoulder, or to biceps tendon.     Sensation: Pt denies any numbness or tingling in bilateral upper extremities, Pt light touch is intact in bilateral upper extremities     Scapular Control/Dyskinesis:    Normal / Subtle / Obvious  Comments    Left  Normal Painful arch     Right  Normal N/A     Function: FOTO      TREATMENT   Treatment Time In: 9:25 AM  Treatment Time Out: 9:45 AM  Total Treatment time (time-based codes) separate from Evaluation: 20 minutes    Linette received therapeutic exercises to develop strength, endurance, ROM, flexibility, posture, and core stabilization for 15 minutes including:  Upper trap stretch   Levator scap stretch   Posterior shoulder roll  Scapular retraction  Shoulder isometrics    Linette received the following manual therapy techniques: Joint mobilizations, Manual traction, Myofacial release, Manual Lymphatic Drainage, Soft tissue Mobilization, and Friction Massage were applied to the: left shoulder for 5 minutes, including:  Shoulder Distraction   Posterior and inferior joint mobs    Home Exercises and Patient Education Provided    Education provided:   - - PT POC  - HEP  - PT prognosis and diagnosis  - all questions and concerns answered     Written Home Exercises Provided: yes.  Exercises were reviewed and Linette was able to demonstrate them prior to the end of the session.  Linette demonstrated good  understanding of the education provided.     See EMR under Patient Instructions for exercises provided  9/12/2022 .    Assessment   Linette is a 56 y.o. female referred to outpatient Physical Therapy with a medical diagnosis of M75.52 (ICD-10-CM) - Subacromial bursitis of left shoulder joint .      The patient presents with impairments which include decreased strength, postural abnormalities, and decreased overall function.  These impairments are limiting patient's ability to perform yard work (mowing lawn and weed eating), house chores, styling her hair, picking up her grandchildren, and taking care of her son who has special need. Pt prognosis is Good due to personal factors and co-morbidities listed below. Pt will benefit from skilled outpatient Physical Therapy to address the deficits stated above and in the chart below, provide pt/family education, and to  maximize pt's level of independence.     Patient prognosis is Good.   Patientt will benefit from skilled outpatient Physical Therapy to address the deficits stated above and in the chart below, provide patient /family education, and to maximize patientt's level of independence.     Plan of care discussed with patient: Yes  Patient's spiritual, cultural and educational needs considered and patient is agreeable to the plan of care and goals as stated below:     Anticipated Barriers for therapy: pain    Medical Necessity is demonstrated by the following  History  Co-morbidities and personal factors that may impact the plan of care Co-morbidities:   See above     Personal Factors:   lifestyle     low   Examination  Body Structures and Functions, activity limitations and participation restrictions that may impact the plan of care Body Regions:   upper extremities    Body Systems:    strength  gross coordinated movement    Participation Restrictions:   Yard work; house work; taking care of children    Activity limitations:   Learning and applying knowledge  no deficits    General Tasks and Commands  no deficits    Communication  no deficits    Mobility  lifting and carrying objects    Self care  washing oneself (bathing, drying, washing hands)  looking after one's health    Domestic Life  shopping  cooking  doing house work (cleaning house, washing dishes, laundry)    Interactions/Relationships  no deficits    Life Areas  no deficits    Community and Social Life  no deficits         low   Clinical Presentation stable and uncomplicated low   Decision Making/ Complexity Score: low     Short Term Goals (5 Weeks):   1. Patient will demonstrate independence with HEP in order to progress toward functional independence  2. Pt will demonstrate improved pain by reports of less than or equal to 7/10 worst pain on the verbal rating scale in order to progress toward maximal functional ability and improve QOL.  3. Pt will be able to  correct slouched posture with minimal verbal cueing for improved static sitting posture and increased QOL.      Long Term Goals (10 Weeks):   1. Patient will demonstrate improved function as indicated by a functional limitation score of 31% on the FOTO.  2. Pt will demonstrate improved pain by reports of less than or equal to 5/10 worst pain on the verbal rating scale in order to progress toward maximal functional ability and improve QOL.  3. Pt will be able to correct slouched posture independently for improved static sitting posture and increased QOL.   4. Pt will move left shoulder through functional ROM in all planes without pain to improve independence in performing yard work, house chores, and ADLs  5. Pt will improve left shoulder strength to 5/5 for improved ability to perform house work, yard work, and hold grandchildren.     Plan   Plan of care Certification: 9/12/2022 to 12/12/2022.    Outpatient Physical Therapy 2 times weekly for 10 weeks to include the following interventions: Aquatic Therapy, Cervical/Lumbar Traction, Electrical Stimulation ;, Manual Therapy, Moist Heat/ Ice, Neuromuscular Re-ed, Patient Education, Self Care, Therapeutic Activities, Therapeutic Exercise, and Ultrasound. And any other therapies and modalities as clinically indicated and appropriate, including but not limited to FDN and telehealth. Pt may be seen by PTA as a part of pt's care team.     Lilia Nagy, PT, DPT  9/12/2022

## 2022-09-12 ENCOUNTER — CLINICAL SUPPORT (OUTPATIENT)
Dept: REHABILITATION | Facility: HOSPITAL | Age: 57
End: 2022-09-12
Payer: OTHER GOVERNMENT

## 2022-09-12 ENCOUNTER — TELEPHONE (OUTPATIENT)
Dept: ORTHOPEDICS | Facility: CLINIC | Age: 57
End: 2022-09-12
Payer: OTHER GOVERNMENT

## 2022-09-12 DIAGNOSIS — M62.89 MUSCLE HYPERTONICITY: ICD-10-CM

## 2022-09-12 DIAGNOSIS — R29.898 WEAKNESS OF LEFT UPPER EXTREMITY: ICD-10-CM

## 2022-09-12 DIAGNOSIS — R52 PAIN: Primary | ICD-10-CM

## 2022-09-12 DIAGNOSIS — R29.898 DECREASED GRIP STRENGTH OF RIGHT HAND: ICD-10-CM

## 2022-09-12 DIAGNOSIS — M25.60 DECREASED RANGE OF MOTION: ICD-10-CM

## 2022-09-12 DIAGNOSIS — M25.512 ACUTE PAIN OF LEFT SHOULDER: ICD-10-CM

## 2022-09-12 DIAGNOSIS — R60.9 EDEMA, UNSPECIFIED TYPE: ICD-10-CM

## 2022-09-12 DIAGNOSIS — L90.5 SCAR ADHERENT: ICD-10-CM

## 2022-09-12 DIAGNOSIS — M75.52 SUBACROMIAL BURSITIS OF LEFT SHOULDER JOINT: ICD-10-CM

## 2022-09-12 PROCEDURE — 97018 PARAFFIN BATH THERAPY: CPT | Mod: PN

## 2022-09-12 PROCEDURE — 97110 THERAPEUTIC EXERCISES: CPT | Mod: PN

## 2022-09-12 PROCEDURE — 97140 MANUAL THERAPY 1/> REGIONS: CPT | Mod: PN

## 2022-09-12 PROCEDURE — 97161 PT EVAL LOW COMPLEX 20 MIN: CPT | Mod: PN

## 2022-09-12 NOTE — PROGRESS NOTES
[facility]  Occupational Therapy Treatment Note    Date: 9/12/2022  Name: Linette LIU [facility]    Clinic Number: [id number]    Therapy Diagnosis:   Encounter Diagnoses   Name Primary?    Pain Yes    Decreased range of motion     Scar adherent     Edema, unspecified type     Decreased  strength of right hand            Physician: Radha Craig PA-C    Physician Orders: Evaluate and treat  status post right partial palmar fasciectomy for Dupuytren's contracture release  eval and treat, modalities as needed  Medical Diagnosis: Post-operative state [Z98.890]; Dupuytren's contracture of right hand [M72.0] ring finger ray   Surgical Procedure and Date: Procedure(s) (LRB): Partial palmar FASCIECTOMY (Right),  6/9/2022   Evaluation Date: 6/28/2022   Insurance Authorization Period Expiration: 12/31/2022   Initial Plan of Care Certification Period: 6/28/2022 - 8/23/2022,  Updated 08/23/22 to 10/04/22  Progress Note Due: 9/23/2022   Date of Return to MD: none noted in medical chart      Visit # / Visits authorized: 12 / 20 (including initial evaluation)   FOTO: 1/3   Initial=49% / Goal=27%      Precautions:  Standard    Time In: 8:30 am  Time Out: 9:00 am   Total Billable Time: 30 minutes (1P, 1MT)       SUBJECTIVE     Pt reports:  Has had no incidents of burning pain over P1 this past week.    She was compliant with home exercise program given last session. She has been doing light scar massage and HEP exercises at least once per day.   Response to previous treatment: none noted          Functional change: none reported     Pain: 0/10 pre-treat; 0/10 post-treat   Location: right hand       Patient is 12 weeks + 4 days post-surgical 9/12/2022.      OBJECTIVE     Objective Measures updated at progress report unless specified.     Treatment     Linette received the treatments listed below:     Paraffin w/ MHP to right hand for 10 min, pre-tx to decrease pain & increase tissue extensibility (Ring  finger wrapped into composite flexion with Coban prior to paraffin DIP)     Direct contact modalities after being cleared for contraindications:     Manual therapy techniques: Extensive Soft tissue Mobilization and retrograde massage and scar massage were applied to the: right hand for 15 minutes, including:  -use of manual techniques, IASTM tool to decrease tissue adhesions, pain, edema and increase circulation and tissue extensibility          Therapeutic exercises to develop strength, endurance, ROM and edema reduction for 5 minutes, including:  *Patient with abbreviated session today 9/12/2022.   MCP hyperextension stretch  5 reps X 10 sec hold    Joint blocking RF flexion MCP/PIP/DIP 10 reps    Joint blocking end range RF extension PIP/DIP 10 reps        Isospheres  3 min          Linette participated in Orthotic fitting and training for 0 minutes including the following:   Custom volar hand based resting pan orthosis with RF/SF in extension was modified today to increase MCP & PIP extension for ring finger. Good fit and positioning was achieved upon completion of fabrication.        Patient Education and Home Exercises      Education provided:   - issued printed HEP exercises/stretches 7/19/2022 and 8/17/2022  - purpose of orthosis, proper donning/doffing, wear/care schedule and precautions to monitor 7/6/2022   - edema control techniques  - scar massage techniques   - review HEP exercises   - Progress towards goals     Written Home Exercises Provided: Patient instructed to cont prior HEP.  Exercises were reviewed and Linette was able to demonstrate them prior to the end of the session.  Linette demonstrated good  understanding of the HEP provided. See EMR under Patient Instructions for exercises provided during therapy sessions.       Assessment   Linette Galo is a 56 y.o. female referred to outpatient occupational therapy and presents with a medical diagnosis of Z98.890 (ICD-10-CM) - Post-operative state.   Patient's session was abbreviated today to accommodate PT evaluation, so that did not have to return to clinic later today.  Patient was advised to continue with theraputty strengthening exercises later today if possible.  Scar tissue continues to improve with less density and more mobility, but continues to limit full finger extension at PIP and MCP due to thickness.  Extensor lag at PIP/MCP is minimal and patient has no issues with functional use of hand.  Patient would benefit from occupational therapy for the following deficits: Decreased ROM, Decreased  strength, Decreased muscle strength, Decreased functional hand use, Increased pain, Edema, Joint Stiffness, Scar Adhesions and Diminished/Impaired Coordination.      Recommend to continue OT Plan of Care for at least 2-3 more visits to address remaining deficits.      Pt's spiritual, cultural and educational needs considered and pt agreeable to plan of care and goals.    Anticipated barriers to occupational therapy: therapy attendance, compliance with HEP     GOALS  Short Term Goals: (in 4 weeks)  1) Patient will be independent in HEP -Met 7/5/2022  2) Decrease pain in right hand to no more than 2-3/10 at worst in ADL/IADL's -Met (08/24/22)  3) Increase TAMS in right RF by 5-10 degrees for improved functioning in ADL/IADL's -Met (08/24/22)  4) Decrease edema in right hand/RF by .3 cm for improved functioning in ADL/IADL's -Met (08/24/22)  5) Assess  strength for increased functional use in ADL/IADL's -Met (08/24/22)      Long Term Goals: (in 8 weeks)  1) Decrease pain in right hand to no more than 1-2/10 at worst in ADL/IADL's-Met (08/24/22)  2) Increase TAMS in right hand by 15-20 degrees for increased functioning in ADL/IADL's-Met (08/24/22)  3) Increase  strength in right hand by 10% of original measures for improved functioning in ADL/IADL's-Progressing  4) Decrease edema in right hand by to trace or none-Met (08/24/22)   4) Increased functioning  in ADL/IADL's, as evidenced by a FOTO impairment rating of no more than 30%-Progressing   PLAN     Updated Certification Period: 08/23/22 to 10/04/22      Recommended Treatment Plan: 1-2 times per week for 6 weeks:  Fluidotherapy, Manual Therapy, Moist Heat/ Ice, Neuromuscular Re-ed, Orthotic Management and Training, Paraffin, Patient Education, Self Care, Therapeutic Activities, Therapeutic Exercise and Ultrasound      Other Recommendations: progress strengthening program, including HEP, as tolerated      Hermes Miramontes OT

## 2022-09-12 NOTE — TELEPHONE ENCOUNTER
Faxed signed Physical Therapy Progress Notes to [phone] with successful fax confirmation email receipt.

## 2022-09-13 NOTE — PROGRESS NOTES
"[facility] OUTPATIENT THERAPY AND WELLNESS   Physical Therapy Treatment Note     Name: Linette LIU [facility]  Clinic Number: [id number]    Therapy Diagnosis:   Encounter Diagnoses   Name Primary?    Muscle hypertonicity Yes    Acute pain of left shoulder     Weakness of left upper extremity      Physician: Ira Rhodes PA-C    Visit Date: 9/14/2022    Physician Orders: PT Eval and Treat   Medical Diagnosis from Referral: M75.52 (ICD-10-CM) - Subacromial bursitis of left shoulder joint   Evaluation Date: 9/12/2022  Authorization Period Expiration: 12/31/2022  Plan of Care Expiration: 12/12/2022  Visit # / Visits authorized: 1/ 15 (+eval)  PTA Visit #: 1/5     PN DUE: 10/12/2022    Precautions: Standard    Time In: 7:45am  Time Out: 8:30am  Total Billable Time: 45 minutes      SUBJECTIVE     Pt reports: Shoulder is hurting a bit more than yesterday.  She was compliant with home exercise program.  Response to previous treatment: none reported  Functional change: none reported    Pain: 4/10  Location: Left shoulder    OBJECTIVE     Objective Measures updated at progress report unless specified.       TREATMENT     Linette received the treatments listed below:      (exercises and techniques performed today are bolded)     THERAPEUTIC EXERCISES to develop  strength, endurance, ROM, flexibility, posture, and core stabilization for 35 minutes including:    UBE 3/3 for range of motion, strength and posture     Upper trap stretch   Levator scap stretch   Posterior shoulder roll x 1 minute  Scapular retraction  Shoulder isometrics 5-way x15 with 5" holds  Rows yellow theraband x 1 minute  B shoulder extension yellow theraband x 1 minute  External rotation walkouts x 1 minute (for isometric hold)  Abduction walkouts x 1 minute  B shoulder external rotation x 1 minute  Sidelying external rotations AROM x 1 minute L UE        MANUAL THERAPY TECHNIQUES were applied  for 10 minutes including:     Shoulder Distraction   Posterior and " inferior joint mobs  Light soft tissue mobilization to supraspinatus, infraspinatus and biceps      NEUROMUSCULAR RE-EDUCATION ACTIVITIES to improve Balance, Coordination, Kinesthetic, Sense, Proprioception, and Posture for 0 minutes.  The following were included:           PATIENT EDUCATION AND HOME EXERCISES     Home Exercises Provided and Patient Education Provided     Education provided:   - educated pt that mild soreness is an expected outcome to therapy session  - educated pt to continue with HEP as issued and prescribed    Written Home Exercises Provided: Patient instructed to cont prior HEP. Exercises were reviewed and Linette was able to demonstrate them prior to the end of the session.  Linette demonstrated good  understanding of the education provided. See EMR under Patient Instructions for exercises provided during therapy sessions    ASSESSMENT     Pt presents today reporting ongoing shoulder pain. Initiated exercises for RTC strengthening with emphasis on staying within a pain free range of motion. Pt tolerated all exercises well today without increase in pain. Educated pt on resting her shoulder and allowing it to heal, she reports she keeps busy mowing the lawn (push mower) and other household chores which aggravate her shoulder. Applied light glenohumeral joint distraction and mobilizations to improve mobility and encourage more subacromial space for RTC tendons.    Linette Is progressing well towards her goals.   Pt prognosis is Good.     Pt will continue to benefit from skilled outpatient physical therapy to address the deficits listed in the problem list box on initial evaluation, provide pt/family education and to maximize pt's level of independence in the home and community environment.     Pt's spiritual, cultural and educational needs considered and pt agreeable to plan of care and goals.     Anticipated barriers to physical therapy: pain    Goals:     Short Term Goals (5 Weeks):   1. Patient will  demonstrate independence with HEP in order to progress toward functional independence  2. Pt will demonstrate improved pain by reports of less than or equal to 7/10 worst pain on the verbal rating scale in order to progress toward maximal functional ability and improve QOL.  3. Pt will be able to correct slouched posture with minimal verbal cueing for improved static sitting posture and increased QOL.      Long Term Goals (10 Weeks):   1. Patient will demonstrate improved function as indicated by a functional limitation score of 31% on the FOTO.  2. Pt will demonstrate improved pain by reports of less than or equal to 5/10 worst pain on the verbal rating scale in order to progress toward maximal functional ability and improve QOL.  3. Pt will be able to correct slouched posture independently for improved static sitting posture and increased QOL.   4. Pt will move left shoulder through functional ROM in all planes without pain to improve independence in performing yard work, house chores, and ADLs  5. Pt will improve left shoulder strength to 5/5 for improved ability to perform house work, yard work, and hold grandchildren.     PLAN     Continue PT per POC, assess tolerance to today's session,  progress exercise as tolerated and appropriate      Tomy Saleem, LIOR   9/14/2022

## 2022-09-14 ENCOUNTER — CLINICAL SUPPORT (OUTPATIENT)
Dept: REHABILITATION | Facility: HOSPITAL | Age: 57
End: 2022-09-14
Payer: OTHER GOVERNMENT

## 2022-09-14 DIAGNOSIS — R29.898 WEAKNESS OF LEFT UPPER EXTREMITY: ICD-10-CM

## 2022-09-14 DIAGNOSIS — M25.60 DECREASED RANGE OF MOTION: ICD-10-CM

## 2022-09-14 DIAGNOSIS — R60.9 EDEMA, UNSPECIFIED TYPE: ICD-10-CM

## 2022-09-14 DIAGNOSIS — L90.5 SCAR ADHERENT: ICD-10-CM

## 2022-09-14 DIAGNOSIS — M62.89 MUSCLE HYPERTONICITY: Primary | ICD-10-CM

## 2022-09-14 DIAGNOSIS — R52 PAIN: Primary | ICD-10-CM

## 2022-09-14 DIAGNOSIS — M25.512 ACUTE PAIN OF LEFT SHOULDER: ICD-10-CM

## 2022-09-14 DIAGNOSIS — R29.898 DECREASED GRIP STRENGTH OF RIGHT HAND: ICD-10-CM

## 2022-09-14 PROCEDURE — 97140 MANUAL THERAPY 1/> REGIONS: CPT | Mod: PN,CQ

## 2022-09-14 PROCEDURE — 97110 THERAPEUTIC EXERCISES: CPT | Mod: PN

## 2022-09-14 PROCEDURE — 97018 PARAFFIN BATH THERAPY: CPT | Mod: PN

## 2022-09-14 PROCEDURE — 97140 MANUAL THERAPY 1/> REGIONS: CPT | Mod: PN

## 2022-09-14 PROCEDURE — 97110 THERAPEUTIC EXERCISES: CPT | Mod: PN,CQ

## 2022-09-14 NOTE — PROGRESS NOTES
[facility]ERAP[facility]  Occupational Therapy Treatment Note    Date: 9/14/2022  Name: Linette LIU [facility] Number: [id number]    Therapy Diagnosis:   Encounter Diagnoses   Name Primary?    Pain Yes    Decreased range of motion     Scar adherent     Edema, unspecified type     Decreased  strength of right hand            Physician: Radha Craig PA-C    Physician Orders: Evaluate and treat  status post right partial palmar fasciectomy for Dupuytren's contracture release  eval and treat, modalities as needed  Medical Diagnosis: Post-operative state [Z98.890]; Dupuytren's contracture of right hand [M72.0] ring finger ray   Surgical Procedure and Date: Procedure(s) (LRB): Partial palmar FASCIECTOMY (Right),  6/9/2022   Evaluation Date: 6/28/2022   Insurance Authorization Period Expiration: 12/31/2022   Initial Plan of Care Certification Period: 6/28/2022 - 8/23/2022,  Updated 08/23/22 to 10/04/22  Progress Note Due: 9/23/2022   Date of Return to MD: none noted in medical chart      Visit # / Visits authorized: 13 / 20 (including initial evaluation)   FOTO: 1/3   Initial=49% / Goal=27%      Precautions:  Standard    Time In: 8:30 am  Time Out: 9:28 am   Total Billable Time: 58 minutes (1P, 2MT, 2MT)       SUBJECTIVE     Pt reports: she currently has no difficulties with functional use of right hand with daily activities   She was compliant with home exercise program given last session. She has been doing light scar massage and HEP exercises at least once per day.   Response to previous treatment: none noted          Functional change: none reported     Pain: 0/10 pre-treat; 0/10 post-treat   Location: right hand       Patient is 13 weeks post-surgical 9/12/2022.      OBJECTIVE     Objective Measures updated at progress report unless specified.     Treatment     Linette received the treatments listed below:     Paraffin w/ MHP to right hand for 10 min, pre-tx to decrease pain & increase tissue  extensibility (Ring finger wrapped into composite flexion with Coban prior to paraffin DIP)     Direct contact modalities after being cleared for contraindications:     Manual therapy techniques: Extensive Soft tissue Mobilization and retrograde massage and scar massage were applied to the: right hand for 23 minutes, including:  -use of manual techniques, IASTM tool to decrease tissue adhesions, pain, edema and increase circulation and tissue extensibility          Therapeutic exercises to develop strength, endurance, ROM and edema reduction for 25 minutes, including:    MCP hyperextension stretch  5 reps X 10 sec hold    Joint blocking RF flexion MCP/PIP/DIP 10 reps    Joint blocking end range RF extension PIP/DIP 10 reps    Finger lifts isolated and composite     10 reps    Intrinsic stretch  5 x 10 sec hold    Hand Master 2/15 reps, black band    PHG  2/15 reps, setting 38#    Digiflex, isolated/10 reps each    Tbar yellow smiles/frowns 15 reps each    Twists  15 reps each    Theraputty: yellow/red mix    -pancake and bloom  3 trials    -raking forward and backward  5 reps each    -intrinsic cones  2 trials          Linette participated in Orthotic fitting and training for 0 minutes including the following:   Custom volar hand based resting pan orthosis with RF/SF in extension was modified today to increase MCP & PIP extension for ring finger. Good fit and positioning was achieved upon completion of fabrication.        Patient Education and Home Exercises      Education provided:   - issued printed HEP exercises/stretches 7/19/2022 and 8/17/2022  - purpose of orthosis, proper donning/doffing, wear/care schedule and precautions to monitor 7/6/2022   - edema control techniques  - scar massage techniques   - review HEP exercises   - Progress towards goals     Written Home Exercises Provided: Patient instructed to cont prior HEP.  Exercises were reviewed and Linette was able to demonstrate them prior to the end of the  session.  Linette demonstrated good  understanding of the HEP provided. See EMR under Patient Instructions for exercises provided during therapy sessions.       Assessment   Linette Galo is a 56 y.o. female referred to outpatient occupational therapy and presents with a medical diagnosis of Z98.890 (ICD-10-CM) - Post-operative state. Scar tissue continues to improve with less density and more mobility.  Patient tolerating advanced strengthening exercises well without significant difficulty.  Recommend to continue OT Plan of Care for at least 2-3 more visits to address remaining deficits and discharge HEP.       Pt's spiritual, cultural and educational needs considered and pt agreeable to plan of care and goals.    Anticipated barriers to occupational therapy: therapy attendance, compliance with HEP     GOALS  Short Term Goals: (in 4 weeks)  1) Patient will be independent in HEP -Met 7/5/2022  2) Decrease pain in right hand to no more than 2-3/10 at worst in ADL/IADL's -Met (08/24/22)  3) Increase TAMS in right RF by 5-10 degrees for improved functioning in ADL/IADL's -Met (08/24/22)  4) Decrease edema in right hand/RF by .3 cm for improved functioning in ADL/IADL's -Met (08/24/22)  5) Assess  strength for increased functional use in ADL/IADL's -Met (08/24/22)      Long Term Goals: (in 8 weeks)  1) Decrease pain in right hand to no more than 1-2/10 at worst in ADL/IADL's-Met (08/24/22)  2) Increase TAMS in right hand by 15-20 degrees for increased functioning in ADL/IADL's-Met (08/24/22)  3) Increase  strength in right hand by 10% of original measures for improved functioning in ADL/IADL's-Progressing  4) Decrease edema in right hand by to trace or none-Met (08/24/22)   4) Increased functioning in ADL/IADL's, as evidenced by a FOTO impairment rating of no more than 30%-Progressing   PLAN     Updated Certification Period: 08/23/22 to 10/04/22      Recommended Treatment Plan: 1-2 times per week for 6  weeks:  Fluidotherapy, Manual Therapy, Moist Heat/ Ice, Neuromuscular Re-ed, Orthotic Management and Training, Paraffin, Patient Education, Self Care, Therapeutic Activities, Therapeutic Exercise and Ultrasound      Other Recommendations: progress strengthening program, including HEP, as tolerated      Hermes Miramontes OT

## 2022-09-16 NOTE — PROGRESS NOTES
"[facility]   Physical Therapy Treatment Note     Name: Linette LIU [facility]  Clinic Number: [id number]    Therapy Diagnosis:   Encounter Diagnoses   Name Primary?    Muscle hypertonicity Yes    Acute pain of left shoulder     Weakness of left upper extremity      Physician: Ira Rhodes PA-C    Visit Date: 9/19/2022    Physician Orders: PT Eval and Treat   Medical Diagnosis from Referral: M75.52 (ICD-10-CM) - Subacromial bursitis of left shoulder joint   Evaluation Date: 9/12/2022  Authorization Period Expiration: 12/31/2022  Plan of Care Expiration: 12/12/2022  Visit # / Visits authorized: 2/ 15 (+eval)    PTA Visit #: 0/5   PN DUE: 10/12/2022    Precautions: Standard    Time In: 9:00 AM  Time Out: 9:45 AM  Total Billable Time: 45 minutes      SUBJECTIVE   Pt reports: that her shoulder did well over the beginning of the weekend and then pain increased at the end of the weekend, possibly due taking down her recliner.     She was compliant with home exercise program.  Response to previous treatment: none reported  Functional change: none reported    Pain: 4/10  Location: Left shoulder    OBJECTIVE     Objective Measures updated at progress report unless specified.       TREATMENT     Linette received the treatments listed below:      (exercises and techniques performed today are bolded)     THERAPEUTIC EXERCISES to develop  strength, endurance, ROM, flexibility, posture, and core stabilization for 40 minutes including:  UBE 3/3 for range of motion, strength and posture  Upper trap stretch 2 x 30"  Levator scap stretch 2 x 30"  Posterior shoulder roll x 1 minute  Scapular retraction  Shoulder isometrics 5-way x15 with 5" holdsd  Rows yellow theraband 3 x 10 x 3" hold  B shoulder extension yellow theraband 3 x 10 x 3" hold  External rotation walkouts x 1 minute (for isometric hold)  Abduction walkouts x 1 minute  B shoulder external rotation YTB 2 x 10  Sidelying external rotations AROM x " 1 minute L UE    MANUAL THERAPY TECHNIQUES were applied  for 5 minutes including:   Shoulder Distraction   Posterior and inferior joint mobs  Light soft tissue mobilization to supraspinatus, infraspinatus and biceps    NEUROMUSCULAR RE-EDUCATION ACTIVITIES to improve Balance, Coordination, Kinesthetic, Sense, Proprioception, and Posture for 0 minutes.  The following were included:     PATIENT EDUCATION AND HOME EXERCISES     Home Exercises Provided and Patient Education Provided     Education provided:   - educated pt that mild soreness is an expected outcome to therapy session  - educated pt to continue with HEP as issued and prescribed    Written Home Exercises Provided: Patient instructed to cont prior HEP. Exercises were reviewed and Linette was able to demonstrate them prior to the end of the session.  Linette demonstrated good  understanding of the education provided. See EMR under Patient Instructions for exercises provided during therapy sessions    ASSESSMENT   Pt tolerated therapy session well today with no adverse effects. She presents with continued left shoulder pain. Therapy session focused on RTC strengthening and scapular strengthening. She exhibits muscle fatige at the end of the session with no reports of increased shoulder pain. She exhibits weakness with internal rotation during shoulder isometrics. Ended session with glenohumeral joint distraction and posterior and inferior grade 2 mobilizations for improved range of motion followed by passive range of motion with light overpressure in all planes of motion.     Linette Is progressing well towards her goals.   Pt prognosis is Good.     Pt will continue to benefit from skilled outpatient physical therapy to address the deficits listed in the problem list box on initial evaluation, provide pt/family education and to maximize pt's level of independence in the home and community environment.     Pt's spiritual, cultural and educational needs considered and pt  agreeable to plan of care and goals.     Anticipated barriers to physical therapy: pain    Goals:   Short Term Goals (5 Weeks):   1. Patient will demonstrate independence with HEP in order to progress toward functional independence  2. Pt will demonstrate improved pain by reports of less than or equal to 7/10 worst pain on the verbal rating scale in order to progress toward maximal functional ability and improve QOL.  3. Pt will be able to correct slouched posture with minimal verbal cueing for improved static sitting posture and increased QOL.      Long Term Goals (10 Weeks):   1. Patient will demonstrate improved function as indicated by a functional limitation score of 31% on the FOTO.  2. Pt will demonstrate improved pain by reports of less than or equal to 5/10 worst pain on the verbal rating scale in order to progress toward maximal functional ability and improve QOL.  3. Pt will be able to correct slouched posture independently for improved static sitting posture and increased QOL.   4. Pt will move left shoulder through functional ROM in all planes without pain to improve independence in performing yard work, house chores, and ADLs  5. Pt will improve left shoulder strength to 5/5 for improved ability to perform house work, yard work, and hold grandchildren.     PLAN     Continue PT per POC, assess tolerance to today's session,  progress exercise as tolerated and appropriate      Lilia Nagy PT, DPT  9/19/2022

## 2022-09-19 ENCOUNTER — CLINICAL SUPPORT (OUTPATIENT)
Dept: REHABILITATION | Facility: HOSPITAL | Age: 57
End: 2022-09-19
Payer: OTHER GOVERNMENT

## 2022-09-19 DIAGNOSIS — M62.89 MUSCLE HYPERTONICITY: Primary | ICD-10-CM

## 2022-09-19 DIAGNOSIS — M25.512 ACUTE PAIN OF LEFT SHOULDER: ICD-10-CM

## 2022-09-19 DIAGNOSIS — R29.898 WEAKNESS OF LEFT UPPER EXTREMITY: ICD-10-CM

## 2022-09-19 PROCEDURE — 97110 THERAPEUTIC EXERCISES: CPT | Mod: PN

## 2022-09-21 ENCOUNTER — CLINICAL SUPPORT (OUTPATIENT)
Dept: REHABILITATION | Facility: HOSPITAL | Age: 57
End: 2022-09-21
Payer: OTHER GOVERNMENT

## 2022-09-21 DIAGNOSIS — M62.89 MUSCLE HYPERTONICITY: Primary | ICD-10-CM

## 2022-09-21 DIAGNOSIS — M25.60 DECREASED RANGE OF MOTION: ICD-10-CM

## 2022-09-21 DIAGNOSIS — R60.9 EDEMA, UNSPECIFIED TYPE: ICD-10-CM

## 2022-09-21 DIAGNOSIS — R29.898 WEAKNESS OF LEFT UPPER EXTREMITY: ICD-10-CM

## 2022-09-21 DIAGNOSIS — R52 PAIN: Primary | ICD-10-CM

## 2022-09-21 DIAGNOSIS — L90.5 SCAR ADHERENT: ICD-10-CM

## 2022-09-21 DIAGNOSIS — M25.512 ACUTE PAIN OF LEFT SHOULDER: ICD-10-CM

## 2022-09-21 DIAGNOSIS — R29.898 DECREASED GRIP STRENGTH OF RIGHT HAND: ICD-10-CM

## 2022-09-21 PROCEDURE — 97140 MANUAL THERAPY 1/> REGIONS: CPT | Mod: PN

## 2022-09-21 PROCEDURE — 97110 THERAPEUTIC EXERCISES: CPT | Mod: PN

## 2022-09-21 PROCEDURE — 97018 PARAFFIN BATH THERAPY: CPT | Mod: PN,59

## 2022-09-21 NOTE — PROGRESS NOTES
"[facility]   Physical Therapy Treatment Note     Name: Linette LIU [facility]  Clinic Number: [id number]    Therapy Diagnosis:   Encounter Diagnoses   Name Primary?    Muscle hypertonicity Yes    Acute pain of left shoulder     Weakness of left upper extremity      Physician: Ira Rhodes PA-C    Visit Date: 9/21/2022    Physician Orders: PT Eval and Treat   Medical Diagnosis from Referral: M75.52 (ICD-10-CM) - Subacromial bursitis of left shoulder joint   Evaluation Date: 9/12/2022  Authorization Period Expiration: 12/31/2022  Plan of Care Expiration: 12/12/2022  Visit # / Visits authorized: 3/ 15 (+eval)    PTA Visit #: 0/5   PN DUE: 10/12/2022    Precautions: Standard    Time In: 7:30 AM  Time Out: 8:15 AM  Total Billable Time: 45 minutes    SUBJECTIVE   Pt reports: that she is doing really good today.     She was compliant with home exercise program.  Response to previous treatment: none reported  Functional change: none reported    Pain: 4/10  Location: Left shoulder    OBJECTIVE     Objective Measures updated at progress report unless specified.     TREATMENT     Linette received the treatments listed below:      (exercises and techniques performed today are bolded)     THERAPEUTIC EXERCISES to develop  strength, endurance, ROM, flexibility, posture, and core stabilization for 35 minutes including:  UBE 3/3 for range of motion, strength and posture  Upper trap stretch 2 x 30"  Levator scap stretch 2 x 30"  Posterior shoulder roll x 1 minute  Scapular retraction  Shoulder isometrics 5-way x15 with 5" holds  Rows yellow theraband 3 x 10 x 3" hold  B shoulder extension yellow theraband 3 x 10 x 3" hold  External rotation walkouts x 1 minute (for isometric hold)  Abduction walkouts x 1 minute  B shoulder external rotation YTB 2 x 10  +(L) shoulder ER with YTB 2 x 10  + (L) shoulder IR with YTB 2 x 10  Sidelying external rotations AROM x 1 minute L UE    MANUAL THERAPY TECHNIQUES were " applied  for 10 minutes including:   Shoulder Distraction   Posterior and inferior joint mobs  +STM to upper trap (L)  Light soft tissue mobilization to supraspinatus, infraspinatus and biceps    NEUROMUSCULAR RE-EDUCATION ACTIVITIES to improve Balance, Coordination, Kinesthetic, Sense, Proprioception, and Posture for 0 minutes.  The following were included:     PATIENT EDUCATION AND HOME EXERCISES     Home Exercises Provided and Patient Education Provided     Education provided:   - educated pt that mild soreness is an expected outcome to therapy session  - educated pt to continue with HEP as issued and prescribed    Written Home Exercises Provided: Patient instructed to cont prior HEP. Exercises were reviewed and Linette was able to demonstrate them prior to the end of the session.  Linette demonstrated good  understanding of the education provided. See EMR under Patient Instructions for exercises provided during therapy sessions    ASSESSMENT   Pt tolerated therapy session well today with no adverse effects. Therapy session focused on RTC strengthening and scapular strengthening. Added shoulder internal and external rotation exercises with light resistance band to good tolerance. Slight fatigue at end of session with quick recovery during manual therapy. Performed glenohumeral joint distraction with posterior and inferior grade 2 mobilizations for improved range of motion followed by passive range of motion with light overpressure in all planes of motion. Ended session with gentle soft tissue mobilization to left upper trap with good tolerance.     Linette Is progressing well towards her goals.   Pt prognosis is Good.     Pt will continue to benefit from skilled outpatient physical therapy to address the deficits listed in the problem list box on initial evaluation, provide pt/family education and to maximize pt's level of independence in the home and community environment.     Pt's spiritual, cultural and educational  needs considered and pt agreeable to plan of care and goals.     Anticipated barriers to physical therapy: pain    Goals:   Short Term Goals (5 Weeks):   1. Patient will demonstrate independence with HEP in order to progress toward functional independence  2. Pt will demonstrate improved pain by reports of less than or equal to 7/10 worst pain on the verbal rating scale in order to progress toward maximal functional ability and improve QOL.  3. Pt will be able to correct slouched posture with minimal verbal cueing for improved static sitting posture and increased QOL.      Long Term Goals (10 Weeks):   1. Patient will demonstrate improved function as indicated by a functional limitation score of 31% on the FOTO.  2. Pt will demonstrate improved pain by reports of less than or equal to 5/10 worst pain on the verbal rating scale in order to progress toward maximal functional ability and improve QOL.  3. Pt will be able to correct slouched posture independently for improved static sitting posture and increased QOL.   4. Pt will move left shoulder through functional ROM in all planes without pain to improve independence in performing yard work, house chores, and ADLs  5. Pt will improve left shoulder strength to 5/5 for improved ability to perform house work, yard work, and hold grandchildren.     PLAN     Continue PT per POC, assess tolerance to today's session,  progress exercise as tolerated and appropriate    Lilia Nagy PT, DPT  9/21/2022

## 2022-09-21 NOTE — PATIENT INSTRUCTIONS
Issued Via [email or web address] (see logo above) scan QR code for pt specific program          View at [email or web address] using code: [email or web address]

## 2022-09-21 NOTE — PROGRESS NOTES
[facility]  Occupational Therapy Treatment Note    Date: 9/21/2022  Name: Linette LIU [facility]    Clinic Number: [id number]    Therapy Diagnosis:   Encounter Diagnoses   Name Primary?    Pain Yes    Decreased range of motion     Scar adherent     Edema, unspecified type     Decreased  strength of right hand              Physician: Radha Craig PA-C    Physician Orders: Evaluate and treat  status post right partial palmar fasciectomy for Dupuytren's contracture release  eval and treat, modalities as needed  Medical Diagnosis: Post-operative state [Z98.890]; Dupuytren's contracture of right hand [M72.0] ring finger ray   Surgical Procedure and Date: Procedure(s) (LRB): Partial palmar FASCIECTOMY (Right),  6/9/2022   Evaluation Date: 6/28/2022   Insurance Authorization Period Expiration: 12/31/2022   Initial Plan of Care Certification Period: 6/28/2022 - 8/23/2022,  Updated 08/23/22 to 10/04/22  Progress Note Due: 9/23/2022   Date of Return to MD: none noted in medical chart      Visit # / Visits authorized: 14 / 20 (including initial evaluation)   FOTO: 1/3   Initial=49% / Current= 31% / Goal=27%      Precautions:  Standard    Time In: 8:30 am  Time Out: 9:30 am   Total Billable Time: 60 minutes (1P, 2MT, 2TE)       SUBJECTIVE     Pt reports: she currently has no difficulties with functional use of right hand with daily activities. She is compliant with wear of night time hand splint.    She was compliant with home exercise program given last session.    Response to previous treatment: none noted          Functional change: none reported     Pain: 0/10 pre-treat; 0/10 post-treat   Location: right hand       Patient is 14 weeks post-surgical 9/21/2022.      OBJECTIVE     Objective Measures updated at progress report unless specified.     Treatment     Linette received the treatments listed below:     Paraffin w/ MHP to right hand for 10 min, pre-tx to decrease pain & increase tissue  extensibility (Ring finger wrapped into composite flexion with Coban prior to paraffin DIP)     Direct contact modalities after being cleared for contraindications:     Manual therapy techniques: Extensive Soft tissue Mobilization and retrograde massage and scar massage were applied to the: right hand for 23 minutes, including:  -use of manual techniques, IASTM tool to decrease tissue adhesions, pain, edema and increase circulation and tissue extensibility          Therapeutic exercises to develop strength, endurance, ROM and edema reduction for 27 minutes, including:    MCP hyperextension stretch  5 reps X 10 sec hold    Joint blocking RF flexion MCP/PIP/DIP 10 reps   Joint blocking end range RF extension PIP/DIP 10 reps    Finger lifts isolated and composite     10 reps    Intrinsic stretch  5 x 10 sec hold        Theraputty: yellow/red mix    -grasping  5 reps, 5 sec hold     -log rolls w/ tripod (LF/RF) 5 reps each    -pancake and bloom  5 trials    -raking forward and backward  5 reps each    -intrinsic cones  5 trials    -intrinsic plus putty pull  5 trials          Linette participated in Orthotic fitting and training for 0 minutes including the following:   Custom volar hand based resting pan orthosis with RF/SF in extension was modified today to increase MCP & PIP extension for ring finger. Good fit and positioning was achieved upon completion of fabrication.        Patient Education and Home Exercises      Education provided:   - issued theraputty HEP and red theraputty 9/21/2022   - issued printed HEP exercises/stretches 7/19/2022 and 8/17/2022  - purpose of orthosis, proper donning/doffing, wear/care schedule and precautions to monitor 7/6/2022   - edema control techniques  - scar massage techniques   - review HEP exercises   - Progress towards goals     Written Home Exercises Provided: Patient instructed to cont prior HEP.  Exercises were reviewed and Linette was able to demonstrate them prior to the end of  the session.  Linette demonstrated good  understanding of the HEP provided. See EMR under Patient Instructions for exercises provided during therapy sessions.       Assessment   Linette Galo is a 56 y.o. female referred to outpatient occupational therapy and presents with a medical diagnosis of Z98.890 (ICD-10-CM) - Post-operative state. Scar tissue continues to improve with less density and more mobility.  Patient is able to make composite fist touching to palm, but not DPC. Minimal extension lag at PIP/DIP joints, but WFL.  Patient tolerating advanced strengthening exercises well without significant difficulty.  Recommend to continue OT Plan of Care for at least 2-3 more visits to address remaining deficits and discharge HEP.       Pt's spiritual, cultural and educational needs considered and pt agreeable to plan of care and goals.    Anticipated barriers to occupational therapy: therapy attendance, compliance with HEP     GOALS  Short Term Goals: (in 4 weeks)  1) Patient will be independent in HEP -Met 7/5/2022  2) Decrease pain in right hand to no more than 2-3/10 at worst in ADL/IADL's -Met (08/24/22)  3) Increase TAMS in right RF by 5-10 degrees for improved functioning in ADL/IADL's -Met (08/24/22)  4) Decrease edema in right hand/RF by .3 cm for improved functioning in ADL/IADL's -Met (08/24/22)  5) Assess  strength for increased functional use in ADL/IADL's -Met (08/24/22)      Long Term Goals: (in 8 weeks)  1) Decrease pain in right hand to no more than 1-2/10 at worst in ADL/IADL's-Met (08/24/22)  2) Increase TAMS in right hand by 15-20 degrees for increased functioning in ADL/IADL's-Met (08/24/22)  3) Increase  strength in right hand by 10% of original measures for improved functioning in ADL/IADL's-Progressing  4) Decrease edema in right hand by to trace or none-Met (08/24/22)   4) Increased functioning in ADL/IADL's, as evidenced by a FOTO impairment rating of no more than 30%-Progressing    PLAN     Updated Certification Period: 08/23/22 to 10/04/22      Recommended Treatment Plan: 1-2 times per week for 6 weeks:  Fluidotherapy, Manual Therapy, Moist Heat/ Ice, Neuromuscular Re-ed, Orthotic Management and Training, Paraffin, Patient Education, Self Care, Therapeutic Activities, Therapeutic Exercise and Ultrasound      Other Recommendations: progress strengthening program, including HEP, as tolerated      Hermes Miramontes, OT

## 2022-09-23 NOTE — PROGRESS NOTES
"[facility]   Physical Therapy Treatment Note     Name: Linette LIU Replaced by [facility]  Clinic Number: [id number]    Therapy Diagnosis:   Encounter Diagnoses   Name Primary?    Muscle hypertonicity Yes    Acute pain of left shoulder     Weakness of left upper extremity        Physician: Ira Rhodes PA-C    Visit Date: 9/26/2022    Physician Orders: PT Eval and Treat   Medical Diagnosis from Referral: M75.52 (ICD-10-CM) - Subacromial bursitis of left shoulder joint   Evaluation Date: 9/12/2022  Authorization Period Expiration: 12/31/2022  Plan of Care Expiration: 12/12/2022  Visit # / Visits authorized: 4/ 15 (+eval)    PTA Visit #: 1/5   PN DUE: 10/12/2022    Precautions: Standard    Time In: 8:30 AM  Time Out: 9:15 AM  Total Billable Time: 45 minutes    SUBJECTIVE   Pt reports: Pain is still off and on, but is doing better overall.     She was compliant with home exercise program.  Response to previous treatment: none reported  Functional change: none reported    Pain: 4/10  Location: Left shoulder    OBJECTIVE     Objective Measures updated at progress report unless specified.     TREATMENT     Linette received the treatments listed below:      (exercises and techniques performed today are bolded)     THERAPEUTIC EXERCISES to develop  strength, endurance, ROM, flexibility, posture, and core stabilization for 30 minutes including:  UBE 3/3 for range of motion, strength and posture  Upper trap stretch 2 x 30"  Levator scap stretch 2 x 30"  Posterior shoulder roll x 1 minute  Scapular retraction  Shoulder isometrics 5-way x15 with 5" holds  Rows yellow theraband 3 x 10 x 3" hold  B shoulder extension yellow theraband 3 x 10 x 3" hold  External rotation walkouts x 1 minute (for isometric hold)  Abduction walkouts x 1 minute  B shoulder external rotation YTB 2 x 10  (L) shoulder ER with YTB 2 x 10  (L) shoulder IR with YTB 2 x 10  +Shoulder press in scaption 2 x 10 #1  Sidelying external rotations " AROM x 1 minute L UE    MANUAL THERAPY TECHNIQUES were applied  for 15 minutes including:   Shoulder Distraction   Posterior and inferior joint mobs  STM to upper trap (L)  Soft tissue mobilization to supraspinatus, infraspinatus and biceps    NEUROMUSCULAR RE-EDUCATION ACTIVITIES to improve Balance, Coordination, Kinesthetic, Sense, Proprioception, and Posture for 0 minutes.  The following were included:     PATIENT EDUCATION AND HOME EXERCISES     Home Exercises Provided and Patient Education Provided     Education provided:   - educated pt that mild soreness is an expected outcome to therapy session  - educated pt to continue with HEP as issued and prescribed    Written Home Exercises Provided: Patient instructed to cont prior HEP. Exercises were reviewed and Linette was able to demonstrate them prior to the end of the session.  Linette demonstrated good  understanding of the education provided. See EMR under Patient Instructions for exercises provided during therapy sessions    ASSESSMENT   Pt presents today reporting mild improvement in her shoulder. Continued with established exercises to good tolerance and no increases in pain. Added shoulder press in scaption to begin strengthening through a greater range of motion, pt reported pain initially but with repetitions pain reduced. Continued to apply shoulder joint mobilizations and soft tissue mobilization to shoulder musculature to good response with improvements in range of motion and improvement in soft tissue extensibility.    Linette Is progressing well towards her goals.   Pt prognosis is Good.     Pt will continue to benefit from skilled outpatient physical therapy to address the deficits listed in the problem list box on initial evaluation, provide pt/family education and to maximize pt's level of independence in the home and community environment.     Pt's spiritual, cultural and educational needs considered and pt agreeable to plan of care and goals.      Anticipated barriers to physical therapy: pain    Goals:   Short Term Goals (5 Weeks):   1. Patient will demonstrate independence with HEP in order to progress toward functional independence  2. Pt will demonstrate improved pain by reports of less than or equal to 7/10 worst pain on the verbal rating scale in order to progress toward maximal functional ability and improve QOL.  3. Pt will be able to correct slouched posture with minimal verbal cueing for improved static sitting posture and increased QOL.      Long Term Goals (10 Weeks):   1. Patient will demonstrate improved function as indicated by a functional limitation score of 31% on the FOTO.  2. Pt will demonstrate improved pain by reports of less than or equal to 5/10 worst pain on the verbal rating scale in order to progress toward maximal functional ability and improve QOL.  3. Pt will be able to correct slouched posture independently for improved static sitting posture and increased QOL.   4. Pt will move left shoulder through functional ROM in all planes without pain to improve independence in performing yard work, house chores, and ADLs  5. Pt will improve left shoulder strength to 5/5 for improved ability to perform house work, yard work, and hold grandchildren.     PLAN     Continue PT per POC, assess tolerance to today's session,  progress exercise as tolerated and appropriate    Tomy Saleem, LIOR  9/26/2022

## 2022-09-26 ENCOUNTER — CLINICAL SUPPORT (OUTPATIENT)
Dept: REHABILITATION | Facility: HOSPITAL | Age: 57
End: 2022-09-26
Payer: OTHER GOVERNMENT

## 2022-09-26 DIAGNOSIS — R29.898 WEAKNESS OF LEFT UPPER EXTREMITY: ICD-10-CM

## 2022-09-26 DIAGNOSIS — M25.512 ACUTE PAIN OF LEFT SHOULDER: ICD-10-CM

## 2022-09-26 DIAGNOSIS — M62.89 MUSCLE HYPERTONICITY: Primary | ICD-10-CM

## 2022-09-26 PROCEDURE — 97110 THERAPEUTIC EXERCISES: CPT | Mod: PN,CQ

## 2022-09-26 PROCEDURE — 97140 MANUAL THERAPY 1/> REGIONS: CPT | Mod: PN,CQ

## 2022-09-26 NOTE — PROGRESS NOTES
[facility]  Occupational Therapy Treatment and Discharge Note    Date: 9/28/2022  Name: Linette LIU Iraj    [facility] Number: [id number]    Therapy Diagnosis:   Encounter Diagnoses   Name Primary?    Pain Yes    Decreased range of motion     Scar adherent     Edema, unspecified type     Decreased  strength of right hand          Physician: Radha Craig PA-C    Physician Orders: Evaluate and treat  status post right partial palmar fasciectomy for Dupuytren's contracture release  eval and treat, modalities as needed  Medical Diagnosis: Post-operative state [Z98.890]; Dupuytren's contracture of right hand [M72.0] ring finger ray   Surgical Procedure and Date: Procedure(s) (LRB): Partial palmar FASCIECTOMY (Right),  6/9/2022   Evaluation Date: 6/28/2022   Insurance Authorization Period Expiration: 12/31/2022   Initial Plan of Care Certification Period: 6/28/2022 - 8/23/2022,  Updated 08/23/22 to 10/04/22  Progress Note Due: 9/23/2022   Date of Return to MD: none noted in medical chart      Visit # / Visits authorized: 15 / 20 (including initial evaluation)   FOTO: 2/3   Initial=49% / Current= 31% / Goal=27%      Precautions:  Standard    Time In: 8:30 am  Time Out: 9:30 am   Total Billable Time:45 minutes (1P, 2MT, 1TE)       SUBJECTIVE     Pt reports: she currently has no difficulties with functional use of right hand with daily activities. She is compliant with wear of night time hand splint.    She was compliant with home exercise program given last session.    Response to previous treatment: none noted          Functional change: none reported     Pain: 0/10 pre-treat; 0/10 post-treat   Location: right hand       Patient is 15 weeks post-surgical 9/28/2022.      OBJECTIVE     Objective Measures updated today, 9/28/2022.     Observation/Appearance:  Trace to no edema is noted in the right hand/fingers.   Scar tissue is minimally dense along the length of the surgical incision, with  slightly denser adhesions at volar MCP.   No significant adhesions noted in right hand affecting functional use.        Edema. Measured in centimeters.    6/28/2022 6/28/2022 08/24/22 9/28/2022     Left Right Right Right    MCPs 20.3 20.4 20.3 (-.1) 20.3 (=)              Ring:         Ring:    P1            5.9 7.3 6.5 (-.8) 6.5 (=)    PIP            6.2 7.0 6.3 (-.7) 6.4 (+1)      Hand ROM. Measured in degrees.    6/28/2022 08/24/22 9/28/2022     Right Right Right             Ring:   MP -11/76 -12/96 0/95 (+12/-1)              PIP -25/72 -10/91 -3/89 (+7/-2)               DIP         (hook fist) 0/40 0/52  (Composite) 0/62 (+10)  (Composite)               BRITO 152 217 (+65) 243            Fisting:        Composite  Unable touch DPC with RF  NT Touch to 2 cm proximal to DPC    Hook to DPC -3.0 cm  NT -3.0   Flat to DPC  -2.8 cm  NT touch       Sensation  Patient denies numbness & tingling at this time.       Strength (Dyanmometer) and Pinch Strength (Pinch Gauge)  Strength (in pounds):    08/24/22 9/28/2022 08/24/22 9/28/2022     Left Left  Right Right     II 70, 65, 64  60,60,65 (-2) 60, 65, 67 74,70,66 (+6)   Lateral 13 16 (+3) 12 15 (+3)   Tripod 15 14 (-1) 12 15 (+3)   Tip 11 11 (=) 10 (-1) 10 (=)         Treatment     Linette received the treatments listed below:     Paraffin w/ MHP to right hand for 10 min, pre-tx to decrease pain & increase tissue extensibility (Ring finger wrapped into composite flexion with Coban prior to paraffin DIP)     Direct contact modalities after being cleared for contraindications:     Manual therapy techniques: Extensive Soft tissue Mobilization and retrograde massage and scar massage were applied to the: right hand for 23 minutes, including:  -use of manual techniques, IASTM tool to decrease tissue adhesions, pain, edema and increase circulation and tissue extensibility          Therapeutic exercises to develop strength, endurance, ROM and edema reduction for 12 minutes,  including:  *Objective measures taken discharge date, 9/28/2022.   MCP hyperextension stretch  5 reps X 10 sec hold    Intrinsic stretch  5 x 10 sec hold              Linette participated in Orthotic fitting and training for 0 minutes including the following:   Custom volar hand based resting pan orthosis with RF/SF in extension was modified today to increase MCP & PIP extension for ring finger. Good fit and positioning was achieved upon completion of fabrication.        Patient Education and Home Exercises      Education provided:   - issued theraputty HEP and red theraputty 9/21/2022   - issued printed HEP exercises/stretches 7/19/2022 and 8/17/2022  - purpose of orthosis, proper donning/doffing, wear/care schedule and precautions to monitor 7/6/2022   - edema control techniques  - scar massage techniques   - review HEP exercises   - Progress towards goals     Written Home Exercises Provided: Patient instructed to cont prior HEP.  Exercises were reviewed and Linette was able to demonstrate them prior to the end of the session.  Linette demonstrated good  understanding of the HEP provided. See EMR under Patient Instructions for exercises provided during therapy sessions.       Assessment   Linette Galo is a 56 y.o. female referred to outpatient occupational therapy and presents with a medical diagnosis of Z98.890 (ICD-10-CM) - Post-operative state. Patient has met all STGs and 4 of 5 LTGs and reports no difficulties with functional use of right hand during daily activities.  Patient was advised to continue theraputty HEP for continued strengthening.  She was also advised to continue wear of night time splint for an additional 4-5 weeks and then began weaning to 3-4 days of the week.  During weaning, she was advised to monitor tightness of ring finger and increase night time wear if needed.  Patient is able to make composite fist with touch to palm, but 2 cm proximal to DPC.  Recommend discharge from occupational  therapy services a tthis time.       Pt's spiritual, cultural and educational needs considered and pt agreeable to plan of care and goals.    Anticipated barriers to occupational therapy: therapy attendance, compliance with HEP     GOALS  Short Term Goals: (in 4 weeks)  1) Patient will be independent in HEP -Met 7/5/2022  2) Decrease pain in right hand to no more than 2-3/10 at worst in ADL/IADL's -Met (08/24/22)  3) Increase TAMS in right RF by 5-10 degrees for improved functioning in ADL/IADL's -Met (08/24/22)  4) Decrease edema in right hand/RF by .3 cm for improved functioning in ADL/IADL's -Met (08/24/22)  5) Assess  strength for increased functional use in ADL/IADL's -Met (08/24/22)      Long Term Goals: (in 8 weeks)  1) Decrease pain in right hand to no more than 1-2/10 at worst in ADL/IADL's-Met (08/24/22)  2) Increase TAMS in right hand by 15-20 degrees for increased functioning in ADL/IADL's-Met (08/24/22)  3) Increase  strength in right hand by 10% of original measures for improved functioning in ADL/IADL's-Met 9/28/2022  4) Decrease edema in right hand by to trace or none-Met (08/24/22)   4) Increased functioning in ADL/IADL's, as evidenced by a FOTO impairment rating of no more than 30%-Progressing, not met (most recent FOTO was 31%)   PLAN     Updated Certification Period: 08/23/22 to 10/04/22      Recommended Treatment Plan: 1-2 times per week for 6 weeks:  Fluidotherapy, Manual Therapy, Moist Heat/ Ice, Neuromuscular Re-ed, Orthotic Management and Training, Paraffin, Patient Education, Self Care, Therapeutic Activities, Therapeutic Exercise and Ultrasound      Other Recommendations: progress strengthening program, including HEP, as tolerated      Hermes Miramontes, OT

## 2022-09-27 NOTE — PROGRESS NOTES
"[facility] OUTPATIENT THERAPY AND WELLNESS   Physical Therapy Treatment Note     Name: Linette LIU [facility]  Clinic Number: [id number]    Therapy Diagnosis:   Encounter Diagnoses   Name Primary?    Muscle hypertonicity Yes    Acute pain of left shoulder     Weakness of left upper extremity          Physician: Ira Rhodes PA-C    Visit Date: 9/28/2022    Physician Orders: PT Eval and Treat   Medical Diagnosis from Referral: M75.52 (ICD-10-CM) - Subacromial bursitis of left shoulder joint   Evaluation Date: 9/12/2022  Authorization Period Expiration: 12/31/2022  Plan of Care Expiration: 12/12/2022  Visit # / Visits authorized: 5/ 15 (+eval)    PTA Visit #: 2/5   PN DUE: 10/12/2022    Precautions: Standard    Time In: 7:45 AM  Time Out: 8:30 AM  Total Billable Time: 45 minutes    SUBJECTIVE   Pt reports: Shoulder was hurting a bit lower this time (points to distal middle deltoid.) But doing well otherwise    She was compliant with home exercise program.  Response to previous treatment: none reported  Functional change: none reported    Pain: 4/10  Location: Left shoulder    OBJECTIVE     Objective Measures updated at progress report unless specified.     TREATMENT     Linette received the treatments listed below:      (exercises and techniques performed today are bolded)     THERAPEUTIC EXERCISES to develop  strength, endurance, ROM, flexibility, posture, and core stabilization for 30 minutes including:  UBE 3/3 for range of motion, strength and posture  Upper trap stretch 2 x 30"  Levator scap stretch 2 x 30"  Posterior shoulder roll x 1 minute  Scapular retraction  Shoulder isometrics 5-way x15 with 5" holds  Rows yellow theraband 3 x 10 x 3" hold  B shoulder extension yellow theraband 3 x 10 x 3" hold  External rotation walkouts x 1 minute (for isometric hold)  Abduction walkouts x 1 minute  B shoulder external rotation YTB 2 x 10  (L) shoulder ER with YTB 2 x 10  (L) shoulder IR with YTB 2 x 10  +(L) ABCs with " yellow theraband x 2  +Shoulder press in scaption 2 x 10 #1  Serratus wall slide with pillow case 2 x 10  Sidelying external rotations AROM x 1 minute L UE    MANUAL THERAPY TECHNIQUES were applied  for 15 minutes including:   Shoulder Distraction   Posterior and inferior joint mobs  STM to upper trap (L)  Soft tissue mobilization to supraspinatus, infraspinatus and biceps  IASTM to biceps tendon and muscle belly    NEUROMUSCULAR RE-EDUCATION ACTIVITIES to improve Balance, Coordination, Kinesthetic, Sense, Proprioception, and Posture for 0 minutes.  The following were included:     PATIENT EDUCATION AND HOME EXERCISES     Home Exercises Provided and Patient Education Provided     Education provided:   - educated pt that mild soreness is an expected outcome to therapy session  - educated pt to continue with HEP as issued and prescribed    Written Home Exercises Provided: Patient instructed to cont prior HEP. Exercises were reviewed and Linette was able to demonstrate them prior to the end of the session.  Linette demonstrated good  understanding of the education provided. See EMR under Patient Instructions for exercises provided during therapy sessions    ASSESSMENT   Pt presents today with ongoing pain. Progressed exercises to improve both shoulder strength and scapular stability with ABCs and wall slides. Otherwise continued with established exercises to good tolerance and no increases in pain. Pt will do well with progression of resistance. Pt responded well to manual therapy with improved soft tissue extensibility through biceps and shoulder musculature and decreased pain with joint mobilizations and distraction.    Linette Is progressing well towards her goals.   Pt prognosis is Good.     Pt will continue to benefit from skilled outpatient physical therapy to address the deficits listed in the problem list box on initial evaluation, provide pt/family education and to maximize pt's level of independence in the home and  community environment.     Pt's spiritual, cultural and educational needs considered and pt agreeable to plan of care and goals.     Anticipated barriers to physical therapy: pain    Goals:   Short Term Goals (5 Weeks):   1. Patient will demonstrate independence with HEP in order to progress toward functional independence  2. Pt will demonstrate improved pain by reports of less than or equal to 7/10 worst pain on the verbal rating scale in order to progress toward maximal functional ability and improve QOL.  3. Pt will be able to correct slouched posture with minimal verbal cueing for improved static sitting posture and increased QOL.      Long Term Goals (10 Weeks):   1. Patient will demonstrate improved function as indicated by a functional limitation score of 31% on the FOTO.  2. Pt will demonstrate improved pain by reports of less than or equal to 5/10 worst pain on the verbal rating scale in order to progress toward maximal functional ability and improve QOL.  3. Pt will be able to correct slouched posture independently for improved static sitting posture and increased QOL.   4. Pt will move left shoulder through functional ROM in all planes without pain to improve independence in performing yard work, house chores, and ADLs  5. Pt will improve left shoulder strength to 5/5 for improved ability to perform house work, yard work, and hold grandchildren.     PLAN     Continue PT per POC, assess tolerance to today's session,  progress exercise as tolerated and appropriate    Tomy Saleem PTA  9/28/2022

## 2022-09-28 ENCOUNTER — CLINICAL SUPPORT (OUTPATIENT)
Dept: REHABILITATION | Facility: HOSPITAL | Age: 57
End: 2022-09-28
Payer: OTHER GOVERNMENT

## 2022-09-28 DIAGNOSIS — M25.60 DECREASED RANGE OF MOTION: ICD-10-CM

## 2022-09-28 DIAGNOSIS — R60.9 EDEMA, UNSPECIFIED TYPE: ICD-10-CM

## 2022-09-28 DIAGNOSIS — R52 PAIN: Primary | ICD-10-CM

## 2022-09-28 DIAGNOSIS — M25.512 ACUTE PAIN OF LEFT SHOULDER: ICD-10-CM

## 2022-09-28 DIAGNOSIS — L90.5 SCAR ADHERENT: ICD-10-CM

## 2022-09-28 DIAGNOSIS — R29.898 DECREASED GRIP STRENGTH OF RIGHT HAND: ICD-10-CM

## 2022-09-28 DIAGNOSIS — R29.898 WEAKNESS OF LEFT UPPER EXTREMITY: ICD-10-CM

## 2022-09-28 DIAGNOSIS — M62.89 MUSCLE HYPERTONICITY: Primary | ICD-10-CM

## 2022-09-28 PROCEDURE — 97140 MANUAL THERAPY 1/> REGIONS: CPT | Mod: PN

## 2022-09-28 PROCEDURE — 97018 PARAFFIN BATH THERAPY: CPT | Mod: PN

## 2022-09-28 PROCEDURE — 97110 THERAPEUTIC EXERCISES: CPT | Mod: PN

## 2022-09-28 PROCEDURE — 97110 THERAPEUTIC EXERCISES: CPT | Mod: PN,CQ

## 2022-09-28 PROCEDURE — 97140 MANUAL THERAPY 1/> REGIONS: CPT | Mod: PN,CQ

## 2022-10-03 ENCOUNTER — CLINICAL SUPPORT (OUTPATIENT)
Dept: REHABILITATION | Facility: HOSPITAL | Age: 57
End: 2022-10-03
Payer: OTHER GOVERNMENT

## 2022-10-03 DIAGNOSIS — M62.89 MUSCLE HYPERTONICITY: Primary | ICD-10-CM

## 2022-10-03 DIAGNOSIS — M25.512 ACUTE PAIN OF LEFT SHOULDER: ICD-10-CM

## 2022-10-03 DIAGNOSIS — R29.898 WEAKNESS OF LEFT UPPER EXTREMITY: ICD-10-CM

## 2022-10-03 PROCEDURE — 97110 THERAPEUTIC EXERCISES: CPT | Mod: PN

## 2022-10-03 PROCEDURE — 97112 NEUROMUSCULAR REEDUCATION: CPT | Mod: PN

## 2022-10-03 PROCEDURE — 97140 MANUAL THERAPY 1/> REGIONS: CPT | Mod: PN

## 2022-10-03 NOTE — PROGRESS NOTES
"[facility]   Physical Therapy Treatment Note     Name: Linette LIU [facility]  Clinic Number: [id number]    Therapy Diagnosis:   Encounter Diagnoses   Name Primary?    Muscle hypertonicity Yes    Acute pain of left shoulder     Weakness of left upper extremity      Physician: Ira Rhodes PA-C    Visit Date: 10/3/2022    Physician Orders: PT Eval and Treat   Medical Diagnosis from Referral: M75.52 (ICD-10-CM) - Subacromial bursitis of left shoulder joint   Evaluation Date: 9/12/2022  Authorization Period Expiration: 12/31/2022  Plan of Care Expiration: 12/12/2022  Visit # / Visits authorized: 6/ 15 (+eval)    PTA Visit #: 0/5   PN DUE: 10/12/2022    Precautions: Standard    Time In: 8:15 AM  Time Out: 9:00 AM  Total Billable Time: 45 minutes    SUBJECTIVE   Pt reports: that she was able to work in her yard over the weekend and had some soreness. She reports noticing decreased left shoulder pain. She reports increased bruising from manual therapy from last therapy session.     She was compliant with home exercise program.  Response to previous treatment: none reported  Functional change: none reported    Pain: 4/10  Location: Left shoulder    OBJECTIVE     Objective Measures updated at progress report unless specified.     TREATMENT     Linette received the treatments listed below:    (exercises and techniques performed today are bolded)     THERAPEUTIC EXERCISES to develop  strength, endurance, ROM, flexibility, posture, and core stabilization for 25 minutes including:  UBE 2/2 for range of motion, strength and posture  Upper trap stretch 2 x 30"  Levator scap stretch 2 x 30"  Posterior shoulder roll x 1 minute  Scapular retraction  External rotation walkouts x 1 minute (for isometric hold)  Abduction walkouts x 1 minute  Shoulder isometrics 5-way x15 with 5" holds  Rows +RTB 3 x 10 x 3" hold  +Shoulder Extension RTB 3 x 10  B shoulder extension yellow theraband 3 x 10 x 3" " hold  +Shoulder flexion YTB 2 x 15  +Shoulder scaption YTB 2 x 15  B shoulder external rotation YTB 2 x 10  (L) shoulder ER with +RTB 2 x 10  (L) shoulder IR with +RTB 2 x 10  Shoulder press in scaption 2 x 10 #1  Sidelying external rotations AROM x 1 minute L UE    MANUAL THERAPY TECHNIQUES were applied  for 10 minutes including:   Shoulder Distraction   Posterior and inferior joint mobs  STM to upper trap (L)  Soft tissue mobilization to supraspinatus, infraspinatus and biceps  IASTM to biceps tendon and muscle belly    NEUROMUSCULAR RE-EDUCATION ACTIVITIES to improve Balance, Coordination, Kinesthetic, Sense, Proprioception, and Posture for 10 minutes.  The following were included:   Serratus wall slide with pillow case 2 x 10  +Wall Ball (up, down, clockwuise, counterclockwise) 2 x 10  +plinth shoulder taps 2 x 10  (L) ABCs with yellow theraband x 2    PATIENT EDUCATION AND HOME EXERCISES     Home Exercises Provided and Patient Education Provided     Education provided:   - educated pt that mild soreness is an expected outcome to therapy session  - educated pt to continue with HEP as issued and prescribed    Written Home Exercises Provided: Patient instructed to cont prior HEP. Exercises were reviewed and Linette was able to demonstrate them prior to the end of the session.  Linette demonstrated good  understanding of the education provided. See EMR under Patient Instructions for exercises provided during therapy sessions    ASSESSMENT   Pt tolerated therapy session well today with no adverse effects. Progressed rotator cuff exercises by adding resistance bands with good tolerance. Also progressed scapular stability exercises today. Added plinth shoulder taps for increased weightbearing tolerance in left upper extremity.Ended session with soft tissue mobilization with tenderness and trigger points along muscle belly of left biceps.     Linette Is progressing well towards her goals.   Pt prognosis is Good.     Pt will  continue to benefit from skilled outpatient physical therapy to address the deficits listed in the problem list box on initial evaluation, provide pt/family education and to maximize pt's level of independence in the home and community environment.     Pt's spiritual, cultural and educational needs considered and pt agreeable to plan of care and goals.     Anticipated barriers to physical therapy: pain    Goals:   Short Term Goals (5 Weeks):   1. Patient will demonstrate independence with HEP in order to progress toward functional independence  2. Pt will demonstrate improved pain by reports of less than or equal to 7/10 worst pain on the verbal rating scale in order to progress toward maximal functional ability and improve QOL.  3. Pt will be able to correct slouched posture with minimal verbal cueing for improved static sitting posture and increased QOL.      Long Term Goals (10 Weeks):   1. Patient will demonstrate improved function as indicated by a functional limitation score of 31% on the FOTO.  2. Pt will demonstrate improved pain by reports of less than or equal to 5/10 worst pain on the verbal rating scale in order to progress toward maximal functional ability and improve QOL.  3. Pt will be able to correct slouched posture independently for improved static sitting posture and increased QOL.   4. Pt will move left shoulder through functional ROM in all planes without pain to improve independence in performing yard work, house chores, and ADLs  5. Pt will improve left shoulder strength to 5/5 for improved ability to perform house work, yard work, and hold grandchildren.     PLAN     Continue PT per POC, assess tolerance to today's session,  progress exercise as tolerated and appropriate    Lilia Nagy PT, DPT  10/3/2022

## 2022-10-04 ENCOUNTER — PATIENT MESSAGE (OUTPATIENT)
Dept: ADMINISTRATIVE | Facility: HOSPITAL | Age: 57
End: 2022-10-04
Payer: OTHER GOVERNMENT

## 2022-10-06 ENCOUNTER — PATIENT MESSAGE (OUTPATIENT)
Dept: ORTHOPEDICS | Facility: CLINIC | Age: 57
End: 2022-10-06
Payer: OTHER GOVERNMENT

## 2022-10-06 NOTE — PROGRESS NOTES
"[facility]   Physical Therapy Treatment Note     Name: Linette LIU [facility]  Clinic Number: [id number]    Therapy Diagnosis:   Encounter Diagnoses   Name Primary?    Muscle hypertonicity Yes    Acute pain of left shoulder     Weakness of left upper extremity        Physician: Ira Rhodes PA-C    Visit Date: 10/10/2022    Physician Orders: PT Eval and Treat   Medical Diagnosis from Referral: M75.52 (ICD-10-CM) - Subacromial bursitis of left shoulder joint   Evaluation Date: 9/12/2022  Authorization Period Expiration: 12/31/2022  Plan of Care Expiration: 12/12/2022  Visit # / Visits authorized: 7/ 15 (+eval)    PTA Visit #: 1/5   PN DUE: 10/12/2022    Precautions: Standard    Time In: 8:30 AM  Time Out: 9:15 AM  Total Billable Time: 45 minutes    SUBJECTIVE   Pt reports: Shoulder is feeling better. Still certain things she does that aggravate it though. Lifting hurts it primarily, a gallon of milk is still hard to lift.    She was compliant with home exercise program.  Response to previous treatment: none reported  Functional change: none reported    Pain: 4/10  Location: Left shoulder    OBJECTIVE     Objective Measures updated at progress report unless specified.     TREATMENT     Linette received the treatments listed below:    (exercises and techniques performed today are bolded)     THERAPEUTIC EXERCISES to develop  strength, endurance, ROM, flexibility, posture, and core stabilization for 20 minutes including:  UBE 2/2 for range of motion, strength and posture  Upper trap stretch 2 x 30"  Levator scap stretch 2 x 30"  Posterior shoulder roll x 1 minute  Scapular retraction  External rotation walkouts x 1 minute (for isometric hold)  Abduction walkouts x 1 minute  Shoulder isometrics 5-way x15 with 5" holds  Rows RTB 3 x 10 x 3" hold  +Shoulder Extension RTB 3 x 10  B shoulder extension yellow theraband 3 x 10 x 3" hold  +Shoulder flexion YTB 2 x 15  +Shoulder scaption YTB 2 x 15  B " shoulder external rotation YTB 2 x 10  (L) shoulder ER with +RTB 2 x 10  (L) shoulder IR with +RTB 2 x 10  Shoulder press in scaption 2 x 10 #1  Sidelying external rotations AROM x 1 minute L UE    MANUAL THERAPY TECHNIQUES were applied  for 15 minutes including:   Shoulder Distraction   Posterior and inferior joint mobs  STM to upper trap (L)  Soft tissue mobilization to supraspinatus, infraspinatus and biceps  IASTM to biceps tendon and muscle belly    NEUROMUSCULAR RE-EDUCATION ACTIVITIES to improve Balance, Coordination, Kinesthetic, Sense, Proprioception, and Posture for 10 minutes.  The following were included:   Serratus wall slide with pillow case 2 x 10  Wall Ball (up, down, clockwuise, counterclockwise) 2 x 10  plinth shoulder taps 3 x 10  (L) ABCs with yellow theraband x 2    PATIENT EDUCATION AND HOME EXERCISES     Home Exercises Provided and Patient Education Provided     Education provided:   - educated pt that mild soreness is an expected outcome to therapy session  - educated pt to continue with HEP as issued and prescribed    Written Home Exercises Provided: Patient instructed to cont prior HEP. Exercises were reviewed and Linette was able to demonstrate them prior to the end of the session.  Linette demonstrated good  understanding of the education provided. See EMR under Patient Instructions for exercises provided during therapy sessions    ASSESSMENT   Pt presents today reporting improvement in her shoulder pain, ongoing difficulty with lifting though. Continued with established exercises and progressions from last visit all to good tolerance. Pt is showing improvement in her strength and stability during exercises. Continued to apply soft tissue mobilization to RTC and biceps musculature, noted improved tolerance to treatment and less soft tissue adhesions all which improved further with manual therapy.    Linette Is progressing well towards her goals.   Pt prognosis is Good.     Pt will continue to  benefit from skilled outpatient physical therapy to address the deficits listed in the problem list box on initial evaluation, provide pt/family education and to maximize pt's level of independence in the home and community environment.     Pt's spiritual, cultural and educational needs considered and pt agreeable to plan of care and goals.     Anticipated barriers to physical therapy: pain    Goals:   Short Term Goals (5 Weeks):   1. Patient will demonstrate independence with HEP in order to progress toward functional independence  2. Pt will demonstrate improved pain by reports of less than or equal to 7/10 worst pain on the verbal rating scale in order to progress toward maximal functional ability and improve QOL.  3. Pt will be able to correct slouched posture with minimal verbal cueing for improved static sitting posture and increased QOL.      Long Term Goals (10 Weeks):   1. Patient will demonstrate improved function as indicated by a functional limitation score of 31% on the FOTO.  2. Pt will demonstrate improved pain by reports of less than or equal to 5/10 worst pain on the verbal rating scale in order to progress toward maximal functional ability and improve QOL.  3. Pt will be able to correct slouched posture independently for improved static sitting posture and increased QOL.   4. Pt will move left shoulder through functional ROM in all planes without pain to improve independence in performing yard work, house chores, and ADLs  5. Pt will improve left shoulder strength to 5/5 for improved ability to perform house work, yard work, and hold grandchildren.     PLAN     Continue PT per POC, assess tolerance to today's session,  progress exercise as tolerated and appropriate    Tomy Saleem, LIOR  10/10/2022

## 2022-10-10 ENCOUNTER — CLINICAL SUPPORT (OUTPATIENT)
Dept: REHABILITATION | Facility: HOSPITAL | Age: 57
End: 2022-10-10
Payer: OTHER GOVERNMENT

## 2022-10-10 DIAGNOSIS — M62.89 MUSCLE HYPERTONICITY: Primary | ICD-10-CM

## 2022-10-10 DIAGNOSIS — M25.512 ACUTE PAIN OF LEFT SHOULDER: ICD-10-CM

## 2022-10-10 DIAGNOSIS — R29.898 WEAKNESS OF LEFT UPPER EXTREMITY: ICD-10-CM

## 2022-10-10 PROCEDURE — 97140 MANUAL THERAPY 1/> REGIONS: CPT | Mod: PN,CQ

## 2022-10-10 PROCEDURE — 97112 NEUROMUSCULAR REEDUCATION: CPT | Mod: PN,CQ

## 2022-10-10 PROCEDURE — 97110 THERAPEUTIC EXERCISES: CPT | Mod: PN,CQ

## 2022-10-11 NOTE — PROGRESS NOTES
"[facility]   Physical Therapy Treatment Note     Name: Linette LIU [facility]  Clinic Number: [id number]    Therapy Diagnosis:   Encounter Diagnoses   Name Primary?    Muscle hypertonicity Yes    Acute pain of left shoulder     Weakness of left upper extremity          Physician: Ira Rhodes PA-C    Visit Date: 10/12/2022    Physician Orders: PT Eval and Treat   Medical Diagnosis from Referral: M75.52 (ICD-10-CM) - Subacromial bursitis of left shoulder joint   Evaluation Date: 9/12/2022  Authorization Period Expiration: 12/31/2022  Plan of Care Expiration: 12/12/2022  Visit # / Visits authorized: 8/ 15 (+eval)    PTA Visit #: 2/5   PN DUE: 10/12/2022    Precautions: Standard    Time In: 7:45 AM  Time Out: 8:30 AM  Total Billable Time: 45 minutes    SUBJECTIVE   Pt reports: Shoulder is doing pretty good. Still feels she needs pain medications to offset her pan, she can feel the difference between taking it and not.    She was compliant with home exercise program.  Response to previous treatment: none reported  Functional change: none reported    Pain: 4/10  Location: Left shoulder    OBJECTIVE     Objective Measures updated at progress report unless specified.     TREATMENT     Linette received the treatments listed below:    (exercises and techniques performed today are bolded)     THERAPEUTIC EXERCISES to develop  strength, endurance, ROM, flexibility, posture, and core stabilization for 20 minutes including:  UBE 2/2 for range of motion, strength and posture  Upper trap stretch 2 x 30"  Levator scap stretch 2 x 30"  Posterior shoulder roll x 1 minute  Scapular retraction  Rows RTB 3 x 10 x 3" hold  Shoulder Extension RTB 3 x 10  Shoulder flexion YTB 2 x 15  Shoulder scaption YTB 2 x 15  B shoulder external rotation YTB 2 x 10  (L) shoulder ER with RTB 2 x 10  (L) shoulder IR with RTB 2 x 10  Shoulder press in scaption 2 x 10 #2  Sidelying external rotations AROM 3 x 10 #2  Supine " serratus punch circles 20/20 x 2 sets    MANUAL THERAPY TECHNIQUES were applied  for 10 minutes including:   Shoulder Distraction   Posterior and inferior joint mobs  STM to upper trap (L)  Soft tissue mobilization to supraspinatus, infraspinatus and biceps  IASTM to biceps tendon and muscle belly    NEUROMUSCULAR RE-EDUCATION ACTIVITIES to improve Balance, Coordination, Kinesthetic, Sense, Proprioception, and Posture for 15 minutes.  The following were included:     Serratus wall slide with pillow case 2 x 10  Wall Ball (up, down, clockwuise, counterclockwise) 2 x 10  plinth shoulder taps 3 x 10  (L) ABCs with yellow theraband x 2    PATIENT EDUCATION AND HOME EXERCISES     Home Exercises Provided and Patient Education Provided     Education provided:   - educated pt that mild soreness is an expected outcome to therapy session  - educated pt to continue with HEP as issued and prescribed    Written Home Exercises Provided: Patient instructed to cont prior HEP. Exercises were reviewed and Linette was able to demonstrate them prior to the end of the session.  Linette demonstrated good  understanding of the education provided. See EMR under Patient Instructions for exercises provided during therapy sessions    ASSESSMENT   Pt presents today reporting ongoing improvement in her shoulder. Continued with established exercises to good tolerance, pt is showing improvement in her pain free range of motion. Progressed sidelying external rotations with additional resistance, and added supine serratus punch circles. Applied soft tissue mobilization to L shoulder RTC musculature to good tolerance and good improvement in soft tissue extensibility.    Linette Is progressing well towards her goals.   Pt prognosis is Good.     Pt will continue to benefit from skilled outpatient physical therapy to address the deficits listed in the problem list box on initial evaluation, provide pt/family education and to maximize pt's level of independence  in the home and community environment.     Pt's spiritual, cultural and educational needs considered and pt agreeable to plan of care and goals.     Anticipated barriers to physical therapy: pain    Goals:   Short Term Goals (5 Weeks):   1. Patient will demonstrate independence with HEP in order to progress toward functional independence  2. Pt will demonstrate improved pain by reports of less than or equal to 7/10 worst pain on the verbal rating scale in order to progress toward maximal functional ability and improve QOL.  3. Pt will be able to correct slouched posture with minimal verbal cueing for improved static sitting posture and increased QOL.      Long Term Goals (10 Weeks):   1. Patient will demonstrate improved function as indicated by a functional limitation score of 31% on the FOTO.  2. Pt will demonstrate improved pain by reports of less than or equal to 5/10 worst pain on the verbal rating scale in order to progress toward maximal functional ability and improve QOL.  3. Pt will be able to correct slouched posture independently for improved static sitting posture and increased QOL.   4. Pt will move left shoulder through functional ROM in all planes without pain to improve independence in performing yard work, house chores, and ADLs  5. Pt will improve left shoulder strength to 5/5 for improved ability to perform house work, yard work, and hold grandchildren.     PLAN     Continue PT per POC, assess tolerance to today's session,  progress exercise as tolerated and appropriate    Tomy Saleem, LIOR  10/12/2022

## 2022-10-12 ENCOUNTER — CLINICAL SUPPORT (OUTPATIENT)
Dept: REHABILITATION | Facility: HOSPITAL | Age: 57
End: 2022-10-12
Payer: OTHER GOVERNMENT

## 2022-10-12 DIAGNOSIS — M25.512 ACUTE PAIN OF LEFT SHOULDER: ICD-10-CM

## 2022-10-12 DIAGNOSIS — M62.89 MUSCLE HYPERTONICITY: Primary | ICD-10-CM

## 2022-10-12 DIAGNOSIS — R29.898 WEAKNESS OF LEFT UPPER EXTREMITY: ICD-10-CM

## 2022-10-12 PROCEDURE — 97110 THERAPEUTIC EXERCISES: CPT | Mod: PN,CQ

## 2022-10-12 PROCEDURE — 97112 NEUROMUSCULAR REEDUCATION: CPT | Mod: PN,CQ

## 2022-10-12 PROCEDURE — 97140 MANUAL THERAPY 1/> REGIONS: CPT | Mod: PN,CQ

## 2022-10-13 NOTE — PROGRESS NOTES
"Orthopaedic Follow-Up Visit    Last Appointment: 8/23/22  Diagnosis: Left shoulder subacromial impingement and resultant bursitis  Prior Procedure: Left shoulder SAS CSI and PT    Linette Galo is a 57 y.o. female who is here for f/u evaluation of her left shoulder. The patient was last seen here by me on 8/23/22 at which point we decided to proceed with left shoulder subacromial space corticosteroid injection and send her for physical therapy prior to considering further treatment options. The patient returns today reporting that the symptoms *** and is interested in discussing further treatment options.     To review her history, Linette Galo is a 56 y.o. right-hand dominant female who presented with left shoulder pain and dysfunction approximately 3 months ago. The patient recently had right handed Dupuytrens contracture surgery with Dr. Jade on 6/9/22, and since then had been using her left arm more frequently. She was moving a piece of furniture with her left arm when she felt a sharp pain in her shoulder. She has tried rest, activity modification, ibuprofen, injection, and physical therapy with no improvement of her symptoms.     Patient's medications, allergies, past medical, surgical, social and family histories were reviewed and updated as appropriate.    Review of Systems   All systems reviewed were negative.  Specifically, the patient denies fever, chills, weight loss, chest pain, shortness of breath, or dyspnea on exertion.      Past Medical History:   Diagnosis Date    ADD (attention deficit disorder)     Anemia     Anxiety     Followed by Psychology    Arthritis     Depression     bipolar, anxiety, ADD    Family history of colon cancer 1/22/2019    Her grandmother had colon cancer.    General anesthetics causing adverse effect in therapeutic use     Patient reports h/o "combativeness", after Gastric and Hysterectomy.     Positive ARIK (antinuclear antibody)     Dr. Ross//following "       Past Surgical History:   Procedure Laterality Date    CARPAL TUNNEL RELEASE      both hands    COLONOSCOPY N/A 1/22/2019    Procedure: COLONOSCOPY;  Surgeon: Moe Jimenez MD;  Location: [facility];  Service: Endoscopy;  Laterality: N/A;    GASTRIC BYPASS  2008    HYSTERECTOMY  2011    RALH (retains ovaries)    INTERPOSITION ARTHROPLASTY OF CARPOMETACARPAL JOINTS Right 2/7/2022    Procedure: INTERPOSITION ARTHROPLASTY, CMC JOINT;  Surgeon: Sterling Jade MD;  Location: [facility] OR;  Service: Orthopedics;  Laterality: Right;  right thumb basal joint arthroplasty     ARTHREX - LATRICE AWARE    SURGICAL REMOVAL OF FASCIA Right 6/9/2022    Procedure: FASCIECTOMY;  Surgeon: Sterling Jade MD;  Location: [facility] OR;  Service: Orthopedics;  Laterality: Right;  partial palmar fasciectomy right hand ring finger     TONSILLECTOMY      TRIGGER FINGER RELEASE Right 2/7/2022    Procedure: RELEASE, TRIGGER FINGER;  Surgeon: Sterling Jade MD;  Location: [facility];  Service: Orthopedics;  Laterality: Right;   right ring trigger finger release        Patient's Medications   New Prescriptions    No medications on file   Previous Medications    BUPROPION (WELLBUTRIN SR) 200 MG SR12        CHOLECALCIFEROL, VITAMIN D3, (VITAMIN D3) 1,000 UNIT CAPSULE    Take 1 capsule (1,000 Units total) by mouth once daily.    CYANOCOBALAMIN, VITAMIN B-12, 50 MCG TABLET    Take 50 mcg by mouth once daily.    DULOXETINE (CYMBALTA) 20 MG CAPSULE    Take 60 mg by mouth once daily.    LAMOTRIGINE (LAMICTAL) 100 MG TABLET    Take by mouth once daily.    MULTIVITAMIN CAPSULE    Take 1 capsule by mouth once daily.    VYVANSE 70 MG CAPSULE    Take 70 mg by mouth every morning.    Modified Medications    No medications on file   Discontinued Medications    No medications on file       Family History   Problem Relation Age of Onset    Breast cancer Maternal Grandmother     Colon cancer Maternal Grandmother     Hypertension Maternal  Grandmother     Diabetes Maternal Grandmother     Parkinsonism Father     Lumbar disc disease Father     Stroke Father     Heart disease Father     Thrombophilia Sister     Hypertension Mother     Diabetes Mother     Hypertension Paternal Grandmother     Diabetes Paternal Grandmother     No Known Problems Brother     No Known Problems Maternal Grandfather     Heart disease Paternal Grandfather        Review of patient's allergies indicates:  No Known Allergies      Objective:      Physical Exam  Patient is alert and oriented, no distress. Skin is intact. Neuro is normal with no focal motor or sensory findings.    Cervical exam is unremarkable. Intact cervical ROM. Negative Spurling's test    Physical Exam:  RIGHT    LEFT    Scap Dyskinesis/Winging (-)    (-)    Tenderness:          Greater Tuberosity  (-)    (-)  Bicipital Groove  (-)    (-)  AC joint   (-)    (-)  Other:     ROM:  Forward Elevation 180***    180***  Abduction  120***    120***  ER (at side)  80***    80***  IR   T8***    T8***    Strength:   Supraspinatus  5/5    5/5  Infraspinatus  5/5    5/5  Subscap / IR  5/5    5/5     Special Tests:   Apprehension:   (-)    (-)   Nereyda Relocation:  (-)    (-)   Jerk / Posterior Load:  (-)    (-)   Neer:    (-)    (-)   Trinh:   (-)    (-)   SS Stress:   (-)    (-)   Bear Hug:   (-)    (-)   [place]'s:   (-)    (-)   Resisted Thrower's:   (-)    (-)   Cross Arm Abduction:  (-)    (-)    Neurovascular examination  - Motor grossly intact bilaterally to shoulder abduction, elbow flexion and extension, wrist flexion and extension, and intrinsic hand musculature  - Sensation intact to light touch bilaterally in axillary, median, radial, and ulnar distributions  - Symmetrical radial pulses    Imaging:    XR Results:  Results for orders placed during the hospital encounter of 08/23/22    X-Ray Shoulder 2 or More Views Left    Narrative  EXAMINATION:  XR SHOULDER COMPLETE 2 OR MORE VIEWS LEFT    CLINICAL  HISTORY:  Pain in left shoulder    TECHNIQUE:  Two or three views of the left shoulder were performed.    COMPARISON:  None    FINDINGS:  There is no radiographic evidence of acute osseous, articular, or soft tissue abnormality.  Joint spaces are preserved.    Impression  No acute findings      Electronically signed by: Kai Dean MD  Date:    08/23/2022  Time:    08:13    MRI Results:  No results found for this or any previous visit.    CT Results:  No results found for this or any previous visit.      Physician read: I agree with the above impression.***    Assessment/Plan:   Linette Galo is a 57 y.o. female with chronic left shoulder pain, suspected rotator cuff tear ***    Plan:    ***  Follow up ***          Jake Garcia MD

## 2022-10-14 ENCOUNTER — OFFICE VISIT (OUTPATIENT)
Dept: ORTHOPEDICS | Facility: CLINIC | Age: 57
End: 2022-10-14
Payer: OTHER GOVERNMENT

## 2022-10-14 VITALS — WEIGHT: 231 LBS | BODY MASS INDEX: 39.44 KG/M2 | HEIGHT: 64 IN

## 2022-10-14 DIAGNOSIS — M75.52 SUBACROMIAL BURSITIS OF LEFT SHOULDER JOINT: Primary | ICD-10-CM

## 2022-10-14 PROCEDURE — 99999 PR PBB SHADOW E&M-EST. PATIENT-LVL III: CPT | Mod: PBBFAC,,, | Performed by: STUDENT IN AN ORGANIZED HEALTH CARE EDUCATION/TRAINING PROGRAM

## 2022-10-14 PROCEDURE — 99999 PR PBB SHADOW E&M-EST. PATIENT-LVL III: ICD-10-PCS | Mod: PBBFAC,,, | Performed by: STUDENT IN AN ORGANIZED HEALTH CARE EDUCATION/TRAINING PROGRAM

## 2022-10-14 PROCEDURE — 99214 OFFICE O/P EST MOD 30 MIN: CPT | Mod: S$PBB,,, | Performed by: STUDENT IN AN ORGANIZED HEALTH CARE EDUCATION/TRAINING PROGRAM

## 2022-10-14 PROCEDURE — 99214 PR OFFICE/OUTPT VISIT, EST, LEVL IV, 30-39 MIN: ICD-10-PCS | Mod: S$PBB,,, | Performed by: STUDENT IN AN ORGANIZED HEALTH CARE EDUCATION/TRAINING PROGRAM

## 2022-10-14 PROCEDURE — 99213 OFFICE O/P EST LOW 20 MIN: CPT | Mod: PBBFAC | Performed by: STUDENT IN AN ORGANIZED HEALTH CARE EDUCATION/TRAINING PROGRAM

## 2022-10-14 NOTE — PROGRESS NOTES
"Orthopaedic Follow-Up Visit    Last Appointment: 8/23/22  Diagnosis: Left shoulder subacromial impingement and resultant bursitis  Prior Procedure: Left shoulder SAS CSI and PT    Linette Galo is a 57 y.o. female who is here for f/u evaluation of her left shoulder. The patient was last seen here by me on 8/23/22 at which point we decided to proceed with left shoulder subacromial space corticosteroid injection and send her for physical therapy prior to considering further treatment options. The patient returns today reporting that the symptoms are improving and is interested in discussing further treatment options.     To review her history, Linette Galo is a 57 y.o. right-hand dominant female who presented with left shoulder pain and dysfunction approximately 4 months ago. The patient recently had right handed Dupuytrens contracture surgery with Dr. Jade on 6/9/22, and since then had been using her left arm more frequently. She was moving a piece of furniture with her left arm when she felt a sharp pain in her shoulder. She has tried rest, activity modification, ibuprofen, injection, and physical therapy with no improvement of her symptoms.     Patient's medications, allergies, past medical, surgical, social and family histories were reviewed and updated as appropriate.    Review of Systems   All systems reviewed were negative.  Specifically, the patient denies fever, chills, weight loss, chest pain, shortness of breath, or dyspnea on exertion.      Past Medical History:   Diagnosis Date    ADD (attention deficit disorder)     Anemia     Anxiety     Followed by Psychology    Arthritis     Depression     bipolar, anxiety, ADD    Family history of colon cancer 1/22/2019    Her grandmother had colon cancer.    General anesthetics causing adverse effect in therapeutic use     Patient reports h/o "combativeness", after Gastric and Hysterectomy.     Positive ARIK (antinuclear antibody)     Dr." Serudi//following       Past Surgical History:   Procedure Laterality Date    CARPAL TUNNEL RELEASE      both hands    COLONOSCOPY N/A 1/22/2019    Procedure: COLONOSCOPY;  Surgeon: Moe Jimenez MD;  Location: [facility];  Service: Endoscopy;  Laterality: N/A;    GASTRIC BYPASS  2008    HYSTERECTOMY  2011    RALH (retains ovaries)    INTERPOSITION ARTHROPLASTY OF CARPOMETACARPAL JOINTS Right 2/7/2022    Procedure: INTERPOSITION ARTHROPLASTY, CMC JOINT;  Surgeon: Sterling Jade MD;  Location: [facility];  Service: Orthopedics;  Laterality: Right;  right thumb basal joint arthroplasty     ARTHREX - LATRICE AWARE    SURGICAL REMOVAL OF FASCIA Right 6/9/2022    Procedure: FASCIECTOMY;  Surgeon: Sterling Jade MD;  Location: [facility] [place] [facility];  Service: Orthopedics;  Laterality: Right;  partial palmar fasciectomy right hand ring finger     TONSILLECTOMY      TRIGGER FINGER RELEASE Right 2/7/2022    Procedure: RELEASE, TRIGGER FINGER;  Surgeon: Sterling Jade MD;  Location: [facility];  Service: Orthopedics;  Laterality: Right;   right ring trigger finger release        Patient's Medications   New Prescriptions    No medications on file   Previous Medications    BUPROPION (WELLBUTRIN SR) 200 MG SR12        CHOLECALCIFEROL, VITAMIN D3, (VITAMIN D3) 1,000 UNIT CAPSULE    Take 1 capsule (1,000 Units total) by mouth once daily.    CYANOCOBALAMIN, VITAMIN B-12, 50 MCG TABLET    Take 50 mcg by mouth once daily.    DULOXETINE (CYMBALTA) 20 MG CAPSULE    Take 60 mg by mouth once daily.    LAMOTRIGINE (LAMICTAL) 100 MG TABLET    Take by mouth once daily.    MULTIVITAMIN CAPSULE    Take 1 capsule by mouth once daily.    VYVANSE 70 MG CAPSULE    Take 70 mg by mouth every morning.    Modified Medications    No medications on file   Discontinued Medications    No medications on file       Family History   Problem Relation Age of Onset    Breast cancer Maternal Grandmother     Colon cancer Maternal Grandmother     Hypertension  Maternal Grandmother     Diabetes Maternal Grandmother     Parkinsonism Father     Lumbar disc disease Father     Stroke Father     Heart disease Father     Thrombophilia Sister     Hypertension Mother     Diabetes Mother     Hypertension Paternal Grandmother     Diabetes Paternal Grandmother     No Known Problems Brother     No Known Problems Maternal Grandfather     Heart disease Paternal Grandfather        Review of patient's allergies indicates:  No Known Allergies      Objective:      Physical Exam  Patient is alert and oriented, no distress. Skin is intact. Neuro is normal with no focal motor or sensory findings.    Cervical exam is unremarkable. Intact cervical ROM. Negative Spurling's test    Physical Exam:  RIGHT    LEFT    Scap Dyskinesis/Winging (-)    (-)    Tenderness:          Greater Tuberosity  (-)    (-)  Bicipital Groove  (-)    +  AC joint   (-)    (-)  Other:     ROM:  Forward Elevation 180    180  Abduction  120    120  ER (at side)  80    80  IR   T8    T8    Strength:   Supraspinatus  5/5    4/5  Infraspinatus  5/5    5/5  Subscap / IR  5/5    5/5     Special Tests:   Neer:    (-)    (-)   Trinh:   (-)    +   SS Stress:   (-)    +   Bear Hug:   (-)    (-)   Mills's:   (-)    +   Resisted Thrower's:   (-)    (-)   Cross Arm Abduction:  (-)    (-)    Neurovascular examination  - Motor grossly intact bilaterally to shoulder abduction, elbow flexion and extension, wrist flexion and extension, and intrinsic hand musculature  - Sensation intact to light touch bilaterally in axillary, median, radial, and ulnar distributions  - Symmetrical radial pulses    Imaging:    XR Results:  Results for orders placed during the hospital encounter of 08/23/22    X-Ray Shoulder 2 or More Views Left    Narrative  EXAMINATION:  XR SHOULDER COMPLETE 2 OR MORE VIEWS LEFT    CLINICAL HISTORY:  Pain in left shoulder    TECHNIQUE:  Two or three views of the left shoulder were  performed.    COMPARISON:  None    FINDINGS:  There is no radiographic evidence of acute osseous, articular, or soft tissue abnormality.  Joint spaces are preserved.    Impression  No acute findings      Electronically signed by: Kai Dean MD  Date:    08/23/2022  Time:    08:13    MRI Results:  No results found for this or any previous visit.    CT Results:  No results found for this or any previous visit.      Physician read: I agree with the above impression.    Assessment/Plan:   Linette Galo is a 57 y.o. female with left shoulder pain, subacromial impingement    Plan:    Discussed diagnosis and treatment options with patient today. Her history and physical exam are consistent with subacromial impingement and resultant bursitis.  Overall her symptoms have dramatically improved since her last injection but she still notices some discomfort with activity.  We discussed a variety of treatment options moving forward including repeat injection in 6-8 weeks.  Continue Physical therapy for now.  Message us in MyChart when therapy is complete and if symptoms are persistent will consider MRI          Jake Garcia MD

## 2022-10-14 NOTE — PROGRESS NOTES
[facility]   Physical Therapy Treatment Note     Name: Linette LIU Iraj  Clinic Number: [id number]    Therapy Diagnosis:   Encounter Diagnoses   Name Primary?    Muscle hypertonicity Yes    Acute pain of left shoulder     Weakness of left upper extremity      Physician: Ira Rhodes PA-C    Visit Date: 10/17/2022    Physician Orders: PT Eval and Treat   Medical Diagnosis from Referral: M75.52 (ICD-10-CM) - Subacromial bursitis of left shoulder joint   Evaluation Date: 9/12/2022  Authorization Period Expiration: 12/31/2022  Plan of Care Expiration: 12/12/2022  Visit # / Visits authorized: 9/ 15 (+eval)    PTA Visit #: 3/5   PN DUE: 11/17/2022    Precautions: Standard    Time In: 8:30 AM  Time Out: 9:15 AM  Total Billable Time: 45 minutes    SUBJECTIVE   Pt reports: Shoulder is doing alright, continuing to see improvement in it. Saw MD last week who offered further treatment, but pt reported she wanted to continue with therapy before seeking other treatment options.    She was compliant with home exercise program.  Response to previous treatment: none reported  Functional change: none reported    Pain: 4/10  Location: Left shoulder    OBJECTIVE     Objective Measures updated at progress report unless specified.     Upper Extremity Strength:     (R) UE (L) UE   Shoulder flexion: 5/5 4+/5  Pain   Shoulder Abduction: 5/5 4+/5  Slight pain   Shoulder ER 5/5    4+/5  No pain   Shoulder IR 5/5 4+/5  No pain   Lower Trap 5/5 4/5  No pain   Middle Trap 5/5    4/5  Slight pain   Rhomboids 5/5 4/5  Slight pain     Joint Mobility: Firm end feel with passive range of motion. Pain denoted at end of range of motion in flexion and abduction       Palpation: Pt reports slight tenderness to left upper trap and levator scap. No tenderness reported along joint line, anterior/posterior shoulder, or to biceps tendon.      Sensation: Pt denies any numbness or tingling in bilateral upper extremities, Pt  "light touch is intact in bilateral upper extremities      Scapular Control/Dyskinesis:     Normal / Subtle / Obvious  Comments    Left  Normal Painful arch    Right  Normal N/A     FOTO: 43% LIMITATION    TREATMENT     Linette received the treatments listed below:    (exercises and techniques performed today are bolded)     THERAPEUTIC EXERCISES to develop  strength, endurance, ROM, flexibility, posture, and core stabilization for 30 minutes including:    Tests and measures    UBE 2/2 for range of motion, strength and posture  Upper trap stretch 2 x 30"  Levator scap stretch 2 x 30"  Posterior shoulder roll x 1 minute  Scapular retraction  Rows RTB 3 x 10 x 3" hold  Shoulder Extension RTB 3 x 10  Shoulder flexion YTB 2 x 15  Shoulder scaption YTB 2 x 15  B shoulder external rotation YTB 2 x 10  (L) shoulder ER with RTB 2 x 10  (L) shoulder IR with RTB 2 x 10  Shoulder press in scaption 2 x 10 #3  Sidelying external rotations AROM 3 x 10 #3  Supine serratus punch circles 20/20 x 2 sets    MANUAL THERAPY TECHNIQUES were applied  for 10 minutes including:   Shoulder Distraction   Posterior and inferior joint mobs  STM to upper trap (L)  Soft tissue mobilization to supraspinatus, infraspinatus and biceps  IASTM to biceps tendon and muscle belly    NEUROMUSCULAR RE-EDUCATION ACTIVITIES to improve Balance, Coordination, Kinesthetic, Sense, Proprioception, and Posture for 5 minutes.  The following were included:     Serratus wall slide with pillow case 2 x 10  Wall Ball (up, down, clockwuise, counterclockwise) 2 x 10  plinth shoulder taps 3 x 10  (L) ABCs with yellow theraband x 3    PATIENT EDUCATION AND HOME EXERCISES     Home Exercises Provided and Patient Education Provided     Education provided:   - educated pt that mild soreness is an expected outcome to therapy session  - educated pt to continue with HEP as issued and prescribed    Written Home Exercises Provided: Patient instructed to cont prior HEP. Exercises were " reviewed and Linette was able to demonstrate them prior to the end of the session.  Linette demonstrated good  understanding of the education provided. See EMR under Patient Instructions for exercises provided during therapy sessions    ASSESSMENT     Pt presents today reporting gradual improvement in her shoulder. Continued progressing exercises today to good tolerance. Pt is showing good progress with therapy thus far, she has met 4/8 goals, completing her STGs and is continuing to show consistent progress in strength and range of motion. Pt is improving with strength and her range of motion is proving more pain free in each motion. Pt will continue to benefit from further skilled PT in order to continue improving range of motion and strength to improve function and return to her prior level of function.    PT agrees with PTA assessment listed above. Patient has met 4 out of 8 goals and will continue to benefit from therapy to continue to progress toward goals.       Linette Is progressing well towards her goals.   Pt prognosis is Good.     Pt will continue to benefit from skilled outpatient physical therapy to address the deficits listed in the problem list box on initial evaluation, provide pt/family education and to maximize pt's level of independence in the home and community environment.     Pt's spiritual, cultural and educational needs considered and pt agreeable to plan of care and goals.     Anticipated barriers to physical therapy: pain    Goals:   Short Term Goals (5 Weeks):   1. Patient will demonstrate independence with HEP in order to progress toward functional independence Met  2. Pt will demonstrate improved pain by reports of less than or equal to 7/10 worst pain on the verbal rating scale in order to progress toward maximal functional ability and improve QOL. Met  3. Pt will be able to correct slouched posture with minimal verbal cueing for improved static sitting posture and increased QOL. Met     Long  Term Goals (10 Weeks):   1. Patient will demonstrate improved function as indicated by a functional limitation score of 31% on the FOTO. Progressing, not met  2. Pt will demonstrate improved pain by reports of less than or equal to 5/10 worst pain on the verbal rating scale in order to progress toward maximal functional ability and improve QOL. Progressing, not met  3. Pt will be able to correct slouched posture independently for improved static sitting posture and increased QOL. Met  4. Pt will move left shoulder through functional ROM in all planes without pain to improve independence in performing yard work, house chores, and ADLs Progressing, not met  5. Pt will improve left shoulder strength to 5/5 for improved ability to perform house work, yard work, and hold grandchildren. Progressing, not met    PLAN     Continue PT per POC, assess tolerance to today's session,  progress exercise as tolerated and appropriate    Tomy Saleem, LIOR  10/17/2022    Lilia Nagy, PT, DPT                       Custom Shielding Preamble Text Will Not Be Included With Simple Simulations (.......... X X Y Cm): A lead shield of 0.762 mm thickness is utilized to form a molded, custom shield with a

## 2022-10-17 ENCOUNTER — CLINICAL SUPPORT (OUTPATIENT)
Dept: REHABILITATION | Facility: HOSPITAL | Age: 57
End: 2022-10-17
Payer: OTHER GOVERNMENT

## 2022-10-17 DIAGNOSIS — M62.89 MUSCLE HYPERTONICITY: Primary | ICD-10-CM

## 2022-10-17 DIAGNOSIS — M25.512 ACUTE PAIN OF LEFT SHOULDER: ICD-10-CM

## 2022-10-17 DIAGNOSIS — R29.898 WEAKNESS OF LEFT UPPER EXTREMITY: ICD-10-CM

## 2022-10-17 PROCEDURE — 97140 MANUAL THERAPY 1/> REGIONS: CPT | Mod: PN,CQ

## 2022-10-17 PROCEDURE — 97110 THERAPEUTIC EXERCISES: CPT | Mod: PN,CQ

## 2022-10-19 ENCOUNTER — CLINICAL SUPPORT (OUTPATIENT)
Dept: REHABILITATION | Facility: HOSPITAL | Age: 57
End: 2022-10-19
Payer: OTHER GOVERNMENT

## 2022-10-19 DIAGNOSIS — M25.512 ACUTE PAIN OF LEFT SHOULDER: ICD-10-CM

## 2022-10-19 DIAGNOSIS — R29.898 WEAKNESS OF LEFT UPPER EXTREMITY: ICD-10-CM

## 2022-10-19 DIAGNOSIS — M62.89 MUSCLE HYPERTONICITY: Primary | ICD-10-CM

## 2022-10-19 PROCEDURE — 97140 MANUAL THERAPY 1/> REGIONS: CPT | Mod: PN

## 2022-10-19 PROCEDURE — 97110 THERAPEUTIC EXERCISES: CPT | Mod: PN

## 2022-10-19 NOTE — PROGRESS NOTES
"[facility]   Physical Therapy Treatment Note     Name: Linette LIU [facility]  Clinic Number: [id number]    Therapy Diagnosis:   Encounter Diagnoses   Name Primary?    Muscle hypertonicity Yes    Acute pain of left shoulder     Weakness of left upper extremity      Physician: Ira Rhodes PA-C    Visit Date: 10/19/2022    Physician Orders: PT Eval and Treat   Medical Diagnosis from Referral: M75.52 (ICD-10-CM) - Subacromial bursitis of left shoulder joint   Evaluation Date: 9/12/2022  Authorization Period Expiration: 12/31/2022  Plan of Care Expiration: 12/12/2022  Visit # / Visits authorized: 9/ 15 (+eval)    PTA Visit #: 3/5   PN DUE: 11/17/2022    Precautions: Standard    Time In: 8:30 AM  Time Out: 9:15 AM  Total Billable Time: 45 minutes    SUBJECTIVE   Pt reports: Shoulder is doing alright, having some increased soreness in anterior shoulder/biceps region today     She was compliant with home exercise program.  Response to previous treatment: none reported  Functional change: none reported    Pain: 4/10  Location: Left shoulder    OBJECTIVE     Objective Measures updated at progress report unless specified.     TREATMENT     Linette received the treatments listed below:    (exercises and techniques performed today are bolded)     THERAPEUTIC EXERCISES to develop  strength, endurance, ROM, flexibility, posture, and core stabilization for 30 minutes including:  UBE 2/2 for range of motion, strength and posture  Upper trap stretch 2 x 30"  Levator scap stretch 2 x 30"  Posterior shoulder roll x 1 minute  Scapular retraction  Rows RTB 3 x 10 x 3" hold  Shoulder Extension RTB 3 x 10  Shoulder flexion YTB 2 x 15  Shoulder scaption YTB 3 x 10  +Shoulder flexion YTB 3 x 10  B shoulder external rotation YTB 3 x 10  (L) shoulder ER with RTB 2 x 10  (L) shoulder IR with RTB 2 x 10  Shoulder press in scaption 2 x 10 #3  Sidelying external rotations AROM 3 x 10 #3  Supine serratus punch circles " 20/20 x 2 sets  +bicep curls 4# 2 x 10  +Tricep extensions 10# 2 x 10    MANUAL THERAPY TECHNIQUES were applied  for 10 minutes including:   Shoulder Distraction   Posterior and inferior joint mobs  PROM with overpressure to left shoulder in all planes of motion   STM to upper trap (L)  Soft tissue mobilization to supraspinatus, infraspinatus and biceps  IASTM to biceps tendon and muscle belly    NEUROMUSCULAR RE-EDUCATION ACTIVITIES to improve Balance, Coordination, Kinesthetic, Sense, Proprioception, and Posture for 5 minutes.  The following were included:   Serratus wall slide with pillow case 2 x 10  Wall Ball (up, down, clockwuise, counterclockwise) 2 x 10  plinth shoulder taps 3 x 10  (L) ABCs with yellow theraband x 3    PATIENT EDUCATION AND HOME EXERCISES     Home Exercises Provided and Patient Education Provided     Education provided:   - educated pt that mild soreness is an expected outcome to therapy session  - educated pt to continue with HEP as issued and prescribed    Written Home Exercises Provided: Patient instructed to cont prior HEP. Exercises were reviewed and Linette was able to demonstrate them prior to the end of the session.  Linette demonstrated good  understanding of the education provided. See EMR under Patient Instructions for exercises provided during therapy sessions    ASSESSMENT   Pt tolerated therapy session well today with no adverse effects. She reports some soreness in her left shoulder today. Added bicep curls and triceps extensions for increased upper extremity strength with good tolerance. Progressed repetitions today to encourage muscle fatigue and increased strength to good tolerance. She exhibited muscular fatigue at end of session and quickly recovered during manual therapy. She showed difficulty with shoulder flexion and scaption with resistance bands today. Ended session with joint mobs followed with shoulder PROM with overpressure with most pain noted during shoulder flexion  and abduction at end ranges. Continue to progress pt upper extremity strength and range of motion activities.     Linette Is progressing well towards her goals.   Pt prognosis is Good.     Pt will continue to benefit from skilled outpatient physical therapy to address the deficits listed in the problem list box on initial evaluation, provide pt/family education and to maximize pt's level of independence in the home and community environment.     Pt's spiritual, cultural and educational needs considered and pt agreeable to plan of care and goals.     Anticipated barriers to physical therapy: pain    Goals:   Short Term Goals (5 Weeks):   1. Patient will demonstrate independence with HEP in order to progress toward functional independence Met  2. Pt will demonstrate improved pain by reports of less than or equal to 7/10 worst pain on the verbal rating scale in order to progress toward maximal functional ability and improve QOL. Met  3. Pt will be able to correct slouched posture with minimal verbal cueing for improved static sitting posture and increased QOL. Met     Long Term Goals (10 Weeks):   1. Patient will demonstrate improved function as indicated by a functional limitation score of 31% on the FOTO. Progressing, not met  2. Pt will demonstrate improved pain by reports of less than or equal to 5/10 worst pain on the verbal rating scale in order to progress toward maximal functional ability and improve QOL. Progressing, not met  3. Pt will be able to correct slouched posture independently for improved static sitting posture and increased QOL. Met  4. Pt will move left shoulder through functional ROM in all planes without pain to improve independence in performing yard work, house chores, and ADLs Progressing, not met  5. Pt will improve left shoulder strength to 5/5 for improved ability to perform house work, yard work, and hold grandchildren. Progressing, not met    PLAN     Continue PT per POC, assess tolerance to  today's session,  progress exercise as tolerated and appropriate    Lilia Nagy, PT, DPT  10/19/2022

## 2022-10-26 ENCOUNTER — CLINICAL SUPPORT (OUTPATIENT)
Dept: REHABILITATION | Facility: HOSPITAL | Age: 57
End: 2022-10-26
Payer: OTHER GOVERNMENT

## 2022-10-26 DIAGNOSIS — M25.512 ACUTE PAIN OF LEFT SHOULDER: ICD-10-CM

## 2022-10-26 DIAGNOSIS — M62.89 MUSCLE HYPERTONICITY: Primary | ICD-10-CM

## 2022-10-26 DIAGNOSIS — R29.898 WEAKNESS OF LEFT UPPER EXTREMITY: ICD-10-CM

## 2022-10-26 PROCEDURE — 97140 MANUAL THERAPY 1/> REGIONS: CPT | Mod: PN

## 2022-10-26 PROCEDURE — 97110 THERAPEUTIC EXERCISES: CPT | Mod: PN

## 2022-10-26 NOTE — PROGRESS NOTES
"[facility]   Physical Therapy Treatment Note     Name: Linette LIU [facility]  Clinic Number: [id number]    Therapy Diagnosis:   Encounter Diagnoses   Name Primary?    Muscle hypertonicity Yes    Acute pain of left shoulder     Weakness of left upper extremity      Physician: Ira Rhodes PA-C    Visit Date: 10/26/2022    Physician Orders: PT Eval and Treat   Medical Diagnosis from Referral: M75.52 (ICD-10-CM) - Subacromial bursitis of left shoulder joint   Evaluation Date: 9/12/2022  Authorization Period Expiration: 12/31/2022  Plan of Care Expiration: 12/12/2022  Visit # / Visits authorized: 9/ 15 (+eval)    PTA Visit #: 3/5   PN DUE: 11/17/2022    Precautions: Standard    Time In: 7:33 AM  Time Out: 8:17 AM  Total Billable Time: 45 minutes    SUBJECTIVE   Pt reports: it hurts with certain things some days and some days it doesn't  She was compliant with home exercise program.  Response to previous treatment: none reported  Functional change: none reported    Pain: 4/10  Location: Left shoulder    OBJECTIVE     Objective Measures updated at progress report unless specified.     TREATMENT     Linette received the treatments listed below:    (exercises and techniques performed today are bolded)     THERAPEUTIC EXERCISES to develop  strength, endurance, ROM, flexibility, posture, and core stabilization for 30 minutes including:  UBE 2/2 for range of motion, strength and posture  Upper trap stretch 2 x 30"  Levator scap stretch 2 x 30"  Posterior shoulder roll x 1 minute  Scapular retraction  Rows RTB 3 x 10 x 3" hold  Shoulder Extension GTB 3 x 10  Shoulder flexion YTB 2 x 15  Shoulder scaption YTB 3 x 10  Shoulder flexion YTB 3 x 10  B shoulder external rotation YTB 3 x 10  (L) shoulder ER with RTB 2 x 10  (L) shoulder IR with GTB 2 x 10  Shoulder press in scaption 2 x 10 #3  Sidelying external rotations AROM 3 x 10 #3  Supine serratus punch circles 20/20 x 2 sets  +bicep curls 4# 2 x " 10  +Tricep extensions 10# 2 x 10  + Doorway Pec stretch 30 sec x 3   + Prone middle/lower trap strengthening (T's/Y's) 2 x 10 each    MANUAL THERAPY TECHNIQUES were applied  for 10 minutes including:   Shoulder Distraction   Posterior and inferior joint mobs  PROM with overpressure to left shoulder in all planes of motion   STM to upper trap (L)  Soft tissue mobilization to supraspinatus, infraspinatus and biceps  IASTM to biceps tendon and muscle belly    NEUROMUSCULAR RE-EDUCATION ACTIVITIES to improve Balance, Coordination, Kinesthetic, Sense, Proprioception, and Posture for 0 minutes.  The following were included:   Serratus wall slide with pillow case 2 x 10  Wall Ball (up, down, clockwuise, counterclockwise) 2 x 10  plinth shoulder taps 3 x 10  (L) ABCs with yellow theraband x 3    PATIENT EDUCATION AND HOME EXERCISES     Home Exercises Provided and Patient Education Provided     Education provided:   - educated pt that mild soreness is an expected outcome to therapy session  - educated pt to continue with HEP as issued and prescribed    Written Home Exercises Provided: Patient instructed to cont prior HEP. Exercises were reviewed and Linette was able to demonstrate them prior to the end of the session.  Linette demonstrated good  understanding of the education provided. See EMR under Patient Instructions for exercises provided during therapy sessions    ASSESSMENT   Pt tolerated therapy session well today with no adverse effects. Added pectoralis stretching and middle/lower trap strengthening to support opening anterior shoulder to decrease impingement. She exhibited muscular fatigue at end of session and demonstrated mild difficulty with shoulder flexion and scaption with resistance bands as well as middle/lower trap strengthening in prone. Continue to progress pt upper extremity strength and range of motion activities.     Linette Is progressing well towards her goals.   Pt prognosis is Good.     Pt will continue  to benefit from skilled outpatient physical therapy to address the deficits listed in the problem list box on initial evaluation, provide pt/family education and to maximize pt's level of independence in the home and community environment.     Pt's spiritual, cultural and educational needs considered and pt agreeable to plan of care and goals.     Anticipated barriers to physical therapy: pain    Goals:   Short Term Goals (5 Weeks):   1. Patient will demonstrate independence with HEP in order to progress toward functional independence Met  2. Pt will demonstrate improved pain by reports of less than or equal to 7/10 worst pain on the verbal rating scale in order to progress toward maximal functional ability and improve QOL. Met  3. Pt will be able to correct slouched posture with minimal verbal cueing for improved static sitting posture and increased QOL. Met     Long Term Goals (10 Weeks):   1. Patient will demonstrate improved function as indicated by a functional limitation score of 31% on the FOTO. Progressing, not met  2. Pt will demonstrate improved pain by reports of less than or equal to 5/10 worst pain on the verbal rating scale in order to progress toward maximal functional ability and improve QOL. Progressing, not met  3. Pt will be able to correct slouched posture independently for improved static sitting posture and increased QOL. Met  4. Pt will move left shoulder through functional ROM in all planes without pain to improve independence in performing yard work, house chores, and ADLs Progressing, not met  5. Pt will improve left shoulder strength to 5/5 for improved ability to perform house work, yard work, and hold grandchildren. Progressing, not met    PLAN     Continue PT per POC, assess tolerance to today's session,  progress exercise as tolerated and appropriate    Mali Christine, PT, DPT  10/26/2022

## 2022-10-28 ENCOUNTER — CLINICAL SUPPORT (OUTPATIENT)
Dept: REHABILITATION | Facility: HOSPITAL | Age: 57
End: 2022-10-28
Payer: OTHER GOVERNMENT

## 2022-10-28 DIAGNOSIS — M25.512 ACUTE PAIN OF LEFT SHOULDER: ICD-10-CM

## 2022-10-28 DIAGNOSIS — R29.898 WEAKNESS OF LEFT UPPER EXTREMITY: ICD-10-CM

## 2022-10-28 DIAGNOSIS — M62.89 MUSCLE HYPERTONICITY: Primary | ICD-10-CM

## 2022-10-28 PROCEDURE — 97110 THERAPEUTIC EXERCISES: CPT | Mod: PN

## 2022-10-28 PROCEDURE — 97140 MANUAL THERAPY 1/> REGIONS: CPT | Mod: PN

## 2022-10-28 NOTE — PROGRESS NOTES
"[facility]   Physical Therapy Treatment Note     Name: Linette LIU [facility]  Clinic Number: [id number]    Therapy Diagnosis:   Encounter Diagnoses   Name Primary?    Muscle hypertonicity Yes    Acute pain of left shoulder     Weakness of left upper extremity      Physician: Ira Rhodes PA-C    Visit Date: 10/28/2022    Physician Orders: PT Eval and Treat   Medical Diagnosis from Referral: M75.52 (ICD-10-CM) - Subacromial bursitis of left shoulder joint   Evaluation Date: 9/12/2022  Authorization Period Expiration: 12/31/2022  Plan of Care Expiration: 12/12/2022  Visit # / Visits authorized: 12/ 15 (+eval)    PTA Visit #: 0/5   PN DUE: 11/17/2022    Precautions: Standard    Time In: 8:15 AM  Time Out: 9:00 AM  Total Billable Time: 45 minutes    SUBJECTIVE   Pt reports: she is on steroids for her sinuses and is tired today from not sleeping all night. She reports she had increased burning and tingling in her left shoulder last night as well.     She was compliant with home exercise program.  Response to previous treatment: none reported  Functional change: none reported    Pain: 4/10  Location: Left shoulder    OBJECTIVE     Objective Measures updated at progress report unless specified.     TREATMENT     Linette received the treatments listed below:    (exercises and techniques performed today are bolded)     THERAPEUTIC EXERCISES to develop  strength, endurance, ROM, flexibility, posture, and core stabilization for 30 minutes including:  UBE 2/2 for range of motion, strength and posture  Upper trap stretch 2 x 30"  Levator scap stretch 2 x 30"  Posterior shoulder roll x 1 minute  Scapular retraction  Rows +10#  2 x 10 x 3" hold  Shoulder Extension GTB 3 x 10  +Straight arm pull downs 13# 2 x 10  Shoulder flexion YTB 2 x 15  Shoulder scaption YTB 2 x 10  Shoulder flexion YTB 2 x 10  B shoulder external rotation RTB 3 x 10  (L) shoulder ER with +GTB 2 x 10  (L) shoulder IR with GTB 2 x " 10  Shoulder press in scaption 2 x 10 #3  Sidelying external rotations AROM 3 x 10 #3  Supine serratus punch circles 20/20 x 2 sets  +bicep curls 4# 2 x 10  +Tricep extensions 10# 2 x 10  Doorway Pec stretch 30 sec x 3   Prone middle/lower trap strengthening (T's/Y's) 2 x 10 each  + plinth shoulder taps 2 x 10    MANUAL THERAPY TECHNIQUES were applied  for 10 minutes including:   Shoulder Distraction   Posterior and inferior joint mobs  PROM with overpressure to left shoulder in all planes of motion   STM to upper trap (L)  Soft tissue mobilization to supraspinatus, infraspinatus and biceps  IASTM to biceps tendon and muscle belly    NEUROMUSCULAR RE-EDUCATION ACTIVITIES to improve Balance, Coordination, Kinesthetic, Sense, Proprioception, and Posture for 5 minutes.  The following were included:   Serratus wall slide with pillow case 2 x 10  Wall Ball (up, down, clockwuise, counterclockwise) 2 x 10  plinth shoulder taps 3 x 10  (L) ABCs with +red theraband x 3    PATIENT EDUCATION AND HOME EXERCISES     Home Exercises Provided and Patient Education Provided     Education provided:   - educated pt that mild soreness is an expected outcome to therapy session  - educated pt to continue with HEP as issued and prescribed    Written Home Exercises Provided: Patient instructed to cont prior HEP. Exercises were reviewed and Linette was able to demonstrate them prior to the end of the session.  Linette demonstrated good  understanding of the education provided. See EMR under Patient Instructions for exercises provided during therapy sessions    ASSESSMENT   Pt tolerated therapy session well today with no adverse effects. Therapy session focused on RTC strengthening, posterior chain strengthening, and muscular endurance. Added plinth shoulder taps for increased weight bearing tolerance and upper extremity strength. Progressed weights and resistance during most exercises today with good tolerance. Continued with Prone series for  increased scapular strengthening and opening anterior shoulder for decreased impingement. She exhibits increased ease with shoulder flexion and scaption with resistance bands during today's therapy session. Applied posterior and inferior glides followed with PROM and overpressure for increased range of motion. Ended session with soft tissue mobilization with good tolerance and tenderness over infraspinatus tendon. Continue to progress pt upper extremity strength and range of motion activities.     Linette Is progressing well towards her goals.   Pt prognosis is Good.     Pt will continue to benefit from skilled outpatient physical therapy to address the deficits listed in the problem list box on initial evaluation, provide pt/family education and to maximize pt's level of independence in the home and community environment.     Pt's spiritual, cultural and educational needs considered and pt agreeable to plan of care and goals.     Anticipated barriers to physical therapy: pain    Goals:   Short Term Goals (5 Weeks):   1. Patient will demonstrate independence with HEP in order to progress toward functional independence Met  2. Pt will demonstrate improved pain by reports of less than or equal to 7/10 worst pain on the verbal rating scale in order to progress toward maximal functional ability and improve QOL. Met  3. Pt will be able to correct slouched posture with minimal verbal cueing for improved static sitting posture and increased QOL. Met     Long Term Goals (10 Weeks):   1. Patient will demonstrate improved function as indicated by a functional limitation score of 31% on the FOTO. Progressing, not met  2. Pt will demonstrate improved pain by reports of less than or equal to 5/10 worst pain on the verbal rating scale in order to progress toward maximal functional ability and improve QOL. Progressing, not met  3. Pt will be able to correct slouched posture independently for improved static sitting posture and  increased QOL. Met  4. Pt will move left shoulder through functional ROM in all planes without pain to improve independence in performing yard work, house chores, and ADLs Progressing, not met  5. Pt will improve left shoulder strength to 5/5 for improved ability to perform house work, yard work, and hold grandchildren. Progressing, not met    PLAN     Continue PT per POC, assess tolerance to today's session,  progress exercise as tolerated and appropriate    Lilia Nagy, PT, DPT  10/28/2022

## 2022-10-31 ENCOUNTER — CLINICAL SUPPORT (OUTPATIENT)
Dept: REHABILITATION | Facility: HOSPITAL | Age: 57
End: 2022-10-31
Payer: OTHER GOVERNMENT

## 2022-10-31 DIAGNOSIS — M62.89 MUSCLE HYPERTONICITY: Primary | ICD-10-CM

## 2022-10-31 DIAGNOSIS — M25.512 ACUTE PAIN OF LEFT SHOULDER: ICD-10-CM

## 2022-10-31 DIAGNOSIS — R29.898 WEAKNESS OF LEFT UPPER EXTREMITY: ICD-10-CM

## 2022-10-31 PROCEDURE — 97140 MANUAL THERAPY 1/> REGIONS: CPT | Mod: PN,CQ

## 2022-10-31 PROCEDURE — 97110 THERAPEUTIC EXERCISES: CPT | Mod: PN,CQ

## 2022-11-04 ENCOUNTER — LAB VISIT (OUTPATIENT)
Dept: LAB | Facility: HOSPITAL | Age: 57
End: 2022-11-04
Attending: NURSE PRACTITIONER
Payer: OTHER GOVERNMENT

## 2022-11-04 ENCOUNTER — TELEPHONE (OUTPATIENT)
Dept: SLEEP MEDICINE | Facility: CLINIC | Age: 57
End: 2022-11-04
Payer: OTHER GOVERNMENT

## 2022-11-04 ENCOUNTER — CLINICAL SUPPORT (OUTPATIENT)
Dept: REHABILITATION | Facility: HOSPITAL | Age: 57
End: 2022-11-04
Payer: OTHER GOVERNMENT

## 2022-11-04 ENCOUNTER — OFFICE VISIT (OUTPATIENT)
Dept: INTERNAL MEDICINE | Facility: CLINIC | Age: 57
End: 2022-11-04
Payer: OTHER GOVERNMENT

## 2022-11-04 VITALS
WEIGHT: 245.13 LBS | HEIGHT: 64 IN | BODY MASS INDEX: 41.85 KG/M2 | DIASTOLIC BLOOD PRESSURE: 80 MMHG | SYSTOLIC BLOOD PRESSURE: 119 MMHG | TEMPERATURE: 98 F | HEART RATE: 89 BPM

## 2022-11-04 DIAGNOSIS — F41.8 MIXED ANXIETY DEPRESSIVE DISORDER: ICD-10-CM

## 2022-11-04 DIAGNOSIS — Z98.84 S/P BARIATRIC SURGERY: ICD-10-CM

## 2022-11-04 DIAGNOSIS — M62.89 MUSCLE HYPERTONICITY: Primary | ICD-10-CM

## 2022-11-04 DIAGNOSIS — R53.83 FATIGUE, UNSPECIFIED TYPE: ICD-10-CM

## 2022-11-04 DIAGNOSIS — R29.898 WEAKNESS OF LEFT UPPER EXTREMITY: ICD-10-CM

## 2022-11-04 DIAGNOSIS — M25.512 ACUTE PAIN OF LEFT SHOULDER: ICD-10-CM

## 2022-11-04 DIAGNOSIS — R53.83 FATIGUE, UNSPECIFIED TYPE: Primary | ICD-10-CM

## 2022-11-04 DIAGNOSIS — G47.19 EXCESSIVE DAYTIME SLEEPINESS: ICD-10-CM

## 2022-11-04 LAB
25(OH)D3+25(OH)D2 SERPL-MCNC: 60 NG/ML (ref 30–96)
ALBUMIN SERPL BCP-MCNC: 3.9 G/DL (ref 3.5–5.2)
ALP SERPL-CCNC: 86 U/L (ref 55–135)
ALT SERPL W/O P-5'-P-CCNC: 17 U/L (ref 10–44)
ANION GAP SERPL CALC-SCNC: 11 MMOL/L (ref 8–16)
AST SERPL-CCNC: 13 U/L (ref 10–40)
BASOPHILS # BLD AUTO: 0.07 K/UL (ref 0–0.2)
BASOPHILS NFR BLD: 0.9 % (ref 0–1.9)
BILIRUB SERPL-MCNC: 0.4 MG/DL (ref 0.1–1)
BUN SERPL-MCNC: 16 MG/DL (ref 6–20)
CALCIUM SERPL-MCNC: 9.7 MG/DL (ref 8.7–10.5)
CHLORIDE SERPL-SCNC: 105 MMOL/L (ref 95–110)
CO2 SERPL-SCNC: 24 MMOL/L (ref 23–29)
CREAT SERPL-MCNC: 1 MG/DL (ref 0.5–1.4)
DIFFERENTIAL METHOD: ABNORMAL
EOSINOPHIL # BLD AUTO: 0.3 K/UL (ref 0–0.5)
EOSINOPHIL NFR BLD: 3.8 % (ref 0–8)
ERYTHROCYTE [DISTWIDTH] IN BLOOD BY AUTOMATED COUNT: 13.2 % (ref 11.5–14.5)
EST. GFR  (NO RACE VARIABLE): >60 ML/MIN/1.73 M^2
ESTIMATED AVG GLUCOSE: 117 MG/DL (ref 68–131)
FERRITIN SERPL-MCNC: 83 NG/ML (ref 20–300)
GLUCOSE SERPL-MCNC: 74 MG/DL (ref 70–110)
HBA1C MFR BLD: 5.7 % (ref 4–5.6)
HCT VFR BLD AUTO: 44.6 % (ref 37–48.5)
HGB BLD-MCNC: 14.3 G/DL (ref 12–16)
IMM GRANULOCYTES # BLD AUTO: 0.06 K/UL (ref 0–0.04)
IMM GRANULOCYTES NFR BLD AUTO: 0.8 % (ref 0–0.5)
IRON SERPL-MCNC: 89 UG/DL (ref 30–160)
LYMPHOCYTES # BLD AUTO: 2 K/UL (ref 1–4.8)
LYMPHOCYTES NFR BLD: 25.7 % (ref 18–48)
MCH RBC QN AUTO: 30 PG (ref 27–31)
MCHC RBC AUTO-ENTMCNC: 32.1 G/DL (ref 32–36)
MCV RBC AUTO: 94 FL (ref 82–98)
MONOCYTES # BLD AUTO: 0.7 K/UL (ref 0.3–1)
MONOCYTES NFR BLD: 8.7 % (ref 4–15)
NEUTROPHILS # BLD AUTO: 4.6 K/UL (ref 1.8–7.7)
NEUTROPHILS NFR BLD: 60.1 % (ref 38–73)
NRBC BLD-RTO: 0 /100 WBC
PLATELET # BLD AUTO: 206 K/UL (ref 150–450)
PMV BLD AUTO: 12.4 FL (ref 9.2–12.9)
POTASSIUM SERPL-SCNC: 4.4 MMOL/L (ref 3.5–5.1)
PROT SERPL-MCNC: 7.2 G/DL (ref 6–8.4)
RBC # BLD AUTO: 4.76 M/UL (ref 4–5.4)
SATURATED IRON: 23 % (ref 20–50)
SODIUM SERPL-SCNC: 140 MMOL/L (ref 136–145)
TOTAL IRON BINDING CAPACITY: 379 UG/DL (ref 250–450)
TRANSFERRIN SERPL-MCNC: 256 MG/DL (ref 200–375)
TSH SERPL DL<=0.005 MIU/L-ACNC: 1.9 UIU/ML (ref 0.4–4)
VIT B12 SERPL-MCNC: 1659 PG/ML (ref 210–950)
WBC # BLD AUTO: 7.7 K/UL (ref 3.9–12.7)

## 2022-11-04 PROCEDURE — 85025 COMPLETE CBC W/AUTO DIFF WBC: CPT | Performed by: NURSE PRACTITIONER

## 2022-11-04 PROCEDURE — 84443 ASSAY THYROID STIM HORMONE: CPT | Performed by: NURSE PRACTITIONER

## 2022-11-04 PROCEDURE — 99214 PR OFFICE/OUTPT VISIT, EST, LEVL IV, 30-39 MIN: ICD-10-PCS | Mod: S$PBB,,, | Performed by: NURSE PRACTITIONER

## 2022-11-04 PROCEDURE — 82306 VITAMIN D 25 HYDROXY: CPT | Performed by: NURSE PRACTITIONER

## 2022-11-04 PROCEDURE — 90471 IMMUNIZATION ADMIN: CPT | Mod: PBBFAC,PO

## 2022-11-04 PROCEDURE — 99999 PR PBB SHADOW E&M-EST. PATIENT-LVL III: ICD-10-PCS | Mod: PBBFAC,,, | Performed by: NURSE PRACTITIONER

## 2022-11-04 PROCEDURE — 82607 VITAMIN B-12: CPT | Performed by: NURSE PRACTITIONER

## 2022-11-04 PROCEDURE — 97110 THERAPEUTIC EXERCISES: CPT | Mod: PN,CQ

## 2022-11-04 PROCEDURE — 82728 ASSAY OF FERRITIN: CPT | Performed by: NURSE PRACTITIONER

## 2022-11-04 PROCEDURE — 36415 COLL VENOUS BLD VENIPUNCTURE: CPT | Mod: PO | Performed by: NURSE PRACTITIONER

## 2022-11-04 PROCEDURE — 83036 HEMOGLOBIN GLYCOSYLATED A1C: CPT | Performed by: NURSE PRACTITIONER

## 2022-11-04 PROCEDURE — 80053 COMPREHEN METABOLIC PANEL: CPT | Performed by: NURSE PRACTITIONER

## 2022-11-04 PROCEDURE — 99213 OFFICE O/P EST LOW 20 MIN: CPT | Mod: PBBFAC,25,PO | Performed by: NURSE PRACTITIONER

## 2022-11-04 PROCEDURE — 99214 OFFICE O/P EST MOD 30 MIN: CPT | Mod: S$PBB,,, | Performed by: NURSE PRACTITIONER

## 2022-11-04 PROCEDURE — 84466 ASSAY OF TRANSFERRIN: CPT | Performed by: NURSE PRACTITIONER

## 2022-11-04 PROCEDURE — 99999 PR PBB SHADOW E&M-EST. PATIENT-LVL III: CPT | Mod: PBBFAC,,, | Performed by: NURSE PRACTITIONER

## 2022-11-04 NOTE — PROGRESS NOTES
"[facility]   Physical Therapy Treatment Note     Name: Linette LIU [facility]  Clinic Number: [id number]    Therapy Diagnosis:   Encounter Diagnoses   Name Primary?    Muscle hypertonicity Yes    Acute pain of left shoulder     Weakness of left upper extremity          Physician: Ira Rhodes PA-C    Visit Date: 11/4/2022    Physician Orders: PT Eval and Treat   Medical Diagnosis from Referral: M75.52 (ICD-10-CM) - Subacromial bursitis of left shoulder joint   Evaluation Date: 9/12/2022  Authorization Period Expiration: 12/31/2022  Plan of Care Expiration: 12/12/2022  Visit # / Visits authorized: 14/ 15 (+eval)    PTA Visit #: 2/5   PN DUE: 11/17/2022    Precautions: Standard    Time In: 9:00 AM  Time Out: 9:35 AM  Total Billable Time: 35 minutes    SUBJECTIVE   Pt reports: she is only experiencing pain with certain movements, such as crossing her arms, however has no current pain.    She was compliant with home exercise program.  Response to previous treatment: none reported  Functional change: none reported    Pain: 0/10  Location: Left shoulder    OBJECTIVE     Objective Measures updated at progress report unless specified.     TREATMENT     Linette received the treatments listed below:    (exercises and techniques performed today are bolded)     THERAPEUTIC EXERCISES to develop  strength, endurance, ROM, flexibility, posture, and core stabilization for 30 minutes including:  UBE +3/3 for range of motion, strength and posture  Upper trap stretch 2 x 30"  Levator scap stretch 2 x 30"  Posterior shoulder roll x 1 minute  Scapular retraction  Rows 13#  2 x 10 x 3" hold  Shoulder Extension GTB 3 x 10  Straight arm pull downs 13# 2 x 10  Shoulder flexion YTB 2 x 15  Shoulder scaption +RTB 2 x 10  Shoulder flexion +RTB 2 x 10  B shoulder external rotation RTB 3 x 10  (L) shoulder ER with GTB 2 x 10   (L) shoulder IR with GTB 2 x 10  Shoulder press in scaption 2 x 10 #3  Sidelying external " rotations AROM 3 x 10 #3  Supine serratus punch circles 20/20 x 2 sets  Bicep curls 4# 2 x 10   Tricep extensions 17# 2 x 10  Doorway Pec stretch 30 sec x 3   Prone middle/lower trap strengthening (T's/Y's) 2 x 10 each  Plinth shoulder taps 2 x 10    MANUAL THERAPY TECHNIQUES were applied  for 0 minutes including:   Shoulder Distraction   Posterior and inferior joint mobs  PROM with overpressure to left shoulder in all planes of motion   STM to upper trap (L)  Soft tissue mobilization to supraspinatus, infraspinatus and biceps  IASTM to biceps tendon and muscle belly    NEUROMUSCULAR RE-EDUCATION ACTIVITIES to improve Balance, Coordination, Kinesthetic, Sense, Proprioception, and Posture for 5 minutes.  The following were included:   Serratus wall slide with pillow case 2 x 10  Wall Ball (up, down, clockwuise, counterclockwise) 2 x 10  plinth shoulder taps 3 x 10  (L) ABCs with red theraband x 3    PATIENT EDUCATION AND HOME EXERCISES     Home Exercises Provided and Patient Education Provided     Education provided:   - educated pt that mild soreness is an expected outcome to therapy session  - educated pt to continue with HEP as issued and prescribed    Written Home Exercises Provided: Patient instructed to cont prior HEP. Exercises were reviewed and Linette was able to demonstrate them prior to the end of the session.  Linette demonstrated good  understanding of the education provided. See EMR under Patient Instructions for exercises provided during therapy sessions    ASSESSMENT   Pt tolerated therapy session well today with no provocation of symptoms, including performance of resistance increases. This session is focused on strengthening of the L RTC. Pt demonstrates appropriate performance of all exercises requiring minimal verbal cues for postural awareness to avoid compensation.      Linette Is progressing well towards her goals.   Pt prognosis is Good.     Pt will continue to benefit from skilled outpatient physical  therapy to address the deficits listed in the problem list box on initial evaluation, provide pt/family education and to maximize pt's level of independence in the home and community environment.     Pt's spiritual, cultural and educational needs considered and pt agreeable to plan of care and goals.     Anticipated barriers to physical therapy: pain    Goals:   Short Term Goals (5 Weeks):   1. Patient will demonstrate independence with HEP in order to progress toward functional independence Met  2. Pt will demonstrate improved pain by reports of less than or equal to 7/10 worst pain on the verbal rating scale in order to progress toward maximal functional ability and improve QOL. Met  3. Pt will be able to correct slouched posture with minimal verbal cueing for improved static sitting posture and increased QOL. Met     Long Term Goals (10 Weeks):   1. Patient will demonstrate improved function as indicated by a functional limitation score of 31% on the FOTO. Progressing, not met  2. Pt will demonstrate improved pain by reports of less than or equal to 5/10 worst pain on the verbal rating scale in order to progress toward maximal functional ability and improve QOL. Progressing, not met  3. Pt will be able to correct slouched posture independently for improved static sitting posture and increased QOL. Met  4. Pt will move left shoulder through functional ROM in all planes without pain to improve independence in performing yard work, house chores, and ADLs Progressing, not met  5. Pt will improve left shoulder strength to 5/5 for improved ability to perform house work, yard work, and hold grandchildren. Progressing, not met    PLAN     Continue PT per POC, assess tolerance to today's session,  progress exercise as tolerated and appropriate    Linette Tena (Becca), PTA,  11/4/2022

## 2022-11-04 NOTE — PROGRESS NOTES
"Subjective:       Patient ID: Linette Galo is a 57 y.o. female.    Chief Complaint: Fatigue    Patient here for fatigue  Tired, worn out  Naps for 2 hours after she gets her kids off to school  Has hx of partial hyst;still has ovaries   No blood in stool  Started b12 6 months ago; also on multi vitamin, tumeric, mircia   followed by outside psych. Vyvanse was stopped in sept due to tremors  Patient states she has lots of stress  Dozes off in chair when she sits in the afternoon   Patient admits she does not sleep well because  snores and does not use his cpap  Sometimes wakes up with headaches  Had sleep study 2008; had gastric bypass and never used cpap    Fatigue  Associated symptoms include fatigue. Pertinent negatives include no chest pain, chills, coughing, diaphoresis, fever or rash.     /80   Pulse 89   Temp 97.7 °F (36.5 °C) (Temporal)   Ht 5' 4" (1.626 m)   Wt 111.2 kg (245 lb 2.4 oz)   BMI 42.08 kg/m²     Review of Systems   Constitutional:  Positive for activity change and fatigue. Negative for appetite change, chills, diaphoresis, fever and unexpected weight change.   HENT: Negative.     Respiratory:  Negative for cough and shortness of breath.    Cardiovascular:  Negative for chest pain, palpitations and leg swelling.   Gastrointestinal: Negative.    Genitourinary: Negative.    Musculoskeletal: Negative.    Skin:  Negative for color change, pallor, rash and wound.   Allergic/Immunologic: Negative for immunocompromised state.   Neurological: Negative.  Negative for dizziness and facial asymmetry.   Hematological:  Negative for adenopathy. Does not bruise/bleed easily.   Psychiatric/Behavioral:  Positive for sleep disturbance. Negative for agitation, behavioral problems and confusion.      Objective:      Physical Exam  Vitals and nursing note reviewed.   Constitutional:       General: She is not in acute distress.     Appearance: Normal appearance. She is well-developed. She is " not diaphoretic.   HENT:      Head: Normocephalic and atraumatic.   Neck:      Comments: Neck circ; 16.5 inches  Cardiovascular:      Rate and Rhythm: Normal rate and regular rhythm.      Heart sounds: Normal heart sounds. No murmur heard.  Pulmonary:      Effort: Pulmonary effort is normal. No respiratory distress.      Breath sounds: Normal breath sounds.   Musculoskeletal:         General: Normal range of motion.   Skin:     General: Skin is warm and dry.      Findings: No rash.   Neurological:      Mental Status: She is alert.   Psychiatric:         Mood and Affect: Mood normal.         Behavior: Behavior normal. Behavior is cooperative.         Thought Content: Thought content normal.         Judgment: Judgment normal.       EPWORTH SLEEPINESS SCALE 11/4/2022   Sitting and reading 2   Watching TV 2   Sitting, inactive in a public place (e.g. a theatre or a meeting) 2   As a passenger in a car for an hour without a break 3   Lying down to rest in the afternoon when circumstances permit 3   Sitting and talking to someone 2   Sitting quietly after a lunch without alcohol 3   In a car, while stopped for a few minutes in traffic 1   Total score 18       Assessment:       1. Fatigue, unspecified type    2. Excessive daytime sleepiness    3. BMI 40.0-44.9, adult    4. Mixed anxiety depressive disorder    5. S/P bariatric surgery        Plan:       Linette was seen today for fatigue.    Diagnoses and all orders for this visit:    Fatigue, unspecified type  -     CBC Auto Differential; Future  -     Comprehensive Metabolic Panel; Future  -     TSH; Future  -     Hemoglobin A1C; Future  -     Vitamin D; Future  -     Ferritin; Future  -     Iron and TIBC; Future  -     Vitamin B12; Future  -     Home Sleep Study; Future    Excessive daytime sleepiness    BMI 40.0-44.9, adult    Mixed anxiety depressive disorder  Stable followed by outside psych    S/P bariatric surgery  Not controlled; discussed diet, exercise, lifestyle  changes for weight reduction    Other orders  -     Influenza - Quadrivalent (PF)    Will do labs and home sleep study  Will contact with results and recommendations

## 2022-11-07 ENCOUNTER — CLINICAL SUPPORT (OUTPATIENT)
Dept: REHABILITATION | Facility: HOSPITAL | Age: 57
End: 2022-11-07
Payer: OTHER GOVERNMENT

## 2022-11-07 DIAGNOSIS — R29.898 WEAKNESS OF LEFT UPPER EXTREMITY: ICD-10-CM

## 2022-11-07 DIAGNOSIS — M62.89 MUSCLE HYPERTONICITY: Primary | ICD-10-CM

## 2022-11-07 DIAGNOSIS — M25.512 ACUTE PAIN OF LEFT SHOULDER: ICD-10-CM

## 2022-11-07 PROCEDURE — 97110 THERAPEUTIC EXERCISES: CPT | Mod: PN,CQ

## 2022-11-07 NOTE — PROGRESS NOTES
"[facility]   Physical Therapy Treatment Note     Name: Linette LIU [facility]  Clinic Number: [id number]    Therapy Diagnosis:   Encounter Diagnoses   Name Primary?    Muscle hypertonicity Yes    Acute pain of left shoulder     Weakness of left upper extremity          Physician: Ira Rhodes PA-C    Visit Date: 11/7/2022    Physician Orders: PT Eval and Treat   Medical Diagnosis from Referral: M75.52 (ICD-10-CM) - Subacromial bursitis of left shoulder joint   Evaluation Date: 9/12/2022  Authorization Period Expiration: 12/31/2022  Plan of Care Expiration: 12/12/2022  Visit # / Visits authorized: 15/ 15 (+eval)    PTA Visit #: 3/5   PN DUE: 11/17/2022    Precautions: Standard    Time In: 9:00 AM  Time Out: 9:45 AM  Total Billable Time: 45 minutes    SUBJECTIVE   Pt reports: she is had some soreness in her L biceps after her last session and is currently experiencing soreness to her L shoulder.    She was compliant with home exercise program.  Response to previous treatment: some soreness  Functional change: able to lift grandchildren using BLE and BUE.     Pain: 2-3/10  Location: Left shoulder    OBJECTIVE     Objective Measures updated at progress report unless specified.     TREATMENT     Linette received the treatments listed below:    (exercises and techniques performed today are bolded)     THERAPEUTIC EXERCISES to develop  strength, endurance, ROM, flexibility, posture, and core stabilization for 35 minutes including:  UBE 3/3 for range of motion, strength and posture +hill intervals L2 peak resistance  Upper trap stretch 2 x 30"  Levator scap stretch 2 x 30"  Posterior shoulder roll x 1 minute  Scapular retraction  Rows 13#  2 x 10 x 3" hold  Shoulder Extension GTB 3 x 10  Straight arm pull downs 13# 2 x 10  Shoulder flexion YTB 2 x 15  Shoulder scaption +RTB 2 x 10  Shoulder flexion +RTB 2 x 10  B shoulder external rotation RTB 3 x 10  (L) shoulder ER with GTB 2 x 10   (L) " "shoulder IR with GTB 2 x 10  Shoulder press in scaption 2 x 10 #3  Sidelying external rotations AROM 3 x 10 #3  Supine serratus punch circles 20/20 x 2 sets  Bicep curls +5# 2 x 10   Tricep extensions 17# 2 x 10  Doorway Pec stretch 3x30"  + B doorway pec stretch Y/T/W - x30" ea    Prone middle/lower trap strengthening (T's/Y's) 2 x 10 each  + Blackburns x10 ea    Plinth shoulder taps 2 x 10    MANUAL THERAPY TECHNIQUES were applied  for 5 minutes including:   Shoulder Distraction   Posterior and inferior joint mobs  PROM with overpressure to left shoulder in all planes of motion   STM to upper trap (L)  Soft tissue mobilization to supraspinatus, infraspinatus and biceps  IASTM to biceps tendon and muscle belly  PNF Rhythmic stabilizations - 2x30" with 5" iso holds with PTA providing resisitance    NEUROMUSCULAR RE-EDUCATION ACTIVITIES to improve Balance, Coordination, Kinesthetic, Sense, Proprioception, and Posture for 5 minutes.  The following were included:   Serratus wall slide with pillow case 2 x 10  Wall Ball (up, down, clockwuise, counterclockwise) 2 x 10  plinth shoulder taps 3 x 10  (L) ABCs with red theraband x 3    PATIENT EDUCATION AND HOME EXERCISES     Home Exercises Provided and Patient Education Provided     Education provided:   - educated pt that mild soreness is an expected outcome to therapy session  - educated pt to continue with HEP as issued and prescribed    Written Home Exercises Provided: Patient instructed to cont prior HEP. Exercises were reviewed and Linette was able to demonstrate them prior to the end of the session.  Linette demonstrated good  understanding of the education provided. See EMR under Patient Instructions for exercises provided during therapy sessions    ASSESSMENT   Pt tolerated this session well, with no adverse effects. Pt performs hill intervals on UBE, and blackburn exercises in pain free ROM with minimal verbal or tactile cueing for postural awareness to avoid " compensation for shoulder and scapular strengthening and endurance. This session is ended with STM followed by supine PNF rhythmic stabilization for scap and shoulder stabilization and proprioception.     Linette Is progressing well towards her goals.   Pt prognosis is Good.     Pt will continue to benefit from skilled outpatient physical therapy to address the deficits listed in the problem list box on initial evaluation, provide pt/family education and to maximize pt's level of independence in the home and community environment.     Pt's spiritual, cultural and educational needs considered and pt agreeable to plan of care and goals.     Anticipated barriers to physical therapy: pain    Goals:   Short Term Goals (5 Weeks):   1. Patient will demonstrate independence with HEP in order to progress toward functional independence Met  2. Pt will demonstrate improved pain by reports of less than or equal to 7/10 worst pain on the verbal rating scale in order to progress toward maximal functional ability and improve QOL. Met  3. Pt will be able to correct slouched posture with minimal verbal cueing for improved static sitting posture and increased QOL. Met     Long Term Goals (10 Weeks):   1. Patient will demonstrate improved function as indicated by a functional limitation score of 31% on the FOTO. Progressing, not met  2. Pt will demonstrate improved pain by reports of less than or equal to 5/10 worst pain on the verbal rating scale in order to progress toward maximal functional ability and improve QOL. Progressing, not met  3. Pt will be able to correct slouched posture independently for improved static sitting posture and increased QOL. Met  4. Pt will move left shoulder through functional ROM in all planes without pain to improve independence in performing yard work, house chores, and ADLs Progressing, not met  5. Pt will improve left shoulder strength to 5/5 for improved ability to perform house work, yard work, and  hold grandchildren. Progressing, not met    PLAN     Continue PT per POC, assess tolerance to today's session,  progress exercise as tolerated and appropriate    iLnette Tena (Becca), PTA,  11/7/2022

## 2022-11-09 ENCOUNTER — HOSPITAL ENCOUNTER (OUTPATIENT)
Dept: RADIOLOGY | Facility: HOSPITAL | Age: 57
Discharge: HOME | End: 2022-11-09
Attending: FAMILY MEDICINE
Payer: OTHER GOVERNMENT

## 2022-11-09 ENCOUNTER — CLINICAL SUPPORT (OUTPATIENT)
Dept: REHABILITATION | Facility: HOSPITAL | Age: 57
End: 2022-11-09
Payer: OTHER GOVERNMENT

## 2022-11-09 DIAGNOSIS — Z12.31 ENCOUNTER FOR SCREENING MAMMOGRAM FOR BREAST CANCER: ICD-10-CM

## 2022-11-09 DIAGNOSIS — M25.512 ACUTE PAIN OF LEFT SHOULDER: ICD-10-CM

## 2022-11-09 DIAGNOSIS — M62.89 MUSCLE HYPERTONICITY: Primary | ICD-10-CM

## 2022-11-09 DIAGNOSIS — R29.898 WEAKNESS OF LEFT UPPER EXTREMITY: ICD-10-CM

## 2022-11-09 PROCEDURE — 77067 SCR MAMMO BI INCL CAD: CPT | Mod: 26,,, | Performed by: RADIOLOGY

## 2022-11-09 PROCEDURE — 77067 MAMMO DIGITAL SCREENING BILAT WITH TOMO: ICD-10-PCS | Mod: 26,,, | Performed by: RADIOLOGY

## 2022-11-09 PROCEDURE — 77063 MAMMO DIGITAL SCREENING BILAT WITH TOMO: ICD-10-PCS | Mod: 26,,, | Performed by: RADIOLOGY

## 2022-11-09 PROCEDURE — 77067 SCR MAMMO BI INCL CAD: CPT | Mod: TC

## 2022-11-09 PROCEDURE — 97110 THERAPEUTIC EXERCISES: CPT | Mod: PN

## 2022-11-09 PROCEDURE — 77063 BREAST TOMOSYNTHESIS BI: CPT | Mod: 26,,, | Performed by: RADIOLOGY

## 2022-11-09 PROCEDURE — 77063 BREAST TOMOSYNTHESIS BI: CPT | Mod: TC

## 2022-11-09 PROCEDURE — 97140 MANUAL THERAPY 1/> REGIONS: CPT | Mod: PN

## 2022-11-09 NOTE — PROGRESS NOTES
"[facility]   Physical Therapy Treatment Note     Name: Linette LIU [facility]  Clinic Number: [id number]    Therapy Diagnosis:   Encounter Diagnoses   Name Primary?    Muscle hypertonicity Yes    Acute pain of left shoulder     Weakness of left upper extremity      Physician: Ira Rhodes PA-C    Visit Date: 11/9/2022    Physician Orders: PT Eval and Treat   Medical Diagnosis from Referral: M75.52 (ICD-10-CM) - Subacromial bursitis of left shoulder joint   Evaluation Date: 9/12/2022  Authorization Period Expiration: 12/31/2022  Plan of Care Expiration: 12/12/2022  Visit # / Visits authorized: 15/ 35 (+eval)    PTA Visit #: 0/5   PN DUE: 11/17/2022    Precautions: Standard    Time In: 9:45 AM  Time Out: 10:30 AM  Total Billable Time: 45 minutes    SUBJECTIVE   Pt reports: she was not sore following her last therapy session. She reports that she had spasms along her right upper extremity the same night as her last therapy session but was gone the next morning.     She was compliant with home exercise program.  Response to previous treatment: some soreness  Functional change: able to lift grandchildren using BLE and BUE.     Pain: 2-3/10  Location: Left shoulder    OBJECTIVE     Objective Measures updated at progress report unless specified.     TREATMENT     Linette received the treatments listed below:    (exercises and techniques performed today are bolded)     THERAPEUTIC EXERCISES to develop  strength, endurance, ROM, flexibility, posture, and core stabilization for 30 minutes including:  UBE 3/3 for range of motion, strength and posture +hill intervals L2 peak resistance  Upper trap stretch 2 x 30"  Levator scap stretch 2 x 30"  Posterior shoulder roll x 1 minute  Scapular retraction  Rows 13#  2 x 10 x 3" hold  Shoulder Extension GTB 3 x 10  Straight arm pull downs 13# 2 x 10  Shoulder flexion YTB 2 x 15  Shoulder scaption RTB 2 x 10 x 3" hold  Shoulder flexion RTB 2 x 10 x 3" hold  B " "shoulder external rotation RTB 3 x 10  (L) shoulder ER with GTB 2 x 10   (L) shoulder IR with GTB 2 x 10  Shoulder press in scaption 2 x 10 #3  Sidelying external rotations AROM 3 x 10 #3  Supine serratus punch circles 20/20 x 2 sets  Bicep curls 5# 2 x 10   Tricep extensions 17# 2 x 10  Doorway Pec stretch 3x30"  B doorway pec stretch Y/T/W - x30" ea    Prone middle/lower trap strengthening (T's/Y's) 2 x 10 each  Prone Series (I,T,Y) 2 x 10  Prone rows 2 x10 1#  Blackburns x10 ea  +Quadruped shoulder taps 2 x 10    MANUAL THERAPY TECHNIQUES were applied for 15 minutes including:   Shoulder Distraction   Posterior and inferior joint mobs  PROM with overpressure to left shoulder in all planes of motion   STM to upper trap (L)  Soft tissue mobilization to supraspinatus, infraspinatus and biceps  IASTM to biceps tendon and muscle belly  PNF Rhythmic stabilizations - 2x30" with 5" iso holds with PTA providing resistance  FDN (anterolateral shoulder protocol) with Estim     NEUROMUSCULAR RE-EDUCATION ACTIVITIES to improve Balance, Coordination, Kinesthetic, Sense, Proprioception, and Posture for 0 minutes.  The following were included:   Serratus wall slide with pillow case 2 x 10  Wall Ball (up, down, clockwuise, counterclockwise) 2 x 10  plinth shoulder taps 3 x 10  (L) ABCs with red theraband x 3    PATIENT EDUCATION AND HOME EXERCISES     Home Exercises Provided and Patient Education Provided     Education provided:   - educated pt that mild soreness is an expected outcome to therapy session  - educated pt to continue with HEP as issued and prescribed    Written Home Exercises Provided: Patient instructed to cont prior HEP. Exercises were reviewed and Linette was able to demonstrate them prior to the end of the session.  Linette demonstrated good  understanding of the education provided. See EMR under Patient Instructions for exercises provided during therapy sessions    ASSESSMENT   Pt tolerated this session well today " with no adverse effects. Pt reports muscle spasms in her right upper extremity following her last therapy session that lasted a few hours but presents today with minimal pain. Therapy session focused on shoulder and scapular strengthening exercises. She continues to exhibit with muscular fatigue throughout therapy session. She exhibit most pain along biceps and brachialis muscle belly. pt was educated on benefits and risks of functional dry needling and was performed following verbal and written consent. FDN to anterolateral shoulder was performed with ESTIM with good muscular contraction of brachialis.   Assess response to FDN at next visit.     Linette Is progressing well towards her goals.   Pt prognosis is Good.     Pt will continue to benefit from skilled outpatient physical therapy to address the deficits listed in the problem list box on initial evaluation, provide pt/family education and to maximize pt's level of independence in the home and community environment.     Pt's spiritual, cultural and educational needs considered and pt agreeable to plan of care and goals.     Anticipated barriers to physical therapy: pain    Goals:   Short Term Goals (5 Weeks):   1. Patient will demonstrate independence with HEP in order to progress toward functional independence Met  2. Pt will demonstrate improved pain by reports of less than or equal to 7/10 worst pain on the verbal rating scale in order to progress toward maximal functional ability and improve QOL. Met  3. Pt will be able to correct slouched posture with minimal verbal cueing for improved static sitting posture and increased QOL. Met     Long Term Goals (10 Weeks):   1. Patient will demonstrate improved function as indicated by a functional limitation score of 31% on the FOTO. Progressing, not met  2. Pt will demonstrate improved pain by reports of less than or equal to 5/10 worst pain on the verbal rating scale in order to progress toward maximal functional  ability and improve QOL. Progressing, not met  3. Pt will be able to correct slouched posture independently for improved static sitting posture and increased QOL. Met  4. Pt will move left shoulder through functional ROM in all planes without pain to improve independence in performing yard work, house chores, and ADLs Progressing, not met  5. Pt will improve left shoulder strength to 5/5 for improved ability to perform house work, yard work, and hold grandchildren. Progressing, not met    PLAN     Continue PT per POC, assess tolerance to today's session,  progress exercise as tolerated and appropriate    Lilia Nagy, PT, DPT  11/9/2022

## 2022-11-14 ENCOUNTER — CLINICAL SUPPORT (OUTPATIENT)
Dept: REHABILITATION | Facility: HOSPITAL | Age: 57
End: 2022-11-14
Payer: OTHER GOVERNMENT

## 2022-11-14 ENCOUNTER — HOSPITAL ENCOUNTER (OUTPATIENT)
Dept: SLEEP MEDICINE | Facility: HOSPITAL | Age: 57
Discharge: HOME | End: 2022-11-14
Attending: FAMILY MEDICINE
Payer: OTHER GOVERNMENT

## 2022-11-14 DIAGNOSIS — M25.512 ACUTE PAIN OF LEFT SHOULDER: ICD-10-CM

## 2022-11-14 DIAGNOSIS — G47.33 OSA (OBSTRUCTIVE SLEEP APNEA): Primary | ICD-10-CM

## 2022-11-14 DIAGNOSIS — R53.83 FATIGUE, UNSPECIFIED TYPE: ICD-10-CM

## 2022-11-14 DIAGNOSIS — R29.898 WEAKNESS OF LEFT UPPER EXTREMITY: ICD-10-CM

## 2022-11-14 DIAGNOSIS — M62.89 MUSCLE HYPERTONICITY: Primary | ICD-10-CM

## 2022-11-14 PROCEDURE — 97140 MANUAL THERAPY 1/> REGIONS: CPT | Mod: PN

## 2022-11-14 PROCEDURE — 97110 THERAPEUTIC EXERCISES: CPT | Mod: PN

## 2022-11-14 PROCEDURE — 97112 NEUROMUSCULAR REEDUCATION: CPT | Mod: PN

## 2022-11-14 NOTE — Clinical Note
1 night study MODERATE OBSTRUCTIVE SLEEP APNEA with overall AHI 15.6/hr ( 119 events): night #1 Oxygen desaturation: 88%. SpO2 between 90% to 94% for 4 hr 2 min. Patient snored 99% time above 50 . Heart rate range: 69 bpm - 92 bpm REC's: Therapy with APAP at 5-15 cm WP using mask of choice with heated humidification is an option. Please refer to [facility] for management. Weight loss/management. with regular exercise per direction of physician. Avoid drowsy driving. Follow up in sleep clinic to maximize adherence and ensure resolution of symptoms

## 2022-11-14 NOTE — PROGRESS NOTES
"[facility]   Physical Therapy Treatment Note     Name: Linette LIU [facility]  Clinic Number: [id number]    Therapy Diagnosis:   Encounter Diagnoses   Name Primary?    Muscle hypertonicity Yes    Acute pain of left shoulder     Weakness of left upper extremity        Physician: Ira Rhodes PA-C    Visit Date: 11/14/2022    Physician Orders: PT Eval and Treat   Medical Diagnosis from Referral: M75.52 (ICD-10-CM) - Subacromial bursitis of left shoulder joint   Evaluation Date: 9/12/2022  Authorization Period Expiration: 12/31/2022  Plan of Care Expiration: 12/12/2022  Visit # / Visits authorized: 17/ 35 (+eval)    PTA Visit #: 0/5   PN DUE: 11/17/2022    Precautions: Standard    Time In: 9:00 AM  Time Out: 9:45 AM  Total Billable Time: 45 minutes    SUBJECTIVE   Pt reports: that she had no pain all day Friday following functional DN. She reports that by Saturday her pain began to come back.     She was compliant with home exercise program.  Response to previous treatment: some soreness  Functional change: able to lift grandchildren using BLE and BUE.     Pain: 2-3/10  Location: Left shoulder    OBJECTIVE     Objective Measures updated at progress report unless specified.     TREATMENT     Linette received the treatments listed below:    (exercises and techniques performed today are bolded)     THERAPEUTIC EXERCISES to develop  strength, endurance, ROM, flexibility, posture, and core stabilization for 22 minutes including:  UBE 3/3 for range of motion, strength and posture +hill intervals L2 peak resistance  Upper trap stretch 2 x 30"  Levator scap stretch 2 x 30"  Posterior shoulder roll x 1 minute  Scapular retraction  Rows 13#  2 x 10 x 3" hold  Shoulder Extension GTB 3 x 10  Straight arm pull downs 13# 2 x 10  Shoulder flexion YTB 2 x 15  Shoulder scaption RTB 2 x 10 x 3" hold  Shoulder flexion RTB 2 x 10 x 3" hold  B shoulder external rotation RTB 3 x 10  (L) shoulder ER with GTB 2 x " "10   (L) shoulder IR with GTB 2 x 10  Shoulder press in scaption 2 x 10 #3  Sidelying external rotations AROM 3 x 10 #3  Supine serratus punch circles 20/20 x 2 sets  Bicep curls 5# 2 x 10   Tricep extensions 13# 2 x 10  Doorway Pec stretch 3x30"  B doorway pec stretch Y/T/W - x30" ea    Prone middle/lower trap strengthening (T's/Y's) 2 x 10 each  Prone Series (I,T,Y) 2 x 10  Prone rows 2 x10 1#  Blackburns x10 ea  Quadruped shoulder taps 2 x 10  +Wall pushups x 10    MANUAL THERAPY TECHNIQUES were applied for 15 minutes including:   Shoulder Distraction   Posterior and inferior joint mobs  PROM with overpressure to left shoulder in all planes of motion   STM to upper trap (L)  Soft tissue mobilization to supraspinatus, infraspinatus and biceps  IASTM to biceps tendon and muscle belly  PNF Rhythmic stabilizations - 2x30" with 5" iso holds with PTA providing resistance  FDN (anterolateral shoulder protocol) with Estim     NEUROMUSCULAR RE-EDUCATION ACTIVITIES to improve Balance, Coordination, Kinesthetic, Sense, Proprioception, and Posture for 8 minutes.  The following were included:   Serratus wall slide with pillow case 2 x 10  Wall Ball (up, down, clockwuise, counterclockwise) 2 x 10  plinth shoulder taps 3 x 10  (L) ABCs with red theraband x 1    PATIENT EDUCATION AND HOME EXERCISES     Home Exercises Provided and Patient Education Provided   Education provided:   - educated pt that mild soreness is an expected outcome to therapy session  - educated pt to continue with HEP as issued and prescribed    Written Home Exercises Provided: Patient instructed to cont prior HEP. Exercises were reviewed and Linette was able to demonstrate them prior to the end of the session.  Linette demonstrated good  understanding of the education provided. See EMR under Patient Instructions for exercises provided during therapy sessions    ASSESSMENT   Pt tolerated this session well today with no adverse effects. She reports increased pain " in her anterior pectoralis region today. Therapy session focused on shoulder and scapular strengthening exercises. Added wall pushups for increased weightbearing into bilateral shoulders with good tolerance. FDN applied to anterolateral shoulder with ESTIM with good muscular contraction of brachialis. Continue to assess response to FDN.     Linette Is progressing well towards her goals.   Pt prognosis is Good.     Pt will continue to benefit from skilled outpatient physical therapy to address the deficits listed in the problem list box on initial evaluation, provide pt/family education and to maximize pt's level of independence in the home and community environment.     Pt's spiritual, cultural and educational needs considered and pt agreeable to plan of care and goals.     Anticipated barriers to physical therapy: pain    Goals:   Short Term Goals (5 Weeks):   1. Patient will demonstrate independence with HEP in order to progress toward functional independence Met  2. Pt will demonstrate improved pain by reports of less than or equal to 7/10 worst pain on the verbal rating scale in order to progress toward maximal functional ability and improve QOL. Met  3. Pt will be able to correct slouched posture with minimal verbal cueing for improved static sitting posture and increased QOL. Met     Long Term Goals (10 Weeks):   1. Patient will demonstrate improved function as indicated by a functional limitation score of 31% on the FOTO. Progressing, not met  2. Pt will demonstrate improved pain by reports of less than or equal to 5/10 worst pain on the verbal rating scale in order to progress toward maximal functional ability and improve QOL. Progressing, not met  3. Pt will be able to correct slouched posture independently for improved static sitting posture and increased QOL. Met  4. Pt will move left shoulder through functional ROM in all planes without pain to improve independence in performing yard work, house chores, and  ADLs Progressing, not met  5. Pt will improve left shoulder strength to 5/5 for improved ability to perform house work, yard work, and hold grandchildren. Progressing, not met    PLAN     Continue PT per POC, assess tolerance to today's session,  progress exercise as tolerated and appropriate    Lilia Nagy, PT, DPT  11/14/2022

## 2022-11-16 ENCOUNTER — CLINICAL SUPPORT (OUTPATIENT)
Dept: REHABILITATION | Facility: HOSPITAL | Age: 57
End: 2022-11-16
Payer: OTHER GOVERNMENT

## 2022-11-16 DIAGNOSIS — M62.89 MUSCLE HYPERTONICITY: Primary | ICD-10-CM

## 2022-11-16 DIAGNOSIS — M25.512 ACUTE PAIN OF LEFT SHOULDER: ICD-10-CM

## 2022-11-16 DIAGNOSIS — R29.898 WEAKNESS OF LEFT UPPER EXTREMITY: ICD-10-CM

## 2022-11-16 PROCEDURE — 97110 THERAPEUTIC EXERCISES: CPT | Mod: PN

## 2022-11-16 PROCEDURE — 97140 MANUAL THERAPY 1/> REGIONS: CPT | Mod: PN

## 2022-11-16 NOTE — PROGRESS NOTES
"[facility]   Physical Therapy Treatment Note     Name: Linette LIU [facility]  Clinic Number: [id number]    Therapy Diagnosis:   No diagnosis found.      Physician: Ira Rhodes PA-C    Visit Date: 11/16/2022    Physician Orders: PT Eval and Treat   Medical Diagnosis from Referral: M75.52 (ICD-10-CM) - Subacromial bursitis of left shoulder joint   Evaluation Date: 9/12/2022  Authorization Period Expiration: 12/31/2022  Plan of Care Expiration: 12/12/2022  Visit # / Visits authorized: 17/ 35 (+eval)    PTA Visit #: 0/5   PN DUE: 11/17/2022    Precautions: Standard    Time In: 9:00 AM  Time Out: 9:45 AM  Total Billable Time: 45 minutes    SUBJECTIVE   Pt reports: that she is feeling good today. She had no soreness following her last therapy session. She took some ibuprofen last night and this morning to help with pain relief and states it has significantly helped.      She was compliant with home exercise program.  Response to previous treatment: some soreness  Functional change: able to lift grandchildren using BLE and BUE.     Pain: 2-3/10  Location: Left shoulder    OBJECTIVE     Objective Measures updated at progress report unless specified.     TREATMENT   Linette received the treatments listed below:    (exercises and techniques performed today are bolded)     THERAPEUTIC EXERCISES to develop  strength, endurance, ROM, flexibility, posture, and core stabilization for 27 minutes including:  UBE 3/3 for range of motion, strength and posture +hill intervals L2 peak resistance  Upper trap stretch 2 x 30"  Levator scap stretch 2 x 30"  Posterior shoulder roll x 1 minute  Scapular retraction  Rows 13#  2 x 10 x 3" hold  Shoulder Extension GTB 3 x 10  Straight arm pull downs 13# 2 x 10  Shoulder flexion YTB 2 x 15  Shoulder scaption RTB 2 x 10 x 3" hold  Shoulder flexion RTB 2 x 10 x 3" hold  B shoulder external rotation RTB 3 x 10  (L) shoulder ER with GTB 2 x 10   (L) shoulder IR with GTB " "2 x 10  Shoulder press in scaption 2 x 10 #3  Sidelying external rotations AROM 3 x 10 #3  Supine serratus punch circles 20/20 x 2 sets  Bicep curls 5# 2 x 10   Tricep extensions 13# 2 x 10  Doorway Pec stretch 3x30"  B doorway pec stretch Y/T/W - x30" ea    Prone middle/lower trap strengthening (T's/Y's) 2 x 10 each  Prone Series (I,T,Y) 2 x 10  Prone rows 2 x10 1#  Blackburns x10 ea  Quadruped shoulder taps 2 x 10  Wall pushups 2 x 10  +Cat/Cow x 15  +modified elbow planks 3 x 10"  +Lat pull down at free-motion machine 10# 2 x 10    MANUAL THERAPY TECHNIQUES were applied for 10 minutes including:   Shoulder Distraction   Posterior and inferior joint mobs  PROM with overpressure to left shoulder in all planes of motion   STM to upper trap (L)  Soft tissue mobilization to supraspinatus, infraspinatus and biceps  IASTM to biceps tendon and muscle belly  PNF Rhythmic stabilizations - 2x30" with 5" iso holds with PTA providing resistance  FDN (anterolateral shoulder protocol) with Estim     NEUROMUSCULAR RE-EDUCATION ACTIVITIES to improve Balance, Coordination, Kinesthetic, Sense, Proprioception, and Posture for 8 minutes.  The following were included:   Serratus wall slide with pillow case 2 x 10  Wall Ball (up, down, clockwuise, counterclockwise) 2 x 10  plinth shoulder taps 3 x 10  (L) ABCs with red theraband x 1    PATIENT EDUCATION AND HOME EXERCISES     Home Exercises Provided and Patient Education Provided   Education provided:   - educated pt that mild soreness is an expected outcome to therapy session  - educated pt to continue with HEP as issued and prescribed    Written Home Exercises Provided: Patient instructed to cont prior HEP. Exercises were reviewed and Linette was able to demonstrate them prior to the end of the session.  Linette demonstrated good  understanding of the education provided. See EMR under Patient Instructions for exercises provided during therapy sessions    ASSESSMENT   Pt tolerated this " session well today with no adverse effects. She presents with minimal pain in her left anterior pectoralis. Therapy session focused on shoulder and scapular strengthening exercises.Added modified elbow planks for increased weightbearing through bilateral upper extremities with good tolerance. FDN applied to anterolateral shoulder with ESTIM with good muscular contraction of brachialis.Continue to assess response to FDN, work on postural stabilization and opening up anterior chest.     Linette Is progressing well towards her goals.   Pt prognosis is Good.     Pt will continue to benefit from skilled outpatient physical therapy to address the deficits listed in the problem list box on initial evaluation, provide pt/family education and to maximize pt's level of independence in the home and community environment.     Pt's spiritual, cultural and educational needs considered and pt agreeable to plan of care and goals.     Anticipated barriers to physical therapy: pain    Goals:   Short Term Goals (5 Weeks):   1. Patient will demonstrate independence with HEP in order to progress toward functional independence Met  2. Pt will demonstrate improved pain by reports of less than or equal to 7/10 worst pain on the verbal rating scale in order to progress toward maximal functional ability and improve QOL. Met  3. Pt will be able to correct slouched posture with minimal verbal cueing for improved static sitting posture and increased QOL. Met     Long Term Goals (10 Weeks):   1. Patient will demonstrate improved function as indicated by a functional limitation score of 31% on the FOTO. Progressing, not met  2. Pt will demonstrate improved pain by reports of less than or equal to 5/10 worst pain on the verbal rating scale in order to progress toward maximal functional ability and improve QOL. Progressing, not met  3. Pt will be able to correct slouched posture independently for improved static sitting posture and increased QOL.  Met  4. Pt will move left shoulder through functional ROM in all planes without pain to improve independence in performing yard work, house chores, and ADLs Progressing, not met  5. Pt will improve left shoulder strength to 5/5 for improved ability to perform house work, yard work, and hold grandchildren. Progressing, not met    PLAN     Continue PT per POC, assess tolerance to today's session,  progress exercise as tolerated and appropriate    Lilia Nagy, PT, DPT  11/16/2022

## 2022-11-17 PROCEDURE — 95806 SLEEP STUDY UNATT&RESP EFFT: CPT | Mod: 26,,, | Performed by: INTERNAL MEDICINE

## 2022-11-17 PROCEDURE — 95806 PR SLEEP STUDY, UNATTENDED, SIMUL RECORD HR/O2 SAT/RESP FLOW/RESP EFFT: ICD-10-PCS | Mod: 26,,, | Performed by: INTERNAL MEDICINE

## 2022-11-19 PROBLEM — G47.33 OSA (OBSTRUCTIVE SLEEP APNEA): Status: ACTIVE | Noted: 2022-11-19

## 2022-11-19 PROBLEM — R53.83 FATIGUE: Status: ACTIVE | Noted: 2022-11-19

## 2022-11-21 ENCOUNTER — PATIENT MESSAGE (OUTPATIENT)
Dept: PULMONOLOGY | Facility: CLINIC | Age: 57
End: 2022-11-21
Payer: OTHER GOVERNMENT

## 2022-11-21 ENCOUNTER — PATIENT MESSAGE (OUTPATIENT)
Dept: INTERNAL MEDICINE | Facility: CLINIC | Age: 57
End: 2022-11-21
Payer: OTHER GOVERNMENT

## 2022-11-21 DIAGNOSIS — G47.33 OSA (OBSTRUCTIVE SLEEP APNEA): Primary | ICD-10-CM

## 2022-11-21 NOTE — TELEPHONE ENCOUNTER
Notified patient of results patient had no concerning questions and verified verbal understanding.   
Sleep study shows moderate sleep apena. Needs to see sleep dept. Referral in.  
Plan: RTC 12/30/21 to reassess lesion. Will plan to apply for 4-6 weeks
Render In Strict Bullet Format?: No
Detail Level: Zone
Initiate Treatment: Efudex 5 % topical cream BID\\nQuantity: 40.0 g  Days Supply: 30\\nSig: Apply two times a day to actinic keratoses x 4 weeks. Wash off in am.

## 2022-11-22 ENCOUNTER — CLINICAL SUPPORT (OUTPATIENT)
Dept: REHABILITATION | Facility: HOSPITAL | Age: 57
End: 2022-11-22
Payer: OTHER GOVERNMENT

## 2022-11-22 DIAGNOSIS — M25.512 ACUTE PAIN OF LEFT SHOULDER: ICD-10-CM

## 2022-11-22 DIAGNOSIS — M62.89 MUSCLE HYPERTONICITY: Primary | ICD-10-CM

## 2022-11-22 DIAGNOSIS — R29.898 WEAKNESS OF LEFT UPPER EXTREMITY: ICD-10-CM

## 2022-11-22 PROCEDURE — 97112 NEUROMUSCULAR REEDUCATION: CPT | Mod: PN,CQ

## 2022-11-22 PROCEDURE — 97110 THERAPEUTIC EXERCISES: CPT | Mod: PN,CQ

## 2022-11-22 NOTE — PROGRESS NOTES
"[facility]   Physical Therapy Treatment Note     Name: Linette LIU [facility]  Clinic Number: [id number]    Therapy Diagnosis:   Encounter Diagnoses   Name Primary?    Muscle hypertonicity Yes    Acute pain of left shoulder     Weakness of left upper extremity          Physician: Ira Rhodes PA-C    Visit Date: 11/22/2022    Physician Orders: PT Eval and Treat   Medical Diagnosis from Referral: M75.52 (ICD-10-CM) - Subacromial bursitis of left shoulder joint   Evaluation Date: 9/12/2022  Authorization Period Expiration: 12/31/2022  Plan of Care Expiration: 12/12/2022  Visit # / Visits authorized: 19/ 35 (+eval)    PTA Visit #: 1/5   PN DUE: 11/17/2022    Precautions: Standard    Time In: 2:00 AM  Time Out: 2:45 AM  Total Billable Time: 45 minutes    SUBJECTIVE   Pt reports: that she is feeling good today. She had no soreness following her last therapy session. She took some ibuprofen last night and this morning to help with pain relief and states it has significantly helped.      She was compliant with home exercise program.  Response to previous treatment: some soreness  Functional change: able to lift grandchildren using BLE and BUE.     Pain: 2/10  Location: Left shoulder    OBJECTIVE     Objective Measures updated at progress report unless specified.     TREATMENT   Linette received the treatments listed below:    (exercises and techniques performed today are bolded)     THERAPEUTIC EXERCISES to develop  strength, endurance, ROM, flexibility, posture, and core stabilization for 35 minutes including:  UBE 3/3 for range of motion, strength and posture +hill intervals L2 peak resistance  Upper trap stretch 2 x 30"  Levator scap stretch 2 x 30"  Posterior shoulder roll x 1 minute  Scapular retraction  Rows 13#  2 x 10 x 3" hold  Shoulder Extension GTB 3 x 10  Straight arm pull downs 13# 2 x 10  Shoulder flexion YTB 2 x 15  Shoulder scaption RTB 2 x 10 x 3" hold  Shoulder flexion RTB 2 x " "10 x 3" hold  B shoulder external rotation RTB 3 x 10  (L) shoulder ER with GTB 2 x 10   (L) shoulder IR with GTB 2 x 10  Shoulder press in scaption 2 x 10 #3  Sidelying external rotations AROM 3 x 10 #3  Supine serratus punch circles 20/20 x 2 sets  Bicep curls 5# 2 x 10   Hammer curls 5# - 2x10  Tricep extensions 13# 2 x 10  Doorway Pec stretch 3x30"   B doorway pec stretch Y/T/W - x30" ea    Prone middle/lower trap strengthening (T's/Y's) 2 x 10 each  Prone Series (I,T,Y) 2 x 10  Prone rows 2 x10 1#   Blackburns x10 ea  Quadruped shoulder taps 2 x 10  Wall pushups 2 x 10   Cat/Cow x 15   modified elbow planks 3 x 10"   Lat pull down at free-motion machine 10# 2 x 10    MANUAL THERAPY TECHNIQUES were applied for 0 minutes including:   Shoulder Distraction   Posterior and inferior joint mobs  PROM with overpressure to left shoulder in all planes of motion   STM to upper trap (L)  Soft tissue mobilization to supraspinatus, infraspinatus and biceps  IASTM to biceps tendon and muscle belly  PNF Rhythmic stabilizations - 2x30" with 5" iso holds with PTA providing resistance  FDN (anterolateral shoulder protocol) with Estim     NEUROMUSCULAR RE-EDUCATION ACTIVITIES to improve Balance, Coordination, Kinesthetic, Sense, Proprioception, and Posture for 10 minutes.  The following were included:   Serratus wall slide with pillow case 2 x 10  Wall Ball (up, down, clockwuise, counterclockwise) 2 x 10  plinth shoulder taps 3 x 10  (L) ABCs with red theraband x 1    PATIENT EDUCATION AND HOME EXERCISES     Home Exercises Provided and Patient Education Provided   Education provided:   - educated pt that mild soreness is an expected outcome to therapy session  - educated pt to continue with HEP as issued and prescribed    Written Home Exercises Provided: Patient instructed to cont prior HEP. Exercises were reviewed and Linette was able to demonstrate them prior to the end of the session.  Linette demonstrated good  understanding of the " education provided. See EMR under Patient Instructions for exercises provided during therapy sessions    ASSESSMENT     Pt tolerated this session well today with no adverse effects. She presents with minimal pain in her left anterior pectoralis and upper trap. Therapy session focused on shoulder and scapular strengthening exercises. Discussed including self STM with tennis ball with HEP for pain relief measures at home.    Linette Is progressing well towards her goals.   Pt prognosis is Good.     Pt will continue to benefit from skilled outpatient physical therapy to address the deficits listed in the problem list box on initial evaluation, provide pt/family education and to maximize pt's level of independence in the home and community environment.     Pt's spiritual, cultural and educational needs considered and pt agreeable to plan of care and goals.     Anticipated barriers to physical therapy: pain    Goals:   Short Term Goals (5 Weeks):   1. Patient will demonstrate independence with HEP in order to progress toward functional independence Met  2. Pt will demonstrate improved pain by reports of less than or equal to 7/10 worst pain on the verbal rating scale in order to progress toward maximal functional ability and improve QOL. Met  3. Pt will be able to correct slouched posture with minimal verbal cueing for improved static sitting posture and increased QOL. Met     Long Term Goals (10 Weeks):   1. Patient will demonstrate improved function as indicated by a functional limitation score of 31% on the FOTO. Progressing, not met  2. Pt will demonstrate improved pain by reports of less than or equal to 5/10 worst pain on the verbal rating scale in order to progress toward maximal functional ability and improve QOL. Progressing, not met  3. Pt will be able to correct slouched posture independently for improved static sitting posture and increased QOL. Met  4. Pt will move left shoulder through functional ROM in all  planes without pain to improve independence in performing yard work, house chores, and ADLs Progressing, not met  5. Pt will improve left shoulder strength to 5/5 for improved ability to perform house work, yard work, and hold grandchildren. Progressing, not met    PLAN     Continue PT per POC, assess tolerance to today's session,  progress exercise as tolerated and appropriate    Linette Tena (Becca), PTA,  11/22/2022

## 2022-11-29 ENCOUNTER — CLINICAL SUPPORT (OUTPATIENT)
Dept: REHABILITATION | Facility: HOSPITAL | Age: 57
End: 2022-11-29
Payer: OTHER GOVERNMENT

## 2022-11-29 DIAGNOSIS — M25.512 ACUTE PAIN OF LEFT SHOULDER: ICD-10-CM

## 2022-11-29 DIAGNOSIS — R29.898 WEAKNESS OF LEFT UPPER EXTREMITY: ICD-10-CM

## 2022-11-29 DIAGNOSIS — M62.89 MUSCLE HYPERTONICITY: Primary | ICD-10-CM

## 2022-11-29 PROCEDURE — 97110 THERAPEUTIC EXERCISES: CPT | Mod: PN,CQ

## 2022-11-29 NOTE — PROGRESS NOTES
[facility]   Physical Therapy Treatment Note     Name: Linette LIU [facility]  Clinic Number: [id number]    Therapy Diagnosis:   Encounter Diagnoses   Name Primary?    Muscle hypertonicity Yes    Acute pain of left shoulder     Weakness of left upper extremity            Physician: Ira Rhodes PA-C    Visit Date: 11/29/2022    Physician Orders: PT Eval and Treat   Medical Diagnosis from Referral: M75.52 (ICD-10-CM) - Subacromial bursitis of left shoulder joint   Evaluation Date: 9/12/2022  Authorization Period Expiration: 12/31/2022  Plan of Care Expiration: 12/12/2022  Visit # / Visits authorized: 20/ 35 (+eval)    PTA Visit #: 2/5   PN DUE: 11/17/2022    Precautions: Standard    Time In: 8:15 AM  Time Out: 9:00 AM  Total Billable Time: 45 minutes    SUBJECTIVE   Pt reports: she is not currently having any pain. She states she was able to weed eat and mow her front lawn with brief higher levels of pain with pulling motions and min to moderate pain with holding the weed eater in the RUE.      She was compliant with home exercise program.  Response to previous treatment: some soreness  Functional change: .     Pain: 0/10  Location: Left shoulder    OBJECTIVE     Objective Measures updated at progress report unless specified.       Upper Extremity Strength: (initial eval) & 11/29/2022    (L) UE (R) UE   Shoulder flexion: (5/5)      5/5           Slight Pain (4/5)                     5-/5  (Pain)                No Pain   Shoulder Abduction: (5/5)     5/5             Pain (4/5)                    4+/5  (Slight pain)      No Pain   Shoulder ER (5/5)     5/5    (4+/5)                  5/5  (Slight pain)      No Pain   Shoulder IR (5/5)     5/5 (4/5)                    5/5  (Pain)               No Pain   Lower Trap   (5/5)     4+/5             Pain (4/5)                    4+/5  (Slight pain)      No Pain   Middle Trap   (5/5)     4+/5             Pain  (4/5)                   4+/5  (Slight  "pain)      No Pain    Rhomboids   (5/5)     4+/5             Pain (4/5)                   4+/5  (Slight pain)      No Pain         TREATMENT   Linette received the treatments listed below:    (exercises and techniques performed today are bolded)     THERAPEUTIC EXERCISES to develop  strength, endurance, ROM, flexibility, posture, and core stabilization for 25 minutes including:  UBE 3/3 for range of motion, strength and posture +hill intervals L2 peak resistance   Upper trap stretch 2 x 30"  Levator scap stretch 2 x 30"  Posterior shoulder roll x 1 minute  Scapular retraction  Rows 13#  2 x 10 x 3" hold   Shoulder Extension GTB 3 x 10  Straight arm pull downs 13# 2 x 10  Shoulder flexion YTB 2 x 15  Shoulder scaption RTB 2 x 10 x 3" hold  Shoulder flexion RTB 2 x 10 x 3" hold  B shoulder external rotation RTB 3 x 10  (L) shoulder ER with GTB 2 x 10   (L) shoulder IR with GTB 2 x 10  Shoulder press in scaption 2 x 10 #3  Sidelying external rotations AROM 3 x 10 #3  Supine serratus punch circles 20/20 x 2 sets  Bicep curls 5# 2 x 10    Hammer curls 5# - 2x10   + [place]ottman curls #5 - 2x10   Tricep extensions 17# 2 x 10   Doorway Pec stretch 3x30"   B doorway pec stretch Y/T/W - x30" ea    Prone middle/lower trap strengthening (T's/Y's) 2 x 10 each    Add back next visit:  Prone Series (I,T,Y) 2 x 10 **  Prone rows 2 x10 1#    Blackburns x10 ea  Re-visit next time:  -  shoulder taps 2 x 10   - Wall pushups 2 x 10    - Cat/Cow x 15   - modified elbow planks 3 x 10"   - Lat pull down at free-motion machine 10# 2 x 10       Shoulder reassessment - 10 minutes    Next Visit:  - pone wide  rows  - upright rows (cables)        MANUAL THERAPY TECHNIQUES were applied for 0 minutes including:   Shoulder Distraction   Posterior and inferior joint mobs  PROM with overpressure to left shoulder in all planes of motion   STM to upper trap (L)  Soft tissue mobilization to supraspinatus, infraspinatus and biceps  IASTM to biceps tendon " "and muscle belly  PNF Rhythmic stabilizations - 2x30" with 5" iso holds with PTA providing resistance  FDN (anterolateral shoulder protocol) with Estim       NEUROMUSCULAR RE-EDUCATION ACTIVITIES to improve Balance, Coordination, Kinesthetic, Sense, Proprioception, and Posture for 10 minutes.  The following were included:   Serratus wall slide with pillow case 2 x 10  Wall Ball (up, down, clockwuise, counterclockwise) 2 x 10  plinth shoulder taps 3 x 10  (L) ABCs with red theraband x 1  + Step to punch - 3# - x15 B  + Chops & lifts - 3# - x10 ea    PATIENT EDUCATION AND HOME EXERCISES     Home Exercises Provided and Patient Education Provided   Education provided:   - educated pt that mild soreness is an expected outcome to therapy session  - educated pt to continue with HEP as issued and prescribed    Written Home Exercises Provided: Patient instructed to cont prior HEP. Exercises were reviewed and Linette was able to demonstrate them prior to the end of the session.  Linette demonstrated good  understanding of the education provided. See EMR under Patient Instructions for exercises provided during therapy sessions    ASSESSMENT     This session begins with UBE and stretching to R UE followed by reassessment with updated measures  listed in objective section above. Pt reports that she feels as if she has progressed to  75%-80% and has begun to return to things such as lawn care, however continues to experience difficulty with removing wet laundry from the washer, sweeping the patio, and driving, which is also the most painful. The remainder of this session is focused on BUE strengthening and progressing to functional strengthening exercises. This session is tolerated well with no adverse effects.    Linette Is progressing well towards her goals.   Pt prognosis is Good.     Pt will continue to benefit from skilled outpatient physical therapy to address the deficits listed in the problem list box on initial evaluation, " provide pt/family education and to maximize pt's level of independence in the home and community environment.     Pt's spiritual, cultural and educational needs considered and pt agreeable to plan of care and goals.     Anticipated barriers to physical therapy: pain    Goals:   Short Term Goals (5 Weeks):   1. Patient will demonstrate independence with HEP in order to progress toward functional independence Met  2. Pt will demonstrate improved pain by reports of less than or equal to 7/10 worst pain on the verbal rating scale in order to progress toward maximal functional ability and improve QOL. Met  3. Pt will be able to correct slouched posture with minimal verbal cueing for improved static sitting posture and increased QOL. Met     Long Term Goals (10 Weeks):   1. Patient will demonstrate improved function as indicated by a functional limitation score of 31% on the FOTO. Progressing, not met  2. Pt will demonstrate improved pain by reports of less than or equal to 5/10 worst pain on the verbal rating scale in order to progress toward maximal functional ability and improve QOL. Progressing, not met  3. Pt will be able to correct slouched posture independently for improved static sitting posture and increased QOL. Met  4. Pt will move left shoulder through functional ROM in all planes without pain to improve independence in performing yard work, house chores, and ADLs Progressing, not met  5. Pt will improve left shoulder strength to 5/5 for improved ability to perform house work, yard work, and hold grandchildren. Progressing, not met    PLAN     Continue PT per POC, assess tolerance to today's session,  progress exercise as tolerated and appropriate    Linette Tena (Becca), PTA,  11/29/2022

## 2022-11-30 NOTE — PROGRESS NOTES
[facility]   Physical Therapy Treatment Note     Name: Linette LIU [facility]  Clinic Number: [id number]    Therapy Diagnosis:   Encounter Diagnoses   Name Primary?    Muscle hypertonicity Yes    Acute pain of left shoulder     Weakness of left upper extremity          Physician: Ira Rhodes PA-C    Visit Date: 12/1/2022    Physician Orders: PT Eval and Treat   Medical Diagnosis from Referral: M75.52 (ICD-10-CM) - Subacromial bursitis of left shoulder joint   Evaluation Date: 9/12/2022  Authorization Period Expiration: 12/31/2022  Plan of Care Expiration: 12/12/2022  Visit # / Visits authorized: 21/ 35 (+eval)    PTA Visit #: 3/5       Precautions: Standard    Time In: 10:20 AM  Time Out: 11:05 AM  Total Billable Time: 45 minutes    SUBJECTIVE   Pt reports: she is not currently having any pain.     She was compliant with home exercise program.  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    Pain: 0/10  Location: Left shoulder    OBJECTIVE     Objective Measures updated at progress report unless specified.       TREATMENT   Linette received the treatments listed below:    (exercises and techniques performed today are bolded)     THERAPEUTIC EXERCISES to develop  strength, endurance, ROM, flexibility, posture, and core stabilization for 35 minutes including:  - UBE 3/3 for range of motion, strength and posture  - Rows 13# - 2 x 10  - Lat pulls - 10# - 2x10  - Bicep curls - 3# - +21's  - Hammer curls - 3# - +21's  - Zottman curls - 3# - +21's  + Plate drivers - blue disc - 2x20  - Wall pushups - 2x10  - Shoulder taps - quadruped - x20  - Mod elbow planks - 3x15  - Cat/cow - x15  + Skull crushers - supine 1# - 2x10  - Prone Series - I/T/Y - 2x10    NEUROMUSCULAR RE-EDUCATION ACTIVITIES to improve Balance, Coordination, Kinesthetic, Sense, Proprioception, and Posture for 5 minutes.  The following were included:   -  "Step to punch - 3# - x15 B  - Chops & lifts - 3# - x15 ea    MANUAL THERAPY TECHNIQUES were applied for 0 minutes including:   Shoulder Distraction   Posterior and inferior joint mobs  PROM with overpressure to left shoulder in all planes of motion   STM to upper trap (L)  Soft tissue mobilization to supraspinatus, infraspinatus and biceps  IASTM to biceps tendon and muscle belly  PNF Rhythmic stabilizations - 2x30" with 5" iso holds with PTA providing resistance  FDN (anterolateral shoulder protocol) with Estim       PATIENT EDUCATION AND HOME EXERCISES     Home Exercises Provided and Patient Education Provided   Education provided:   - educated pt that mild soreness is an expected outcome to therapy session  - educated pt to continue with HEP as issued and prescribed    Written Home Exercises Provided: Patient instructed to cont prior HEP. Exercises were reviewed and Linette was able to demonstrate them prior to the end of the session.  Linette demonstrated good  understanding of the education provided. See EMR under Patient Instructions for exercises provided during therapy sessions    ASSESSMENT     Pt tolerates this session well with no adverse effects. Plate  exercise is added for L shoulder strengthening and muscular endurance as pt reports she continues to experience high levels of pain with holding her steering wheel and is unable to drive using BUE.     Linette Is progressing well towards her goals.   Pt prognosis is Good.     Pt will continue to benefit from skilled outpatient physical therapy to address the deficits listed in the problem list box on initial evaluation, provide pt/family education and to maximize pt's level of independence in the home and community environment.     Pt's spiritual, cultural and educational needs considered and pt agreeable to plan of care and goals.     Anticipated barriers to physical therapy: pain    Goals:   Short Term Goals (5 Weeks):   1. Patient will demonstrate " independence with HEP in order to progress toward functional independence Met  2. Pt will demonstrate improved pain by reports of less than or equal to 7/10 worst pain on the verbal rating scale in order to progress toward maximal functional ability and improve QOL. Met  3. Pt will be able to correct slouched posture with minimal verbal cueing for improved static sitting posture and increased QOL. Met     Long Term Goals (10 Weeks):   1. Patient will demonstrate improved function as indicated by a functional limitation score of 31% on the FOTO. Progressing, not met  2. Pt will demonstrate improved pain by reports of less than or equal to 5/10 worst pain on the verbal rating scale in order to progress toward maximal functional ability and improve QOL. Progressing, not met  3. Pt will be able to correct slouched posture independently for improved static sitting posture and increased QOL. Met  4. Pt will move left shoulder through functional ROM in all planes without pain to improve independence in performing yard work, house chores, and ADLs Progressing, not met  5. Pt will improve left shoulder strength to 5/5 for improved ability to perform house work, yard work, and hold grandchildren. Progressing, not met    PLAN     Continue PT per POC, assess tolerance to today's session,  progress exercise as tolerated and appropriate    Linette Tena (Becca), PTA,  12/1/2022

## 2022-12-01 ENCOUNTER — CLINICAL SUPPORT (OUTPATIENT)
Dept: REHABILITATION | Facility: HOSPITAL | Age: 57
End: 2022-12-01
Payer: OTHER GOVERNMENT

## 2022-12-01 DIAGNOSIS — M25.512 ACUTE PAIN OF LEFT SHOULDER: ICD-10-CM

## 2022-12-01 DIAGNOSIS — M62.89 MUSCLE HYPERTONICITY: Primary | ICD-10-CM

## 2022-12-01 DIAGNOSIS — R29.898 WEAKNESS OF LEFT UPPER EXTREMITY: ICD-10-CM

## 2022-12-01 PROCEDURE — 97110 THERAPEUTIC EXERCISES: CPT | Mod: PN,CQ

## 2022-12-05 ENCOUNTER — CLINICAL SUPPORT (OUTPATIENT)
Dept: REHABILITATION | Facility: HOSPITAL | Age: 57
End: 2022-12-05
Payer: OTHER GOVERNMENT

## 2022-12-05 DIAGNOSIS — M25.512 ACUTE PAIN OF LEFT SHOULDER: ICD-10-CM

## 2022-12-05 DIAGNOSIS — M62.89 MUSCLE HYPERTONICITY: Primary | ICD-10-CM

## 2022-12-05 DIAGNOSIS — R29.898 WEAKNESS OF LEFT UPPER EXTREMITY: ICD-10-CM

## 2022-12-05 PROCEDURE — 97110 THERAPEUTIC EXERCISES: CPT | Mod: PN

## 2022-12-05 NOTE — PROGRESS NOTES
[facility]   Physical Therapy Treatment Note     Name: Linette LIU [facility]  Clinic Number: [id number]    Therapy Diagnosis:   Encounter Diagnoses   Name Primary?    Muscle hypertonicity Yes    Acute pain of left shoulder     Weakness of left upper extremity      Physician: Ira Rhodes PA-C    Visit Date: 12/5/2022    Physician Orders: PT Eval and Treat   Medical Diagnosis from Referral: M75.52 (ICD-10-CM) - Subacromial bursitis of left shoulder joint   Evaluation Date: 9/12/2022  Authorization Period Expiration: 12/31/2022  Plan of Care Expiration: 12/12/2022  Visit # / Visits authorized: 22/ 35 (+eval)    PTA Visit #: 0/5     Precautions: Standard    Time In: 10:30 AM  Time Out: 11:15 AM  Total Billable Time: 45 minutes    SUBJECTIVE   Pt reports: she is not currently having any pain. She reports she had an episode of pain in her left shoulder while carrying a chair and twisting to place it down.     She was compliant with home exercise program.  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    Pain: 0/10  Location: Left shoulder    OBJECTIVE     Objective Measures updated at progress report unless specified.     TREATMENT   Linette received the treatments listed below:    (exercises and techniques performed today are bolded)     THERAPEUTIC EXERCISES to develop  strength, endurance, ROM, flexibility, posture, and core stabilization for 25 minutes including:  UBE 3/3 for range of motion, strength and posture  Rows 13# - 3 x 10  Lat pulls - 10# - 3 x10  Bicep curls - 3# - 3x10  - Hammer curls - 3# - +21's  - Zottman curls - 3# - +21's  Plate drivers - blue disc x20 & 3# x 20  Wall pushups - 3x10  - Shoulder taps - quadruped - x20  - Mod elbow planks - 3x15  - Cat/cow - x15  Skull crushers -10#- 3x10  - Prone Series - I/T/Y - 2x10    NEUROMUSCULAR RE-EDUCATION ACTIVITIES to improve Balance, Coordination,  "Kinesthetic, Sense, Proprioception, and Posture for 15 minutes.  The following were included:   (B) Step to punch - 3# - 3 x 10  (B) Chops & lifts - 7# - 2 x10  +Wall ball abduction green weighted ball     MANUAL THERAPY TECHNIQUES were applied for 0 minutes including:   Shoulder Distraction   Posterior and inferior joint mobs  PROM with overpressure to left shoulder in all planes of motion   STM to upper trap (L)  Soft tissue mobilization to supraspinatus, infraspinatus and biceps  IASTM to biceps tendon and muscle belly  PNF Rhythmic stabilizations - 2x30" with 5" iso holds with PTA providing resistance  FDN (anterolateral shoulder protocol) with Estim     Therapeutic Activities x 5 minutes  Lifting from floor to table and back to floor x5 (crate) x5 (5#) x5 (10#)      PATIENT EDUCATION AND HOME EXERCISES     Home Exercises Provided and Patient Education Provided   Education provided:   - educated pt that mild soreness is an expected outcome to therapy session  - educated pt to continue with HEP as issued and prescribed    Written Home Exercises Provided: Patient instructed to cont prior HEP. Exercises were reviewed and Linette was able to demonstrate them prior to the end of the session.  Linette demonstrated good  understanding of the education provided. See EMR under Patient Instructions for exercises provided during therapy sessions    ASSESSMENT   Pt tolerates this session well with no adverse effects. She presents with minimal to no left shoulder pain. Added lifting activity to educate patient on proper way to carry and lift objects to protect both left shoulder and back and focus on avoiding twisting motions while carrying objects. Pt able to perform lifting activities with verbal cueing for proper body mechanics. Progressed repetitions today with good tolerance. Added abduction wall balls and weight to  activity for increased strength and ease with driving tasks.     Linette Is progressing well towards her " goals.   Pt prognosis is Good.     Pt will continue to benefit from skilled outpatient physical therapy to address the deficits listed in the problem list box on initial evaluation, provide pt/family education and to maximize pt's level of independence in the home and community environment.     Pt's spiritual, cultural and educational needs considered and pt agreeable to plan of care and goals.     Anticipated barriers to physical therapy: pain    Goals:   Short Term Goals (5 Weeks):   1. Patient will demonstrate independence with HEP in order to progress toward functional independence Met  2. Pt will demonstrate improved pain by reports of less than or equal to 7/10 worst pain on the verbal rating scale in order to progress toward maximal functional ability and improve QOL. Met  3. Pt will be able to correct slouched posture with minimal verbal cueing for improved static sitting posture and increased QOL. Met     Long Term Goals (10 Weeks):   1. Patient will demonstrate improved function as indicated by a functional limitation score of 31% on the FOTO. Progressing, not met  2. Pt will demonstrate improved pain by reports of less than or equal to 5/10 worst pain on the verbal rating scale in order to progress toward maximal functional ability and improve QOL. Progressing, not met  3. Pt will be able to correct slouched posture independently for improved static sitting posture and increased QOL. Met  4. Pt will move left shoulder through functional ROM in all planes without pain to improve independence in performing yard work, house chores, and ADLs Progressing, not met  5. Pt will improve left shoulder strength to 5/5 for improved ability to perform house work, yard work, and hold grandchildren. Progressing, not met    PLAN     Continue PT per POC, assess tolerance to today's session,  progress exercise as tolerated and appropriate    Lilia Nagy PT, DPT  12/5/2022

## 2022-12-07 ENCOUNTER — OFFICE VISIT (OUTPATIENT)
Dept: ORTHOPEDICS | Facility: CLINIC | Age: 57
End: 2022-12-07
Payer: OTHER GOVERNMENT

## 2022-12-07 ENCOUNTER — CLINICAL SUPPORT (OUTPATIENT)
Dept: REHABILITATION | Facility: HOSPITAL | Age: 57
End: 2022-12-07
Payer: OTHER GOVERNMENT

## 2022-12-07 VITALS — WEIGHT: 245.13 LBS | BODY MASS INDEX: 41.85 KG/M2 | HEIGHT: 64 IN

## 2022-12-07 DIAGNOSIS — M18.12 ARTHRITIS OF CARPOMETACARPAL (CMC) JOINT OF LEFT THUMB: Primary | ICD-10-CM

## 2022-12-07 DIAGNOSIS — M25.512 ACUTE PAIN OF LEFT SHOULDER: ICD-10-CM

## 2022-12-07 DIAGNOSIS — M72.0 DUPUYTREN'S CONTRACTURE OF RIGHT HAND: ICD-10-CM

## 2022-12-07 DIAGNOSIS — R29.898 WEAKNESS OF LEFT UPPER EXTREMITY: ICD-10-CM

## 2022-12-07 DIAGNOSIS — M62.89 MUSCLE HYPERTONICITY: Primary | ICD-10-CM

## 2022-12-07 PROCEDURE — 20600 DRAIN/INJ JOINT/BURSA W/O US: CPT | Mod: PBBFAC,LT | Performed by: ORTHOPAEDIC SURGERY

## 2022-12-07 PROCEDURE — 97110 THERAPEUTIC EXERCISES: CPT | Mod: PN

## 2022-12-07 PROCEDURE — 99213 OFFICE O/P EST LOW 20 MIN: CPT | Mod: PBBFAC | Performed by: ORTHOPAEDIC SURGERY

## 2022-12-07 PROCEDURE — 99999 PR PBB SHADOW E&M-EST. PATIENT-LVL III: CPT | Mod: PBBFAC,,, | Performed by: ORTHOPAEDIC SURGERY

## 2022-12-07 PROCEDURE — 99999 PR PBB SHADOW E&M-EST. PATIENT-LVL III: ICD-10-PCS | Mod: PBBFAC,,, | Performed by: ORTHOPAEDIC SURGERY

## 2022-12-07 PROCEDURE — 20600 SMALL JOINT ASPIRATION/INJECTION: R RING MCP: ICD-10-PCS | Mod: S$PBB,50,, | Performed by: ORTHOPAEDIC SURGERY

## 2022-12-07 PROCEDURE — 20600 DRAIN/INJ JOINT/BURSA W/O US: CPT | Mod: PBBFAC,RT | Performed by: ORTHOPAEDIC SURGERY

## 2022-12-07 PROCEDURE — 97140 MANUAL THERAPY 1/> REGIONS: CPT | Mod: PN

## 2022-12-07 PROCEDURE — 99213 OFFICE O/P EST LOW 20 MIN: CPT | Mod: S$PBB,25,, | Performed by: ORTHOPAEDIC SURGERY

## 2022-12-07 PROCEDURE — 99213 PR OFFICE/OUTPT VISIT, EST, LEVL III, 20-29 MIN: ICD-10-PCS | Mod: S$PBB,25,, | Performed by: ORTHOPAEDIC SURGERY

## 2022-12-07 RX ORDER — TRIAMCINOLONE ACETONIDE 40 MG/ML
40 INJECTION, SUSPENSION INTRA-ARTICULAR; INTRAMUSCULAR
Status: DISCONTINUED | OUTPATIENT
Start: 2022-12-07 | End: 2022-12-07 | Stop reason: HOSPADM

## 2022-12-07 RX ADMIN — TRIAMCINOLONE ACETONIDE 40 MG: 200 INJECTION, SUSPENSION INTRA-ARTICULAR; INTRAMUSCULAR at 09:12

## 2022-12-07 NOTE — PROCEDURES
Small Joint Aspiration/Injection: R ring MCP    Date/Time: 12/7/2022 9:15 AM  Performed by: Sterling Jade MD  Authorized by: Sterling Jade MD     Consent Done?:  Yes (Verbal)  Indications:  Pain  Timeout: prior to procedure the correct patient, procedure, and site was verified    Prep: patient was prepped and draped in usual sterile fashion      Local anesthesia used?: Yes    Local anesthetic:  Lidocaine 2% without epinephrine  Anesthetic total (ml):  0.25    Location:  Ring finger  Site:  R ring MCP  Ultrasonic guidance for needle placement?: No    Needle size:  25 G  Medications:  40 mg triamcinolone acetonide 40 mg/mL

## 2022-12-07 NOTE — PROCEDURES
Small Joint Aspiration/Injection: L thumb CMC    Date/Time: 12/7/2022 9:15 AM  Performed by: Sterling Jade MD  Authorized by: Sterling Jade MD     Consent Done?:  Yes (Verbal)  Indications:  Pain  Site marked: the procedure site was marked    Timeout: prior to procedure the correct patient, procedure, and site was verified    Prep: patient was prepped and draped in usual sterile fashion      Local anesthesia used?: Yes    Local anesthetic:  Lidocaine 2% without epinephrine  Location:  Thumb  Site:  L thumb CMC  Ultrasonic guidance for needle placement?: No    Needle size:  25 G  Approach:  Dorsal  Medications:  40 mg triamcinolone acetonide 40 mg/mL

## 2022-12-07 NOTE — PROGRESS NOTES
"Subjective:     Patient ID: Linette Galo is a 57 y.o. female.    Chief Complaint: Pain of the Left Hand      HPI:  The patient is a 57-year-old female with left thumb basal joint degenerative joint disease.  She was last injected 06/22/2022 with good results until recently and requests reinjection.  She also is status post right ring finger partial palmar fasciectomy date of surgery is 06/09/2022.  She has a tight scar and wished to have that injected as well    Past Medical History:   Diagnosis Date    ADD (attention deficit disorder)     Anemia     Anxiety     Followed by Psychology    Arthritis     Depression     bipolar, anxiety, ADD    Family history of colon cancer 1/22/2019    Her grandmother had colon cancer.    General anesthetics causing adverse effect in therapeutic use     Patient reports h/o "combativeness", after Gastric and Hysterectomy.     ADAN (obstructive sleep apnea) 11/19/2022    Positive ARIK (antinuclear antibody)     Dr. Ross//following     Past Surgical History:   Procedure Laterality Date    CARPAL TUNNEL RELEASE      both hands    COLONOSCOPY N/A 01/22/2019    Procedure: COLONOSCOPY;  Surgeon: Moe Jimenez MD;  Location: [facility];  Service: Endoscopy;  Laterality: N/A;    GASTRIC BYPASS  2008    HYSTERECTOMY  2011    RALH (retains ovaries)    INTERPOSITION ARTHROPLASTY OF CARPOMETACARPAL JOINTS Right 02/07/2022    Procedure: INTERPOSITION ARTHROPLASTY, CMC JOINT;  Surgeon: Sterling Jade MD;  Location: [facility];  Service: Orthopedics;  Laterality: Right;  right thumb basal joint arthroplasty     ARTHREX - LATRICE AWARE    SURGICAL REMOVAL OF FASCIA Right 06/09/2022    Procedure: FASCIECTOMY;  Surgeon: Sterling Jade MD;  Location: [facility] OR;  Service: Orthopedics;  Laterality: Right;  partial palmar fasciectomy right hand ring finger     TONSILLECTOMY      TRIGGER FINGER RELEASE Right 02/07/2022    Procedure: RELEASE, TRIGGER FINGER;  Surgeon: Sterling Jade, " MD;  Location: [facility] OR;  Service: Orthopedics;  Laterality: Right;   right ring trigger finger release      Family History   Problem Relation Age of Onset    Breast cancer Maternal Grandmother     Colon cancer Maternal Grandmother     Hypertension Maternal Grandmother     Diabetes Maternal Grandmother     Parkinsonism Father     Lumbar disc disease Father     Stroke Father     Heart disease Father     Thrombophilia Sister     Hypertension Mother     Diabetes Mother     Hypertension Paternal Grandmother     Diabetes Paternal Grandmother     No Known Problems Brother     No Known Problems Maternal Grandfather     Heart disease Paternal Grandfather      Social History     Socioeconomic History    Marital status:    Tobacco Use    Smoking status: Never    Smokeless tobacco: Never   Substance and Sexual Activity    Alcohol use: Not Currently     Comment: socially    Drug use: No    Sexual activity: Yes     Partners: Male     Birth control/protection: Surgical     Comment: mut monog     Medication List with Changes/Refills   Current Medications    BUPROPION (WELLBUTRIN SR) 200 MG SR12        CHOLECALCIFEROL, VITAMIN D3, (VITAMIN D3) 1,000 UNIT CAPSULE    Take 1 capsule (1,000 Units total) by mouth once daily.    CYANOCOBALAMIN, VITAMIN B-12, 50 MCG TABLET    Take 50 mcg by mouth once daily.    DULOXETINE (CYMBALTA) 20 MG CAPSULE    Take 60 mg by mouth once daily.    LAMOTRIGINE (LAMICTAL) 100 MG TABLET    Take by mouth once daily.    MULTIVITAMIN CAPSULE    Take 1 capsule by mouth once daily.     Review of patient's allergies indicates:  No Known Allergies  Review of Systems   Constitutional: Positive for malaise/fatigue.   HENT:  Negative for hearing loss.    Eyes:  Negative for double vision and visual disturbance.   Cardiovascular:  Negative for chest pain.   Respiratory:  Positive for sleep disturbances due to breathing. Negative for shortness of breath.    Endocrine: Negative for cold intolerance.    Hematologic/Lymphatic: Does not bruise/bleed easily.   Skin:  Negative for poor wound healing and suspicious lesions.   Musculoskeletal:  Positive for arthritis, joint pain, joint swelling and muscle weakness. Negative for gout.   Gastrointestinal:  Negative for nausea and vomiting.   Genitourinary:  Negative for dysuria.   Neurological:  Negative for numbness, paresthesias and sensory change.   Psychiatric/Behavioral:  Positive for depression. Negative for memory loss and substance abuse. The patient is nervous/anxious.    Allergic/Immunologic: Negative for persistent infections.     Objective:   Body mass index is 42.08 kg/m².  There were no vitals filed for this visit.             General    Constitutional: She is oriented to person, place, and time. She appears well-developed and well-nourished. No distress.   HENT:   Head: Normocephalic.   Eyes: EOM are normal.   Pulmonary/Chest: Effort normal.   Neurological: She is oriented to person, place, and time.   Psychiatric: She has a normal mood and affect.             Right Hand/Wrist Exam     Inspection   Scars: Wrist - absent Hand -  present  Effusion: Wrist - absent Hand -  absent    Other     Neuorologic Exam    Median Distribution: normal  Ulnar Distribution: normal  Radial Distribution: normal    Comments:  The patient has a tight hypertrophic scar volar flexor crease metacarpophalangeal joint ring finger.  Table top test is negative.  She is had complete correction of her contracture      Left Hand/Wrist Exam     Inspection   Scars: Wrist - absent Hand -  absent  Effusion: Wrist - absent Hand -  absent    Pain   Wrist - The patient exhibits pain of the CMC.    Other     Sensory Exam  Median Distribution: normal  Ulnar Distribution: normal  Radial Distribution: normal    Comments:  The patient has tenderness left thumb basal joint with a positive axial circumduction grind test.          Vascular Exam       Capillary Refill  Right Hand: normal capillary  refill  Left Hand: normal capillary refill        radiographs were not obtained today  Assessment:     Encounter Diagnoses   Name Primary?    Arthritis of carpometacarpal (CMC) joint of left thumb Yes    Dupuytren's contracture of right hand         Plan:     The patient injected right ring finger Dupuytren's scar volar flexor crease metacarpophalangeal joint with 0.25 cc of Kenalog and 0.25 cc of 2% plain lidocaine in the left thumb basal joint with 0.5 cc of Kenalog and 0.5 cc of 2% plain lidocaine.  She will wait at least 3 months between injections.                Disclaimer: This note was prepared using a voice recognition system and is likely to have sound alike errors within the text.

## 2022-12-07 NOTE — PROGRESS NOTES
[facility]   Physical Therapy Treatment Note     Name: Linette LIU [facility]  Clinic Number: [id number]    Therapy Diagnosis:   Encounter Diagnoses   Name Primary?    Muscle hypertonicity Yes    Acute pain of left shoulder     Weakness of left upper extremity        Physician: Ira Rhodes PA-C    Visit Date: 12/7/2022    Physician Orders: PT Eval and Treat   Medical Diagnosis from Referral: M75.52 (ICD-10-CM) - Subacromial bursitis of left shoulder joint   Evaluation Date: 9/12/2022  Authorization Period Expiration: 12/31/2022  Plan of Care Expiration: 12/12/2022  Visit # / Visits authorized: 23/ 35 (+eval)    PN DUE:12/29/2022  PTA Visit #: 0/5     Precautions: Standard    Time In: 12:45 PM  Time Out: 1:30 PM  Total Billable Time: 45 minutes    SUBJECTIVE   Pt reports: she is not currently having any pain. She reports she is at about 90% back to normal. She reports most pain with driving and with sleeping on her left shoulder.     She was compliant with home exercise program.  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    Pain: 0/10  Location: Left shoulder    OBJECTIVE     Objective Measures updated at progress report unless specified.     TREATMENT   Linette received the treatments listed below:    (exercises and techniques performed today are bolded)     THERAPEUTIC EXERCISES to develop  strength, endurance, ROM, flexibility, posture, and core stabilization for 22 minutes including:  UBE 3/3 for range of motion, strength and posture  Rows 13# - 3 x 10  Lat pulls - 10# - 3 x10  Bicep curls - # 5- 2x12  - Hammer curls - 3# - +21's  Zottman curls - 5# - 2 x 12  Plate drivers - 5# 2 x 20  Wall pushups - 3x10  - Shoulder taps - quadruped - x20  - Mod elbow planks - 3x15  - Cat/cow - x15  Skull crushers -10#- 3x10  - Prone Series - I/T/Y - 2x10    NEUROMUSCULAR RE-EDUCATION ACTIVITIES to improve Balance,  "Coordination, Kinesthetic, Sense, Proprioception, and Posture for 8 minutes.  The following were included:   (B) Step to punch - 3# - 3 x 10  (B) Chops & lifts - 7# - 2 x10  +Wall ball abduction green weighted ball     MANUAL THERAPY TECHNIQUES were applied for 15 minutes including:   Shoulder Distraction   Posterior and inferior joint mobs  PROM with overpressure to left shoulder in all planes of motion   STM to upper trap (L)  Soft tissue mobilization to supraspinatus, infraspinatus and biceps  IASTM to biceps tendon and muscle belly  PNF Rhythmic stabilizations - 2x30" with 5" iso holds with PTA providing resistance  FDN (anterolateral shoulder protocol) with Estim     Therapeutic Activities x 5 minutes  Lifting from floor to table and back to floor x5 (crate) x5 (5#) x5 (10#)      PATIENT EDUCATION AND HOME EXERCISES     Home Exercises Provided and Patient Education Provided   Education provided:   - educated pt that mild soreness is an expected outcome to therapy session  - educated pt to continue with HEP as issued and prescribed    Written Home Exercises Provided: Patient instructed to cont prior HEP. Exercises were reviewed and Linette was able to demonstrate them prior to the end of the session.  Linette demonstrated good  understanding of the education provided. See EMR under Patient Instructions for exercises provided during therapy sessions    ASSESSMENT   Pt tolerates this session well with no adverse effects. She presents with minimal to no left shoulder pain. She states she is 90% back to normal but still experiences high levels of pain with driving tasks and with sleeping on her left shoulder. Continued with progressed exercises. Therapy session focused on scapular control, shoulder strengthening, and postural stability with good tolerance. Ended session with FDN with estim following consent with good muscular twitch.     Linette Is progressing well towards her goals.   Pt prognosis is Good.     Pt will " continue to benefit from skilled outpatient physical therapy to address the deficits listed in the problem list box on initial evaluation, provide pt/family education and to maximize pt's level of independence in the home and community environment.     Pt's spiritual, cultural and educational needs considered and pt agreeable to plan of care and goals.     Anticipated barriers to physical therapy: pain    Goals:   Short Term Goals (5 Weeks):   1. Patient will demonstrate independence with HEP in order to progress toward functional independence Met  2. Pt will demonstrate improved pain by reports of less than or equal to 7/10 worst pain on the verbal rating scale in order to progress toward maximal functional ability and improve QOL. Met  3. Pt will be able to correct slouched posture with minimal verbal cueing for improved static sitting posture and increased QOL. Met     Long Term Goals (10 Weeks):   1. Patient will demonstrate improved function as indicated by a functional limitation score of 31% on the FOTO. Progressing, not met  2. Pt will demonstrate improved pain by reports of less than or equal to 5/10 worst pain on the verbal rating scale in order to progress toward maximal functional ability and improve QOL. Progressing, not met  3. Pt will be able to correct slouched posture independently for improved static sitting posture and increased QOL. Met  4. Pt will move left shoulder through functional ROM in all planes without pain to improve independence in performing yard work, house chores, and ADLs Progressing, not met  5. Pt will improve left shoulder strength to 5/5 for improved ability to perform house work, yard work, and hold grandchildren. Progressing, not met    PLAN     Continue PT per POC, assess tolerance to today's session,  progress exercise as tolerated and appropriate    Lilia Nagy, PT, DPT  12/7/2022

## 2023-01-09 ENCOUNTER — TELEPHONE (OUTPATIENT)
Dept: PULMONOLOGY | Facility: CLINIC | Age: 58
End: 2023-01-09
Payer: OTHER GOVERNMENT

## 2023-01-09 ENCOUNTER — OFFICE VISIT (OUTPATIENT)
Dept: PULMONOLOGY | Facility: CLINIC | Age: 58
End: 2023-01-09
Payer: OTHER GOVERNMENT

## 2023-01-09 VITALS — WEIGHT: 240 LBS | HEIGHT: 64 IN | BODY MASS INDEX: 40.97 KG/M2

## 2023-01-09 DIAGNOSIS — F45.8 BRUXISM: ICD-10-CM

## 2023-01-09 DIAGNOSIS — G47.33 OSA (OBSTRUCTIVE SLEEP APNEA): Primary | ICD-10-CM

## 2023-01-09 PROCEDURE — 99214 PR OFFICE/OUTPT VISIT, EST, LEVL IV, 30-39 MIN: ICD-10-PCS | Mod: 95,,, | Performed by: INTERNAL MEDICINE

## 2023-01-09 PROCEDURE — 99214 OFFICE O/P EST MOD 30 MIN: CPT | Mod: 95,,, | Performed by: INTERNAL MEDICINE

## 2023-01-09 NOTE — PATIENT INSTRUCTIONS
What Are CPAP and Other Air Pressure Treatments?   Continuous positive air pressure (CPAP) uses gentle air pressure to hold the airway open. CPAP is often the most effective treatment for sleep apnea and severe snoring. It works very well for many people. But keep in mind that it can take several adjustments before the setup is right for you.   How CPAP Works   A small portable pump beside the bed sends air through a hose, which is held over your nose by a mask. Air is gently pushed through your airway. The air pressure nudges sagging tissues aside. This widens the airway so you can breathe better. CPAP may be combined with other kinds of therapy for sleep apnea.      A mask over the nose gently directs air into the throat to keep the airway open.      Types of Air Pressure Treatments   There are different types of CPAP. Your doctor or CPAP technician will help you decide which type is best for you:   Basic CPAP keeps the pressure constant all night long.   A bilevel device gives out more pressure when you breathe in and less when you breathe out.   An autoCPAP device automatically adjusts pressure throughout the night and in response to changes such as body position, sleep stage, and snoring.      Please call office [phone] for any questions

## 2023-01-09 NOTE — PROGRESS NOTES
The patient location is: [address] [place] [address] [address] [id number]    The chief complaint leading to consultation is: ADAN    Visit type: audiovisual    Face to Face time with patient: 15 min  25 minutes of total time spent on the encounter, which includes face to face time and non-face to face time preparing to see the patient (eg, review of tests), Obtaining and/or reviewing separately obtained history, Documenting clinical information in the electronic or other health record, Independently interpreting results (not separately reported) and communicating results to the patient/family/caregiver, or Care coordination (not separately reported).         Each patient to whom he or she provides medical services by telemedicine is:  (1) informed of the relationship between the physician and patient and the respective role of any other health care provider with respect to management of the patient; and (2) notified that he or she may decline to receive medical services by telemedicine and may withdraw from such care at any time.    Notes:                                               Pulmonary Outpatient   Visit     Subjective:       Patient ID: Linette Galo is a 57 y.o. female.    Chief Complaint: Sleep Apnea      Linette Galo is 57 y.o.  Asked to see by Nikki LAGUERRE  Home study: Moderate ADAN  Sleep with mouth gurad  Snoring and apnea  Dentist for Bruxism  Bed time 9 pm  No sleeping   Wake time: 4 30 am  WASO x 10  cause ?  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  (>25 is overweight, >30 is obese)    Blood Pressure = normal blood pressure  (PreHTN 120-139/80-89, Stg1  140-159/90-99, Stg2 >160/>100)  [place] = 2 of three ADAN categories are positive (high risk is 2-3 positive categories)     [place] Sleepiness Scale TOTAL =  12    EPWORTH SLEEPINESS SCALE 1/9/2023   Sitting and reading 2   Watching TV 1   Sitting, inactive in a public place (e.g. a theatre or a meeting) 0   As a passenger in a car for an hour without a break 3   Lying down to rest in the afternoon when circumstances permit 3   Sitting and talking to someone 0   Sitting quietly after a lunch without alcohol 3   In a car, while stopped for a few minutes in traffic 0   Total score 12       (validated sleepiness questionnaire with a higher score indicating greater sleepiness; range 0-24)  No flowsheet data found.    STOP-Bang Questionnaire (validated ADAN screening questionnaire)  Negative unless checked off.  [x] Snoring    [x]  Tired/Fatigued/Sleepy  [x] Obstruction (apneas/choking)  [] Pressure (HTN)  [] BMI >35  [] Age >50  [] Neck >40 cm  [] Gender male       STOP-Bang = 3 (low risk 0-2,high risk 3-8       The following portions of the patient's history were reviewed and updated as appropriate: She  has a past medical history of ADD (attention deficit disorder), Anemia, Anxiety, Arthritis, Depression, Family history of colon cancer (1/22/2019), General anesthetics causing adverse effect in therapeutic use, ADAN (obstructive sleep apnea) (11/19/2022), and Positive ARIK (antinuclear antibody).  She does not have any pertinent problems on file.  She  has a past surgical history that includes Gastric bypass (2008); Carpal tunnel release; Tonsillectomy; Colonoscopy (N/A, 01/22/2019); Hysterectomy (2011); Trigger finger release (Right, 02/07/2022); Interposition arthroplasty of carpometacarpal joints (Right, 02/07/2022); and Surgical removal of fascia (Right, 06/09/2022).  Her family history includes Breast cancer in her maternal grandmother; Colon cancer in her maternal grandmother; Diabetes in her maternal grandmother,  mother, and paternal grandmother; Heart disease in her father and paternal grandfather; Hypertension in her maternal grandmother, mother, and paternal grandmother; Lumbar disc disease in her father; No Known Problems in her brother and maternal grandfather; Parkinsonism in her father; Stroke in her father; Thrombophilia in her sister.  She  reports that she has never smoked. She has never used smokeless tobacco. She reports that she does not currently use alcohol. She reports that she does not use drugs.  She has a current medication list which includes the following prescription(s): bupropion, cholecalciferol (vitamin d3), cyanocobalamin (vitamin b-12), duloxetine, lamotrigine, and multivitamin.  Current Outpatient Medications on File Prior to Visit   Medication Sig Dispense Refill    buPROPion (WELLBUTRIN SR) 200 MG SR12       cholecalciferol, vitamin D3, (VITAMIN D3) 1,000 unit capsule Take 1 capsule (1,000 Units total) by mouth once daily. 100 capsule 12    cyanocobalamin, vitamin B-12, 50 mcg tablet Take 50 mcg by mouth once daily.      DULoxetine (CYMBALTA) 20 MG capsule Take 60 mg by mouth once daily.      lamotrigine (LAMICTAL) 100 MG tablet Take by mouth once daily.      multivitamin capsule Take 1 capsule by mouth once daily.       No current facility-administered medications on file prior to visit.     She has No Known Allergies..      Review of Systems   Respiratory:  Positive for apnea, snoring and somnolence.    Psychiatric/Behavioral:  Positive for sleep disturbance.    All other systems reviewed and are negative.    Outpatient Encounter Medications as of 1/9/2023   Medication Sig Dispense Refill    buPROPion (WELLBUTRIN SR) 200 MG SR12       cholecalciferol, vitamin D3, (VITAMIN D3) 1,000 unit capsule Take 1 capsule (1,000 Units total) by mouth once daily. 100 capsule 12    cyanocobalamin, vitamin B-12, 50 mcg tablet Take 50 mcg by mouth once daily.      DULoxetine (CYMBALTA) 20 MG capsule Take 60 mg  "by mouth once daily.      lamotrigine (LAMICTAL) 100 MG tablet Take by mouth once daily.      multivitamin capsule Take 1 capsule by mouth once daily.       No facility-administered encounter medications on file as of 1/9/2023.       Objective:     Vital Signs (Most Recent)  Height and Weight  Height: 5' 4" (162.6 cm)  Weight: 108.9 kg (240 lb)  BSA (Calculated - sq m): 2.22 sq meters  BMI (Calculated): 41.2  Weight in (lb) to have BMI = 25: 145.3]  Wt Readings from Last 2 Encounters:   01/09/23 108.9 kg (240 lb)   12/07/22 111.2 kg (245 lb 2.4 oz)       Physical Exam   Constitutional: She is oriented to person, place, and time. She appears well-developed and well-nourished.   Neurological: She is alert and oriented to person, place, and time.   Nursing note and vitals reviewed.    Laboratory  Lab Results   Component Value Date    WBC 7.70 11/04/2022    RBC 4.76 11/04/2022    HGB 14.3 11/04/2022    HCT 44.6 11/04/2022    MCV 94 11/04/2022    MCH 30.0 11/04/2022    MCHC 32.1 11/04/2022    RDW 13.2 11/04/2022     11/04/2022    MPV 12.4 11/04/2022    GRAN 4.6 11/04/2022    GRAN 60.1 11/04/2022    LYMPH 2.0 11/04/2022    LYMPH 25.7 11/04/2022    MONO 0.7 11/04/2022    MONO 8.7 11/04/2022    EOS 0.3 11/04/2022    BASO 0.07 11/04/2022    EOSINOPHIL 3.8 11/04/2022    BASOPHIL 0.9 11/04/2022       BMP  Lab Results   Component Value Date     11/04/2022    K 4.4 11/04/2022     11/04/2022    CO2 24 11/04/2022    BUN 16 11/04/2022    CREATININE 1.0 11/04/2022    CALCIUM 9.7 11/04/2022    ANIONGAP 11 11/04/2022    ESTGFRAFRICA >60.0 05/31/2022    EGFRNONAA >60.0 05/31/2022    AST 13 11/04/2022    ALT 17 11/04/2022    PROT 7.2 11/04/2022       No results found for: BNP    Lab Results   Component Value Date    TSH 1.904 11/04/2022       No results found for: SEDRATE    No results found for: CRP  No results found for: IGE     No results found for: ASPERGILLUS  No results found for: AFUMIGATUSCL     No results " found for: ACE     Diagnostic Results:  I have personally reviewed today the following studies:  1 night study  MODERATE OBSTRUCTIVE SLEEP APNEA with overall AHI 15.6/hr ( 119 events): night #1  Oxygen desaturation: 88%. SpO2 between 90% to 94% for 4 hr 2 min.  Patient snored 99% time above 50 .  Heart rate range: 69 bpm - 92 bpm  REC's:  Therapy with APAP at 5-15 cm WP using mask of choice with heated humidification is an option.  Please refer to [facility] Disorder Clinic for management.  Weight loss/management. with regular exercise per direction of physician.  Avoid drowsy driving.  Follow up in sleep clinic to maximize adherence and ensure resolution of symptoms      EXAMINATION:  XR CHEST PA AND LATERAL PRE-OP     CLINICAL HISTORY:  Encounter for other preprocedural examination     TECHNIQUE:  PA and lateral views of the chest were performed.     COMPARISON:  12/12/2007     FINDINGS:  The cardiac and mediastinal silhouettes appear within normal limits.   The lungs are clear bilaterally.  No acute osseous findings demonstrated.     Impression:     No acute findings.     Assessment/Plan:     Problem List Items Addressed This Visit       ADAN (obstructive sleep apnea) - Primary     Snoring, Apnea, Fatigue  EPWORTH 12  Moderate ADAN on Home study    Goals of CPAP discussed           Relevant Orders    MyChart Patient Entered CPAP Usage    CPAP FOR HOME USE    Body mass index 40.0-44.9, adult     General weight loss/lifestyle modification strategies discussed (elicit support from others; identify saboteurs; non-food rewards).  Diet interventions: low calorie (1000 kCal/d) deficit diet           Bruxism     Wears mouth guard               Follow up in about 2 months (around 3/9/2023), or CPAP, Download, weight loss.    This note was prepared using voice recognition system and is likely to have sound alike errors that may have been overlooked even after proof reading.  Please call me with any questions    Discussed  diagnosis, its evaluation, treatment and usual course. All questions answered.      Parrish Sylvester MD

## 2023-01-17 ENCOUNTER — DOCUMENTATION ONLY (OUTPATIENT)
Dept: REHABILITATION | Facility: HOSPITAL | Age: 58
End: 2023-01-17
Payer: OTHER GOVERNMENT

## 2023-01-17 PROBLEM — M62.89 MUSCLE HYPERTONICITY: Status: RESOLVED | Noted: 2022-09-12 | Resolved: 2023-01-17

## 2023-01-17 PROBLEM — R29.898 WEAKNESS OF LEFT UPPER EXTREMITY: Status: RESOLVED | Noted: 2022-09-12 | Resolved: 2023-01-17

## 2023-01-17 PROBLEM — M25.512 ACUTE PAIN OF LEFT SHOULDER: Status: RESOLVED | Noted: 2022-09-12 | Resolved: 2023-01-17

## 2023-01-17 NOTE — PROGRESS NOTES
[facility]   Discharge Note    Name: Linette LIU [facility] Number: [id number]      Therapy Diagnosis:        Encounter Diagnoses   Name Primary?    Muscle hypertonicity Yes    Acute pain of left shoulder      Weakness of left upper extremity         Physician: Ira Rhodes PA-C     Physician Orders: PT Eval and Treat   Medical Diagnosis from Referral: M75.52 (ICD-10-CM) - Subacromial bursitis of left shoulder joint   Evaluation Date: 9/12/2022    Date of Last visit: 12/7/2022  Total Visits Received: 30    ASSESSMENT    Discharge reason: Pt is no longer having pain in her left shoulder. She states that she feels that she is 90% back to normal and only has increase in pain with driving and sleeping on her left side.     Discharge FOTO Score: 44% limitation (11/29/2022)    Goals:   Short Term Goals (5 Weeks):   1. Patient will demonstrate independence with HEP in order to progress toward functional independence Met  2. Pt will demonstrate improved pain by reports of less than or equal to 7/10 worst pain on the verbal rating scale in order to progress toward maximal functional ability and improve QOL. Met  3. Pt will be able to correct slouched posture with minimal verbal cueing for improved static sitting posture and increased QOL. Met     Long Term Goals (10 Weeks):   1. Patient will demonstrate improved function as indicated by a functional limitation score of 31% on the FOTO. Progressing, not met  2. Pt will demonstrate improved pain by reports of less than or equal to 5/10 worst pain on the verbal rating scale in order to progress toward maximal functional ability and improve QOL. Progressing, not met  3. Pt will be able to correct slouched posture independently for improved static sitting posture and increased QOL. Met  4. Pt will move left shoulder through functional ROM in all planes without pain to improve independence in performing yard work, house chores, and ADLs  Progressing, not met  5. Pt will improve left shoulder strength to 5/5 for improved ability to perform house work, yard work, and hold grandchildren. Progressing, not met    PLAN   This patient is discharged from Physical Therapy    Lilia Nagy, PT, DPT

## 2023-02-06 ENCOUNTER — PATIENT MESSAGE (OUTPATIENT)
Dept: PULMONOLOGY | Facility: CLINIC | Age: 58
End: 2023-02-06
Payer: OTHER GOVERNMENT

## 2023-02-15 ENCOUNTER — TELEPHONE (OUTPATIENT)
Dept: PULMONOLOGY | Facility: CLINIC | Age: 58
End: 2023-02-15
Payer: OTHER GOVERNMENT

## 2023-02-15 NOTE — TELEPHONE ENCOUNTER
----- Message from Kamryn Sweet sent at 2/14/2023 11:52 AM CST -----  Regarding: PAP Follow Up  Patient received pap equipment today and needs to schedule a follow up with Dr Sylvester. It MUST be between 3/14/23 and 5/14/23 for insurance compliance. Please call her to schedule.     Thank you

## 2023-04-06 ENCOUNTER — OFFICE VISIT (OUTPATIENT)
Dept: SLEEP MEDICINE | Facility: CLINIC | Age: 58
End: 2023-04-06
Payer: OTHER GOVERNMENT

## 2023-04-06 VITALS
OXYGEN SATURATION: 97 % | DIASTOLIC BLOOD PRESSURE: 74 MMHG | HEART RATE: 86 BPM | WEIGHT: 245.38 LBS | RESPIRATION RATE: 18 BRPM | HEIGHT: 64 IN | BODY MASS INDEX: 41.89 KG/M2 | SYSTOLIC BLOOD PRESSURE: 120 MMHG

## 2023-04-06 DIAGNOSIS — F45.8 BRUXISM: ICD-10-CM

## 2023-04-06 DIAGNOSIS — G47.33 OSA (OBSTRUCTIVE SLEEP APNEA): Primary | ICD-10-CM

## 2023-04-06 DIAGNOSIS — F32.A DEPRESSION, UNSPECIFIED DEPRESSION TYPE: ICD-10-CM

## 2023-04-06 PROCEDURE — 99999 PR PBB SHADOW E&M-EST. PATIENT-LVL IV: ICD-10-PCS | Mod: PBBFAC,,, | Performed by: INTERNAL MEDICINE

## 2023-04-06 PROCEDURE — 99214 OFFICE O/P EST MOD 30 MIN: CPT | Mod: PBBFAC | Performed by: INTERNAL MEDICINE

## 2023-04-06 PROCEDURE — 99999 PR PBB SHADOW E&M-EST. PATIENT-LVL IV: CPT | Mod: PBBFAC,,, | Performed by: INTERNAL MEDICINE

## 2023-04-06 PROCEDURE — 99214 PR OFFICE/OUTPT VISIT, EST, LEVL IV, 30-39 MIN: ICD-10-PCS | Mod: S$PBB,,, | Performed by: INTERNAL MEDICINE

## 2023-04-06 PROCEDURE — 99214 OFFICE O/P EST MOD 30 MIN: CPT | Mod: S$PBB,,, | Performed by: INTERNAL MEDICINE

## 2023-04-06 RX ORDER — DULOXETIN HYDROCHLORIDE 60 MG/1
60 CAPSULE, DELAYED RELEASE ORAL
COMMUNITY
Start: 2023-03-01

## 2023-04-06 NOTE — PATIENT INSTRUCTIONS
Discussed therapeutic goals for positive airway pressure therapy(CPAP or BiPAP):   Ideal is usage 100% of nights for 6 - 8 hours per night.   Minimum usage is 70% of night for at least 4 hours per night used.  Mask choices discussed.  Patient expressed understanding. All Questions answered.

## 2023-04-06 NOTE — PROGRESS NOTES
Pulmonary Outpatient   Visit     Subjective:       Patient ID: Linette Galo is a 57 y.o. female.    Chief Complaint: Apnea      Linette Galo is 57 y.o.  Asked to see by Nikki LAGUERRE  Home study: Moderate ADAN  Sleep with mouth gurad  Snoring and apnea  Dentist for Bruxism  Bed time 9 pm  No sleeping   Wake time: 4 30 am  WASO x 10  cause ?  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  (>25 is overweight, >30 is obese)    Blood Pressure = normal blood pressure  (PreHTN 120-139/80-89, Stg1 140-159/90-99, Stg2 >160/>100)  Secretary = 2 of three ADAN categories are positive (high risk is 2-3 positive categories)     [place] Sleepiness Scale TOTAL =  12     EPWORTH SLEEPINESS SCALE 4/6/2023   Sitting and reading 3   Watching TV 3   Sitting, inactive in a public place (e.g. a theatre or a meeting) 0   As a passenger in a car for an hour without a break 3   Lying down to rest in the afternoon when circumstances permit 3   Sitting and talking to someone 0   Sitting quietly after a lunch without alcohol 3   In a car, while stopped for a few minutes in traffic 0   Total score 15        (validated sleepiness questionnaire with a higher score indicating greater sleepiness; range 0-24)  No flowsheet data found.    STOP-Bang Questionnaire (validated ADAN screening questionnaire)  Negative unless checked off.  [x] Snoring    [x]  Tired/Fatigued/Sleepy  [x] Obstruction (apneas/choking)  [] Pressure (HTN)  [] BMI  >35  [] Age >50  [] Neck >40 cm  [] Gender male       STOP-Bang = 3 (low risk 0-2,high risk 3-8       The following portions of the patient's history were reviewed and updated as appropriate: She  has a past medical history of ADD (attention deficit disorder), Anemia, Anxiety, Arthritis, Depression, Family history of colon cancer (1/22/2019), General anesthetics causing adverse effect in therapeutic use, ADAN (obstructive sleep apnea) (11/19/2022), and Positive ARIK (antinuclear antibody).  She does not have any pertinent problems on file.  She  has a past surgical history that includes Gastric bypass (2008); Carpal tunnel release; Tonsillectomy; Colonoscopy (N/A, 01/22/2019); Hysterectomy (2011); Trigger finger release (Right, 02/07/2022); Interposition arthroplasty of carpometacarpal joints (Right, 02/07/2022); and Surgical removal of fascia (Right, 06/09/2022).  Her family history includes Breast cancer in her maternal grandmother; Colon cancer in her maternal grandmother; Diabetes in her maternal grandmother, mother, and paternal grandmother; Heart disease in her father and paternal grandfather; Hypertension in her maternal grandmother, mother, and paternal grandmother; Lumbar disc disease in her father; No Known Problems in her brother and maternal grandfather; Parkinsonism in her father; Stroke in her father; Thrombophilia in her sister.  She  reports that she has never smoked. She has never used smokeless tobacco. She reports that she does not currently use alcohol. She reports that she does not use drugs.  She has a current medication list which includes the following prescription(s): bupropion, cholecalciferol (vitamin d3), cyanocobalamin (vitamin b-12), duloxetine, duloxetine, lamotrigine, and multivitamin.  Current Outpatient Medications on File Prior to Visit   Medication Sig Dispense Refill    buPROPion (WELLBUTRIN SR) 200 MG SR12       cholecalciferol, vitamin D3, (VITAMIN D3) 1,000 unit capsule Take 1  "capsule (1,000 Units total) by mouth once daily. 100 capsule 12    cyanocobalamin, vitamin B-12, 50 mcg tablet Take 50 mcg by mouth once daily.      DULoxetine (CYMBALTA) 20 MG capsule Take 60 mg by mouth once daily.      DULoxetine (CYMBALTA) 60 MG capsule Take 60 mg by mouth.      lamotrigine (LAMICTAL) 100 MG tablet Take by mouth once daily.      multivitamin capsule Take 1 capsule by mouth once daily.       No current facility-administered medications on file prior to visit.     She has No Known Allergies..      Review of Systems   Respiratory:  Positive for apnea, snoring and somnolence.    Psychiatric/Behavioral:  Positive for sleep disturbance.    All other systems reviewed and are negative.    Outpatient Encounter Medications as of 4/6/2023   Medication Sig Dispense Refill    buPROPion (WELLBUTRIN SR) 200 MG SR12       cholecalciferol, vitamin D3, (VITAMIN D3) 1,000 unit capsule Take 1 capsule (1,000 Units total) by mouth once daily. 100 capsule 12    cyanocobalamin, vitamin B-12, 50 mcg tablet Take 50 mcg by mouth once daily.      DULoxetine (CYMBALTA) 20 MG capsule Take 60 mg by mouth once daily.      DULoxetine (CYMBALTA) 60 MG capsule Take 60 mg by mouth.      lamotrigine (LAMICTAL) 100 MG tablet Take by mouth once daily.      multivitamin capsule Take 1 capsule by mouth once daily.       No facility-administered encounter medications on file as of 4/6/2023.       Objective:     Vital Signs (Most Recent)  Vital Signs  Pulse: 86  Resp: 18  SpO2: 97 %  BP: 120/74  Height and Weight  Height: 5' 4" (162.6 cm)  Weight: 111.3 kg (245 lb 6 oz)  BSA (Calculated - sq m): 2.24 sq meters  BMI (Calculated): 42.1  Weight in (lb) to have BMI = 25: 145.3]  Wt Readings from Last 2 Encounters:   04/06/23 111.3 kg (245 lb 6 oz)   01/09/23 108.9 kg (240 lb)       Physical Exam   Constitutional: She is oriented to person, place, and time. She appears well-developed and well-nourished.   HENT:   Head: Normocephalic. " "  Mouth/Throat: Mallampati Score: IV.   Neck 16"   Neck: No JVD present.   Cardiovascular: Normal rate and intact distal pulses.   No murmur heard.  Pulmonary/Chest: Normal expansion, effort normal and breath sounds normal.   Abdominal: Soft. Bowel sounds are normal.   Musculoskeletal:         General: No edema. Normal range of motion.      Cervical back: Normal range of motion and neck supple.   Lymphadenopathy:     She has no axillary adenopathy.   Neurological: She is alert and oriented to person, place, and time. Gait normal.   Skin: Skin is warm and dry.   Psychiatric: She has a normal mood and affect.   Nursing note and vitals reviewed.    Laboratory  Lab Results   Component Value Date    WBC 7.70 11/04/2022    RBC 4.76 11/04/2022    HGB 14.3 11/04/2022    HCT 44.6 11/04/2022    MCV 94 11/04/2022    MCH 30.0 11/04/2022    MCHC 32.1 11/04/2022    RDW 13.2 11/04/2022     11/04/2022    MPV 12.4 11/04/2022    GRAN 4.6 11/04/2022    GRAN 60.1 11/04/2022    LYMPH 2.0 11/04/2022    LYMPH 25.7 11/04/2022    MONO 0.7 11/04/2022    MONO 8.7 11/04/2022    EOS 0.3 11/04/2022    BASO 0.07 11/04/2022    EOSINOPHIL 3.8 11/04/2022    BASOPHIL 0.9 11/04/2022       BMP  Lab Results   Component Value Date     11/04/2022    K 4.4 11/04/2022     11/04/2022    CO2 24 11/04/2022    BUN 16 11/04/2022    CREATININE 1.0 11/04/2022    CALCIUM 9.7 11/04/2022    ANIONGAP 11 11/04/2022    ESTGFRAFRICA >60.0 05/31/2022    EGFRNONAA >60.0 05/31/2022    AST 13 11/04/2022    ALT 17 11/04/2022    PROT 7.2 11/04/2022       No results found for: BNP    Lab Results   Component Value Date    TSH 1.904 11/04/2022       No results found for: SEDRATE    No results found for: CRP  No results found for: IGE     No results found for: ASPERGILLUS  No results found for: AFUMIGATUSCL     No results found for: ACE     Diagnostic Results:  I have personally reviewed today the following studies:  1 night study  MODERATE OBSTRUCTIVE SLEEP " APNEA with overall AHI 15.6/hr ( 119 events): night #1  Oxygen desaturation: 88%. SpO2 between 90% to 94% for 4 hr 2 min.  Patient snored 99% time above 50 .  Heart rate range: 69 bpm - 92 bpm  REC's:  Therapy with APAP at 5-15 cm WP using mask of choice with heated humidification is an option.  Please refer to Sleep Disorder Clinic for management.  Weight loss/management. with regular exercise per direction of physician.  Avoid drowsy driving.  Follow up in sleep clinic to maximize adherence and ensure resolution of symptoms      EXAMINATION:  XR CHEST PA AND LATERAL PRE-OP     CLINICAL HISTORY:  Encounter for other preprocedural examination     TECHNIQUE:  PA and lateral views of the chest were performed.     COMPARISON:  2007     FINDINGS:  The cardiac and mediastinal silhouettes appear within normal limits.   The lungs are clear bilaterally.  No acute osseous findings demonstrated.     Impression:     No acute findings.   2023 - 2023  Patient ID: [id number]  : 1965  Age: 57 years  Gender: Female  [facility]  [address] [facility], [address]  Compliance Report  Compliance  Payor Standard  Usage 2023 - 2023  Usage days 28/30 days (93%)  >= 4 hours 27 days (90%)  < 4 hours 1 days (3%)  Usage hours 201 hours 40 minutes  Average usage (total days) 6 hours 43 minutes  Average usage (days used) 7 hours 12 minutes  Median usage (days used) 7 hours 12 minutes  Total used hours (value since last reset - 2023) 375 hours  AirSense 10 AutoSet  Serial number [id number]  Mode AutoSet  Min Pressure 5 cmH2O  Max Pressure 12 cmH2O  EPR Fulltime  EPR level 3  Response Standard  Therapy  Pressure - cmH2O Median: 8.2 95th percentile: 10.7 Maximum: 11.6  Leaks - L/min Median: 0.2 95th percentile: 4.2 Maximum: 26.7  Events per hour AI: 2.1 HI: 0.3 AHI: 2.4  Apnea Index Central: 0.4 Obstructive: 1.5 Unknown: 0.1  RERA Index 0.1  Cheyne-Herzog respiration (average  duration per night) 0 minutes (0%)  Assessment/Plan:     Problem List Items Addressed This Visit       Depression     Stable on Wellbutrin, Cymbalta, Lamictal           ADAN (obstructive sleep apnea) - Primary     [place] 15  Usage > 4 hrs was 93%  APAP 5-12  AHI 2.4  Using and benefits  Prefers nasal mask            Relevant Orders    CPAP/BIPAP SUPPLIES    Body mass index 40.0-44.9, adult     General weight loss/lifestyle modification strategies discussed (elicit support from others; identify saboteurs; non-food rewards).  Diet interventions: low calorie (1000 kCal/d) deficit diet          Bruxism     Wears mouth guard             Try different mask  Provigil, nuvigil may be considered if residual sleepiness persists    Follow up in about 3 months (around 7/6/2023), or see Kamryn about mask, Weight loss, Download.    This note was prepared using voice recognition system and is likely to have sound alike errors that may have been overlooked even after proof reading.  Please call me with any questions    Discussed diagnosis, its evaluation, treatment and usual course. All questions answered.      Parrish Sylvester MD

## 2023-07-06 ENCOUNTER — OFFICE VISIT (OUTPATIENT)
Dept: SLEEP MEDICINE | Facility: CLINIC | Age: 58
End: 2023-07-06
Payer: OTHER GOVERNMENT

## 2023-07-06 VITALS
OXYGEN SATURATION: 96 % | HEIGHT: 64 IN | DIASTOLIC BLOOD PRESSURE: 76 MMHG | WEIGHT: 248.25 LBS | SYSTOLIC BLOOD PRESSURE: 124 MMHG | BODY MASS INDEX: 42.38 KG/M2 | HEART RATE: 82 BPM | RESPIRATION RATE: 16 BRPM

## 2023-07-06 DIAGNOSIS — F45.8 BRUXISM: ICD-10-CM

## 2023-07-06 DIAGNOSIS — G47.33 OSA (OBSTRUCTIVE SLEEP APNEA): Primary | ICD-10-CM

## 2023-07-06 PROCEDURE — 99999 PR PBB SHADOW E&M-EST. PATIENT-LVL IV: ICD-10-PCS | Mod: PBBFAC,,, | Performed by: INTERNAL MEDICINE

## 2023-07-06 PROCEDURE — 99214 OFFICE O/P EST MOD 30 MIN: CPT | Mod: PBBFAC | Performed by: INTERNAL MEDICINE

## 2023-07-06 PROCEDURE — 99214 OFFICE O/P EST MOD 30 MIN: CPT | Mod: S$PBB,,, | Performed by: INTERNAL MEDICINE

## 2023-07-06 PROCEDURE — 99999 PR PBB SHADOW E&M-EST. PATIENT-LVL IV: CPT | Mod: PBBFAC,,, | Performed by: INTERNAL MEDICINE

## 2023-07-06 PROCEDURE — 99214 PR OFFICE/OUTPT VISIT, EST, LEVL IV, 30-39 MIN: ICD-10-PCS | Mod: S$PBB,,, | Performed by: INTERNAL MEDICINE

## 2023-07-06 NOTE — PROGRESS NOTES
Pulmonary Outpatient   Visit     Subjective:       Patient ID: Linette Galo is a 57 y.o. female.    Chief Complaint: Sleep Apnea      Linette Galo is 57 y.o.  Asked to see by Nikki Alcantara VIDAL  Home study: Moderate ADAN  Sleep with mouth gurad  Snoring and apnea  Dentist for Bruxism  Bed time 9 pm  No sleeping   Wake time: 4 30 am  WASO x 10  cause ?  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  (>25 is overweight, >30 is obese)    Blood Pressure = normal blood pressure  (PreHTN 120-139/80-89, Stg1 140-159/90-99, Stg2 >160/>100)  [place]one = 2 of three ADAN categories are positive (high risk is 2-3 positive categories)     [place] Sleepiness Scale TOTAL =  6      EPWORTH SLEEPINESS SCALE 7/6/2023   Sitting and reading 1   Watching TV 1   Sitting, inactive in a public place (e.g. a theatre or a meeting) 0   As a passenger in a car for an hour without a break 2   Lying down to rest in the afternoon when circumstances permit 1   Sitting and talking to someone 0   Sitting quietly after a lunch without alcohol 1   In a car, while stopped for a few minutes in traffic 0   Total score 6           (validated sleepiness questionnaire with a higher score indicating greater sleepiness; range 0-24)  No flowsheet data found.    STOP-Bang Questionnaire (validated ADAN screening  questionnaire)         The following portions of the patient's history were reviewed and updated as appropriate: She  has a past medical history of ADD (attention deficit disorder), Anemia, Anxiety, Arthritis, Depression, Family history of colon cancer (1/22/2019), General anesthetics causing adverse effect in therapeutic use, ADAN (obstructive sleep apnea) (11/19/2022), and Positive ARIK (antinuclear antibody).  She does not have any pertinent problems on file.  She  has a past surgical history that includes Gastric bypass (2008); Carpal tunnel release; Tonsillectomy; Colonoscopy (N/A, 01/22/2019); Hysterectomy (2011); Trigger finger release (Right, 02/07/2022); Interposition arthroplasty of carpometacarpal joints (Right, 02/07/2022); and Surgical removal of fascia (Right, 06/09/2022).  Her family history includes Breast cancer in her maternal grandmother; Colon cancer in her maternal grandmother; Diabetes in her maternal grandmother, mother, and paternal grandmother; Heart disease in her father and paternal grandfather; Hypertension in her maternal grandmother, mother, and paternal grandmother; Lumbar disc disease in her father; No Known Problems in her brother and maternal grandfather; Parkinsonism in her father; Stroke in her father; Thrombophilia in her sister.  She  reports that she has never smoked. She has never used smokeless tobacco. She reports that she does not currently use alcohol. She reports that she does not use drugs.  She has a current medication list which includes the following prescription(s): bupropion, cholecalciferol (vitamin d3), cyanocobalamin (vitamin b-12), duloxetine, duloxetine, lamotrigine, and multivitamin.  Current Outpatient Medications on File Prior to Visit   Medication Sig Dispense Refill    buPROPion (WELLBUTRIN SR) 200 MG SR12       cholecalciferol, vitamin D3, (VITAMIN D3) 1,000 unit capsule Take 1 capsule (1,000 Units total) by mouth once daily. 100 capsule 12     "cyanocobalamin, vitamin B-12, 50 mcg tablet Take 50 mcg by mouth once daily.      DULoxetine (CYMBALTA) 20 MG capsule Take 60 mg by mouth once daily.      DULoxetine (CYMBALTA) 60 MG capsule Take 60 mg by mouth.      lamotrigine (LAMICTAL) 100 MG tablet Take by mouth once daily.      multivitamin capsule Take 1 capsule by mouth once daily.       No current facility-administered medications on file prior to visit.     She has No Known Allergies..      Review of Systems   Respiratory:  Negative for apnea, snoring and somnolence.    Psychiatric/Behavioral:  Negative for sleep disturbance.    All other systems reviewed and are negative.    Outpatient Encounter Medications as of 7/6/2023   Medication Sig Dispense Refill    buPROPion (WELLBUTRIN SR) 200 MG SR12       cholecalciferol, vitamin D3, (VITAMIN D3) 1,000 unit capsule Take 1 capsule (1,000 Units total) by mouth once daily. 100 capsule 12    cyanocobalamin, vitamin B-12, 50 mcg tablet Take 50 mcg by mouth once daily.      DULoxetine (CYMBALTA) 20 MG capsule Take 60 mg by mouth once daily.      DULoxetine (CYMBALTA) 60 MG capsule Take 60 mg by mouth.      lamotrigine (LAMICTAL) 100 MG tablet Take by mouth once daily.      multivitamin capsule Take 1 capsule by mouth once daily.       No facility-administered encounter medications on file as of 7/6/2023.       Objective:     Vital Signs (Most Recent)  Vital Signs  Pulse: 82  Resp: 16  SpO2: 96 %  BP: 124/76  Height and Weight  Height: 5' 4" (162.6 cm)  Weight: 112.6 kg (248 lb 3.8 oz)  BSA (Calculated - sq m): 2.25 sq meters  BMI (Calculated): 42.6  Weight in (lb) to have BMI = 25: 145.3]  Wt Readings from Last 2 Encounters:   07/06/23 112.6 kg (248 lb 3.8 oz)   04/06/23 111.3 kg (245 lb 6 oz)       Physical Exam   Constitutional: She is oriented to person, place, and time. She appears well-developed and well-nourished.   HENT:   Head: Normocephalic.   Mouth/Throat: Mallampati Score: IV.   Neck 16"   Neck: No JVD " present.   Cardiovascular: Normal rate and intact distal pulses.   No murmur heard.  Pulmonary/Chest: Normal expansion, effort normal and breath sounds normal.   Abdominal: Soft. Bowel sounds are normal.   Musculoskeletal:         General: No edema. Normal range of motion.      Cervical back: Normal range of motion and neck supple.   Lymphadenopathy:     She has no axillary adenopathy.   Neurological: She is alert and oriented to person, place, and time. Gait normal.   Skin: Skin is warm and dry.   Psychiatric: She has a normal mood and affect.   Nursing note and vitals reviewed.    Laboratory  Lab Results   Component Value Date    WBC 7.70 11/04/2022    RBC 4.76 11/04/2022    HGB 14.3 11/04/2022    HCT 44.6 11/04/2022    MCV 94 11/04/2022    MCH 30.0 11/04/2022    MCHC 32.1 11/04/2022    RDW 13.2 11/04/2022     11/04/2022    MPV 12.4 11/04/2022    GRAN 4.6 11/04/2022    GRAN 60.1 11/04/2022    LYMPH 2.0 11/04/2022    LYMPH 25.7 11/04/2022    MONO 0.7 11/04/2022    MONO 8.7 11/04/2022    EOS 0.3 11/04/2022    BASO 0.07 11/04/2022    EOSINOPHIL 3.8 11/04/2022    BASOPHIL 0.9 11/04/2022       BMP  Lab Results   Component Value Date     11/04/2022    K 4.4 11/04/2022     11/04/2022    CO2 24 11/04/2022    BUN 16 11/04/2022    CREATININE 1.0 11/04/2022    CALCIUM 9.7 11/04/2022    ANIONGAP 11 11/04/2022    ESTGFRAFRICA >60.0 05/31/2022    EGFRNONAA >60.0 05/31/2022    AST 13 11/04/2022    ALT 17 11/04/2022    PROT 7.2 11/04/2022       No results found for: BNP    Lab Results   Component Value Date    TSH 1.904 11/04/2022       No results found for: SEDRATE    No results found for: CRP  No results found for: IGE     No results found for: ASPERGILLUS  No results found for: AFUMIGATUSCL     No results found for: ACE     Diagnostic Results:  I have personally reviewed today the following studies:  1 night study  MODERATE OBSTRUCTIVE SLEEP APNEA with overall AHI 15.6/hr ( 119 events): night #1  Oxygen  desaturation: 88%. SpO2 between 90% to 94% for 4 hr 2 min.  Patient snored 99% time above 50 .  Heart rate range: 69 bpm - 92 bpm  REC's:  Therapy with APAP at 5-15 cm WP using mask of choice with heated humidification is an option.  Please refer to Sleep Disorder Clinic for management.  Weight loss/management. with regular exercise per direction of physician.  Avoid drowsy driving.  Follow up in sleep clinic to maximize adherence and ensure resolution of symptoms      EXAMINATION:  XR CHEST PA AND LATERAL PRE-OP     CLINICAL HISTORY:  Encounter for other preprocedural examination     TECHNIQUE:  PA and lateral views of the chest were performed.     COMPARISON:  12/12/2007     FINDINGS:  The cardiac and mediastinal silhouettes appear within normal limits.   The lungs are clear bilaterally.  No acute osseous findings demonstrated.     Impression:     No acute findings.     Assessment/Plan:     Problem List Items Addressed This Visit       ADAN (obstructive sleep apnea) - Primary     [place] 15 down to 6  Usage > 4 hrs was 80%  APAP 5-12  AHI 3.3  Using and benefits  Prefers Full face mask     USING AND BENEFITS         Body mass index 40.0-44.9, adult     Patient would benefit from weight loss and has tried to set realistic goals to achieve success. Lifestyle changes were discussed on eating healthy, exercising at least 150 minutes weekly, and reducing sedentary behavior.   Discussed the risk factors associated with obesity: Arthritis/ADAN/Diabetes/Fatty Liver/Cardiovascular disease/GERD/HTN/HLP.          Bruxism     Wears mouth guard           Stable       Follow up in about 1 year (around 7/6/2024), or weight loss, sleep hygiene, Download.    This note was prepared using voice recognition system and is likely to have sound alike errors that may have been overlooked even after proof reading.  Please call me with any questions    Discussed diagnosis, its evaluation, treatment and usual course. All questions  answered.      Parrish Sylvester MD

## 2023-07-06 NOTE — ASSESSMENT & PLAN NOTE
[place] 15 down to 6  Usage > 4 hrs was 80%  APAP 5-12  AHI 3.3  Using and benefits  Prefers Full face mask     USING AND BENEFITS

## 2023-07-06 NOTE — ASSESSMENT & PLAN NOTE
Patient would benefit from weight loss and has tried to set realistic goals to achieve success. Lifestyle changes were discussed on eating healthy, exercising at least 150 minutes weekly, and reducing sedentary behavior.   Discussed the risk factors associated with obesity: Arthritis/ADAN/Diabetes/Fatty Liver/Cardiovascular disease/GERD/HTN/HLP.

## 2023-08-03 ENCOUNTER — PATIENT MESSAGE (OUTPATIENT)
Dept: INTERNAL MEDICINE | Facility: CLINIC | Age: 58
End: 2023-08-03
Payer: OTHER GOVERNMENT

## 2023-08-04 ENCOUNTER — OFFICE VISIT (OUTPATIENT)
Dept: INTERNAL MEDICINE | Facility: CLINIC | Age: 58
End: 2023-08-04
Payer: OTHER GOVERNMENT

## 2023-08-04 ENCOUNTER — LAB VISIT (OUTPATIENT)
Dept: LAB | Facility: HOSPITAL | Age: 58
End: 2023-08-04
Attending: NURSE PRACTITIONER
Payer: OTHER GOVERNMENT

## 2023-08-04 VITALS
DIASTOLIC BLOOD PRESSURE: 90 MMHG | OXYGEN SATURATION: 97 % | HEIGHT: 64 IN | HEART RATE: 86 BPM | WEIGHT: 246.94 LBS | TEMPERATURE: 98 F | BODY MASS INDEX: 42.16 KG/M2 | SYSTOLIC BLOOD PRESSURE: 142 MMHG

## 2023-08-04 DIAGNOSIS — E66.01 OBESITY, MORBID (MORE THAN 100 LBS OVER IDEAL WEIGHT OR BMI > 40): ICD-10-CM

## 2023-08-04 DIAGNOSIS — G47.33 OSA ON CPAP: ICD-10-CM

## 2023-08-04 DIAGNOSIS — R53.83 FATIGUE, UNSPECIFIED TYPE: Primary | ICD-10-CM

## 2023-08-04 DIAGNOSIS — R53.83 FATIGUE, UNSPECIFIED TYPE: ICD-10-CM

## 2023-08-04 DIAGNOSIS — Z11.59 NEED FOR HEPATITIS C SCREENING TEST: ICD-10-CM

## 2023-08-04 DIAGNOSIS — Z98.84 HISTORY OF BARIATRIC SURGERY: ICD-10-CM

## 2023-08-04 LAB
ALBUMIN SERPL BCP-MCNC: 4.2 G/DL (ref 3.5–5.2)
ALP SERPL-CCNC: 73 U/L (ref 55–135)
ALT SERPL W/O P-5'-P-CCNC: 27 U/L (ref 10–44)
ANION GAP SERPL CALC-SCNC: 14 MMOL/L (ref 8–16)
AST SERPL-CCNC: 21 U/L (ref 10–40)
BILIRUB SERPL-MCNC: 0.4 MG/DL (ref 0.1–1)
BUN SERPL-MCNC: 15 MG/DL (ref 6–20)
CALCIUM SERPL-MCNC: 9.8 MG/DL (ref 8.7–10.5)
CHLORIDE SERPL-SCNC: 107 MMOL/L (ref 95–110)
CHOLEST SERPL-MCNC: 222 MG/DL (ref 120–199)
CHOLEST/HDLC SERPL: 4.1 {RATIO} (ref 2–5)
CO2 SERPL-SCNC: 21 MMOL/L (ref 23–29)
CREAT SERPL-MCNC: 0.9 MG/DL (ref 0.5–1.4)
EST. GFR  (NO RACE VARIABLE): >60 ML/MIN/1.73 M^2
ESTIMATED AVG GLUCOSE: 111 MG/DL (ref 68–131)
GLUCOSE SERPL-MCNC: 96 MG/DL (ref 70–110)
HBA1C MFR BLD: 5.5 % (ref 4–5.6)
HDLC SERPL-MCNC: 54 MG/DL (ref 40–75)
HDLC SERPL: 24.3 % (ref 20–50)
INSULIN COLLECTION INTERVAL: 0
INSULIN SERPL-ACNC: 6.5 UU/ML
IRON SERPL-MCNC: 110 UG/DL (ref 30–160)
LDLC SERPL CALC-MCNC: 150 MG/DL (ref 63–159)
NONHDLC SERPL-MCNC: 168 MG/DL
POTASSIUM SERPL-SCNC: 4.7 MMOL/L (ref 3.5–5.1)
PROT SERPL-MCNC: 7.6 G/DL (ref 6–8.4)
SATURATED IRON: 29 % (ref 20–50)
SODIUM SERPL-SCNC: 142 MMOL/L (ref 136–145)
TOTAL IRON BINDING CAPACITY: 383 UG/DL (ref 250–450)
TRANSFERRIN SERPL-MCNC: 259 MG/DL (ref 200–375)
TRIGL SERPL-MCNC: 90 MG/DL (ref 30–150)

## 2023-08-04 PROCEDURE — 99999 PR PBB SHADOW E&M-EST. PATIENT-LVL IV: ICD-10-PCS | Mod: PBBFAC,,, | Performed by: NURSE PRACTITIONER

## 2023-08-04 PROCEDURE — 84425 ASSAY OF VITAMIN B-1: CPT | Performed by: NURSE PRACTITIONER

## 2023-08-04 PROCEDURE — 99214 PR OFFICE/OUTPT VISIT, EST, LEVL IV, 30-39 MIN: ICD-10-PCS | Mod: S$PBB,,, | Performed by: NURSE PRACTITIONER

## 2023-08-04 PROCEDURE — 82746 ASSAY OF FOLIC ACID SERUM: CPT | Performed by: NURSE PRACTITIONER

## 2023-08-04 PROCEDURE — 80053 COMPREHEN METABOLIC PANEL: CPT | Performed by: NURSE PRACTITIONER

## 2023-08-04 PROCEDURE — 80061 LIPID PANEL: CPT | Performed by: NURSE PRACTITIONER

## 2023-08-04 PROCEDURE — 82306 VITAMIN D 25 HYDROXY: CPT | Performed by: NURSE PRACTITIONER

## 2023-08-04 PROCEDURE — 84443 ASSAY THYROID STIM HORMONE: CPT | Performed by: NURSE PRACTITIONER

## 2023-08-04 PROCEDURE — 99214 OFFICE O/P EST MOD 30 MIN: CPT | Mod: PBBFAC,PO | Performed by: NURSE PRACTITIONER

## 2023-08-04 PROCEDURE — 99999 PR PBB SHADOW E&M-EST. PATIENT-LVL IV: CPT | Mod: PBBFAC,,, | Performed by: NURSE PRACTITIONER

## 2023-08-04 PROCEDURE — 83036 HEMOGLOBIN GLYCOSYLATED A1C: CPT | Performed by: NURSE PRACTITIONER

## 2023-08-04 PROCEDURE — 83525 ASSAY OF INSULIN: CPT | Performed by: NURSE PRACTITIONER

## 2023-08-04 PROCEDURE — 85025 COMPLETE CBC W/AUTO DIFF WBC: CPT | Performed by: NURSE PRACTITIONER

## 2023-08-04 PROCEDURE — 86803 HEPATITIS C AB TEST: CPT | Performed by: NURSE PRACTITIONER

## 2023-08-04 PROCEDURE — 82607 VITAMIN B-12: CPT | Performed by: NURSE PRACTITIONER

## 2023-08-04 PROCEDURE — 82728 ASSAY OF FERRITIN: CPT | Performed by: NURSE PRACTITIONER

## 2023-08-04 PROCEDURE — 99214 OFFICE O/P EST MOD 30 MIN: CPT | Mod: S$PBB,,, | Performed by: NURSE PRACTITIONER

## 2023-08-04 PROCEDURE — 84466 ASSAY OF TRANSFERRIN: CPT | Performed by: NURSE PRACTITIONER

## 2023-08-04 RX ORDER — AMOXICILLIN 500 MG
1 CAPSULE ORAL DAILY
COMMUNITY

## 2023-08-04 NOTE — PROGRESS NOTES
"Subjective:       Patient ID: Linette Galo is a 57 y.o. female.    Chief Complaint: Fatigue    57 year old female here for follow up  Tired all the time, lots of fatigue  Tired of being tired  Frustrated that she has gained weight  Started with no carbs in Jan  Only lost 7 pounds  Was walking prior to it getting hot    Had gastric bypass 2008- Dr. Loco  Got down to 167  Highest adult weight 203  On multi vitamin, vitmain d, b12, vitamin c  Tumeric, mirica, fish oil    The only veggie she likes is lettuce    Psych Dr[facility] Keira Ch at [facility]    Fatigue  Associated symptoms include fatigue. Pertinent negatives include no chest pain, chills, coughing, diaphoresis, fever or rash.       BP (!) 142/90   Pulse 86   Temp 98.1 °F (36.7 °C) (Tympanic)   Ht 5' 4" (1.626 m)   Wt 112 kg (246 lb 14.6 oz)   SpO2 97%   BMI 42.38 kg/m²     Review of Systems   Constitutional:  Positive for fatigue. Negative for activity change, appetite change, chills, diaphoresis, fever and unexpected weight change.   HENT: Negative.     Respiratory:  Negative for cough and shortness of breath.    Cardiovascular:  Negative for chest pain, palpitations and leg swelling.   Gastrointestinal: Negative.    Genitourinary: Negative.    Musculoskeletal: Negative.    Skin:  Negative for color change, pallor, rash and wound.   Allergic/Immunologic: Negative for immunocompromised state.   Neurological: Negative.  Negative for dizziness and facial asymmetry.   Hematological:  Negative for adenopathy. Does not bruise/bleed easily.   Psychiatric/Behavioral:  Negative for agitation, behavioral problems and confusion.        Objective:      Physical Exam  Vitals and nursing note reviewed.   Constitutional:       General: She is not in acute distress.     Appearance: Normal appearance. She is well-developed. She is not diaphoretic.   HENT:      Head: Normocephalic and atraumatic.   Cardiovascular:      Rate and Rhythm: Normal rate and regular " rhythm.      Heart sounds: Normal heart sounds. No murmur heard.  Pulmonary:      Effort: Pulmonary effort is normal. No respiratory distress.      Breath sounds: Normal breath sounds.   Musculoskeletal:         General: Normal range of motion.   Skin:     General: Skin is warm and dry.      Findings: No rash.   Neurological:      Mental Status: She is alert.   Psychiatric:         Mood and Affect: Mood normal. Affect is tearful.         Behavior: Behavior normal. Behavior is cooperative.         Thought Content: Thought content normal.         Judgment: Judgment normal.         Assessment:       1. Fatigue, unspecified type    2. ADAN on CPAP    3. History of bariatric surgery    4. Need for hepatitis C screening test    5. Obesity, morbid (more than 100 lbs over ideal weight or BMI > 40)        Plan:       Linette was seen today for fatigue.    Diagnoses and all orders for this visit:    Fatigue, unspecified type  -     CBC Auto Differential; Future  -     Comprehensive Metabolic Panel; Future  -     TSH; Future  -     Lipid Panel; Future  -     Hemoglobin A1C; Future  -     Insulin, Random; Future  -     Ferritin; Future  -     Folate; Future  -     Iron and TIBC; Future  -     Vitamin B1; Future  -     Vitamin B12; Future  -     Vitamin D; Future    ADAN on CPAP    History of bariatric surgery    Need for hepatitis C screening test  -     Hepatitis C Antibody; Future    Obesity, morbid (more than 100 lbs over ideal weight or BMI > 40)      Will get labs today for further evaluation  Bp high today; advised diet lifestyle changes and dash diet  Patient to follow up in 2 weeks for bp check  Continue cpap  Ultimate goal is weight loss  Consider wegovy based on labs

## 2023-08-05 LAB
25(OH)D3+25(OH)D2 SERPL-MCNC: 67 NG/ML (ref 30–96)
BASOPHILS # BLD AUTO: 0.06 K/UL (ref 0–0.2)
BASOPHILS NFR BLD: 1 % (ref 0–1.9)
DIFFERENTIAL METHOD: NORMAL
EOSINOPHIL # BLD AUTO: 0.3 K/UL (ref 0–0.5)
EOSINOPHIL NFR BLD: 4.5 % (ref 0–8)
ERYTHROCYTE [DISTWIDTH] IN BLOOD BY AUTOMATED COUNT: 13.3 % (ref 11.5–14.5)
FERRITIN SERPL-MCNC: 78 NG/ML (ref 20–300)
FOLATE SERPL-MCNC: 15 NG/ML (ref 4–24)
HCT VFR BLD AUTO: 44 % (ref 37–48.5)
HCV AB SERPL QL IA: NORMAL
HGB BLD-MCNC: 14.1 G/DL (ref 12–16)
IMM GRANULOCYTES # BLD AUTO: 0.01 K/UL (ref 0–0.04)
IMM GRANULOCYTES NFR BLD AUTO: 0.2 % (ref 0–0.5)
LYMPHOCYTES # BLD AUTO: 1.9 K/UL (ref 1–4.8)
LYMPHOCYTES NFR BLD: 33.6 % (ref 18–48)
MCH RBC QN AUTO: 29.4 PG (ref 27–31)
MCHC RBC AUTO-ENTMCNC: 32 G/DL (ref 32–36)
MCV RBC AUTO: 92 FL (ref 82–98)
MONOCYTES # BLD AUTO: 0.4 K/UL (ref 0.3–1)
MONOCYTES NFR BLD: 7.1 % (ref 4–15)
NEUTROPHILS # BLD AUTO: 3.1 K/UL (ref 1.8–7.7)
NEUTROPHILS NFR BLD: 53.6 % (ref 38–73)
NRBC BLD-RTO: 0 /100 WBC
PLATELET # BLD AUTO: 201 K/UL (ref 150–450)
PMV BLD AUTO: 12.3 FL (ref 9.2–12.9)
RBC # BLD AUTO: 4.8 M/UL (ref 4–5.4)
TSH SERPL DL<=0.005 MIU/L-ACNC: 1.62 UIU/ML (ref 0.4–4)
VIT B12 SERPL-MCNC: >2000 PG/ML (ref 210–950)
WBC # BLD AUTO: 5.78 K/UL (ref 3.9–12.7)

## 2023-08-09 LAB — VIT B1 BLD-MCNC: 66 UG/L (ref 38–122)

## 2023-08-22 ENCOUNTER — OFFICE VISIT (OUTPATIENT)
Dept: INTERNAL MEDICINE | Facility: CLINIC | Age: 58
End: 2023-08-22
Payer: OTHER GOVERNMENT

## 2023-08-22 VITALS
TEMPERATURE: 98 F | BODY MASS INDEX: 42.91 KG/M2 | WEIGHT: 250 LBS | HEART RATE: 97 BPM | SYSTOLIC BLOOD PRESSURE: 126 MMHG | DIASTOLIC BLOOD PRESSURE: 70 MMHG

## 2023-08-22 DIAGNOSIS — G47.33 OSA (OBSTRUCTIVE SLEEP APNEA): ICD-10-CM

## 2023-08-22 DIAGNOSIS — F32.A DEPRESSION, UNSPECIFIED DEPRESSION TYPE: ICD-10-CM

## 2023-08-22 DIAGNOSIS — F41.9 ANXIETY: Primary | ICD-10-CM

## 2023-08-22 PROCEDURE — 99999 PR PBB SHADOW E&M-EST. PATIENT-LVL III: ICD-10-PCS | Mod: PBBFAC,,, | Performed by: NURSE PRACTITIONER

## 2023-08-22 PROCEDURE — 99214 PR OFFICE/OUTPT VISIT, EST, LEVL IV, 30-39 MIN: ICD-10-PCS | Mod: S$PBB,,, | Performed by: NURSE PRACTITIONER

## 2023-08-22 PROCEDURE — 99213 OFFICE O/P EST LOW 20 MIN: CPT | Mod: PBBFAC,PO | Performed by: NURSE PRACTITIONER

## 2023-08-22 PROCEDURE — 99999 PR PBB SHADOW E&M-EST. PATIENT-LVL III: CPT | Mod: PBBFAC,,, | Performed by: NURSE PRACTITIONER

## 2023-08-22 PROCEDURE — 99214 OFFICE O/P EST MOD 30 MIN: CPT | Mod: S$PBB,,, | Performed by: NURSE PRACTITIONER

## 2023-08-22 NOTE — PROGRESS NOTES
Subjective:       Patient ID: Linette Galo is a 57 y.o. female.    Chief Complaint: Follow-up    Patient here for follow up  Home bp readings are at goal  Working on diet and lifestyle changes  I saw her a few weeks ago for fatigue, just tired of being tired  Using cpap  Caring for 2 kids she has custody of who have special needs, stressful  Wants to have energy for her grandkids  Admits she emotionally eats    Had gastric bypass 2008- Dr. Loco  Got down to 167  Highest adult weight 203  On multi vitamin, vitmain d, b12, vitamin c  Tumeric, mirica, fish oil     The only veggie she likes is lettuce     Psych Dr. Keira Ch at [facility]    Follow-up  Pertinent negatives include no chest pain, chills, coughing, diaphoresis, fatigue, fever or rash.       /70   Pulse 97   Temp 98 °F (36.7 °C) (Tympanic)   Wt 113.4 kg (250 lb)   BMI 42.91 kg/m²     Review of Systems   Constitutional:  Negative for activity change, appetite change, chills, diaphoresis, fatigue, fever and unexpected weight change.   HENT: Negative.     Respiratory:  Negative for cough and shortness of breath.    Cardiovascular:  Negative for chest pain, palpitations and leg swelling.   Gastrointestinal: Negative.    Genitourinary: Negative.    Musculoskeletal: Negative.    Skin:  Negative for color change, pallor, rash and wound.   Allergic/Immunologic: Negative for immunocompromised state.   Neurological: Negative.  Negative for dizziness and facial asymmetry.   Hematological:  Negative for adenopathy. Does not bruise/bleed easily.   Psychiatric/Behavioral:  Negative for agitation, behavioral problems and confusion.        Objective:      Physical Exam  Vitals and nursing note reviewed.   Constitutional:       General: She is not in acute distress.     Appearance: Normal appearance. She is well-developed. She is not diaphoretic.   HENT:      Head: Normocephalic and atraumatic.   Cardiovascular:      Rate and Rhythm: Normal rate  and regular rhythm.      Heart sounds: Normal heart sounds. No murmur heard.  Pulmonary:      Effort: Pulmonary effort is normal. No respiratory distress.      Breath sounds: Normal breath sounds.   Musculoskeletal:         General: Normal range of motion.   Skin:     General: Skin is warm and dry.      Findings: No rash.   Neurological:      Mental Status: She is alert.   Psychiatric:         Mood and Affect: Mood is depressed. Affect is tearful.         Behavior: Behavior normal. Behavior is cooperative.         Thought Content: Thought content normal.         Judgment: Judgment normal.         Assessment:       1. Anxiety    2. Depression, unspecified depression type    3. BMI 40.0-44.9, adult    4. ADAN (obstructive sleep apnea)        Plan:       Linette was seen today for follow-up.    Diagnoses and all orders for this visit:    Anxiety  Depression, unspecified depression type  Stable; followed by Dr. Keira Ch  On cymbalta and lamictal    BMI 40.0-44.9, adult  -     semaglutide, weight loss, 0.25 mg/0.5 mL PnIj; Inject 0.25 mg into the skin every 7 days.  Denies fam hx of medullary thyroid cancer  Discussed diet and lifestyle changes  Increase protein  Drink lots of fluids  Will work on wegovy PA   Plan to follow up with me 1 month after she starts wegovy    ADAN (obstructive sleep apnea)  Stable on cpap

## 2023-08-28 ENCOUNTER — PATIENT MESSAGE (OUTPATIENT)
Dept: INTERNAL MEDICINE | Facility: CLINIC | Age: 58
End: 2023-08-28
Payer: OTHER GOVERNMENT

## 2023-09-11 ENCOUNTER — PATIENT MESSAGE (OUTPATIENT)
Dept: INTERNAL MEDICINE | Facility: CLINIC | Age: 58
End: 2023-09-11
Payer: OTHER GOVERNMENT

## 2023-10-16 ENCOUNTER — PATIENT MESSAGE (OUTPATIENT)
Dept: INTERNAL MEDICINE | Facility: CLINIC | Age: 58
End: 2023-10-16
Payer: OTHER GOVERNMENT

## 2023-11-08 ENCOUNTER — OFFICE VISIT (OUTPATIENT)
Dept: INTERNAL MEDICINE | Facility: CLINIC | Age: 58
End: 2023-11-08
Payer: OTHER GOVERNMENT

## 2023-11-08 DIAGNOSIS — F41.9 ANXIETY: Primary | ICD-10-CM

## 2023-11-08 PROCEDURE — 99214 PR OFFICE/OUTPT VISIT, EST, LEVL IV, 30-39 MIN: ICD-10-PCS | Mod: 95,,, | Performed by: NURSE PRACTITIONER

## 2023-11-08 PROCEDURE — 99214 OFFICE O/P EST MOD 30 MIN: CPT | Mod: 95,,, | Performed by: NURSE PRACTITIONER

## 2023-11-08 RX ORDER — SEMAGLUTIDE 0.5 MG/.5ML
0.5 INJECTION, SOLUTION SUBCUTANEOUS
Qty: 2 ML | Refills: 0 | Status: SHIPPED | OUTPATIENT
Start: 2023-11-08

## 2023-11-08 NOTE — PROGRESS NOTES
Subjective:       Patient ID: Linette Galo is a 58 y.o. female.    Chief Complaint: No chief complaint on file.    The patient location is: home  The chief complaint leading to consultation is: follow up     Visit type: audiovisual    Face to Face time with patient: 15 min  20 minutes of total time spent on the encounter, which includes face to face time and non-face to face time preparing to see the patient (eg, review of tests), Obtaining and/or reviewing separately obtained history, Documenting clinical information in the electronic or other health record, Independently interpreting results (not separately reported) and communicating results to the patient/family/caregiver, or Care coordination (not separately reported).         Each patient to whom he or she provides medical services by telemedicine is:  (1) informed of the relationship between the physician and patient and the respective role of any other health care provider with respect to management of the patient; and (2) notified that he or she may decline to receive medical services by telemedicine and may withdraw from such care at any time.    Notes:     Patient doing virtual for wegovy follow up  Started wegovy 1 month ago  Appetite is down  Weight 236 this am  Was 247 prior to starting wegovy   Able to control her cravings   Getting full faster  Trying to reduce portions as well  Trying to focus on protein  Needs to drink more water  Bowels move fine  Having diarrhea today; ate garlic parmesan wings for lunch yesterday   Walking a little for exercise           There were no vitals taken for this visit.    Review of Systems   Constitutional:  Positive for activity change. Negative for unexpected weight change.   HENT:  Negative for hearing loss, rhinorrhea and trouble swallowing.    Eyes:  Negative for discharge and visual disturbance.   Respiratory:  Negative for chest tightness and wheezing.    Cardiovascular:  Negative for chest pain and  palpitations.   Gastrointestinal:  Negative for blood in stool, constipation, diarrhea and vomiting.   Endocrine: Negative for polydipsia and polyuria.   Genitourinary:  Negative for difficulty urinating, dysuria, hematuria and menstrual problem.   Musculoskeletal:  Negative for arthralgias, joint swelling and neck pain.   Neurological:  Negative for weakness and headaches.   Psychiatric/Behavioral:  Negative for confusion and dysphoric mood.        Objective:      Physical Exam  Constitutional:       General: She is not in acute distress.     Appearance: Normal appearance. She is well-developed. She is not diaphoretic.   HENT:      Head: Normocephalic and atraumatic.      Right Ear: External ear normal.      Left Ear: External ear normal.   Eyes:      General: No scleral icterus.        Right eye: No discharge.         Left eye: No discharge.      Conjunctiva/sclera: Conjunctivae normal.   Pulmonary:      Effort: Pulmonary effort is normal. No tachypnea, accessory muscle usage or respiratory distress.      Breath sounds: No stridor.   Musculoskeletal:      Cervical back: Normal range of motion and neck supple.   Skin:     Findings: No rash.   Neurological:      Mental Status: She is alert. She is not disoriented.   Psychiatric:         Attention and Perception: She is attentive.         Mood and Affect: Mood normal. Mood is not anxious or depressed. Affect is not labile, blunt, angry or inappropriate.         Speech: Speech normal.         Behavior: Behavior normal.         Thought Content: Thought content normal.         Judgment: Judgment normal.         Assessment:       1. Anxiety    2. BMI 40.0-44.9, adult        Plan:       Diagnoses and all orders for this visit:    Anxiety  Stable  Followed by outside psych    BMI 40.0-44.9, adult  -     semaglutide, weight loss, (WEGOVY) 0.5 mg/0.5 mL PnIj; Inject 0.5 mg into the skin every 7 days.  Doing well on wegovy  Discussed 100g protein daily, discussed 120 oz water  daily  Discussed diet and lifestyle changes for weight reduction  Plan for virtual in 1 month

## 2023-11-28 ENCOUNTER — OFFICE VISIT (OUTPATIENT)
Dept: ORTHOPEDICS | Facility: CLINIC | Age: 58
End: 2023-11-28
Payer: OTHER GOVERNMENT

## 2023-11-28 VITALS — HEIGHT: 64 IN | WEIGHT: 250 LBS | BODY MASS INDEX: 42.68 KG/M2

## 2023-11-28 DIAGNOSIS — M18.12 ARTHRITIS OF CARPOMETACARPAL (CMC) JOINT OF LEFT THUMB: Primary | ICD-10-CM

## 2023-11-28 PROCEDURE — 99213 OFFICE O/P EST LOW 20 MIN: CPT | Mod: S$PBB,25,, | Performed by: ORTHOPAEDIC SURGERY

## 2023-11-28 PROCEDURE — 99999PBSHW PR PBB SHADOW TECHNICAL ONLY FILED TO HB: ICD-10-PCS | Mod: PBBFAC,,,

## 2023-11-28 PROCEDURE — 99999 PR PBB SHADOW E&M-EST. PATIENT-LVL III: CPT | Mod: PBBFAC,,, | Performed by: ORTHOPAEDIC SURGERY

## 2023-11-28 PROCEDURE — 20600 SMALL JOINT ASPIRATION/INJECTION: L THUMB CMC: ICD-10-PCS | Mod: S$PBB,LT,, | Performed by: ORTHOPAEDIC SURGERY

## 2023-11-28 PROCEDURE — 99999PBSHW PR PBB SHADOW TECHNICAL ONLY FILED TO HB: Mod: PBBFAC,,,

## 2023-11-28 PROCEDURE — 99213 OFFICE O/P EST LOW 20 MIN: CPT | Mod: PBBFAC | Performed by: ORTHOPAEDIC SURGERY

## 2023-11-28 PROCEDURE — 20600 DRAIN/INJ JOINT/BURSA W/O US: CPT | Mod: PBBFAC,LT | Performed by: ORTHOPAEDIC SURGERY

## 2023-11-28 PROCEDURE — 99999 PR PBB SHADOW E&M-EST. PATIENT-LVL III: ICD-10-PCS | Mod: PBBFAC,,, | Performed by: ORTHOPAEDIC SURGERY

## 2023-11-28 PROCEDURE — 99213 PR OFFICE/OUTPT VISIT, EST, LEVL III, 20-29 MIN: ICD-10-PCS | Mod: S$PBB,25,, | Performed by: ORTHOPAEDIC SURGERY

## 2023-11-28 RX ORDER — TRIAMCINOLONE ACETONIDE 40 MG/ML
40 INJECTION, SUSPENSION INTRA-ARTICULAR; INTRAMUSCULAR
Status: DISCONTINUED | OUTPATIENT
Start: 2023-11-28 | End: 2023-11-28 | Stop reason: HOSPADM

## 2023-11-28 RX ADMIN — TRIAMCINOLONE ACETONIDE 40 MG: 200 INJECTION, SUSPENSION INTRA-ARTICULAR; INTRAMUSCULAR at 11:11

## 2023-11-28 NOTE — PROCEDURES
Small Joint Aspiration/Injection: L thumb CMC    Date/Time: 11/28/2023 11:45 AM    Performed by: Sterling Jade MD  Authorized by: Sterling Jade MD    Consent Done?:  Yes (Verbal)  Indications:  Pain  Site marked: the procedure site was marked    Timeout: prior to procedure the correct patient, procedure, and site was verified    Prep: patient was prepped and draped in usual sterile fashion      Local anesthesia used?: Yes    Local anesthetic:  Lidocaine 2% without epinephrine  Anesthetic total (ml):  0.5    Location:  Thumb  Site:  L thumb CMC  Ultrasonic guidance for needle placement?: No    Needle size:  25 G  Approach:  Dorsal  Medications:  40 mg triamcinolone acetonide 40 mg/mL

## 2023-11-28 NOTE — PROGRESS NOTES
"Subjective:     Patient ID: Linette Galo is a 58 y.o. female.    Chief Complaint: Pain of the Left Hand      HPI:  The patient is a 58-year-old female with left thumb basal joint degenerative joint disease.  She was last injected 12/07/2022 with good results until recently and requests reinjection today    Past Medical History:   Diagnosis Date    ADD (attention deficit disorder)     Anemia     Anxiety     Followed by Psychology    Arthritis     Depression     bipolar, anxiety, ADD    Family history of colon cancer 1/22/2019    Her grandmother had colon cancer.    General anesthetics causing adverse effect in therapeutic use     Patient reports h/o "combativeness", after Gastric and Hysterectomy.     ADAN (obstructive sleep apnea) 11/19/2022    Positive ARIK (antinuclear antibody)     Dr. Ross//following     Past Surgical History:   Procedure Laterality Date    CARPAL TUNNEL RELEASE      both hands    COLONOSCOPY N/A 01/22/2019    Procedure: COLONOSCOPY;  Surgeon: Moe Jimenez MD;  Location: [facility];  Service: Endoscopy;  Laterality: N/A;    GASTRIC BYPASS  2008    HYSTERECTOMY  2011    RALH (retains ovaries)    INTERPOSITION ARTHROPLASTY OF CARPOMETACARPAL JOINTS Right 02/07/2022    Procedure: INTERPOSITION ARTHROPLASTY, CMC JOINT;  Surgeon: Sterling Jade MD;  Location: [facility];  Service: Orthopedics;  Laterality: Right;  right thumb basal joint arthroplasty     ARTHREX - LATRICE AWARE    SURGICAL REMOVAL OF FASCIA Right 06/09/2022    Procedure: FASCIECTOMY;  Surgeon: Sterling Jade MD;  Location: [facility];  Service: Orthopedics;  Laterality: Right;  partial palmar fasciectomy right hand ring finger     TONSILLECTOMY      TRIGGER FINGER RELEASE Right 02/07/2022    Procedure: RELEASE, TRIGGER FINGER;  Surgeon: Sterling Jade MD;  Location: [facility];  Service: Orthopedics;  Laterality: Right;   right ring trigger finger release      Family History   Problem Relation Age of Onset    " Breast cancer Maternal Grandmother     Colon cancer Maternal Grandmother     Hypertension Maternal Grandmother     Diabetes Maternal Grandmother     Parkinsonism Father     Lumbar disc disease Father     Stroke Father     Heart disease Father     Thrombophilia Sister     Hypertension Mother     Diabetes Mother     Hypertension Paternal Grandmother     Diabetes Paternal Grandmother     No Known Problems Brother     No Known Problems Maternal Grandfather     Heart disease Paternal Grandfather      Social History     Socioeconomic History    Marital status:    Tobacco Use    Smoking status: Never    Smokeless tobacco: Never   Substance and Sexual Activity    Alcohol use: Not Currently     Comment: socially    Drug use: No    Sexual activity: Yes     Partners: Male     Birth control/protection: Surgical     Comment: mut monog     Social Determinants of Health     Financial Resource Strain: Low Risk  (11/8/2023)    Overall Financial Resource Strain (CARDIA)     Difficulty of Paying Living Expenses: Not hard at all   Food Insecurity: No Food Insecurity (11/8/2023)    Hunger Vital Sign     Worried About Running Out of Food in the Last Year: Never true     Ran Out of Food in the Last Year: Never true   Transportation Needs: No Transportation Needs (11/8/2023)    PRAPARE - Transportation     Lack of Transportation (Medical): No     Lack of Transportation (Non-Medical): No   Physical Activity: Insufficiently Active (11/8/2023)    Exercise Vital Sign     Days of Exercise per Week: 2 days     Minutes of Exercise per Session: 10 min   Stress: Stress Concern Present (11/8/2023)    [employer] [place] [employer] - Occupational Stress Questionnaire     Feeling of Stress : To some extent   Social Connections: Unknown (11/8/2023)    Social Connection and Isolation Panel [NHANES]     Frequency of Communication with Friends and Family: More than three times a week     Frequency of Social Gatherings with Friends and  Family: Once a week     Active Member of Clubs or Organizations: No     Attends Club or Organization Meetings: Never     Marital Status:    Housing Stability: Low Risk  (11/8/2023)    Housing Stability Vital Sign     Unable to Pay for Housing in the Last Year: No     Number of Places Lived in the Last Year: 1     Unstable Housing in the Last Year: No     Medication List with Changes/Refills   Current Medications    CHOLECALCIFEROL, VITAMIN D3, (VITAMIN D3) 1,000 UNIT CAPSULE    Take 1 capsule (1,000 Units total) by mouth once daily.    CYANOCOBALAMIN, VITAMIN B-12, 50 MCG TABLET    Take 50 mcg by mouth once daily.    DULOXETINE (CYMBALTA) 20 MG CAPSULE    Take 60 mg by mouth once daily.    DULOXETINE (CYMBALTA) 60 MG CAPSULE    Take 60 mg by mouth.    LAMOTRIGINE (LAMICTAL) 100 MG TABLET    Take by mouth once daily.    MULTIVITAMIN CAPSULE    Take 1 capsule by mouth once daily.    OMEGA-3 FATTY ACIDS/FISH OIL (FISH OIL-OMEGA-3 FATTY ACIDS) 300-1,000 MG CAPSULE    Take 1 capsule by mouth once daily.    SEMAGLUTIDE, WEIGHT LOSS, (WEGOVY) 0.5 MG/0.5 ML PNIJ    Inject 0.5 mg into the skin every 7 days.    TUMERIC-GING-OLIVE-OREG-CAPRYL ORAL    Take by mouth.     Review of patient's allergies indicates:  No Known Allergies  Review of Systems   Constitutional: Positive for malaise/fatigue.   HENT:  Negative for hearing loss.    Eyes:  Negative for double vision and visual disturbance.   Cardiovascular:  Negative for chest pain.   Respiratory:  Positive for sleep disturbances due to breathing. Negative for shortness of breath.    Endocrine: Negative for cold intolerance.   Hematologic/Lymphatic: Does not bruise/bleed easily.   Skin:  Negative for poor wound healing and suspicious lesions.   Musculoskeletal:  Positive for arthritis, joint pain and joint swelling. Negative for gout.   Gastrointestinal:  Negative for nausea and vomiting.   Genitourinary:  Negative for dysuria.   Neurological:  Negative for numbness,  paresthesias and sensory change.   Psychiatric/Behavioral:  Positive for depression. Negative for memory loss and substance abuse. The patient is nervous/anxious.    Allergic/Immunologic: Negative for persistent infections.       Objective:   Body mass index is 42.91 kg/m².  There were no vitals filed for this visit.             General    Constitutional: She is oriented to person, place, and time. She appears well-developed and well-nourished. No distress.   HENT:   Head: Normocephalic.   Eyes: EOM are normal.   Pulmonary/Chest: Effort normal.   Neurological: She is oriented to person, place, and time.   Psychiatric: She has a normal mood and affect.         Left Hand/Wrist Exam     Inspection   Scars: Wrist - absent Hand -  absent  Effusion: Wrist - absent Hand -  absent    Pain   Wrist - The patient exhibits pain of the CMC.    Other     Sensory Exam  Median Distribution: normal  Ulnar Distribution: normal  Radial Distribution: normal    Comments:  The patient has tenderness basal joint left thumb with a positive axial circumduction grind test          Vascular Exam       Capillary Refill  Left Hand: normal capillary refill           radiographs were not obtained today  Assessment:     Encounter Diagnosis   Name Primary?    Arthritis of carpometacarpal (CMC) joint of left thumb Yes        Plan:       The patient injected left thumb basal joint with 0.5 cc Kenalog and 0.5 cc 2% plain lidocaine under sterile technique.  She will wait at least 3 months between injections.              Disclaimer: This note was prepared using a voice recognition system and is likely to have sound alike errors within the text.

## 2023-12-05 ENCOUNTER — PATIENT MESSAGE (OUTPATIENT)
Dept: INTERNAL MEDICINE | Facility: CLINIC | Age: 58
End: 2023-12-05
Payer: OTHER GOVERNMENT

## 2023-12-05 RX ORDER — SEMAGLUTIDE 0.25 MG/.5ML
0.25 INJECTION, SOLUTION SUBCUTANEOUS
Qty: 4 EACH | Refills: 3 | Status: SHIPPED | OUTPATIENT
Start: 2023-12-05

## 2023-12-22 ENCOUNTER — PATIENT MESSAGE (OUTPATIENT)
Dept: INTERNAL MEDICINE | Facility: CLINIC | Age: 58
End: 2023-12-22
Payer: OTHER GOVERNMENT

## 2024-01-19 ENCOUNTER — PATIENT MESSAGE (OUTPATIENT)
Dept: INTERNAL MEDICINE | Facility: CLINIC | Age: 59
End: 2024-01-19
Payer: OTHER GOVERNMENT

## 2024-02-01 ENCOUNTER — PATIENT MESSAGE (OUTPATIENT)
Dept: INTERNAL MEDICINE | Facility: CLINIC | Age: 59
End: 2024-02-01
Payer: OTHER GOVERNMENT

## 2025-02-18 ENCOUNTER — PATIENT MESSAGE (OUTPATIENT)
Dept: SLEEP MEDICINE | Facility: CLINIC | Age: 60
End: 2025-02-18
Payer: OTHER GOVERNMENT

## 2025-03-05 ENCOUNTER — OFFICE VISIT (OUTPATIENT)
Dept: PULMONOLOGY | Facility: CLINIC | Age: 60
End: 2025-03-05
Payer: OTHER GOVERNMENT

## 2025-03-05 VITALS
WEIGHT: 175.5 LBS | OXYGEN SATURATION: 99 % | SYSTOLIC BLOOD PRESSURE: 120 MMHG | DIASTOLIC BLOOD PRESSURE: 72 MMHG | BODY MASS INDEX: 29.96 KG/M2 | HEART RATE: 75 BPM | HEIGHT: 64 IN

## 2025-03-05 DIAGNOSIS — G47.33 OSA (OBSTRUCTIVE SLEEP APNEA): Primary | ICD-10-CM

## 2025-03-05 PROCEDURE — 99999 PR PBB SHADOW E&M-EST. PATIENT-LVL IV: CPT | Mod: PBBFAC,,, | Performed by: STUDENT IN AN ORGANIZED HEALTH CARE EDUCATION/TRAINING PROGRAM

## 2025-03-05 PROCEDURE — 99213 OFFICE O/P EST LOW 20 MIN: CPT | Mod: S$PBB,,, | Performed by: STUDENT IN AN ORGANIZED HEALTH CARE EDUCATION/TRAINING PROGRAM

## 2025-03-05 PROCEDURE — 99214 OFFICE O/P EST MOD 30 MIN: CPT | Mod: PBBFAC,PO | Performed by: STUDENT IN AN ORGANIZED HEALTH CARE EDUCATION/TRAINING PROGRAM

## 2025-03-05 NOTE — PATIENT INSTRUCTIONS
Your Provider has placed an order for CPAP with [facility]). A respiratory therapist or other staff within [facility] will reach out to you to get more information and speak with you about insurance, costs and next steps. Please be advised that this call may come from a ([phone] area code. Please be sure to answer unfamiliar numbers as this may be [facility] and your order can not be processed until [facility] has gotten in touch with you. If you have any questions or concerns please reach out to us. For specific CPAP equipment status update please reach out directly to [facility]. A contact list has been provided for you below.  [facility] 1st Contact : [phone]  [facility] Main Office: [phone]  Fax: [phone]

## 2025-05-20 ENCOUNTER — HOSPITAL ENCOUNTER (OUTPATIENT)
Dept: RADIOLOGY | Facility: HOSPITAL | Age: 60
Discharge: HOME | End: 2025-05-20
Attending: FAMILY MEDICINE
Payer: OTHER GOVERNMENT

## 2025-05-20 DIAGNOSIS — Z12.31 ENCOUNTER FOR SCREENING MAMMOGRAM FOR BREAST CANCER: ICD-10-CM

## 2025-05-20 PROCEDURE — 77063 BREAST TOMOSYNTHESIS BI: CPT | Mod: TC,PN

## 2025-05-20 PROCEDURE — 77067 SCR MAMMO BI INCL CAD: CPT | Mod: 26,,, | Performed by: STUDENT IN AN ORGANIZED HEALTH CARE EDUCATION/TRAINING PROGRAM

## 2025-05-20 PROCEDURE — 77063 BREAST TOMOSYNTHESIS BI: CPT | Mod: 26,,, | Performed by: STUDENT IN AN ORGANIZED HEALTH CARE EDUCATION/TRAINING PROGRAM

## (undated) DEVICE — TOWEL OR DISP STRL BLUE 4/PK

## (undated) DEVICE — SUPPORT ULNA NERVE PROTECTOR

## (undated) DEVICE — UNDERGLOVES BIOGEL PI SIZE 8.5

## (undated) DEVICE — SEE MEDLINE ITEM 157131

## (undated) DEVICE — DRESSING XEROFORM FOIL PK 1X8

## (undated) DEVICE — ALCOHOL 70% ISOP RUBBING 4OZ

## (undated) DEVICE — APPLICATOR CHLORAPREP ORN 26ML

## (undated) DEVICE — GLOVE SURGICAL LATEX SZ 6.5

## (undated) DEVICE — NDL SAFETY 25G X 1.5 ECLIPSE

## (undated) DEVICE — BANDAGE ESMARK ELASTIC ST 4X9

## (undated) DEVICE — SYR 10CC LUER LOCK

## (undated) DEVICE — GLOVE SURGICAL LATEX SZ 7

## (undated) DEVICE — SOL 9P NACL IRR PIC IL

## (undated) DEVICE — SEE MEDLINE ITEM 157173

## (undated) DEVICE — SCRUB HIBICLENS 4% CHG 4OZ

## (undated) DEVICE — DRAPE PLASTIC U 60X72

## (undated) DEVICE — BANDAGE ELASTIC 3X5 VELCRO ST

## (undated) DEVICE — PAD CAST SPECIALIST STRL 3

## (undated) DEVICE — GOWN SURG 2XL DISP TIE BACK

## (undated) DEVICE — GAUZE SPONGE 4X4 12PLY

## (undated) DEVICE — POSITIONER HEAD DONUT 9IN FOAM

## (undated) DEVICE — SEE MEDLINE ITEM 157027

## (undated) DEVICE — COVER LIGHT HANDLE 80/CA

## (undated) DEVICE — UNDERGLOVE BIOGEL PI SZ 6.5 LF

## (undated) DEVICE — TOURNIQUET SB QC SP 24X4IN

## (undated) DEVICE — SUT 4-0 ETHILON 18 PS-2

## (undated) DEVICE — GLOVE BIOGEL SZ 8 1/2

## (undated) DEVICE — UNDERGLOVES BIOGEL PI SIZE 7.5

## (undated) DEVICE — UNDERGLOVES BIOGEL PI SZ 7 LF

## (undated) DEVICE — PAD ABD 8X10 STERILE

## (undated) DEVICE — COVER CAMERA OPERATING ROOM

## (undated) DEVICE — SEE MEDLINE ITEM 157117

## (undated) DEVICE — TOURNIQUET SB QC DP 18X4IN